# Patient Record
Sex: MALE | Race: ASIAN | NOT HISPANIC OR LATINO | Employment: OTHER | URBAN - METROPOLITAN AREA
[De-identification: names, ages, dates, MRNs, and addresses within clinical notes are randomized per-mention and may not be internally consistent; named-entity substitution may affect disease eponyms.]

---

## 2017-01-31 ENCOUNTER — CARE COORDINATION (OUTPATIENT)
Dept: GERIATRIC MEDICINE | Facility: CLINIC | Age: 67
End: 2017-01-31

## 2017-02-27 ENCOUNTER — CARE COORDINATION (OUTPATIENT)
Dept: GERIATRIC MEDICINE | Facility: CLINIC | Age: 67
End: 2017-02-27

## 2017-02-27 NOTE — PROGRESS NOTES
2/15/17: Cm placed call to member for 6 mo call. No answer - unable to leave .      Viki Avalos RN, PHN  [facility]

## 2017-02-27 NOTE — PROGRESS NOTES
1/31/17: TAN called member with Italian  for 6 mo call.  No answer.     Viki Avalos RN, N  [facility]

## 2017-02-27 NOTE — PROGRESS NOTES
"Per CM, mailed client an \"Unable to Contact\" letter.    Andie Meyer  Case Management Specialist   [facility]   [phone]    "

## 2017-02-27 NOTE — PROGRESS NOTES
2/27/17: TAN placed call for 6 mo call with Alber Shea  with [employer] line.  No answer - no voice mail set up. Will send [facility] letter.     Viki Avalos RN, N  [facility]

## 2017-03-07 ENCOUNTER — CARE COORDINATION (OUTPATIENT)
Dept: GERIATRIC MEDICINE | Facility: CLINIC | Age: 67
End: 2017-03-07

## 2017-03-07 NOTE — PROGRESS NOTES
3/7/17: Received call back from member and son, Noe, in response to 6 mo [facility] letter.   Member is doing well and has no needs - he is independent per son and can care for himself.    [facility] Six-Month Telephone Assessment    6 month telephone assessment completed on 3/7/17.    ER visits: No  Hospitalizations: No  TCU stays: No  Significant health status changes: None  Falls/Injuries: No  ADL/IADL changes: No  Changes in services: No    Caregiver Assessment follow up:  N/A    Goals: See POC in chart for goal progress documentation.      Will see client in 6 months for an annual health risk assessment.   Encouraged client to call CM with any questions or concerns in the meantime.     Viki Avalos RN, PHN  [facility]

## 2017-04-17 DIAGNOSIS — E11.9 TYPE 2 DIABETES MELLITUS WITH HEMOGLOBIN A1C GOAL OF LESS THAN 8.0% (H): ICD-10-CM

## 2017-04-17 NOTE — TELEPHONE ENCOUNTER
Routing refill request to provider for review/approval because:  Labs out of range:  Creatinine and GFR  Xin Marie RN

## 2017-04-17 NOTE — TELEPHONE ENCOUNTER
metFORMIN (GLUCOPHAGE) 500 MG tablet         Last Written Prescription Date: 03/02/16  Last Fill Quantity: 180, # refills: 3  Last Office Visit with [facility], [facility] or  Health prescribing provider:  11/16/16   Next 5 appointments (look out 90 days)     May 16, 2017  3:20 PM CDT   [facility] Visit with Kimberly Bell MD   [facility]    [address] [place] [id number] [phone]                   BP Readings from Last 3 Encounters:   11/16/16 112/65   03/02/16 120/78   02/25/16 139/77     Lab Results   Component Value Date    MICROL 41 11/16/2016     Lab Results   Component Value Date    UMALCR 9.74 11/16/2016     Creatinine   Date Value Ref Range Status   11/16/2016 1.40 (H) 0.66 - 1.25 mg/dL Final   ]  GFR Estimate   Date Value Ref Range Status   11/16/2016 50 (L) >60 mL/min/1.7m2 Final     Comment:     Non  GFR Calc   11/05/2015 70 >60 mL/min/1.7m2 Final     Comment:     Non  GFR Calc   06/15/2015 67 >60 mL/min/1.7m2 Final     Comment:     Non  GFR Calc     GFR Estimate If Black   Date Value Ref Range Status   11/16/2016 61 >60 mL/min/1.7m2 Final     Comment:      GFR Calc   11/05/2015 84 >60 mL/min/1.7m2 Final     Comment:      GFR Calc   06/15/2015 82 >60 mL/min/1.7m2 Final     Comment:      GFR Calc     Lab Results   Component Value Date    CHOL 121 11/05/2015     Lab Results   Component Value Date    HDL 41 11/05/2015     Lab Results   Component Value Date    LDL 91 11/16/2016    LDL 67 11/05/2015     Lab Results   Component Value Date    TRIG 63 11/05/2015     Lab Results   Component Value Date    CHOLHDLRATIO 3.0 11/05/2015     Lab Results   Component Value Date    AST 35 01/14/2014     Lab Results   Component Value Date    ALT 52 01/14/2014     Lab Results   Component Value Date    A1C 7.4 11/16/2016    A1C 6.3 02/23/2016    A1C 6.5 11/05/2015     A1C 6.9 06/15/2015    A1C 6.2 02/09/2015     Potassium   Date Value Ref Range Status   11/16/2016 4.0 3.4 - 5.3 mmol/L Final         [facility]

## 2017-04-21 ENCOUNTER — TRANSFERRED RECORDS (OUTPATIENT)
Dept: HEALTH INFORMATION MANAGEMENT | Facility: CLINIC | Age: 67
End: 2017-04-21

## 2017-05-09 ENCOUNTER — RADIANT APPOINTMENT (OUTPATIENT)
Dept: GENERAL RADIOLOGY | Facility: CLINIC | Age: 67
End: 2017-05-09
Payer: COMMERCIAL

## 2017-05-09 ENCOUNTER — OFFICE VISIT (OUTPATIENT)
Dept: FAMILY MEDICINE | Facility: CLINIC | Age: 67
End: 2017-05-09
Payer: COMMERCIAL

## 2017-05-09 VITALS
HEART RATE: 62 BPM | OXYGEN SATURATION: 97 % | BODY MASS INDEX: 28.17 KG/M2 | WEIGHT: 165 LBS | TEMPERATURE: 97.1 F | DIASTOLIC BLOOD PRESSURE: 79 MMHG | HEIGHT: 64 IN | SYSTOLIC BLOOD PRESSURE: 137 MMHG

## 2017-05-09 DIAGNOSIS — M62.838 TRAPEZIUS MUSCLE SPASM: ICD-10-CM
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
## 2017-05-09 DIAGNOSIS — I10 HTN, GOAL BELOW 150/90: ICD-10-CM

## 2017-05-09 DIAGNOSIS — Z13.6 SCREENING FOR AAA (ABDOMINAL AORTIC ANEURYSM): ICD-10-CM

## 2017-05-09 DIAGNOSIS — E11.49 TYPE 2 DIABETES MELLITUS WITH NEUROLOGICAL MANIFESTATIONS, CONTROLLED (H): ICD-10-CM
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
## 2017-05-09 DIAGNOSIS — M54.2 CERVICALGIA: ICD-10-CM

## 2017-05-09 DIAGNOSIS — M54.50 ACUTE BILATERAL LOW BACK PAIN WITHOUT SCIATICA: ICD-10-CM

## 2017-05-09 LAB
ANION GAP SERPL CALCULATED.3IONS-SCNC: 5 MMOL/L (ref 3–14)
BUN SERPL-MCNC: 20 MG/DL (ref 7–30)
CALCIUM SERPL-MCNC: 9.5 MG/DL (ref 8.5–10.1)
CHLORIDE SERPL-SCNC: 105 MMOL/L (ref 94–109)
CO2 SERPL-SCNC: 30 MMOL/L (ref 20–32)
CREAT SERPL-MCNC: 1.19 MG/DL (ref 0.66–1.25)
GFR SERPL CREATININE-BSD FRML MDRD: 61 ML/MIN/1.7M2
GLUCOSE SERPL-MCNC: 89 MG/DL (ref 70–99)
HBA1C MFR BLD: 8.1 % (ref 4.3–6)
POTASSIUM SERPL-SCNC: 4.1 MMOL/L (ref 3.4–5.3)
SODIUM SERPL-SCNC: 140 MMOL/L (ref 133–144)

## 2017-05-09 PROCEDURE — 99214 OFFICE O/P EST MOD 30 MIN: CPT | Performed by: PREVENTIVE MEDICINE

## 2017-05-09 PROCEDURE — 36415 COLL VENOUS BLD VENIPUNCTURE: CPT | Performed by: PREVENTIVE MEDICINE

## 2017-05-09 PROCEDURE — 99213 OFFICE O/P EST LOW 20 MIN: CPT | Performed by: PREVENTIVE MEDICINE

## 2017-05-09 PROCEDURE — 80048 BASIC METABOLIC PNL TOTAL CA: CPT | Performed by: PREVENTIVE MEDICINE

## 2017-05-09 PROCEDURE — 72100 X-RAY EXAM L-S SPINE 2/3 VWS: CPT

## 2017-05-09 PROCEDURE — 83036 HEMOGLOBIN GLYCOSYLATED A1C: CPT | Performed by: PREVENTIVE MEDICINE

## 2017-05-09 PROCEDURE — 72040 X-RAY EXAM NECK SPINE 2-3 VW: CPT

## 2017-05-09 RX ORDER — METHOCARBAMOL 500 MG/1
1000 TABLET, FILM COATED ORAL 3 TIMES DAILY PRN
Qty: 30 TABLET | Refills: 0 | Status: SHIPPED | OUTPATIENT
Start: 2017-05-09 | End: 2018-01-17

## 2017-05-09 ASSESSMENT — PAIN SCALES - GENERAL: PAINLEVEL: MODERATE PAIN (5)
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
## 2017-05-09 NOTE — PROGRESS NOTES
Please send a letter:    Dear Wolf Sen,    X rays of the low back did not show any acute changes. Mild arthritis was seen. Plan of care and follow up as discussed in clinic.     Regards,    Kimberly Bell MD MPH

## 2017-05-09 NOTE — PATIENT INSTRUCTIONS
At [facility], we strive to deliver an exceptional experience to you, every time we see you.    If you receive a survey in the mail, please send us back your thoughts. We really do value your feedback.    Thank you for visiting [facility]    Normal or non-critical lab and imaging results will be communicated to you by MyChart, letter or phone within 7 days.  If you do not hear from us within 10 days, please call the clinic. If you have a critical or abnormal lab result, we will notify you by phone as soon as possible.     If you have any questions regarding your visit please contact:     Team [employer]  Clinic Hours Telephone Number   Dr. Wilbur Nguyen   7am-7pm  Monday through Thursday  7am-5pm Friday [phone]  Uma HERRING RN   Pharmacy 8:30am-9pm Monday-Friday    9am-5pm Saturday-Sunday [phone]   Urgent Care 11am-9pm Monday-Friday        9am-5pm Saturday-Sunday [phone]     After hours, weekend or if you need to make an appointment with your primary provider please call [phone].   After Hours nurse advise: call [facility]: [phone]    Use [employer] (secure email communication and access to your chart) to send your primary care provider a message or make an appointment. Ask someone on your Team how to sign up for [employer]. To log on to [employer] or for more information in [place] please visit the website at [email or web address].   As of October 8, 2013, all password changes, disabled accounts, or ID changes in [employer]/MyHealth will be done by our Access Services Department.   If you need help with your account or password, call: [phone]. Clinic staff no longer has the ability to change passwords.

## 2017-05-09 NOTE — MR AVS SNAPSHOT
After Visit Summary   5/9/2017    Wolf Sen    MRN: [id number]           Patient Information     Date Of Birth          1950        Visit Information        Provider Department      5/9/2017 11:30 AM Kimberly Bell MD; [employer] [facility]        Today's Diagnoses     Type 2 diabetes mellitus with neurological manifestations, controlled (H)    -  1    HTN, goal below 150/90        Screening for AAA (abdominal aortic aneurysm)        Cervicalgia        Acute bilateral low back pain without sciatica          Care Instructions    At [facility], we strive to deliver an exceptional experience to you, every time we see you.    If you receive a survey in the mail, please send us back your thoughts. We really do value your feedback.    Thank you for visiting [facility]    Normal or non-critical lab and imaging results will be communicated to you by MyChart, letter or phone within 7 days.  If you do not hear from us within 10 days, please call the clinic. If you have a critical or abnormal lab result, we will notify you by phone as soon as possible.     If you have any questions regarding your visit please contact:     Team Sue/Spirit  Clinic Hours Telephone Number   Dr. Wilbur Nguyen   7am-7pm  Monday through Thursday  7am-5pm Friday [phone]  Uma HERRING RN   Pharmacy 8:30am-9pm Monday-Friday    9am-5pm Saturday-Sunday [phone]   Urgent Care 11am-9pm Monday-Friday        9am-5pm Saturday-Sunday [phone]     After hours, weekend or if you need to make an appointment with your primary provider please call [phone].   After Hours nurse advise: call [facility]: [phone]    Use Pingpigeon (secure email communication and access to your chart) to send your primary care provider a message  or make an appointment. Ask someone on your Team how to sign up for [employer]. To log on to SnappCloud or for more information in Dinomarket please visit the website at [email or web address].   As of October 8, 2013, all password changes, disabled accounts, or ID changes in [employer]/MyHealth will be done by our Access Services Department.   If you need help with your account or password, call: [phone]. Clinic staff no longer has the ability to change passwords.           Follow-ups after your visit        Your next 10 appointments already scheduled     May 16, 2017  3:15 PM CDT   [facility] Kimberly Bell MD   [facility]    [address] [place] [id number] [phone]           Bring a current list of meds and any records pertaining to this visit.  For Physicals, please bring immunization records and any forms needing to be filled out.  Please arrive 10 minutes early to complete paperwork.              Who to contact     If you have questions or need follow up information about today's clinic visit or your schedule please contact [facility] directly at [phone].  Normal or non-critical lab and imaging results will be communicated to you by NovoEDhart, letter or phone within 4 business days after the clinic has received the results. If you do not hear from us within 7 days, please contact the clinic through [employer]ne[employer] System[employer] or phone. If you have a critical or abnormal lab result, we will notify you by phone as soon as possible.  Submit refill requests through [employer] or call your pharmacy and they will forward the refill request to us. Please allow 3 business days for your refill to be completed.          Additional Information About Your Visit        [employer] Information     [employer] lets you send messages to your doctor, view your test results, renew your prescriptions, schedule appointments and more. To sign up, go to  [email or web address] . Click on \"Log in\" on the left side of the screen, which will take you to the Welcome page. Then click on \"Sign up Now\" on the right side of the page.     You will be asked to enter the access code listed below, as well as some personal information. Please follow the directions to create your username and password.     Your access code is: [email or web address]  Expires: 2017 12:12 PM     Your access code will  in 90 days. If you need help or a new code, please call your [facility] clinic or [phone].        Care EveryWhere ID     This is your Care EveryWhere ID. This could be used by other organizations to access your [facility] medical records  [id number]        Your Vitals Were     Pulse Temperature Height Pulse Oximetry BMI (Body Mass Index)       62 97.1  F (36.2  C) (Oral) 5' 4\" (1.626 m) 97% 28.32 kg/m2        Blood Pressure from Last 3 Encounters:   17 137/79   16 112/65   16 120/78    Weight from Last 3 Encounters:   17 165 lb (74.8 kg)   16 166 lb (75.3 kg)   16 160 lb (72.6 kg)              We Performed the Following     Basic metabolic panel     HEMOGLOBIN A1C        Primary Care Provider Office Phone # Fax #    Kimberly Bell [phone]       [facility] [address]  [facility] [address]        Thank you!     Thank you for choosing [facility]  for your care. Our goal is always to provide you with excellent care. Hearing back from our patients is one way we can continue to improve our services. Please take a few minutes to complete the written survey that you may receive in the mail after your visit with us. Thank you!             Your Updated Medication List - Protect others around you: Learn how to safely use, store and throw away your medicines at www.disposemymeds.org.          This list is accurate as of: 17 12:12 PM.  Always use your most recent med list.                   " Brand Name Dispense Instructions for use    amLODIPine 10 MG tablet    NORVASC    90 tablet    Take 1 tablet (10 mg) by mouth daily       ASPIRIN PO      Take 81 mg by mouth.       atorvastatin 40 MG tablet    LIPITOR    90 tablet    TAKE 1 TABLET BY MOUTH EVERY DAY       blood glucose monitoring test strip    EMIL CONTOUR NEXT    100 each    USE TO TEST BLOOD SUGARS 3 TIMES DAILY OR AS DIRECTED.       insulin aspart 100 UNIT/ML injection    NovoLOG FLEXPEN    15 mL    INJECT 5 UNITS UNDER THE SKIN THREE TIMES DAILY WITH MEALS       insulin glargine 100 UNIT/ML injection    LANTUS SOLOSTAR    3 Month    10 units in the evening       insulin pen needle 31G X 8 MM    B-D U/F    100 each    USE AS DIRECTED THREE TIMES DAILY       ketotifen 0.025 % Soln ophthalmic solution    ZADITOR    1 Bottle    Place 1 drop into both eyes 2 times daily as needed for itching       lisinopril 40 MG tablet    PRINIVIL/ZESTRIL    90 tablet    TAKE 1 TABLET BY MOUTH EVERY DAY       metFORMIN 500 MG tablet    GLUCOPHAGE    180 tablet    TAKE 1 TABLET BY MOUTH TWICE DAILY WITH MEALS       metoprolol 100 MG tablet    LOPRESSOR    180 tablet    TAKE 1 TABLET BY MOUTH TWICE DAILY       moxifloxacin 0.5 % ophthalmic solution    VIGAMOX    1 Bottle    Place 1 drop into the right eye 3 times daily       multivitamin, therapeutic with minerals Tabs tablet     100 tablet    Take 1 tablet by mouth daily       order for DME     1 Units    Equipment being ordered: Rolling walker with seat       order for DME     3 Month    Equipment being ordered: Lancets for use with Lantus pen (once at night) and Novolog Flex Pen (three times a day).  Dispense 3 month supply with one refill       SEROQUEL  MG 24 hr tablet   Generic drug:  QUEtiapine

## 2017-05-16 ENCOUNTER — OFFICE VISIT (OUTPATIENT)
Dept: FAMILY MEDICINE | Facility: CLINIC | Age: 67
End: 2017-05-16
Payer: COMMERCIAL

## 2017-05-16 VITALS
TEMPERATURE: 97.6 F | WEIGHT: 165 LBS | SYSTOLIC BLOOD PRESSURE: 125 MMHG | HEART RATE: 60 BPM | DIASTOLIC BLOOD PRESSURE: 73 MMHG | BODY MASS INDEX: 28.17 KG/M2 | HEIGHT: 64 IN | OXYGEN SATURATION: 96 %

## 2017-05-16 DIAGNOSIS — E11.65 TYPE 2 DIABETES MELLITUS WITH HYPERGLYCEMIA, WITH LONG-TERM CURRENT USE OF INSULIN (H): ICD-10-CM

## 2017-05-16 DIAGNOSIS — E11.9 TYPE 2 DIABETES MELLITUS WITH HEMOGLOBIN A1C GOAL OF LESS THAN 8.0% (H): ICD-10-CM

## 2017-05-16 DIAGNOSIS — E78.5 HYPERLIPIDEMIA LDL GOAL <70: ICD-10-CM

## 2017-05-16 DIAGNOSIS — I10 HYPERTENSION GOAL BP (BLOOD PRESSURE) < 150/90: ICD-10-CM

## 2017-05-16 DIAGNOSIS — Z79.4 TYPE 2 DIABETES MELLITUS WITH HYPERGLYCEMIA, WITH LONG-TERM CURRENT USE OF INSULIN (H): ICD-10-CM

## 2017-05-16 DIAGNOSIS — Z13.89 SCREENING FOR DIABETIC PERIPHERAL NEUROPATHY: ICD-10-CM

## 2017-05-16 DIAGNOSIS — E11.49 TYPE 2 DIABETES MELLITUS WITH NEUROLOGICAL MANIFESTATIONS, CONTROLLED (H): Primary | ICD-10-CM

## 2017-05-16 PROCEDURE — 99214 OFFICE O/P EST MOD 30 MIN: CPT | Performed by: PREVENTIVE MEDICINE

## 2017-05-16 PROCEDURE — 99207 C FOOT EXAM  NO CHARGE: CPT | Performed by: PREVENTIVE MEDICINE

## 2017-05-16 RX ORDER — METOPROLOL TARTRATE 100 MG
100 TABLET ORAL 2 TIMES DAILY
Qty: 180 TABLET | Refills: 1 | Status: SHIPPED | OUTPATIENT
Start: 2017-05-16 | End: 2017-09-26

## 2017-05-16 ASSESSMENT — PAIN SCALES - GENERAL: PAINLEVEL: NO PAIN (0)

## 2017-05-16 NOTE — PATIENT INSTRUCTIONS
At [facility], we strive to deliver an exceptional experience to you, every time we see you.    If you receive a survey in the mail, please send us back your thoughts. We really do value your feedback.    Thank you for visiting [facility]    Normal or non-critical lab and imaging results will be communicated to you by MyChart, letter or phone within 7 days.  If you do not hear from us within 10 days, please call the clinic. If you have a critical or abnormal lab result, we will notify you by phone as soon as possible.     If you have any questions regarding your visit please contact:     Team Sue/Spirit  Clinic Hours Telephone Number   Dr. Wilbur Nguyen   7am-7pm  Monday through Thursday  7am-5pm Friday [phone]  Uma HERRING RN   Pharmacy 8:30am-9pm Monday-Friday    9am-5pm Saturday-Sunday [phone]   Urgent Care 11am-9pm Monday-Friday        9am-5pm Saturday-Sunday [phone]     After hours, weekend or if you need to make an appointment with your primary provider please call [phone].   After Hours nurse advise: call [place] [facility]: [phone]    Use [employer] Management[employer] (secure email communication and access to your chart) to send your primary care provider a message or make an appointment. Ask someone on your Team how to sign up for [employer]ry[employer]. To log on to VideoBurst or for more information in wildcraft please visit the website at [email or web address].   As of October 8, 2013, all password changes, disabled accounts, or ID changes in MerryMarry/MyHealth will be done by our Access Services Department.   If you need help with your account or password, call: [phone]. Clinic staff no longer has the ability to change passwords.
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
## 2017-05-16 NOTE — MR AVS SNAPSHOT
After Visit Summary   5/16/2017    Wolf Sen    MRN: [id number]           Patient Information     Date Of Birth          1950        Visit Information        Provider Department      5/16/2017 3:15 PM Kimberly Bell MD; [employer] [facility]        Today's Diagnoses     Screening for diabetic peripheral neuropathy        Need for prophylactic vaccination against Streptococcus pneumoniae (pneumococcus)        Hypertension goal BP (blood pressure) < 150/90        Type 2 diabetes mellitus with hemoglobin A1c goal of less than 8.0% (H)          Care Instructions    At [facility], we strive to deliver an exceptional experience to you, every time we see you.    If you receive a survey in the mail, please send us back your thoughts. We really do value your feedback.    Thank you for visiting [facility]    Normal or non-critical lab and imaging results will be communicated to you by MyChart, letter or phone within 7 days.  If you do not hear from us within 10 days, please call the clinic. If you have a critical or abnormal lab result, we will notify you by phone as soon as possible.     If you have any questions regarding your visit please contact:     Team Sue[employer]Spirit  Clinic Hours Telephone Number   Dr. Wilbur Nguyen   7am-7pm  Monday through Thursday  7am-5pm Friday [phone]  Uma HERRING RN   Pharmacy 8:30am-9pm Monday-Friday    9am-5pm Saturday-Sunday [phone]   Urgent Care 11am-9pm Monday-Friday        9am-5pm Saturday-Sunday [phone]     After hours, weekend or if you need to make an appointment with your primary provider please call [phone].   After Hours nurse advise: call [facility]: [phone]    Use e[employer]ise (secure email communication and access to your chart) to send your  "primary care provider a message or make an appointment. Ask someone on your Team how to sign up for [employer]. To log on to [employer] or for more information in [employer] please visit the website at [email or web address].   As of 2013, all password changes, disabled accounts, or ID changes in [employer]/MyHealth will be done by our Access Services Department.   If you need help with your account or password, call: [phone]. Clinic staff no longer has the ability to change passwords.           Follow-ups after your visit        Who to contact     If you have questions or need follow up information about today's clinic visit or your schedule please contact [facility] directly at [phone].  Normal or non-critical lab and imaging results will be communicated to you by [employer], letter or phone within 4 business days after the clinic has received the results. If you do not hear from us within 7 days, please contact the clinic through [employer] or phone. If you have a critical or abnormal lab result, we will notify you by phone as soon as possible.  Submit refill requests through [employer] or call your pharmacy and they will forward the refill request to us. Please allow 3 business days for your refill to be completed.          Additional Information About Your Visit        [employer] Information     [employer] lets you send messages to your doctor, view your test results, renew your prescriptions, schedule appointments and more. To sign up, go to [email or web address] . Click on \"Log in\" on the left side of the screen, which will take you to the Welcome page. Then click on \"Sign up Now\" on the right side of the page.     You will be asked to enter the access code listed below, as well as some personal information. Please follow the directions to create your username and password.     Your access code is: [email or web address]  Expires: 2017 12:12 PM     Your access code will  in 90 days. If you " "need help or a new code, please call your [place] clinic or [phone].        Care EveryWhere ID     This is your Care EveryWhere ID. This could be used by other organizations to access your [place] medical records  [id number]        Your Vitals Were     Pulse Temperature Height Pulse Oximetry BMI (Body Mass Index)       60 97.6  F (36.4  C) 5' 4\" (1.626 m) 96% 28.32 kg/m2        Blood Pressure from Last 3 Encounters:   05/16/17 125/73   05/09/17 137/79   11/16/16 112/65    Weight from Last 3 Encounters:   05/16/17 165 lb (74.8 kg)   05/09/17 165 lb (74.8 kg)   11/16/16 166 lb (75.3 kg)              We Performed the Following     FOOT EXAM  NO CHARGE [61712.114]          Today's Medication Changes          These changes are accurate as of: 5/16/17  3:43 PM.  If you have any questions, ask your nurse or doctor.               These medicines have changed or have updated prescriptions.        Dose/Directions    metFORMIN 500 MG tablet   Commonly known as:  GLUCOPHAGE   This may have changed:  See the new instructions.   Used for:  Type 2 diabetes mellitus with hemoglobin A1c goal of less than 8.0% (H)   Changed by:  Kimberly Bell MD        Dose:  1000 mg   Take 2 tablets (1,000 mg) by mouth 2 times daily (with meals)   Quantity:  180 tablet   Refills:  0       metoprolol 100 MG tablet   Commonly known as:  LOPRESSOR   This may have changed:  See the new instructions.   Used for:  Hypertension goal BP (blood pressure) < 150/90   Changed by:  Kimberly Bell MD        Dose:  100 mg   Take 1 tablet (100 mg) by mouth 2 times daily   Quantity:  180 tablet   Refills:  1            Where to get your medicines      These medications were sent to [facility] - [place], [place] - [address] AT [facility] [address], [facility] [address]     Phone:  [phone]     metoprolol 100 MG tablet                Primary Care Provider Office Phone # Fax #    Kimberly Bell MD " [phone]       [facility] [address]  [facility] [address]        Thank you!     Thank you for choosing [facility]  for your care. Our goal is always to provide you with excellent care. Hearing back from our patients is one way we can continue to improve our services. Please take a few minutes to complete the written survey that you may receive in the mail after your visit with us. Thank you!             Your Updated Medication List - Protect others around you: Learn how to safely use, store and throw away your medicines at www.disposemymeds.org.          This list is accurate as of: 5/16/17  3:43 PM.  Always use your most recent med list.                   Brand Name Dispense Instructions for use    amLODIPine 10 MG tablet    NORVASC    90 tablet    Take 1 tablet (10 mg) by mouth daily       ASPIRIN PO      Take 81 mg by mouth.       atorvastatin 40 MG tablet    LIPITOR    90 tablet    TAKE 1 TABLET BY MOUTH EVERY DAY       blood glucose monitoring test strip    EMIL CONTOUR NEXT    100 each    USE TO TEST BLOOD SUGARS 3 TIMES DAILY OR AS DIRECTED.       insulin aspart 100 UNIT/ML injection    NovoLOG FLEXPEN    15 mL    INJECT 5 UNITS UNDER THE SKIN THREE TIMES DAILY WITH MEALS       insulin glargine 100 UNIT/ML injection    LANTUS SOLOSTAR    3 Month    10 units in the evening       insulin pen needle 31G X 8 MM    B-D U/F    100 each    USE AS DIRECTED THREE TIMES DAILY       ketotifen 0.025 % Soln ophthalmic solution    ZADITOR    1 Bottle    Place 1 drop into both eyes 2 times daily as needed for itching       lisinopril 40 MG tablet    PRINIVIL/ZESTRIL    90 tablet    TAKE 1 TABLET BY MOUTH EVERY DAY       metFORMIN 500 MG tablet    GLUCOPHAGE    180 tablet    Take 2 tablets (1,000 mg) by mouth 2 times daily (with meals)       methocarbamol 500 MG tablet    ROBAXIN    30 tablet    Take 2 tablets (1,000 mg) by mouth 3 times daily as needed for muscle  spasms       metoprolol 100 MG tablet    LOPRESSOR    180 tablet    Take 1 tablet (100 mg) by mouth 2 times daily       moxifloxacin 0.5 % ophthalmic solution    VIGAMOX    1 Bottle    Place 1 drop into the right eye 3 times daily       multivitamin, therapeutic with minerals Tabs tablet     100 tablet    Take 1 tablet by mouth daily       order for DME     1 Units    Equipment being ordered: Rolling walker with seat       order for DME     3 Month    Equipment being ordered: Lancets for use with Lantus pen (once at night) and Novolog Flex Pen (three times a day).  Dispense 3 month supply with one refill       SEROQUEL  MG 24 hr tablet   Generic drug:  QUEtiapine

## 2017-05-19 ENCOUNTER — RADIANT APPOINTMENT (OUTPATIENT)
Dept: ULTRASOUND IMAGING | Facility: CLINIC | Age: 67
End: 2017-05-19
Payer: COMMERCIAL

## 2017-05-19 DIAGNOSIS — Z13.6 SCREENING FOR AAA (ABDOMINAL AORTIC ANEURYSM): ICD-10-CM

## 2017-05-19 PROCEDURE — 76775 US EXAM ABDO BACK WALL LIM: CPT

## 2017-05-19 NOTE — LETTER
[facility]   [address]  [place] [address]      May 22, 2017      Wolf Sen  [address]  [place] [address]            Dear Wolf Sen,    Ultrasound of the abdomen did not show any abnormalities of the aorta. Plan of care and follow up as discussed in clinic. Please let me know if you have any questions and thank you for choosing [facility].    Regards,    Kimberly Bell MD MPH/ak        Results for orders placed or performed in visit on 05/19/17   US abdominal aorta limited    Narrative    US ABDOMINAL AORTA LIMITED 5/19/2017 11:10 AM     HISTORY: Encounter for screening for cardiovascular disorders.     FINDINGS: The maximal transverse diameter within the visualized  portions of the proximal, mid, and distal portions of the abdominal  aorta are 1.8 cm, 1.6 cm, and 1.7 cm respectively.      Impression    IMPRESSION: Negative for AAA.    MARK PIERCE MD

## 2017-05-22 NOTE — PROGRESS NOTES
Please send a letter:    Dear Wolf Sen,    Ultrasound of the abdomen did not show any abnormalities of the aorta. Plan of care and follow up as discussed in clinic. Please let me know if you have any questions and thank you for choosing [facility].    Regards,    Kimberly Bell MD MPH

## 2017-06-18 DIAGNOSIS — I10 HTN, GOAL BELOW 150/90: ICD-10-CM

## 2017-06-18 NOTE — TELEPHONE ENCOUNTER
lisinopril (PRINIVIL/ZESTRIL) 40 MG tablet      Last Written Prescription Date: 3/21/17  Last Fill Quantity: 90, # refills: 0  Last Office Visit with G, P or [facility] prescribing provider: 5/16/17       Potassium   Date Value Ref Range Status   05/09/2017 4.1 3.4 - 5.3 mmol/L Final     Creatinine   Date Value Ref Range Status   05/09/2017 1.19 0.66 - 1.25 mg/dL Final     BP Readings from Last 3 Encounters:   05/16/17 125/73   05/09/17 137/79   11/16/16 112/65         Melodie Garcia  [facility]

## 2017-06-20 RX ORDER — LISINOPRIL 40 MG/1
TABLET ORAL
Qty: 90 TABLET | Refills: 1 | Status: SHIPPED | OUTPATIENT
Start: 2017-06-20 | End: 2017-09-26

## 2017-06-20 NOTE — TELEPHONE ENCOUNTER
Prescription approved per [facility] Refill Protocol.  Xin Marie RN

## 2017-06-30 ENCOUNTER — CARE COORDINATION (OUTPATIENT)
Dept: GERIATRIC MEDICINE | Facility: CLINIC | Age: 67
End: 2017-06-30

## 2017-06-30 DIAGNOSIS — Z76.89 HEALTH CARE HOME: ICD-10-CM

## 2017-06-30 DIAGNOSIS — Z71.89 ADVANCE CARE PLANNING: Chronic | ICD-10-CM

## 2017-06-30 NOTE — PROGRESS NOTES
"Per CC, mailed client a \"Refusal of Home Visit\" letter.  MMIS entry.      Leora Dasilva  Case Management Specialist  [facility]   [phone]      "

## 2017-06-30 NOTE — PROGRESS NOTES
6/30/17:  CM placed call to member with Spanish  via [employer] to discuss annual HRA visit. Member asked CM to call back as at appt.       Member called CM back - made conference call with Spanish  via [employer].    Member states he is doing well and declined need for CM assistance/visit at this time.  Member stats he lives with family.  He states he is able to cook and clean for self and is independent.  He also drives short distances.  He is able to manage his medications, checks BS and manages all dr appts on his own.   He exercises daily.   He states he has CM contact information and will contact CM for any needs.  CMS instructed to enter refusal of visit into MMIS and mail refusal of visit letter to client.     Follow-Up Plan:  CM to f/u with client with a 6 month telephone assessment.  Contact information shared with client and family, encouraged client to call with any questions or concerns.    Viki Avalos RN, PHN  [employer]

## 2017-07-14 DIAGNOSIS — E11.9 TYPE 2 DIABETES MELLITUS WITH HEMOGLOBIN A1C GOAL OF LESS THAN 8.0% (H): ICD-10-CM

## 2017-07-14 DIAGNOSIS — I10 HYPERTENSION, GOAL BELOW 150/90: ICD-10-CM

## 2017-07-15 NOTE — TELEPHONE ENCOUNTER
metFORMIN (GLUCOPHAGE) 500 MG tablet         Last Written Prescription Date: 5/16/17  Last Fill Quantity: 180, # refills: 0  Last Office Visit with [facility], [facility] or [facility] prescribing provider:  5/16/17        BP Readings from Last 3 Encounters:   05/16/17 125/73   05/09/17 137/79   11/16/16 112/65     Lab Results   Component Value Date    MICROL 41 11/16/2016     Lab Results   Component Value Date    UMALCR 9.74 11/16/2016     Creatinine   Date Value Ref Range Status   05/09/2017 1.19 0.66 - 1.25 mg/dL Final   ]  GFR Estimate   Date Value Ref Range Status   05/09/2017 61 >60 mL/min/1.7m2 Final     Comment:     Non  GFR Calc   11/16/2016 50 (L) >60 mL/min/1.7m2 Final     Comment:     Non  GFR Calc   11/05/2015 70 >60 mL/min/1.7m2 Final     Comment:     Non  GFR Calc     GFR Estimate If Black   Date Value Ref Range Status   05/09/2017 73 >60 mL/min/1.7m2 Final     Comment:      GFR Calc   11/16/2016 61 >60 mL/min/1.7m2 Final     Comment:      GFR Calc   11/05/2015 84 >60 mL/min/1.7m2 Final     Comment:      GFR Calc     Lab Results   Component Value Date    CHOL 121 11/05/2015     Lab Results   Component Value Date    HDL 41 11/05/2015     Lab Results   Component Value Date    LDL 91 11/16/2016    LDL 67 11/05/2015     Lab Results   Component Value Date    TRIG 63 11/05/2015     Lab Results   Component Value Date    CHOLHDLRATIO 3.0 11/05/2015     Lab Results   Component Value Date    AST 35 01/14/2014     Lab Results   Component Value Date    ALT 52 01/14/2014     Lab Results   Component Value Date    A1C 8.1 05/09/2017    A1C 7.4 11/16/2016    A1C 6.3 02/23/2016    A1C 6.5 11/05/2015    A1C 6.9 06/15/2015     Potassium   Date Value Ref Range Status   05/09/2017 4.1 3.4 - 5.3 mmol/L Final       amLODIPine (NORVASC) 10 MG tablet      Last Written Prescription Date: 11/16/16  Last Fill Quantity: 90, # refills: 1    Last Office  Visit with [facility], [facility] or [facility] prescribing provider:  5/16/17   Future Office Visit:        BP Readings from Last 3 Encounters:   05/16/17 125/73   05/09/17 137/79   11/16/16 112/65           Timi Faarax  [facility]

## 2017-07-17 RX ORDER — AMLODIPINE BESYLATE 10 MG/1
TABLET ORAL
Qty: 90 TABLET | Refills: 0 | Status: SHIPPED | OUTPATIENT
Start: 2017-07-17 | End: 2017-09-26

## 2017-07-17 NOTE — TELEPHONE ENCOUNTER
Norvasc prescription approved per [facility] Refill [facility].    Metformin medication is being filled for 1 time refill only due to:  Patient needs labs A1C.    Xin Marie RN

## 2017-08-21 ENCOUNTER — OFFICE VISIT (OUTPATIENT)
Dept: OPHTHALMOLOGY | Facility: CLINIC | Age: 67
End: 2017-08-21
Payer: COMMERCIAL

## 2017-08-21 DIAGNOSIS — H26.493 PCO (POSTERIOR CAPSULAR OPACIFICATION), BILATERAL: ICD-10-CM

## 2017-08-21 DIAGNOSIS — H02.836 DERMATOCHALASIS OF EYELIDS OF BOTH EYES: ICD-10-CM

## 2017-08-21 DIAGNOSIS — Z98.890 S/P YAG CAPSULOTOMY, LEFT: ICD-10-CM

## 2017-08-21 DIAGNOSIS — Z96.1 PSEUDOPHAKIA: Primary | ICD-10-CM

## 2017-08-21 DIAGNOSIS — H02.833 DERMATOCHALASIS OF EYELIDS OF BOTH EYES: ICD-10-CM

## 2017-08-21 PROCEDURE — 92015 DETERMINE REFRACTIVE STATE: CPT | Performed by: OPHTHALMOLOGY

## 2017-08-21 PROCEDURE — 92014 COMPRE OPH EXAM EST PT 1/>: CPT | Performed by: OPHTHALMOLOGY

## 2017-08-21 RX ORDER — PREDNISOLONE ACETATE 10 MG/ML
1 SUSPENSION/ DROPS OPHTHALMIC 4 TIMES DAILY
Qty: 2 ML | Refills: 0 | Status: SHIPPED | OUTPATIENT
Start: 2017-08-21 | End: 2017-08-28

## 2017-08-21 ASSESSMENT — TONOMETRY
OD_IOP_MMHG: 17
OS_IOP_MMHG: 14
IOP_METHOD: TONOPEN

## 2017-08-21 ASSESSMENT — CUP TO DISC RATIO
OD_RATIO: 0.5
OS_RATIO: 0.45

## 2017-08-21 ASSESSMENT — SLIT LAMP EXAM - LIDS
COMMENTS: UPPER LID DERMATOCHALASIS
COMMENTS: UPPER LID DERMATOCHALASIS

## 2017-08-21 ASSESSMENT — REFRACTION_MANIFEST
OS_AXIS: 015
OD_SPHERE: -0.25
OS_ADD: +2.50
OD_ADD: +2.50
OS_SPHERE: -0.75
OS_CYLINDER: +0.25
OD_CYLINDER: SPHERE

## 2017-08-21 ASSESSMENT — VISUAL ACUITY
OD_SC: 20/25
OS_SC: 20/40
METHOD: SNELLEN - LINEAR

## 2017-08-21 ASSESSMENT — EXTERNAL EXAM - RIGHT EYE: OD_EXAM: NORMAL

## 2017-08-21 ASSESSMENT — CONF VISUAL FIELD
OD_NORMAL: 1
METHOD: COUNTING FINGERS
OS_NORMAL: 1

## 2017-08-21 ASSESSMENT — EXTERNAL EXAM - LEFT EYE: OS_EXAM: NORMAL

## 2017-08-21 NOTE — PATIENT INSTRUCTIONS
YAG Capsulotomy/Iridotomy Laser Surgery    Postoperative Instructions    Postoperative Medications: After surgery, you will use one eye drop for seven days. Which you will start these eye drops on the day of surgery. Your first dose will be given in the clinic prior to you leaving.  Prednisolone - is a steroid eye drop, used to minimize inflammation and modulate healing. It should be used 4 times daily for 1 week. It is a suspension so you will need to shake it before use.  (Name brands for Prednisilone include: Pred Forte, Omnipred,  Econopred, Durezol)  A convenient way of remembering the drops is to use them at Breakfast, Lunch, Dinner,and Bedtime.  The drops might sting a little when they are instilled, and that is normal.  Please continue any glaucoma, dry eye, or other medications you were using prior to the surgery. Wait 1-2 minutes between eye drops.  Please allow 24 to 48 hours when requesting refills, and call BEFORE you run out of  drops.  Restriction on Activities:  - You may develop a slight headache following the treatment. If desired, a pain reliever such as Ibuprofen, Advil, or Motrin may be used.  - It is fine to bathe, read, watch TV, and use the computer.  Symptoms requiring medical attention:  - Sudden onset of increased flashes and/or floaters beyond what you had prior to  leaving the surgery center.  - Persistent or increasing pain in the eye  - Sudden decrease in vision  - Nausea or vomiting  If you have any questions or concerns before or after your surgery, please contact  Dr. Don s office at [phone]
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
## 2017-08-21 NOTE — PROGRESS NOTES
Assessment & Plan   Wolf Sen is a 67 year old male who presents with:   Review of systems for the eyes was negative other than the pertinent positives and negatives noted in the HPI.    Pseudophakia  - REFRACTION  - EYE EXAM, EST PATIENT,COMPREHESV    PCO (posterior capsular opacification), bilateral  - EYE EXAM, EST PATIENT,COMPREHESV    S/P YAG capsulotomy, left  - prednisoLONE acetate (PRED FORTE) 1 % ophthalmic susp; Place 1 drop Into the left eye 4 times daily for 7 days  - EYE EXAM, EST PATIENT,COMPREHESV    Dermatochalasis of eyelids of both eyes  - OPHTHALMOLOGY ADULT REFERRAL  - EYE EXAM, EST PATIENT,COMPREHESV      Return in 1 week for f/u YAG PC OS. Repeat OCT RNFL OU (bad test today)    Documentation for today's encounter was performed by Lacey Baugh COA. OSC. Acting as a scribe in my presence. I have reviewed and verified that it is an accurate recording of today's encounter.    Attending Physician Attestation:  Complete documentation of historical and exam elements from today's encounter can be found in the full encounter summary report (not reduplicated in this progress note).  I personally obtained the chief complaint(s) and history of present illness.  I confirmed and edited as necessary the review of systems, past medical/surgical history, family history, social history, and examination findings as documented by others; and I examined the patient myself.  I personally reviewed the relevant tests, images, and reports as documented above.  I formulated and edited as necessary the assessment and plan and discussed the findings and management plan with the patient and family. - Carlton Don MD
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
## 2017-08-28 ENCOUNTER — OFFICE VISIT (OUTPATIENT)
Dept: OPTOMETRY | Facility: CLINIC | Age: 67
End: 2017-08-28
Payer: COMMERCIAL

## 2017-08-28 ENCOUNTER — OFFICE VISIT (OUTPATIENT)
Dept: OPHTHALMOLOGY | Facility: CLINIC | Age: 67
End: 2017-08-28
Payer: COMMERCIAL

## 2017-08-28 DIAGNOSIS — Z98.890 S/P YAG CAPSULOTOMY, LEFT: Primary | ICD-10-CM

## 2017-08-28 PROCEDURE — 99024 POSTOP FOLLOW-UP VISIT: CPT | Performed by: OPHTHALMOLOGY

## 2017-08-28 PROCEDURE — 92341 FIT SPECTACLES BIFOCAL: CPT | Performed by: OPTOMETRIST

## 2017-08-28 ASSESSMENT — SLIT LAMP EXAM - LIDS
COMMENTS: UPPER LID DERMATOCHALASIS
COMMENTS: UPPER LID DERMATOCHALASIS

## 2017-08-28 ASSESSMENT — VISUAL ACUITY
OS_SC: 20/30
OS_SC+: -2
METHOD: SNELLEN - LINEAR
OD_SC: 20/25

## 2017-08-28 ASSESSMENT — REFRACTION_MANIFEST
OS_SPHERE: -0.50
OS_ADD: +2.50
OS_AXIS: 035
OD_CYLINDER: +0.50
OD_AXIS: 035
OD_ADD: +2.50
OD_SPHERE: -0.50
OS_CYLINDER: +0.25

## 2017-08-28 ASSESSMENT — TONOMETRY
OS_IOP_MMHG: 14
OD_IOP_MMHG: 14
IOP_METHOD: TONOPEN

## 2017-08-28 ASSESSMENT — EXTERNAL EXAM - RIGHT EYE: OD_EXAM: NORMAL

## 2017-08-28 ASSESSMENT — EXTERNAL EXAM - LEFT EYE: OS_EXAM: NORMAL

## 2017-08-28 NOTE — PROGRESS NOTES
Assessment & Plan   Wolf Sen is a 67 year old male who presents with:   Review of systems for the eyes was negative other than the pertinent positives and negatives noted in the HPI.  History is obtained from the patient  with an  translating throughout the encounter.    S/P YAG capsulotomy, left  - Good post op results, Rx per MR for glasses (optional). Stop PF       Return in 1 year for annual exam    Documentation for today's encounter was performed by Lacey GARRISON. OSC. Acting as a scribe in my presence. I have reviewed and verified that it is an accurate recording of today's encounter.    Attending Physician Attestation:  Complete documentation of historical and exam elements from today's encounter can be found in the full encounter summary report (not reduplicated in this progress note).  I personally obtained the chief complaint(s) and history of present illness.  I confirmed and edited as necessary the review of systems, past medical/surgical history, family history, social history, and examination findings as documented by others; and I examined the patient myself.  I personally reviewed the relevant tests, images, and reports as documented above.  I formulated and edited as necessary the assessment and plan and discussed the findings and management plan with the patient and family. - Carlton Don MD

## 2017-08-28 NOTE — NURSING NOTE
Patient presents with:  Post-op Yag Capsulotomy: left eye 7 days ago      Referring Provider:  No referring provider defined for this encounter.    HPI    Symptoms:           Do you have eye pain now?:  No      Comments:     Post-op Yag Capsulotomy: left eye 7 days ago               Lacey GRARISON. OSC
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
## 2017-08-29 ENCOUNTER — PRE VISIT (OUTPATIENT)
Dept: OPHTHALMOLOGY | Facility: CLINIC | Age: 67
End: 2017-08-29

## 2017-08-29 NOTE — TELEPHONE ENCOUNTER
August 29, 2017                 Pre-Visit Documentation: Wolf Sen is a 67 year old male    Chief Complaint   Patient presents with     Previsit     appt w/ Dr Melara     Dermatochalasis Evaluation     refered by Dr Don           Current Outpatient Prescriptions   Medication Sig Dispense Refill     metFORMIN (GLUCOPHAGE) 500 MG tablet TAKE 1 TABLET BY MOUTH TWICE DAILY WITH MEALS 60 tablet 0     amLODIPine (NORVASC) 10 MG tablet TAKE 1 TABLET(10 MG) BY MOUTH DAILY 90 tablet 0     lisinopril (PRINIVIL/ZESTRIL) 40 MG tablet TAKE 1 TABLET BY MOUTH EVERY DAY 90 tablet 1     metoprolol (LOPRESSOR) 100 MG tablet Take 1 tablet (100 mg) by mouth 2 times daily 180 tablet 1     metFORMIN (GLUCOPHAGE) 500 MG tablet Take 2 tablets (1,000 mg) by mouth 2 times daily (with meals) 180 tablet 0     methocarbamol (ROBAXIN) 500 MG tablet Take 2 tablets (1,000 mg) by mouth 3 times daily as needed for muscle spasms 30 tablet 0     atorvastatin (LIPITOR) 40 MG tablet TAKE 1 TABLET BY MOUTH EVERY DAY 90 tablet 3     insulin pen needle (B-D U/F) 31G X 8 MM USE AS DIRECTED THREE TIMES DAILY 100 each 3     blood glucose monitoring (EMIL CONTOUR NEXT) test strip USE TO TEST BLOOD SUGARS 3 TIMES DAILY OR AS DIRECTED. 100 each 11     insulin glargine (LANTUS SOLOSTAR) 100 UNIT/ML PEN 10 units in the evening 3 Month 3     insulin aspart (NOVOLOG FLEXPEN) 100 UNIT/ML soln INJECT 5 UNITS UNDER THE SKIN THREE TIMES DAILY WITH MEALS 15 mL 3     multivitamin, therapeutic with minerals (MULTI-VITAMIN) TABS Take 1 tablet by mouth daily 100 tablet 3     moxifloxacin (VIGAMOX) 0.5 % ophthalmic solution Place 1 drop into the right eye 3 times daily 1 Bottle 0     ketotifen (ZADITOR) 0.025 % SOLN Place 1 drop into both eyes 2 times daily as needed for itching 1 Bottle 11     ORDER FOR DME Equipment being ordered: Lancets for use with Lantus pen (once at night) and Novolog Flex Pen (three times a day).  Dispense 3 month supply with one  refill 3 Month 1     SEROQUEL  MG 24 hr tablet   2     ORDER FOR DME Equipment being ordered: Rolling walker with seat 1 Units 0     ASPIRIN PO Take 81 mg by mouth.           Appt scheduled 8/30/17  Lacey SAGASTUME OSC

## 2017-08-30 ENCOUNTER — OFFICE VISIT (OUTPATIENT)
Dept: OPHTHALMOLOGY | Facility: CLINIC | Age: 67
End: 2017-08-30
Payer: COMMERCIAL

## 2017-08-30 ENCOUNTER — TELEPHONE (OUTPATIENT)
Dept: OPHTHALMOLOGY | Facility: CLINIC | Age: 67
End: 2017-08-30

## 2017-08-30 DIAGNOSIS — H02.833 DERMATOCHALASIS OF EYELIDS OF BOTH EYES: Primary | ICD-10-CM

## 2017-08-30 DIAGNOSIS — H02.403 ACQUIRED INVOLUTIONAL PTOSIS OF BOTH EYELIDS: ICD-10-CM

## 2017-08-30 DIAGNOSIS — H02.836 DERMATOCHALASIS OF EYELIDS OF BOTH EYES: Primary | ICD-10-CM

## 2017-08-30 PROCEDURE — 92285 EXTERNAL OCULAR PHOTOGRAPHY: CPT | Performed by: OPHTHALMOLOGY

## 2017-08-30 PROCEDURE — 99213 OFFICE O/P EST LOW 20 MIN: CPT | Performed by: OPHTHALMOLOGY

## 2017-08-30 PROCEDURE — 92081 LIMITED VISUAL FIELD XM: CPT | Performed by: OPHTHALMOLOGY

## 2017-08-30 ASSESSMENT — VISUAL ACUITY
METHOD: SNELLEN - LINEAR
OS_SC: 20/30
OD_SC: 20/25

## 2017-08-30 ASSESSMENT — SLIT LAMP EXAM - LIDS
COMMENTS: HEAVY DERMATOCHALASIS RESTING ON LASHES, TRUE PTOSIS
COMMENTS: HEAVY DERMATOCHALASIS RESTING ON LASHES, TRUE PTOSIS

## 2017-08-30 ASSESSMENT — LEVATOR FUNCTION
OS_LEVATOR: 14
OD_LEVATOR: 14

## 2017-08-30 ASSESSMENT — CONF VISUAL FIELD
OS_SUPERIOR_TEMPORAL_RESTRICTION: 3
OD_SUPERIOR_NASAL_RESTRICTION: 3
OD_SUPERIOR_TEMPORAL_RESTRICTION: 3
OS_SUPERIOR_NASAL_RESTRICTION: 3

## 2017-08-30 ASSESSMENT — LAGOPHTHALMOS
OS_LAGOPHTHALMOS: 0
OD_LAGOPHTHALMOS: 0

## 2017-08-30 ASSESSMENT — EXTERNAL EXAM - RIGHT EYE: OD_EXAM: RIGHT BROW PTOSIS

## 2017-08-30 ASSESSMENT — MARGIN REFLEX DISTANCE
OD_MRD1: 1
OS_MRD1: 0

## 2017-08-30 NOTE — PROGRESS NOTES
Oculoplastic Clinic New Patient    Patient: Wolf Sen MRN# [id number]   YOB: 1950 Age: 67 year old   Date of Visit: Aug 30, 2017    CC: Droopy eyelids obstructing vision.  Chief Complaints and History of Present Illnesses   Patient presents with     Dermatochalasis Evaluation     refered by Dr Arian [facility]                 HPI:     Wolf Sen is a 67 year old male who has noted gradual onset of droopy eyelids over the past years. The droopy eyelid is interfering with activities of daily living including driving, and reading. The patient denies double vision, variability of the eyelid position, or dry eye symptoms.     EXAM:     MRD1: 1 right eye 0 left eye   Dermatochalasis with excess skin touching eyelashes   Brow ptosis with brow resting below superior orbital rim right     VISUAL FIELD:  Right eye untaped:11 degrees Right eye taped:40 degrees  Left eye untaped:8 degrees Left eye taped:36 degrees    Assessment & Plan     Wolf Sen is a 67 year old male with the following diagnoses:   1. Dermatochalasis of eyelids of both eyes    2. Acquired involutional ptosis of both eyelids           Both upper eyelid blepharoplasty  and Bilateral ptosis repair MMCR 9 right eye and 10 OS      ANTICOAGULATION:  Aspirin 81mg   Can hold prior to surgery         PHOTOS DEMONSTRATE:    Significant dermatochalasis with lids resting on eyelashes and obstructing visual axis  Myogenic blepharoptosis  Brow ptosis with thicker brow skin and hairs below the lateral superior orbital rim on the right       Complete documentation of historical and exam elements from today's encounter can be found in the full encounter summary report (not reduplicated in this progress note). I personally obtained the chief complaint(s) and history of present illness.  I confirmed and edited as necessary the review of systems, past medical/surgical history, family history, social history, and examination findings as  documented by others; and I examined the patient myself. I personally reviewed the relevant tests, images, and reports as documented above. I formulated and edited as necessary the assessment and plan and discussed the findings and management plan with the patient and family. - Bibiana Melara MD      Today with Wolf Sen  and his  Harvey, I reviewed the indications, risks, benefits, and alternatives of the proposed surgical procedure including, but not limited to, failure obtain the desired result  and need for additional surgery, bleeding, infection, loss of vision, loss of the eye, and the remote possibility of permanent damage to any organ system or death with the use of anesthesia.  I provided multiple opportunities for the questions, answered all questions to the best of my ability, and confirmed that my answers and my discussion were understood.

## 2017-08-30 NOTE — NURSING NOTE
Patient presents with:  Dermatochalasis Evaluation: refered by Dr Arian MCGUIRE [facility]      Referring Provider:  Carlton Don MD  [facility]  [place], [phone]    HPI    Affected eye(s):  Both   Location:  Upper   Symptoms:           Do you have eye pain now?:  No      Comments:     Dermatochalasis Evaluation: refered by Dr Arian MCGUIRE [facility]               Lacey GARRISON. OSC

## 2017-08-30 NOTE — TELEPHONE ENCOUNTER
"Procedure:  both upper eyelid blepharoplasty and ptosis repair   Facility: [facility], [facility] or Other [facility]  Length: .40 minute(s)  Surgeon:Bibiana Melara MD  Anesthesia: Local with MAC  Diagnosis: droopy eyelids with superior field defect  Out Patient or AM admit:  (Same day)  BMI:Estimated body mass index is 28.32 kg/(m^2) as calculated from the following:    Height as of 5/16/17: 1.626 m (5' 4\").    Weight as of 5/16/17: 74.8 kg (165 lb). (If over 43 for general or 45 for MAC cannot be scheduled at [facility])   Pre-op Appointments needed: History & Physical within 30 days of surgery  Post-op appointments needed: droopy eyelids with superior field defect1 week 3 weeks   needed:Yes:  Del   Surgery packet/instructions given to patient?:  No  Pre op teaching done:  No  Lens Orders Needed: No   Referring provider: Dr Don  Special Considerations: will need a PA also  Lacey GARRISON. [facility]            "
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
## 2017-09-26 ENCOUNTER — OFFICE VISIT (OUTPATIENT)
Dept: FAMILY MEDICINE | Facility: CLINIC | Age: 67
End: 2017-09-26
Payer: COMMERCIAL

## 2017-09-26 VITALS
TEMPERATURE: 97.8 F | SYSTOLIC BLOOD PRESSURE: 107 MMHG | DIASTOLIC BLOOD PRESSURE: 60 MMHG | WEIGHT: 161.4 LBS | HEART RATE: 57 BPM | OXYGEN SATURATION: 97 % | HEIGHT: 64 IN | BODY MASS INDEX: 27.55 KG/M2

## 2017-09-26 DIAGNOSIS — E11.9 TYPE 2 DIABETES MELLITUS WITH HEMOGLOBIN A1C GOAL OF LESS THAN 8.0% (H): ICD-10-CM

## 2017-09-26 DIAGNOSIS — Z01.818 PREOP GENERAL PHYSICAL EXAM: Primary | ICD-10-CM

## 2017-09-26 DIAGNOSIS — E78.5 HYPERLIPIDEMIA, UNSPECIFIED HYPERLIPIDEMIA TYPE: ICD-10-CM

## 2017-09-26 DIAGNOSIS — I63.9 CEREBRAL INFARCTION, UNSPECIFIED MECHANISM (H): ICD-10-CM

## 2017-09-26 DIAGNOSIS — I10 HYPERTENSION, GOAL BELOW 150/90: ICD-10-CM

## 2017-09-26 LAB
ANION GAP SERPL CALCULATED.3IONS-SCNC: 8 MMOL/L (ref 3–14)
BUN SERPL-MCNC: 20 MG/DL (ref 7–30)
CALCIUM SERPL-MCNC: 9.1 MG/DL (ref 8.5–10.1)
CHLORIDE SERPL-SCNC: 105 MMOL/L (ref 94–109)
CO2 SERPL-SCNC: 26 MMOL/L (ref 20–32)
CREAT SERPL-MCNC: 1.16 MG/DL (ref 0.66–1.25)
GFR SERPL CREATININE-BSD FRML MDRD: 62 ML/MIN/1.7M2
GLUCOSE SERPL-MCNC: 94 MG/DL (ref 70–99)
HBA1C MFR BLD: 7.9 % (ref 4.3–6)
POTASSIUM SERPL-SCNC: 4.3 MMOL/L (ref 3.4–5.3)
SODIUM SERPL-SCNC: 139 MMOL/L (ref 133–144)

## 2017-09-26 PROCEDURE — 99214 OFFICE O/P EST MOD 30 MIN: CPT | Performed by: PHYSICIAN ASSISTANT

## 2017-09-26 PROCEDURE — 80048 BASIC METABOLIC PNL TOTAL CA: CPT | Performed by: PHYSICIAN ASSISTANT

## 2017-09-26 PROCEDURE — 83036 HEMOGLOBIN GLYCOSYLATED A1C: CPT | Performed by: PHYSICIAN ASSISTANT

## 2017-09-26 PROCEDURE — 99207 C PAF COMPLETED  NO CHARGE: CPT | Performed by: PHYSICIAN ASSISTANT

## 2017-09-26 PROCEDURE — 93000 ELECTROCARDIOGRAM COMPLETE: CPT | Performed by: PHYSICIAN ASSISTANT

## 2017-09-26 PROCEDURE — 36415 COLL VENOUS BLD VENIPUNCTURE: CPT | Performed by: PHYSICIAN ASSISTANT

## 2017-09-26 RX ORDER — LISINOPRIL 40 MG/1
40 TABLET ORAL DAILY
Qty: 90 TABLET | Refills: 1 | Status: SHIPPED | OUTPATIENT
Start: 2017-09-26 | End: 2018-01-17

## 2017-09-26 RX ORDER — METOPROLOL TARTRATE 100 MG
100 TABLET ORAL 2 TIMES DAILY
Qty: 180 TABLET | Refills: 1 | Status: SHIPPED | OUTPATIENT
Start: 2017-09-26 | End: 2018-01-17

## 2017-09-26 RX ORDER — AMLODIPINE BESYLATE 10 MG/1
10 TABLET ORAL DAILY
Qty: 90 TABLET | Refills: 1 | Status: SHIPPED | OUTPATIENT
Start: 2017-09-26 | End: 2018-01-17

## 2017-09-26 RX ORDER — ATORVASTATIN CALCIUM 40 MG/1
40 TABLET, FILM COATED ORAL DAILY
Qty: 90 TABLET | Refills: 3 | Status: SHIPPED | OUTPATIENT
Start: 2017-09-26 | End: 2018-04-04

## 2017-09-26 NOTE — TELEPHONE ENCOUNTER
Date Scheduled: 10-9-17  Facility: [facility]  Surgeon: Dr. Melara   Post-op appointment scheduled:   Next 5 appointments (look out 90 days)     Oct 25, 2017  3:00 PM CDT   [facility] with Bib[facility]ara MD   [facility]    [address] [place] [id number] [phone]                   scheduled?: No  Surgery packet/instructions confirmed received?  Yes  Special Considerations:   Mana Win, Surgery Scheduling Coordinator

## 2017-09-26 NOTE — LETTER
October 2, 2017      Wolf Sen  [address] [address] [place] [address]            Dear Wolf Sen        Enclosed is a copy of the results.  Labs are within normal limits.    If you have any questions or concerns, please contact our team at [phone].      Sincerely,    Mali Johns PA-C/ak          Results for orders placed or performed in visit on 09/26/17   Hemoglobin A1c   Result Value Ref Range    Hemoglobin A1C 7.9 (H) 4.3 - 6.0 %   Basic metabolic panel  (Ca, Cl, CO2, Creat, Gluc, K, Na, BUN)   Result Value Ref Range    Sodium 139 133 - 144 mmol/L    Potassium 4.3 3.4 - 5.3 mmol/L    Chloride 105 94 - 109 mmol/L    Carbon Dioxide 26 20 - 32 mmol/L    Anion Gap 8 3 - 14 mmol/L    Glucose 94 70 - 99 mg/dL    Urea Nitrogen 20 7 - 30 mg/dL    Creatinine 1.16 0.66 - 1.25 mg/dL    GFR Estimate 62 >60 mL/min/1.7m2    GFR Estimate If Black 75 >60 mL/min/1.7m2    Calcium 9.1 8.5 - 10.1 mg/dL

## 2017-09-26 NOTE — PROGRESS NOTES
[address] [address]  [phone]  Dept: [phone]    PRE-OP EVALUATION:  Today's date: 2017    Wolf Sen (: 1950) presents for pre-operative evaluation assessment as requested by Dr. Melara.  He requires evaluation and anesthesia risk assessment prior to undergoing surgery/procedure for treatment of repair ptosis. Proposed procedure: blepharoplasty    Date of Surgery/ Procedure: 10/09/2017  Time of Surgery/ Procedure: 11 AM  Hospital/Surgical Facility: [facility]   Fax number for surgical facility:   Primary Physician: Kimberly Bell  Type of Anesthesia Anticipated: Local with MAC    Patient has a Health Care Directive or Living Will:  NO    1. YES - Do you have a history of heart attack, stroke, stent, bypass or surgery on an artery in the head, neck, heart or legs? Stroke  2. NO - Do you ever have any pain or discomfort in your chest?  3. NO - Do you have a history of  Heart Failure?  4. NO - Are you troubled by shortness of breath when: walking on the level, up a slight hill or at night?  5. NO - Do you currently have a cold, bronchitis or other respiratory infection?  6. NO - Do you have a cough, shortness of breath or wheezing?  7. NO - Do you sometimes get pains in the calves of your legs when you walk?  8. NO - Do you or anyone in your family have previous history of blood clots?  9. NO - Do you or does anyone in your family have a serious bleeding problem such as prolonged bleeding following surgeries or cuts?  10. NO - Have you ever had problems with anemia or been told to take iron pills?  11. NO - Have you had any abnormal blood loss such as black, tarry or bloody stools, or abnormal vaginal bleeding?  12. NO - Have you ever had a blood transfusion?  13. NO - Have you or any of your relatives ever had problems with anesthesia?  14. YES - Do you have sleep apnea, excessive snoring or daytime drowsiness?  15. NO  - Do you have any prosthetic heart valves?  16. NO - Do you have prosthetic joints?  17. NO - Is there any chance that you may be pregnant?        HPI:                                                      Brief HPI related to upcoming procedure: ptosis of bilateral eyelids causing disruptive vision. Procedure- blepharoplasty      DIABETES - Patient has a longstanding history of DiabetesType Type II . Patient is being treated with oral agents and insulin injections and denies significant side effects. Control has been good. Complicating factors include but are not limited to: cerebrovascular -history of stroke.                                                                                                             .  HYPERTENSION - Patient has longstanding history of mod-severe HTN , currently denies any symptoms referable to elevated blood pressure. Specifically denies chest pain, palpitations, dyspnea, orthopnea, PND or peripheral edema. Blood pressure readings have been in normal range. Current medication regimen is as listed below. Patient denies any side effects of medication.                                                                                                                                                                                          .  HYPERLIPIDEMIA - Patient has a long history of significant Hyperlipidemia requiring medication for treatment with recent good control. Patient reports no problems or side effects with the medication.                                                                                                                                                       .  SLEEP PROBLEM - Patient has a longstanding history of snoring of moderate severity. Patient has tried OTC medications with limited success.                                                                                                                                         .    MEDICAL HISTORY:                                                     Patient Active Problem List    Diagnosis Date Noted     Pseudophakia of left eye 2016     Priority: Medium     Type 2 diabetes mellitus with neurological manifestations, controlled (H) 10/15/2015     Priority: Medium     Advance care planning 06/15/2015     Priority: Medium     Advance Care Planning 2017: ACP Review of Chart / Resources Provided:  Reviewed chart for advance care plan.  Wolf Sen has an up to date advance care plan on file.  Added by Viki Avalos  16: Advance Care Planning 16: ACP Review of Chart / Resources Provided:  Reviewed chart for advance care plan.  Wolf Sen has an up to date advance care plan on file.  Added by Viki Avalos  Advance Care Planning 2016: Receipt of ACP document:  Received: Health Care Directive which was witnessed or notarized on 7/28/15.  Document not previously scanned.  Validation form completed and sent with document to be scanned.  Code Status reflect choices in most recent ACP document.   Confirmed/documented designated decision maker(s).  Added by Bobbi Licona  Advance Care Planning 2015: ACP Review and Resources Provided:  Reviewed chart for advance care plan.  Wolf Sen has no plan or code status on file. Discussed available resources and provided with information on 06/15/2015. Confirmed code status reflects current choices pending further ACP discussions.  Confirmed/documented legally designated decision maker(s). Added by Carmina Arboleda  Advance Care Planning 6-15-15 Gave patient advance care planning info.  Ama De La Tor[facility]       Health Care Home 07/10/2014     Priority: Medium     [facility]   Viki Avalos RN  [phone]    [facility] CARE PLAN SUMMARY    Client Name:  Wolf Sen  Address:  [address] [address] [place] [address] Phone: [phone] (home)    :  1950 Date of Assessment: Refusal 17   Health Plan:  [facility]  "MSHO  Health Plan Number: [id number] Medical Assistance Number: [id number]  Financial Worker:  [facility]  Case #:     FVP :  Viki Avalos RN CM Phone:  [phone]  CM Fax:  [phone]   [facility] Enrollment Date: 7/1/2014 Case Mix:    Rate Cell:    Waiver Type:  None   Emergency Contact:  Extended Emergency Contact Information  Primary Emergency Contact: Karine Sen  Address: [address] [place] [address]  Home Phone: [phone]  Relation: Son  Secondary Emergency Contact: RickyjoseyJamison  Relation: Daughter Language:  Guamanian  :  Yes    Health Care Agent/POA:  Family Advanced Directives/Living Will:  No   Primary Care Clinic/Phone/Fax:  [facility]/(p) [phone], (f) [phone] Primary Dx:   Secondary Dx:     Primary Physician:  Adelina Dash   Height:  5' 5\"  Weight:  163 lbs   Specialty Physician:    Audiologist:     Eye Care Provider:   Dental Care Provider:    Chel: [facility] [phone]/See a Dentist [phone]   Other:                                             Cataracts, both eyes 05/20/2014     Priority: Medium     DM type 2, goal A1C below 8.0 05/05/2014     Priority: Medium     HTN, goal below 150/90 05/05/2014     Priority: Medium     Hyperlipidemia LDL goal <70 05/05/2014     Priority: Medium     Overweight (BMI 25.0-29.9) 05/05/2014     Priority: Medium     Problem list name updated by automated process. Provider to review       History of carotid endarterectomy 01/25/2013     Priority: Medium     Aortic valve insufficiency 01/25/2013     Priority: Medium     Cerebral infarction (H) 10/01/2012     Priority: Medium     Overview:   Right sided numbness and weakness. 1/2011        Past Medical History:   Diagnosis Date     Cataracts, both eyes 5/20/2014     DM type 2, goal A1c below 7 5/5/2014     Past Surgical History:   Procedure Laterality Date     CATARACT IOL, RT/LT       PHACOEMULSIFICATION WITH STANDARD " INTRAOCULAR LENS IMPLANT Right 2/25/2016    Procedure: PHACOEMULSIFICATION WITH STANDARD INTRAOCULAR LENS IMPLANT;  Surgeon: Carlton Don MD;  Location: [facility] OR     PHACOEMULSIFICATION WITH STANDARD INTRAOCULAR LENS IMPLANT Left 2/11/2016    Procedure: PHACOEMULSIFICATION WITH STANDARD INTRAOCULAR LENS IMPLANT;  Surgeon: Carlton Don MD;  Location: [facility] OR     Current Outpatient Prescriptions   Medication Sig Dispense Refill     amLODIPine (NORVASC) 10 MG tablet Take 1 tablet (10 mg) by mouth daily 90 tablet 1     metoprolol (LOPRESSOR) 100 MG tablet Take 1 tablet (100 mg) by mouth 2 times daily 180 tablet 1     atorvastatin (LIPITOR) 40 MG tablet Take 1 tablet (40 mg) by mouth daily 90 tablet 3     lisinopril (PRINIVIL/ZESTRIL) 40 MG tablet Take 1 tablet (40 mg) by mouth daily 90 tablet 1     metFORMIN (GLUCOPHAGE) 500 MG tablet Take 2 tablets (1,000 mg) by mouth 2 times daily (with meals) 180 tablet 1     [DISCONTINUED] metFORMIN (GLUCOPHAGE) 500 MG tablet TAKE 1 TABLET BY MOUTH TWICE DAILY WITH MEALS 60 tablet 0     [DISCONTINUED] amLODIPine (NORVASC) 10 MG tablet TAKE 1 TABLET(10 MG) BY MOUTH DAILY 90 tablet 0     [DISCONTINUED] lisinopril (PRINIVIL/ZESTRIL) 40 MG tablet TAKE 1 TABLET BY MOUTH EVERY DAY 90 tablet 1     [DISCONTINUED] metoprolol (LOPRESSOR) 100 MG tablet Take 1 tablet (100 mg) by mouth 2 times daily 180 tablet 1     [DISCONTINUED] metFORMIN (GLUCOPHAGE) 500 MG tablet Take 2 tablets (1,000 mg) by mouth 2 times daily (with meals) 180 tablet 0     methocarbamol (ROBAXIN) 500 MG tablet Take 2 tablets (1,000 mg) by mouth 3 times daily as needed for muscle spasms 30 tablet 0     [DISCONTINUED] atorvastatin (LIPITOR) 40 MG tablet TAKE 1 TABLET BY MOUTH EVERY DAY 90 tablet 3     insulin pen needle (B-D U/F) 31G X 8 MM USE AS DIRECTED THREE TIMES DAILY 100 each 3     blood glucose monitoring (EMIL CONTOUR NEXT) test strip USE TO TEST BLOOD SUGARS 3 TIMES DAILY OR AS DIRECTED. 100 each  "11     insulin glargine (LANTUS SOLOSTAR) 100 UNIT/ML PEN 10 units in the evening 3 Month 3     insulin aspart (NOVOLOG FLEXPEN) 100 UNIT/ML soln INJECT 5 UNITS UNDER THE SKIN THREE TIMES DAILY WITH MEALS 15 mL 3     multivitamin, therapeutic with minerals (MULTI-VITAMIN) TABS Take 1 tablet by mouth daily 100 tablet 3     ORDER FOR DME Equipment being ordered: Lancets for use with Lantus pen (once at night) and Novolog Flex Pen (three times a day).  Dispense 3 month supply with one refill 3 Month 1     SEROQUEL  MG 24 hr tablet   2     ORDER FOR DME Equipment being ordered: Rolling walker with seat 1 Units 0     ASPIRIN PO Take 81 mg by mouth.         OTC products: None, except as noted above    No Known Allergies   Latex Allergy: NO    Social History   Substance Use Topics     Smoking status: Never Smoker     Smokeless tobacco: Never Used     Alcohol use No     History   Drug Use No       REVIEW OF SYSTEMS:                                                    Constitutional, neuro, ENT, endocrine, pulmonary, cardiac, gastrointestinal, genitourinary, musculoskeletal, integument and psychiatric systems are negative, except as otherwise noted.      EXAM:                                                    /60 (BP Location: Left arm, Patient Position: Chair, Cuff Size: Adult Regular)  Pulse 57  Temp 97.8  F (36.6  C) (Oral)  Ht 5' 4\" (1.626 m)  Wt 161 lb 6.4 oz (73.2 kg)  SpO2 97%  BMI 27.7 kg/m2    GENERAL APPEARANCE: healthy, alert and no distress     EYES: EOMI, PERRL     HENT: ear canals and TM's normal and nose and mouth without ulcers or lesions     NECK: no adenopathy, no asymmetry, masses, or scars and thyroid normal to palpation     RESP: lungs clear to auscultation - no rales, rhonchi or wheezes     CV: regular rates and rhythm, normal S1 S2, no S3 or S4 and no murmur, click or rub     ABDOMEN:  soft, nontender, no HSM or masses and bowel sounds normal     MS: extremities normal- no gross " deformities noted, no evidence of inflammation in joints, FROM in all extremities.     SKIN: no suspicious lesions or rashes     NEURO: Normal strength and tone, sensory exam grossly normal, mentation intact and speech normal     PSYCH: mentation appears normal. and affect normal/bright     LYMPHATICS: No axillary, cervical, or supraclavicular nodes    DIAGNOSTICS:                                                    EKG: appears normal, NSR, normal axis, normal intervals, no acute ST/T changes c/w ischemia, no LVH by voltage criteria, unchanged from previous tracings  Serum Potassium  Serum Creatinine  Hemoglobin A1C    Recent Labs   Lab Test  05/09/17   1216  11/16/16   1537   08/08/14   1003   01/14/14   1151   HGB   --    --    --   13.4   --   14.5   PLT   --    --    --   259   --   246   NA  140  138   < >  139   < >  131*   POTASSIUM  4.1  4.0   < >  4.4   < >  5.0   CR  1.19  1.40*   < >  1.25   < >  1.73*   A1C  8.1*  7.4*   < >  6.4*   < >   --     < > = values in this interval not displayed.    Diagnostic Test Results:  Results for orders placed or performed in visit on 09/26/17 (from the past 24 hour(s))   Hemoglobin A1c   Result Value Ref Range    Hemoglobin A1C 7.9 (H) 4.3 - 6.0 %         IMPRESSION:                                                    Reason for surgery/procedure: bilateral eyelid ptosis requiring blepharoplasty    The proposed surgical procedure is considered LOW risk.    REVISED CARDIAC RISK INDEX  The patient has the following serious cardiovascular risks for perioperative complications such as (MI, PE, VFib and 3  AV Block):  No serious cardiac risks  INTERPRETATION: 1 risks: Class II (low risk - 0.9% complication rate)    The patient has the following additional risks for perioperative complications:  No identified additional risks      ICD-10-CM    1. Preop general physical exam Z01.818 EKG 12-lead complete w/read - Clinics     Basic metabolic panel  (Ca, Cl, CO2, Creat, Gluc, K,  Na, BUN)   2. Type 2 diabetes mellitus with hemoglobin A1c goal of less than 8.0% (H) E11.9 Hemoglobin A1c     metFORMIN (GLUCOPHAGE) 500 MG tablet     Basic metabolic panel  (Ca, Cl, CO2, Creat, Gluc, K, Na, BUN)   3. Hypertension, goal below 150/90 I10 amLODIPine (NORVASC) 10 MG tablet   4. Hyperlipidemia, unspecified hyperlipidemia type E78.5 atorvastatin (LIPITOR) 40 MG tablet   5. Cerebral infarction, unspecified mechanism (H) I63.9        RECOMMENDATIONS:                                                          --NPO on day of surgery    Diabetes Medication Use  Stable A1C 7.9  -----Hold usual  oral diabetic meds (e.g. Metformin, Actos, Glipizide) while NPO.   -----Take 80% of long acting insulin (e.g. Lantus, NPH) while NPO (fasting) 8 units   -----Hold short acting insulin (e.g. Novolog, Humalog) while NPO (fasting)      ACE Inhibitor or Angiotensin Receptor Blocker (ARB) Use  Ace inhibitor or Angiotensin Receptor Blocker (ARB) and will continue this medication due to the higher risk of uncontrolled perioerative hypertension (e.g. neurosurgical procedure)      Patient is cleared for surgery as low risk for anesthesia. A copy of the pre-op was provided to the patient. Full Code.       APPROVAL GIVEN to proceed with proposed procedure, without further diagnostic evaluation       Signed Electronically by: Mali Johns PA-C  I reviewed and agree with assessment and plan above. Carlos Tomlinson MD    Copy of this evaluation report is provided to requesting physician.    [facility] Preop Guidelines

## 2017-09-26 NOTE — NURSING NOTE
"Chief Complaint   Patient presents with     Pre-Op Exam     Eye lids remove       Initial /60 (BP Location: Left arm, Patient Position: Chair, Cuff Size: Adult Regular)  Pulse 57  Temp 97.8  F (36.6  C) (Oral)  Ht 5' 4\" (1.626 m)  Wt 161 lb 6.4 oz (73.2 kg)  SpO2 97%  BMI 27.7 kg/m2 Estimated body mass index is 27.7 kg/(m^2) as calculated from the following:    Height as of this encounter: 5' 4\" (1.626 m).    Weight as of this encounter: 161 lb 6.4 oz (73.2 kg).  Medication Reconciliation: complete   Maribell Siddiqi MA        "
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
## 2017-09-27 DIAGNOSIS — E11.49 TYPE 2 DIABETES MELLITUS WITH NEUROLOGICAL MANIFESTATIONS, CONTROLLED (H): ICD-10-CM

## 2017-09-27 NOTE — TELEPHONE ENCOUNTER
insulin glargine (LANTUS SOLOSTAR) 100 UNIT/ML PEN         Last Written Prescription Date: 11/16/16  Last Fill Quantity: 3, # refills: 3  Last Office Visit with JAZMYN, KENDY or [facility] prescribing provider:  09/27/17   Next 5 appointments (look out 90 days)     Oct 25, 2017  3:00 PM CDT   Return Visit with Bibiana Melara MD   [facility] ([facility]    [address] [place] [address] [phone]                   BP Readings from Last 3 Encounters:   09/26/17 107/60   05/16/17 125/73   05/09/17 137/79     Lab Results   Component Value Date    MICROL 41 11/16/2016     Lab Results   Component Value Date    UMALCR 9.74 11/16/2016     Creatinine   Date Value Ref Range Status   09/26/2017 1.16 0.66 - 1.25 mg/dL Final   ]  GFR Estimate   Date Value Ref Range Status   09/26/2017 62 >60 mL/min/1.7m2 Final     Comment:     Non  GFR Calc   05/09/2017 61 >60 mL/min/1.7m2 Final     Comment:     Non  GFR Calc   11/16/2016 50 (L) >60 mL/min/1.7m2 Final     Comment:     Non  GFR Calc     GFR Estimate If Black   Date Value Ref Range Status   09/26/2017 75 >60 mL/min/1.7m2 Final     Comment:      GFR Calc   05/09/2017 73 >60 mL/min/1.7m2 Final     Comment:      GFR Calc   11/16/2016 61 >60 mL/min/1.7m2 Final     Comment:      GFR Calc     Lab Results   Component Value Date    CHOL 121 11/05/2015     Lab Results   Component Value Date    HDL 41 11/05/2015     Lab Results   Component Value Date    LDL 91 11/16/2016    LDL 67 11/05/2015     Lab Results   Component Value Date    TRIG 63 11/05/2015     Lab Results   Component Value Date    CHOLHDLRATIO 3.0 11/05/2015     Lab Results   Component Value Date    AST 35 01/14/2014     Lab Results   Component Value Date    ALT 52 01/14/2014     Lab Results   Component Value Date    A1C 7.9 09/26/2017    A1C 8.1 05/09/2017    A1C 7.4 11/16/2016     A1C 6.3 02/23/2016    A1C 6.5 11/05/2015     Potassium   Date Value Ref Range Status   09/26/2017 4.3 3.4 - 5.3 mmol/L Final         [facility]

## 2017-10-06 ENCOUNTER — ANESTHESIA EVENT (OUTPATIENT)
Dept: SURGERY | Facility: AMBULATORY SURGERY CENTER | Age: 67
End: 2017-10-06

## 2017-10-09 ENCOUNTER — SURGERY (OUTPATIENT)
Age: 67
End: 2017-10-09
Payer: COMMERCIAL

## 2017-10-09 ENCOUNTER — HOSPITAL ENCOUNTER (OUTPATIENT)
Facility: AMBULATORY SURGERY CENTER | Age: 67
Discharge: HOME | End: 2017-10-09
Attending: OPHTHALMOLOGY | Admitting: OPHTHALMOLOGY
Payer: COMMERCIAL

## 2017-10-09 ENCOUNTER — ANESTHESIA (OUTPATIENT)
Dept: SURGERY | Facility: AMBULATORY SURGERY CENTER | Age: 67
End: 2017-10-09

## 2017-10-09 VITALS
TEMPERATURE: 98.1 F | RESPIRATION RATE: 14 BRPM | DIASTOLIC BLOOD PRESSURE: 72 MMHG | OXYGEN SATURATION: 96 % | SYSTOLIC BLOOD PRESSURE: 138 MMHG

## 2017-10-09 DIAGNOSIS — Z98.890 POSTOPERATIVE EYE STATE: Primary | ICD-10-CM

## 2017-10-09 LAB — GLUCOSE BLDC GLUCOMTR-MCNC: 123 MG/DL (ref 70–99)

## 2017-10-09 PROCEDURE — G8907 PT DOC NO EVENTS ON DISCHARG: HCPCS

## 2017-10-09 PROCEDURE — 15823 BLEPHARP UPR EYELID XCSV SKN: CPT | Mod: 50 | Performed by: OPHTHALMOLOGY

## 2017-10-09 PROCEDURE — 15823 BLEPHARP UPR EYELID XCSV SKN: CPT | Mod: 50

## 2017-10-09 PROCEDURE — G8918 PT W/O PREOP ORDER IV AB PRO: HCPCS

## 2017-10-09 RX ORDER — TETRACAINE HYDROCHLORIDE 5 MG/ML
SOLUTION OPHTHALMIC PRN
Status: DISCONTINUED | OUTPATIENT
Start: 2017-10-09 | End: 2017-10-09 | Stop reason: HOSPADM

## 2017-10-09 RX ORDER — ERYTHROMYCIN 5 MG/G
OINTMENT OPHTHALMIC PRN
Status: DISCONTINUED | OUTPATIENT
Start: 2017-10-09 | End: 2017-10-09 | Stop reason: HOSPADM

## 2017-10-09 RX ORDER — TOBRAMYCIN AND DEXAMETHASONE 3; 1 MG/ML; MG/ML
1 SUSPENSION/ DROPS OPHTHALMIC 3 TIMES DAILY
Qty: 5 ML | Refills: 0 | Status: SHIPPED | OUTPATIENT
Start: 2017-10-09 | End: 2017-10-19

## 2017-10-09 RX ORDER — SODIUM CHLORIDE, SODIUM LACTATE, POTASSIUM CHLORIDE, CALCIUM CHLORIDE 600; 310; 30; 20 MG/100ML; MG/100ML; MG/100ML; MG/100ML
500 INJECTION, SOLUTION INTRAVENOUS CONTINUOUS
Status: DISCONTINUED | OUTPATIENT
Start: 2017-10-09 | End: 2017-10-10 | Stop reason: HOSPADM

## 2017-10-09 RX ORDER — NALOXONE HYDROCHLORIDE 0.4 MG/ML
.1-.4 INJECTION, SOLUTION INTRAMUSCULAR; INTRAVENOUS; SUBCUTANEOUS
Status: DISCONTINUED | OUTPATIENT
Start: 2017-10-09 | End: 2017-10-10 | Stop reason: HOSPADM

## 2017-10-09 RX ORDER — ERYTHROMYCIN 5 MG/G
OINTMENT OPHTHALMIC
Qty: 3.5 G | Refills: 0 | Status: SHIPPED | OUTPATIENT
Start: 2017-10-09 | End: 2017-11-06

## 2017-10-09 RX ORDER — HYDROCODONE BITARTRATE AND ACETAMINOPHEN 5; 325 MG/1; MG/1
1 TABLET ORAL EVERY 4 HOURS PRN
Qty: 15 TABLET | Refills: 0 | Status: SHIPPED | OUTPATIENT
Start: 2017-10-09 | End: 2017-11-06

## 2017-10-09 RX ORDER — PROPOFOL 10 MG/ML
INJECTION, EMULSION INTRAVENOUS CONTINUOUS PRN
Status: DISCONTINUED | OUTPATIENT
Start: 2017-10-09 | End: 2017-10-09

## 2017-10-09 RX ORDER — FENTANYL CITRATE 50 UG/ML
INJECTION, SOLUTION INTRAMUSCULAR; INTRAVENOUS PRN
Status: DISCONTINUED | OUTPATIENT
Start: 2017-10-09 | End: 2017-10-09

## 2017-10-09 RX ORDER — SODIUM CHLORIDE, SODIUM LACTATE, POTASSIUM CHLORIDE, CALCIUM CHLORIDE 600; 310; 30; 20 MG/100ML; MG/100ML; MG/100ML; MG/100ML
INJECTION, SOLUTION INTRAVENOUS CONTINUOUS
Status: DISCONTINUED | OUTPATIENT
Start: 2017-10-09 | End: 2017-10-10 | Stop reason: HOSPADM

## 2017-10-09 RX ORDER — MEPERIDINE HYDROCHLORIDE 25 MG/ML
12.5 INJECTION INTRAMUSCULAR; INTRAVENOUS; SUBCUTANEOUS
Status: DISCONTINUED | OUTPATIENT
Start: 2017-10-09 | End: 2017-10-10 | Stop reason: HOSPADM

## 2017-10-09 RX ORDER — ACETAMINOPHEN 325 MG/1
975 TABLET ORAL ONCE
Status: COMPLETED | OUTPATIENT
Start: 2017-10-09 | End: 2017-10-09

## 2017-10-09 RX ORDER — HYDROMORPHONE HYDROCHLORIDE 1 MG/ML
.3-.5 INJECTION, SOLUTION INTRAMUSCULAR; INTRAVENOUS; SUBCUTANEOUS EVERY 10 MIN PRN
Status: DISCONTINUED | OUTPATIENT
Start: 2017-10-09 | End: 2017-10-10 | Stop reason: HOSPADM

## 2017-10-09 RX ORDER — LIDOCAINE 40 MG/G
CREAM TOPICAL
Status: DISCONTINUED | OUTPATIENT
Start: 2017-10-09 | End: 2017-10-10 | Stop reason: HOSPADM

## 2017-10-09 RX ORDER — DEXAMETHASONE SODIUM PHOSPHATE 4 MG/ML
4 INJECTION, SOLUTION INTRA-ARTICULAR; INTRALESIONAL; INTRAMUSCULAR; INTRAVENOUS; SOFT TISSUE EVERY 10 MIN PRN
Status: DISCONTINUED | OUTPATIENT
Start: 2017-10-09 | End: 2017-10-10 | Stop reason: HOSPADM

## 2017-10-09 RX ORDER — ALBUTEROL SULFATE 0.83 MG/ML
2.5 SOLUTION RESPIRATORY (INHALATION) EVERY 4 HOURS PRN
Status: DISCONTINUED | OUTPATIENT
Start: 2017-10-09 | End: 2017-10-10 | Stop reason: HOSPADM

## 2017-10-09 RX ORDER — FENTANYL CITRATE 50 UG/ML
25-50 INJECTION, SOLUTION INTRAMUSCULAR; INTRAVENOUS
Status: DISCONTINUED | OUTPATIENT
Start: 2017-10-09 | End: 2017-10-10 | Stop reason: HOSPADM

## 2017-10-09 RX ORDER — ONDANSETRON 4 MG/1
4 TABLET, ORALLY DISINTEGRATING ORAL EVERY 30 MIN PRN
Status: DISCONTINUED | OUTPATIENT
Start: 2017-10-09 | End: 2017-10-10 | Stop reason: HOSPADM

## 2017-10-09 RX ORDER — ONDANSETRON 2 MG/ML
4 INJECTION INTRAMUSCULAR; INTRAVENOUS EVERY 30 MIN PRN
Status: DISCONTINUED | OUTPATIENT
Start: 2017-10-09 | End: 2017-10-10 | Stop reason: HOSPADM

## 2017-10-09 RX ADMIN — SODIUM CHLORIDE, SODIUM LACTATE, POTASSIUM CHLORIDE, CALCIUM CHLORIDE 500 ML: 600; 310; 30; 20 INJECTION, SOLUTION INTRAVENOUS at 11:01

## 2017-10-09 RX ADMIN — ERYTHROMYCIN 1 INCH: 5 OINTMENT OPHTHALMIC at 11:30

## 2017-10-09 RX ADMIN — TETRACAINE HYDROCHLORIDE 1 DROP: 5 SOLUTION OPHTHALMIC at 11:05

## 2017-10-09 RX ADMIN — FENTANYL CITRATE 25 MCG: 50 INJECTION, SOLUTION INTRAMUSCULAR; INTRAVENOUS at 11:12

## 2017-10-09 RX ADMIN — PROPOFOL 100 MCG/KG/MIN: 10 INJECTION, EMULSION INTRAVENOUS at 11:09

## 2017-10-09 RX ADMIN — Medication 6 ML: at 11:20

## 2017-10-09 RX ADMIN — ACETAMINOPHEN 975 MG: 325 TABLET ORAL at 10:55

## 2017-10-09 RX ADMIN — ERYTHROMYCIN 1 INCH: 5 OINTMENT OPHTHALMIC at 11:31

## 2017-10-09 RX ADMIN — FENTANYL CITRATE 25 MCG: 50 INJECTION, SOLUTION INTRAMUSCULAR; INTRAVENOUS at 11:11

## 2017-10-09 NOTE — ANESTHESIA CARE TRANSFER NOTE
Patient: Wolf Sen    Procedure(s):  Bilateral upper eyelid blepharoplasty and ptosis repair - Wound Class: I-Clean    Diagnosis: droopy eyelids with superior field defect  Diagnosis Additional Information: No value filed.    Anesthesia Type:   No value filed.     Note:  Airway :Room Air  Patient transferred to:Phase II        Vitals: (Last set prior to Anesthesia Care Transfer)    CRNA VITALS  10/9/2017 1124 - 10/9/2017 1202      10/9/2017             Pulse: 60    SpO2: 95 %                Electronically Signed By: BERE Trent CRNA  October 9, 2017  12:02 PM
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
## 2017-10-09 NOTE — ANESTHESIA POSTPROCEDURE EVALUATION
Patient: Wolf Sen    Procedure(s):  Bilateral upper eyelid blepharoplasty and ptosis repair - Wound Class: I-Clean    Diagnosis:droopy eyelids with superior field defect  Diagnosis Additional Information: No value filed.    Anesthesia Type:  No value filed.    Note:  Anesthesia Post Evaluation    Patient location during evaluation: PACU  Patient participation: Able to fully participate in evaluation  Level of consciousness: awake  Pain management: adequate  Airway patency: patent  Cardiovascular status: acceptable  Respiratory status: acceptable  Hydration status: balanced  PONV: none     Anesthetic complications: None          Last vitals:  Vitals:    10/09/17 1157 10/09/17 1212 10/09/17 1227   BP: 108/62 123/65 138/72   Resp: 14 14 14   Temp: 36.7  C (98.1  F)     SpO2: 96% 97% 96%         Electronically Signed By: Gato Mcrae MD  October 9, 2017  3:02 PM

## 2017-10-09 NOTE — OP NOTE
PREOPERATIVE DIAGNOSIS:   1. Bilateral upper eyelid dermatochalasis.   2. Bilateral upper eyelid ptosis.   POSTOPERATIVE DIAGNOSES:   1. Bilateral upper eyelid dermatochalasis.   2. Bilateral upper eyelid ptosis.   PROCEDURE PERFORMED:   1. Bilateral upper blepharoplasty.   2. Bilateral upper eyelid ptosis repair by Knight's muscle conjunctival resection. 9mm right 10 mm left.   SURGEON: Bibiana Melara MD   ASSISTANT: Pelon Boyd MD  ANESTHESIA: Monitored with local infiltration of a 50/50 mixture of 2% lidocaine with epinephrine and 0.5% Marcaine.   COMPLICATIONS: None.   ESTIMATED BLOOD LOSS: Less than 5 mL.   HISTORY: Wolf Sen  presented with upper lid drooping interfering with his superior visual field and activities of daily living. After the risks, benefits and alternatives to the proposed procedure were explained, informed consent was obtained.   DESCRIPTION OF PROCEDURE: Wolf Sen  was brought to the operating room and placed supine on the operating table. IV sedation was given. The upper eyelid crease and excess upper eyelid skin was marked with a marking pen and infiltrated with local anesthetic.  The area was prepped and draped in the typical sterile ophthalmic fashion. Attention was directed to the left side. Skin was incised following marked lines. Skin and orbicularis muscle flap were excised with high temperature cautery. A 4-0 silk suture was placed to the eyelid margin and the eyelid everted over a Desmarres retractor. A 6-0 silk suture was threaded 5 mm from the superior tarsal border and used to elevate the conjunctiva and Knight's muscle which was clamped with a Putterman clamp. A 6-0 plain gut suture was run in a horizontal mattress fashion 1 mm below the clamp. This was run from lateral to medial and then medial to lateral and the clamped tissues were excised with a 15 blade. The suture was externalized through the blepharoplasty incision and tied in a permanent fashion. A small  amount of nasal and central fat was debulked. Crease formation sutures were placd from skin to levator to skin using 7-0 vicrl. The blepharoplasty incision was closed with running 6-0 plain gut suture. The same procedure was performed on the right side where a total of 9 mm of conjunctiva and bass's muscle was excised. The patient tolerated the procedure well. Antibiotic ointment was applied. Wolf Sen left the operating room in stable condition.   Bibiana Melara MD
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
## 2017-10-09 NOTE — BRIEF OP NOTE
[facility]    Brief Operative Note    Pre-operative diagnosis: Droopy eyelids with superior field defect  Post-operative diagnosis Same  Procedure: Procedure(s):  Bilateral upper eyelid blepharoplasty and ptosis repair - Wound Class:   Surgeon: Surgeon(s) and Role:     * Bibiana Melara MD - Primary   Pelon Boyd MD - Assisting  Anesthesia: Combined MAC with Local   Estimated blood loss: <10ml  Drains: None  Specimens: * No specimens in log *  Findings:   Pls see dictation.  Complications: None.  Implants: None.

## 2017-10-09 NOTE — IP AVS SNAPSHOT
[facility]    [address] [place] [address]    Phone:  [phone]                                       After Visit Summary   10/9/2017    Wolf Sen    MRN: [id number]           After Visit Summary Signature Page     I have received my discharge instructions, and my questions have been answered. I have discussed any challenges I see with this plan with the nurse or doctor.    ..........................................................................................................................................  Patient/Patient Representative Signature      ..........................................................................................................................................  Patient Representative Print Name and Relationship to Patient    ..................................................               ................................................  Date                                            Time    ..........................................................................................................................................  Reviewed by Signature/Title    ...................................................              ..............................................  Date                                                            Time

## 2017-10-09 NOTE — DISCHARGE INSTRUCTIONS
[facility]  Same-Day Surgery   Adult Discharge Orders & Instructions   For 24 hours after surgery  1. Get plenty of rest.  A responsible adult must stay with you for at least 24 hours after you leave the hospital.   2. Do not drive or use heavy equipment.  If you have weakness or tingling, don't drive or use heavy equipment until this feeling goes away.  3. Do not drink alcohol.  4. Avoid strenuous or risky activities.  Ask for help when climbing stairs.   5. You may feel lightheaded.  IF so, sit for a few minutes before standing.  Have someone help you get up.   6. If you have nausea (feel sick to your stomach): Drink only clear liquids such as apple juice, ginger ale, broth or 7-Up.  Rest may also help.  Be sure to drink enough fluids.  Move to a regular diet as you feel able.  7. You may have a slight fever. Call the doctor if your fever is over 100 F (37.7 C) (taken under the tongue) or lasts longer than 24 hours.  8. You may have a dry mouth, a sore throat, muscle aches or trouble sleeping.  These should go away after 24 hours.  9. Do not make important or legal decisions.   Call your doctor for any of the followin.  Signs of infection (fever, growing tenderness at the surgery site, a large amount of drainage or bleeding, severe pain, foul-smelling drainage, redness, swelling).    2. It has been over 8 to 10 hours since surgery and you are still not able to urinate (pass water).    3.  Headache for over 24 hours.      Post-operative Instructions  Ophthalmic Plastic and Reconstructive Surgery    Bibiana Melara M.D.     All instructions apply to the operated eye(s) or eyelid(s).    Wound care and personal care  ? If a patch or bandage has been placed, please leave this in place until seen by your physician. Ensure that the bandage does not get wet when you take a shower.  ? Apply ice compresses 15 minutes on 15 minutes off while awake for 2 days, then switch to warm water  compresses 4 times a day until seen by your physician. For warm packs you can place a cup of dry uncooked rice in a clean cotton sock. Then place sock in microwave 30 seconds to one minute. Next place the warm sock into a plastic bag and wrap the bag with clean warm wet washcloth and place over operated eye.    ? You may shower or wash your hair the day after surgery. Do not bathe or go swimming for 1 week to prevent contamination of your wounds.  ? Do not apply make-up to the eyes or eyelids for 2 weeks after surgery.  ? Expect some swelling, bruising, black eye (even into the lower eyelids and cheeks). Also expect serum caking, crusting and discharge from the eye and/or incisions. A small amount of surface bleeding is normal for the first 48 hours.  ? Your eye(s) and eyelid(s) may be painful and tender. This is normal after surgery.  Use the pain medication as prescribed. If your pain does not improve despite the  medication, contact the office.    Contact information and follow-up  ? Return to the Eye Clinic for a follow-up appointment with your physician as  scheduled. If no appointment has been scheduled:   - [facility] eye clinic: [phone] for an appointment with Dr. Melara within 1 to 2 weeks from your date of surgery.   -  [facility] eye clinic: [phone] for an appointment with Dr. Melara within 1 to 2 weeks from your date of surgery.     ? For severe pain, bleeding, or loss of vision, call the [facility] at [phone] or [facility] at [phone].     After hours or on weekends and holidays, call [phone] and ask to speak with the ophthalmologist on call.    An on call person can be reached after hours for concerns. The on call doctor should not call in medication refill requests after hours or on weekends, so please plan accordingly. An effort has been made to provide adequate pain medications following every surgery,  and refills will not be provided in most instances. Narcotic pain medications cannot be called in.     Activity restrictions and driving  ? Avoid heavy lifting, bending, exercise or strenuous activity for 1 week after surgery.  You may resume other activities and return to work as tolerated.  ? You may not resume driving until have you stopped using narcotic pain medications (such as Norco, Percocet, Tylenol #3).    Medications  ? Restart all your regular home medications and eye drops. If you take Plavix or  Aspirin on a regular basis, wait for 72 hours after your surgery before restarting these in order to decrease the risk of bleeding complications.  ? Avoid aspirin and aspirin-like medications (Motrin, Aleve, Ibuprofen, Randi-  [place] etc) for 72 hours to reduce the risk of bleeding. You may take Tylenol  (acetaminophen) for pain.  ? In addition to your home medications, take the following post-operative medications as prescribed by your physician.    ? Apply antibiotic ointment to all sutures three times a day, and into the operated eye(s) at night.  ? Instill eye drops 3 times a day for 10 days.   ? Take pain pills at prescribed frequency as needed for pain.    ? WARNING: All the prescription pain medications listed above contain Tylenol  (acetaminophen). You must not take more than 4,000 mg of acetaminophen per  24-hour period. This is equivalent to 6 tablets of Darvocet, 12 tablets of Norco, Percocet or Tylenol #3. If you take other over-the-counter medications containing acetaminophen, you must take the amount of acetaminophen into account and reduce the number of prescribed pain pills accordingly.  ? The prescribed medications may make you drowsy. You must not drive a car,  operate heavy machinery or drink alcohol while taking them.  ? The prescribed pain medications may cause constipation and nausea. Take them with some food to prevent a stomach upset. If you continue to experience nausea, call your  physician.

## 2017-11-01 ENCOUNTER — OFFICE VISIT (OUTPATIENT)
Dept: OPHTHALMOLOGY | Facility: CLINIC | Age: 67
End: 2017-11-01
Payer: COMMERCIAL

## 2017-11-01 DIAGNOSIS — H02.836 DERMATOCHALASIS OF EYELIDS OF BOTH EYES: Primary | ICD-10-CM

## 2017-11-01 DIAGNOSIS — H02.833 DERMATOCHALASIS OF EYELIDS OF BOTH EYES: Primary | ICD-10-CM

## 2017-11-01 PROCEDURE — 99024 POSTOP FOLLOW-UP VISIT: CPT | Performed by: OPHTHALMOLOGY

## 2017-11-01 ASSESSMENT — VISUAL ACUITY
OS_SC: 20/40
OS_SC+: -2
METHOD: SNELLEN - LINEAR
OD_SC: 20/25
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
## 2017-11-01 NOTE — NURSING NOTE
Patient presents with:  Post Op (Ophthalmology) Both Eyes: both upper eyelid blepharoplasty and ptosis repair 10/9/17      Referring Provider:  No referring provider defined for this encounter.    HPI    Symptoms:              Comments:  OU comfortable. Pt currently using gtt TID OU.

## 2017-11-01 NOTE — PROGRESS NOTES
Wolf Sen is status post both upper eyelid blepharoplasty  and bilateral mmcr ptosis repair.  Incision(s) healing well.  The lid(s)  are  in excellent position.    I have recommended:  * Continue antibiotic ointment or bland lubricating ointment (eg vaseline or aquaphor) to the incision site BID.  * Massage along the incision 2-3 x daily.   * Warm soaks 3-4x daily until all edema and ecchymoses resolve  * Return to clinic in 2 months     Attending Physician Attestation:  Complete documentation of historical and exam elements from today's encounter can be found in the full encounter summary report (not reduplicated in this progress note).  I personally obtained the chief complaint(s) and history of present illness.  I confirmed and edited as necessary the review of systems, past medical/surgical history, family history, social history, and examination findings as documented by others; and I examined the patient myself.  I personally reviewed the relevant tests, images, and reports as documented above.  I formulated and edited as necessary the assessment and plan and discussed the findings and management plan with the patient and family. - Bibiana Melara MD

## 2017-11-06 ENCOUNTER — OFFICE VISIT (OUTPATIENT)
Dept: OPHTHALMOLOGY | Facility: CLINIC | Age: 67
End: 2017-11-06
Payer: COMMERCIAL

## 2017-11-06 DIAGNOSIS — H10.12 ALLERGIC CONJUNCTIVITIS OF LEFT EYE: Primary | ICD-10-CM

## 2017-11-06 PROCEDURE — 92012 INTRM OPH EXAM EST PATIENT: CPT | Performed by: OPHTHALMOLOGY

## 2017-11-06 ASSESSMENT — VISUAL ACUITY
OD_SC+: -1
OS_SC+: +2
OD_SC: 20/25
OS_SC: 20/50
METHOD: SNELLEN - LINEAR
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
## 2017-11-06 NOTE — NURSING NOTE
Patient presents with:  Red Eye Both Eyes: L>R, started Friday last week, used the Tdex he was given after ptosis surgery and the redness went away. c/o itching on the skin for which he uses ointment still per Dr Melara's instructions      Referring Provider:  No referring provider defined for this encounter.    HPI    Affected eye(s):  Left, Both   Symptoms:     Redness   Itching         Do you have eye pain now?:  No      Comments:     Red Eye Both Eyes: L>R, started Friday last week, used the Tdex he was given after ptosis surgery and the redness went away. c/o itching on the skin for which he uses ointment still per Dr Melara's instructions               Lacey GARRISON. OSC

## 2017-11-06 NOTE — PROGRESS NOTES
Assessment & Plan   Wolf Sen is a 67 year old male who presents with:   Review of systems for the eyes was negative other than the pertinent positives and negatives noted in the HPI.  History is obtained from the patient with an  translating throughout the encounter.    Allergic conjunctivitis of left eye  - No redness present today, incision lines healing well. Use ATs for dryness    Return PRN    Documentation for today's encounter was performed by Lacey GARRISON. GILBERTO. Acting as a scribe in my presence. I have reviewed and verified that it is an accurate recording of today's encounter.    Attending Physician Attestation:  Complete documentation of historical and exam elements from today's encounter can be found in the full encounter summary report (not reduplicated in this progress note).  I personally obtained the chief complaint(s) and history of present illness.  I confirmed and edited as necessary the review of systems, past medical/surgical history, family history, social history, and examination findings as documented by others; and I examined the patient myself.  I personally reviewed the relevant tests, images, and reports as documented above.  I formulated and edited as necessary the assessment and plan and discussed the findings and management plan with the patient and family. - Carlton Don MD

## 2017-11-13 ASSESSMENT — EXTERNAL EXAM - LEFT EYE: OS_EXAM: NORMAL

## 2017-11-13 ASSESSMENT — SLIT LAMP EXAM - LIDS
COMMENTS: NORMAL
COMMENTS: NORMAL

## 2017-11-13 ASSESSMENT — EXTERNAL EXAM - RIGHT EYE: OD_EXAM: NORMAL

## 2017-11-20 DIAGNOSIS — E11.9 TYPE 2 DIABETES MELLITUS WITH HEMOGLOBIN A1C GOAL OF LESS THAN 8.0% (H): ICD-10-CM

## 2017-11-22 NOTE — TELEPHONE ENCOUNTER
Medication is being filled for 1 time refill only due to:  patient is overdue for labs and diabetic recheck. 30 day supply sent.       - please send reminder.     Carrol Lake RN

## 2017-11-22 NOTE — TELEPHONE ENCOUNTER
This writer attempted to contact patient  on 11/22/17      Reason for call refill and left detailed message refill x 1 needs labs and office visit.        Amena Castillo MA

## 2018-01-03 ENCOUNTER — CARE COORDINATION (OUTPATIENT)
Dept: GERIATRIC MEDICINE | Facility: CLINIC | Age: 68
End: 2018-01-03

## 2018-01-03 NOTE — PROGRESS NOTES
[facility] Six-Month Telephone Assessment    6 month telephone assessment completed on 1/3/18 with Beka Vazquez.    ER visits: No  Hospitalizations: No  TCU stays: No  Significant health status changes: N/A  Falls/Injuries: No  ADL/IADL changes: No  Changes in services: No    Caregiver Assessment follow up:  N/A    Goals: See POC in chart for goal progress documentation.  Member states he is healthy and has no needs.     Will see client in 6 months for an annual health risk assessment.   Encouraged client to call CM with any questions or concerns in the meantime.     Viki Avalos RN, PHN  [facility]

## 2018-01-10 ENCOUNTER — OFFICE VISIT (OUTPATIENT)
Dept: OPHTHALMOLOGY | Facility: CLINIC | Age: 68
End: 2018-01-10
Payer: COMMERCIAL

## 2018-01-10 DIAGNOSIS — H02.833 DERMATOCHALASIS OF EYELIDS OF BOTH EYES: Primary | ICD-10-CM

## 2018-01-10 DIAGNOSIS — H02.836 DERMATOCHALASIS OF EYELIDS OF BOTH EYES: Primary | ICD-10-CM

## 2018-01-10 PROCEDURE — 99024 POSTOP FOLLOW-UP VISIT: CPT | Performed by: OPHTHALMOLOGY

## 2018-01-10 ASSESSMENT — VISUAL ACUITY
OS_SC: 20/25
OS_SC+: -1
METHOD: SNELLEN - LINEAR
OD_SC: 20/20
OD_SC+: -1
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
## 2018-01-10 NOTE — PROGRESS NOTES
Wolf Sen is about 2 months status post bilateral upper eyelid blepharoplasty and ptosis repair  He is doing well. He is pleased with the aesthetic result, and improvement in peripheral visual field.      He will follow up with me on an as needed basis.    Complete documentation of historical and exam elements from today's encounter can be found in the full encounter summary report (not reduplicated in this progress note). I personally obtained the chief complaint(s) and history of present illness.  I confirmed and edited as necessary the review of systems, past medical/surgical history, family history, social history, and examination findings as documented by others; and I examined the patient myself. I personally reviewed the relevant tests, images, and reports as documented above. I formulated and edited as necessary the assessment and plan and discussed the findings and management plan with the patient and family. - Bibiana Melara MD

## 2018-01-10 NOTE — NURSING NOTE
Patient presents with:  Post Op (Ophthalmology) Both Eyes:  both upper eyelid blepharoplasty and ptosis repair 10/9/17      Referring Provider:  No referring provider defined for this encounter.    HPI    Symptoms:           Do you have eye pain now?:  No      Comments:  Upper lids have been fine but has been having dry lower lids OU.

## 2018-01-17 ENCOUNTER — OFFICE VISIT (OUTPATIENT)
Dept: FAMILY MEDICINE | Facility: CLINIC | Age: 68
End: 2018-01-17
Payer: COMMERCIAL

## 2018-01-17 VITALS
OXYGEN SATURATION: 98 % | HEART RATE: 64 BPM | SYSTOLIC BLOOD PRESSURE: 127 MMHG | DIASTOLIC BLOOD PRESSURE: 74 MMHG | HEIGHT: 64 IN | WEIGHT: 161 LBS | BODY MASS INDEX: 27.49 KG/M2 | TEMPERATURE: 97.9 F

## 2018-01-17 DIAGNOSIS — I10 HYPERTENSION, GOAL BELOW 150/90: ICD-10-CM

## 2018-01-17 DIAGNOSIS — Z23 NEED FOR PROPHYLACTIC VACCINATION AND INOCULATION AGAINST INFLUENZA: ICD-10-CM

## 2018-01-17 DIAGNOSIS — E11.49 TYPE 2 DIABETES MELLITUS WITH NEUROLOGICAL MANIFESTATIONS, CONTROLLED (H): Primary | ICD-10-CM

## 2018-01-17 DIAGNOSIS — E78.5 HYPERLIPIDEMIA, UNSPECIFIED HYPERLIPIDEMIA TYPE: ICD-10-CM

## 2018-01-17 DIAGNOSIS — I63.9 CEREBRAL INFARCTION, UNSPECIFIED MECHANISM (H): ICD-10-CM

## 2018-01-17 DIAGNOSIS — M15.9 GENERALIZED OSTEOARTHRITIS: ICD-10-CM

## 2018-01-17 LAB
ALBUMIN SERPL-MCNC: 3.7 G/DL (ref 3.4–5)
ALP SERPL-CCNC: 120 U/L (ref 40–150)
ALT SERPL W P-5'-P-CCNC: 54 U/L (ref 0–70)
ANION GAP SERPL CALCULATED.3IONS-SCNC: 5 MMOL/L (ref 3–14)
AST SERPL W P-5'-P-CCNC: 30 U/L (ref 0–45)
BILIRUB SERPL-MCNC: 0.5 MG/DL (ref 0.2–1.3)
BUN SERPL-MCNC: 20 MG/DL (ref 7–30)
CALCIUM SERPL-MCNC: 8.8 MG/DL (ref 8.5–10.1)
CHLORIDE SERPL-SCNC: 103 MMOL/L (ref 94–109)
CO2 SERPL-SCNC: 32 MMOL/L (ref 20–32)
CREAT SERPL-MCNC: 1.22 MG/DL (ref 0.66–1.25)
CREAT UR-MCNC: 235 MG/DL
GFR SERPL CREATININE-BSD FRML MDRD: 59 ML/MIN/1.7M2
GLUCOSE SERPL-MCNC: 92 MG/DL (ref 70–99)
HBA1C MFR BLD: 9 % (ref 4.3–6)
LDLC SERPL DIRECT ASSAY-MCNC: 160 MG/DL
MICROALBUMIN UR-MCNC: 74 MG/L
MICROALBUMIN/CREAT UR: 31.28 MG/G CR (ref 0–17)
POTASSIUM SERPL-SCNC: 4.1 MMOL/L (ref 3.4–5.3)
PROT SERPL-MCNC: 8.1 G/DL (ref 6.8–8.8)
SODIUM SERPL-SCNC: 140 MMOL/L (ref 133–144)
TSH SERPL DL<=0.005 MIU/L-ACNC: 1.35 MU/L (ref 0.4–4)

## 2018-01-17 PROCEDURE — G0008 ADMIN INFLUENZA VIRUS VAC: HCPCS | Performed by: PREVENTIVE MEDICINE

## 2018-01-17 PROCEDURE — 99214 OFFICE O/P EST MOD 30 MIN: CPT | Mod: 25 | Performed by: PREVENTIVE MEDICINE

## 2018-01-17 PROCEDURE — 90662 IIV NO PRSV INCREASED AG IM: CPT | Performed by: PREVENTIVE MEDICINE

## 2018-01-17 PROCEDURE — 84443 ASSAY THYROID STIM HORMONE: CPT | Performed by: PREVENTIVE MEDICINE

## 2018-01-17 PROCEDURE — 80053 COMPREHEN METABOLIC PANEL: CPT | Performed by: PREVENTIVE MEDICINE

## 2018-01-17 PROCEDURE — 82306 VITAMIN D 25 HYDROXY: CPT | Performed by: PREVENTIVE MEDICINE

## 2018-01-17 PROCEDURE — 36415 COLL VENOUS BLD VENIPUNCTURE: CPT | Performed by: PREVENTIVE MEDICINE

## 2018-01-17 PROCEDURE — 83721 ASSAY OF BLOOD LIPOPROTEIN: CPT | Performed by: PREVENTIVE MEDICINE

## 2018-01-17 PROCEDURE — 82043 UR ALBUMIN QUANTITATIVE: CPT | Performed by: PREVENTIVE MEDICINE

## 2018-01-17 PROCEDURE — 83036 HEMOGLOBIN GLYCOSYLATED A1C: CPT | Performed by: PREVENTIVE MEDICINE

## 2018-01-17 RX ORDER — METOPROLOL TARTRATE 100 MG
100 TABLET ORAL 2 TIMES DAILY
Qty: 180 TABLET | Refills: 1 | Status: SHIPPED | OUTPATIENT
Start: 2018-01-17 | End: 2018-04-04

## 2018-01-17 RX ORDER — AMLODIPINE BESYLATE 10 MG/1
10 TABLET ORAL DAILY
Qty: 90 TABLET | Refills: 1 | Status: SHIPPED | OUTPATIENT
Start: 2018-01-17 | End: 2018-04-04

## 2018-01-17 RX ORDER — MULTIPLE VITAMINS W/ MINERALS TAB 9MG-400MCG
1 TAB ORAL DAILY
Qty: 100 TABLET | Refills: 3 | Status: SHIPPED | OUTPATIENT
Start: 2018-01-17 | End: 2018-04-04

## 2018-01-17 RX ORDER — LISINOPRIL 40 MG/1
40 TABLET ORAL DAILY
Qty: 90 TABLET | Refills: 1 | Status: SHIPPED | OUTPATIENT
Start: 2018-01-17 | End: 2018-04-04

## 2018-01-17 RX ORDER — ACETAMINOPHEN 500 MG
1000 TABLET ORAL 3 TIMES DAILY PRN
Qty: 100 TABLET | Refills: 0 | Status: SHIPPED | OUTPATIENT
Start: 2018-01-17 | End: 2018-04-04

## 2018-01-17 ASSESSMENT — PAIN SCALES - GENERAL: PAINLEVEL: NO PAIN (0)

## 2018-01-17 NOTE — PATIENT INSTRUCTIONS
At [facility], we strive to deliver an exceptional experience to you, every time we see you.  If you receive a survey in the mail, please send us back your thoughts. We really do value your feedback.    Based on your medical history, these are the current health maintenance/preventive care services that you are due for (some may have been done at this visit.)  Health Maintenance Due   Topic Date Due     PNEUMOCOCCAL (2 of 2 - PCV13) 05/05/2015     INFLUENZA VACCINE (SYSTEM ASSIGNED)  09/01/2017     LIPID MONITORING Q1 YEAR  11/16/2017     MICROALBUMIN Q1 YEAR  11/16/2017         Suggested websites for health information:  Www.[facility].org : Up to date and easily searchable information on multiple topics.  Www.medlineplus.gov : medication info, interactive tutorials, watch real surgeries online  Www.familydoctor.org : good info from the Academy of Family Physicians  Www.cdc.gov : public health info, travel advisories, epidemics (H1N1)  Www.aap.org : children's health info, normal development, vaccinations  Www.health.[facility].[place].us : [place] dept of health, public health issues in [place], N1N1    Your care team:                            Family Medicine Internal Medicine   MD Isidro Ventura MD Shantel Branch-Fleming, MD Katya Georgiev PA-C Nam Ho, MD Pediatrics   CHRISTINE Quarles, MD Apoorva Ball CNP, MD Deborah Mielke, MD Kim Thein, APRN CNP      Clinic hours: Monday - Thursday 7 am-7 pm; Fridays 7 am-5 pm.   Urgent care: Monday - Friday 11 am-9 pm; Saturday and Sunday 9 am-5 pm.  Pharmacy : Monday -Thursday 8 am-8 pm; Friday 8 am-6 pm; Saturday and Sunday 9 am-5 pm.     Clinic: [phone]   Pharmacy: [phone]

## 2018-01-17 NOTE — PROGRESS NOTES
SUBJECTIVE:   Wolf Sen is a 67 year old male who presents to clinic today for the following health issues:      Diabetes Follow-up    Patient is checking blood sugars: once daily.  Results are as follows:       am - 140    Diabetic concerns: None     Symptoms of hypoglycemia (low blood sugar): none     Paresthesias (numbness or burning in feet) or sores: No     Date of last diabetic eye exam: UTD    Exercise daily+ 10 minutes daily     Hyperlipidemia Follow-Up      Rate your low fat/cholesterol diet?: good    Taking statin?  Yes, no muscle aches from statin    Other lipid medications/supplements?:  none    Hypertension Follow-up      Outpatient blood pressures are being checked at home.  Results are 147/77.    Low Salt Diet: low salt  BP Readings from Last 2 Encounters:   10/09/17 138/72   09/26/17 107/60     Hemoglobin A1C (%)   Date Value   09/26/2017 7.9 (H)   05/09/2017 8.1 (H)     LDL Cholesterol Calculated (mg/dL)   Date Value   11/05/2015 67   06/15/2015 50     LDL Cholesterol Direct (mg/dL)   Date Value   11/16/2016 91         Amount of exercise or physical activity: 6-7 days/week for an average of less than 15 minutes    Problems taking medications regularly: No    Medication side effects: none    Diet: low salt, low fat/cholesterol and diabetic      Generalized joint pains:  -As needed use of Advil  -Advil twice a day  -No stiffness  -Some tightness  -No erythema  -No rash   -Has had for some time    Handicapped parking decal:  -Would like a parking tag      Problem list and histories reviewed & adjusted, as indicated.  Additional history: as documented    Patient Active Problem List   Diagnosis     DM type 2, goal A1C below 8.0     HTN, goal below 150/90     Hyperlipidemia LDL goal <70     Overweight (BMI 25.0-29.9)     Cataracts, both eyes     Health Care Home     Advance care planning     Type 2 diabetes mellitus with neurological manifestations, controlled (H)     Pseudophakia of left eye      History of carotid endarterectomy     Cerebral infarction (H)     Aortic valve insufficiency     Past Surgical History:   Procedure Laterality Date     CATARACT IOL, RT/LT       COMBINED REPAIR PTOSIS WITH BLEPHAROPLASTY Bilateral 10/9/2017    Procedure: COMBINED REPAIR PTOSIS WITH BLEPHAROPLASTY;  Bilateral upper eyelid blepharoplasty and ptosis repair;  Surgeon: Bibiana Melara MD;  Location: [facility] OR     PHACOEMULSIFICATION WITH STANDARD INTRAOCULAR LENS IMPLANT Right 2/25/2016    Procedure: PHACOEMULSIFICATION WITH STANDARD INTRAOCULAR LENS IMPLANT;  Surgeon: Carlton Don MD;  Location: [facility] OR     PHACOEMULSIFICATION WITH STANDARD INTRAOCULAR LENS IMPLANT Left 2/11/2016    Procedure: PHACOEMULSIFICATION WITH STANDARD INTRAOCULAR LENS IMPLANT;  Surgeon: Carlton Don MD;  Location: [facility] OR     REPAIR PTOSIS Bilateral     10/17       Social History   Substance Use Topics     Smoking status: Never Smoker     Smokeless tobacco: Never Used     Alcohol use No     Family History   Problem Relation Age of Onset     Hypertension Father      CANCER No family hx of      DIABETES No family hx of      CEREBROVASCULAR DISEASE No family hx of      Thyroid Disease No family hx of      Glaucoma No family hx of      Macular Degeneration No family hx of          Current Outpatient Prescriptions   Medication Sig Dispense Refill     acetaminophen (TYLENOL) 500 MG tablet Take 2 tablets (1,000 mg) by mouth 3 times daily as needed for pain 100 tablet 0     multivitamin, therapeutic with minerals (MULTI-VITAMIN) TABS tablet Take 1 tablet by mouth daily 100 tablet 3     amLODIPine (NORVASC) 10 MG tablet Take 1 tablet (10 mg) by mouth daily 90 tablet 1     metoprolol tartrate (LOPRESSOR) 100 MG tablet Take 1 tablet (100 mg) by mouth 2 times daily 180 tablet 1     lisinopril (PRINIVIL/ZESTRIL) 40 MG tablet Take 1 tablet (40 mg) by mouth daily 90 tablet 1     metFORMIN (GLUCOPHAGE) 1000 MG tablet Take 1 tablet (1,000 mg)  by mouth 2 times daily (with meals) 180 tablet 1     insulin glargine (LANTUS SOLOSTAR) 100 UNIT/ML injection Inject 10 Units Subcutaneous At Bedtime 9 mL 1     insulin aspart (NOVOLOG FLEXPEN) 100 UNIT/ML injection INJECT 5 UNITS UNDER THE SKIN THREE TIMES DAILY WITH MEALS 15 mL 3     atorvastatin (LIPITOR) 40 MG tablet Take 1 tablet (40 mg) by mouth daily 90 tablet 3     insulin pen needle (B-D U/F) 31G X 8 MM USE AS DIRECTED THREE TIMES DAILY 100 each 3     blood glucose monitoring (EMIL CONTOUR NEXT) test strip USE TO TEST BLOOD SUGARS 3 TIMES DAILY OR AS DIRECTED. 100 each 11     multivitamin, therapeutic with minerals (MULTI-VITAMIN) TABS Take 1 tablet by mouth daily 100 tablet 3     ORDER FOR DME Equipment being ordered: Lancets for use with Lantus pen (once at night) and Novolog Flex Pen (three times a day).  Dispense 3 month supply with one refill 3 Month 1     SEROQUEL  MG 24 hr tablet   2     ORDER FOR DME Equipment being ordered: Rolling walker with seat 1 Units 0     ASPIRIN PO Take 81 mg by mouth.         [DISCONTINUED] metFORMIN (GLUCOPHAGE) 500 MG tablet TAKE 1 TABLET BY MOUTH TWICE DAILY WITH MEALS 60 tablet 0     [DISCONTINUED] insulin glargine (LANTUS SOLOSTAR) 100 UNIT/ML injection Inject 10 Units Subcutaneous At Bedtime 9 mL 1     [DISCONTINUED] amLODIPine (NORVASC) 10 MG tablet Take 1 tablet (10 mg) by mouth daily 90 tablet 1     [DISCONTINUED] metoprolol (LOPRESSOR) 100 MG tablet Take 1 tablet (100 mg) by mouth 2 times daily 180 tablet 1     [DISCONTINUED] lisinopril (PRINIVIL/ZESTRIL) 40 MG tablet Take 1 tablet (40 mg) by mouth daily 90 tablet 1     [DISCONTINUED] metFORMIN (GLUCOPHAGE) 500 MG tablet Take 2 tablets (1,000 mg) by mouth 2 times daily (with meals) 180 tablet 1     [DISCONTINUED] insulin aspart (NOVOLOG FLEXPEN) 100 UNIT/ML soln INJECT 5 UNITS UNDER THE SKIN THREE TIMES DAILY WITH MEALS 15 mL 3     No Known Allergies  Recent Labs   Lab Test  09/26/17   1449  09/26/17    "1413  05/09/17   1216  11/16/16   1537  02/23/16   1042  11/05/15   0951  06/15/15   1103  02/09/15   1034   01/14/14   1151   A1C   --   7.9*  8.1*  7.4*  6.3*  6.5*  6.9*  6.2*   < >   --    LDL   --    --    --   91   --   67  50  123   < >   --    HDL   --    --    --    --    --   41  47  55   < >   --    TRIG   --    --    --    --    --   63  113  87   < >   --    ALT   --    --    --    --    --    --    --    --    --   52   CR  1.16   --   1.19  1.40*   --   1.06  1.09  0.94   < >  1.73*   GFRESTIMATED  62   --   61  50*   --   70  67  80   < >  40*   GFRESTBLACK  75   --   73  61   --   84  82  >90   GFR Calc     < >  48*   POTASSIUM  4.3   --   4.1  4.0   --   4.7  4.5  4.0   < >  5.0   TSH   --    --    --    --   1.32   --    --    --    --    --     < > = values in this interval not displayed.      BP Readings from Last 3 Encounters:   01/17/18 127/74   10/09/17 138/72   09/26/17 107/60    Wt Readings from Last 3 Encounters:   01/17/18 161 lb (73 kg)   09/26/17 161 lb 6.4 oz (73.2 kg)   05/16/17 165 lb (74.8 kg)                  Labs reviewed in EPIC          Reviewed and updated as needed this visit by clinical staffAllergies       Reviewed and updated as needed this visit by Provider         ROS:  Constitutional, neuro, ENT, endocrine, pulmonary, cardiac, gastrointestinal, genitourinary, musculoskeletal, integument and psychiatric systems are negative, except as otherwise noted.      OBJECTIVE:                                                    /74  Pulse 64  Temp 97.9  F (36.6  C) (Oral)  Ht 5' 4\" (1.626 m)  Wt 161 lb (73 kg)  SpO2 98%  BMI 27.64 kg/m2  Body mass index is 27.64 kg/(m^2).  GENERAL APPEARANCE: healthy, alert and no distress  EYES: Eyes grossly normal to inspection  NECK: no adenopathy  RESP: lungs clear to auscultation - no rales, rhonchi or wheezes  CV: regular rates and rhythm, normal S1 S2, no S3 or S4, no murmur, click or rub, no irregular beats and " peripheral pulses strong  ABDOMEN: soft, non-tender  MS: extremities normal- no gross deformities noted and peripheral pulses normal  SKIN: no suspicious lesions or rashes  NEURO: Normal strength and tone, mentation intact and speech normal  PSYCH: mentation appears normal and affect normal/bright    Diagnostic test results:  Diagnostic Test Results:  No results found for this or any previous visit (from the past 24 hour(s)).       ASSESSMENT/PLAN:                                                    1. Type 2 diabetes mellitus with neurological manifestations, controlled (H)  - Albumin Random Urine Quantitative with Creat Ratio  - Hemoglobin A1c  - Comprehensive metabolic panel  - LDL cholesterol direct  - TSH with free T4 reflex  - Vitamin D Deficiency  - lisinopril (PRINIVIL/ZESTRIL) 40 MG tablet; Take 1 tablet (40 mg) by mouth daily  Dispense: 90 tablet; Refill: 1  - metFORMIN (GLUCOPHAGE) 1000 MG tablet; Take 1 tablet (1,000 mg) by mouth 2 times daily (with meals)  Dispense: 180 tablet; Refill: 1  - insulin glargine (LANTUS SOLOSTAR) 100 UNIT/ML injection; Inject 10 Units Subcutaneous At Bedtime  Dispense: 9 mL; Refill: 1  - insulin aspart (NOVOLOG FLEXPEN) 100 UNIT/ML injection; INJECT 5 UNITS UNDER THE SKIN THREE TIMES DAILY WITH MEALS  Dispense: 15 mL; Refill: 3    2. Cerebral infarction, unspecified mechanism (H)  -On Aspirin     3. Generalized osteoarthritis  -Handicapped parking form completed, copy made for medical records   - acetaminophen (TYLENOL) 500 MG tablet; Take 2 tablets (1,000 mg) by mouth 3 times daily as needed for pain  Dispense: 100 tablet; Refill: 0  - multivitamin, therapeutic with minerals (MULTI-VITAMIN) TABS tablet; Take 1 tablet by mouth daily  Dispense: 100 tablet; Refill: 3  - Vitamin D Deficiency    4. Hypertension, goal below 150/90  - amLODIPine (NORVASC) 10 MG tablet; Take 1 tablet (10 mg) by mouth daily  Dispense: 90 tablet; Refill: 1  - metoprolol tartrate (LOPRESSOR) 100 MG  tablet; Take 1 tablet (100 mg) by mouth 2 times daily  Dispense: 180 tablet; Refill: 1  - lisinopril (PRINIVIL/ZESTRIL) 40 MG tablet; Take 1 tablet (40 mg) by mouth daily  Dispense: 90 tablet; Refill: 1    5. Hyperlipidemia, unspecified hyperlipidemia type  -Continue statin   -Last LDL was 91     6. Need for prophylactic vaccination and inoculation against influenza  - FLU VACCINE, INCREASED ANTIGEN, PRESV FREE, AGE 65+ [99422]  - Vaccine Administration, Initial [85577]      Follow up with Provider - 6 months, sooner if any changes or concerns      Kimberly Bell MD MPH    [facility]    Injectable Influenza Immunization Documentation    1.  Is the person to be vaccinated sick today?   No    2. Does the person to be vaccinated have an allergy to a component   of the vaccine?   No  Egg Allergy Algorithm Link    3. Has the person to be vaccinated ever had a serious reaction   to influenza vaccine in the past?   No    4. Has the person to be vaccinated ever had Guillain-Barré syndrome?   No    Form completed by Maribell PURDY

## 2018-01-17 NOTE — MR AVS SNAPSHOT
After Visit Summary   1/17/2018    Wolf Sen    MRN: [id number]           Patient Information     Date Of Birth          1950        Visit Information        Provider Department      1/17/2018 1:30 PM Kimberly Bell MD; [employer] [facility]        Today's Diagnoses     Type 2 diabetes mellitus with neurological manifestations, controlled (H)    -  1    Need for prophylactic vaccination and inoculation against influenza        Generalized osteoarthritis        Hypertension, goal below 150/90        Hyperlipidemia, unspecified hyperlipidemia type          Care Instructions    At [facility], we strive to deliver an exceptional experience to you, every time we see you.  If you receive a survey in the mail, please send us back your thoughts. We really do value your feedback.    Based on your medical history, these are the current health maintenance/preventive care services that you are due for (some may have been done at this visit.)  Health Maintenance Due   Topic Date Due     PNEUMOCOCCAL (2 of 2 - PCV13) 05/05/2015     INFLUENZA VACCINE (SYSTEM ASSIGNED)  09/01/2017     LIPID MONITORING Q1 YEAR  11/16/2017     MICROALBUMIN Q1 YEAR  11/16/2017         Suggested websites for health information:  [email or web address] : Up to date and easily searchable information on multiple topics.  Www.medlineplus.gov : medication info, interactive tutorials, watch real surgeries online  Www.familydoctor.org : good info from the Academy of Family Physicians  Www.cdc.gov : public health info, travel advisories, epidemics (H1N1)  Www.aap.org : children's health info, normal development, vaccinations  Www.health.[facility].[place].us : [place] dept of health, public health issues in [place], N1N1    Your care team:                            Family Medicine Internal Medicine   MD Isidro Ventura MD Shantel Branch-Fleming, MD Katya Georgiev PA-C Nam Ho, MD  "Pediatrics   CHRISTINE Quarles, VERONICA Nguyen APRN MD Apoorva Gong MD Deborah Mielke, MD Kim Thein, BERE [facility]      Clinic hours: Monday - Thursday 7 am-7 pm;  7 am-5 pm.   Urgent care: Monday - Friday 11 am-9 pm; Saturday and  9 am-5 pm.  Pharmacy : Monday -Thursday 8 am-8 pm; Friday 8 am-6 pm; Saturday and  9 am-5 pm.     Clinic: [phone]   Pharmacy: [phone]              Follow-ups after your visit        Who to contact     If you have questions or need follow up information about today's clinic visit or your schedule please contact [facility] directly at [phone].  Normal or non-critical lab and imaging results will be communicated to you by MyChart, letter or phone within 4 business days after the clinic has received the results. If you do not hear from us within 7 days, please contact the clinic through [employer] or phone. If you have a critical or abnormal lab result, we will notify you by phone as soon as possible.  Submit refill requests through [employer] or call your pharmacy and they will forward the refill request to us. Please allow 3 business days for your refill to be completed.          Additional Information About Your Visit        [employer] Information     [employer] lets you send messages to your doctor, view your test results, renew your prescriptions, schedule appointments and more. To sign up, go to [email or web address] . Click on \"Log in\" on the left side of the screen, which will take you to the Welcome page. Then click on \"Sign up Now\" on the right side of the page.     You will be asked to enter the access code listed below, as well as some personal information. Please follow the directions to create your username and password.     Your access code is: [email or web address]  Expires: 4/10/2018  2:54 PM     Your access code will  in 90 days. If you need help or a new code, please call your " "[facility] or [phone].        Care EveryWhere ID     This is your Care EveryWhere ID. This could be used by other organizations to access your [facility] medical records  [id number]        Your Vitals Were     Pulse Temperature Height Pulse Oximetry BMI (Body Mass Index)       64 97.9  F (36.6  C) (Oral) 5' 4\" (1.626 m) 98% 27.64 kg/m2        Blood Pressure from Last 3 Encounters:   01/17/18 127/74   10/09/17 138/72   09/26/17 107/60    Weight from Last 3 Encounters:   01/17/18 161 lb (73 kg)   09/26/17 161 lb 6.4 oz (73.2 kg)   05/16/17 165 lb (74.8 kg)              We Performed the Following     Albumin Random Urine Quantitative with Creat Ratio     Comprehensive metabolic panel     FLU VACCINE, INCREASED ANTIGEN, PRESV FREE, AGE 65+ [74959]     Hemoglobin A1c     LDL cholesterol direct     TSH with free T4 reflex     Vaccine Administration, Initial [00594]     Vitamin D Deficiency          Today's Medication Changes          These changes are accurate as of: 1/17/18  2:09 PM.  If you have any questions, ask your nurse or doctor.               Start taking these medicines.        Dose/Directions    acetaminophen 500 MG tablet   Commonly known as:  TYLENOL   Used for:  Generalized osteoarthritis   Started by:  Kimberly Bell MD        Dose:  1000 mg   Take 2 tablets (1,000 mg) by mouth 3 times daily as needed for pain   Quantity:  100 tablet   Refills:  0         These medicines have changed or have updated prescriptions.        Dose/Directions    metFORMIN 1000 MG tablet   Commonly known as:  GLUCOPHAGE   This may have changed:  medication strength   Used for:  Type 2 diabetes mellitus with neurological manifestations, controlled (H)   Changed by:  Kimberly Bell MD        Dose:  1000 mg   Take 1 tablet (1,000 mg) by mouth 2 times daily (with meals)   Quantity:  180 tablet   Refills:  1       * multivitamin, therapeutic with minerals Tabs tablet   This may have changed:  Another medication with the " same name was added. Make sure you understand how and when to take each.   Used for:  Type 2 diabetes mellitus with neurological manifestations, controlled (H)   Changed by:  Kimberly Bell MD        Dose:  1 tablet   Take 1 tablet by mouth daily   Quantity:  100 tablet   Refills:  3       * multivitamin, therapeutic with minerals Tabs tablet   This may have changed:  You were already taking a medication with the same name, and this prescription was added. Make sure you understand how and when to take each.   Used for:  Generalized osteoarthritis   Changed by:  Kimberly Bell MD        Dose:  1 tablet   Take 1 tablet by mouth daily   Quantity:  100 tablet   Refills:  3       * Notice:  This list has 2 medication(s) that are the same as other medications prescribed for you. Read the directions carefully, and ask your doctor or other care provider to review them with you.         Where to get your medicines      These medications were sent to [facility] - [place], [place] - [address] AT [facility] [address], [facility] [address]     Phone:  [phone]     acetaminophen 500 MG tablet    amLODIPine 10 MG tablet    insulin aspart 100 UNIT/ML injection    insulin glargine 100 UNIT/ML injection    lisinopril 40 MG tablet    metFORMIN 1000 MG tablet    metoprolol tartrate 100 MG tablet    multivitamin, therapeutic with minerals Tabs tablet                Primary Care Provider Office Phone # Fax #    Kimberly Bell [phone]       [address]  [facility] [address]        Equal Access to Services     ANDERSON BALDWIN AH: Hadii kasia ku hadasho Soomaali, waaxda luqadaha, qaybta kaalmada adeegyada, risa titus haykira poole. So [facility] [phone].    ATENCIÓN: Si habla español, tiene a kim disposición servicios gratuitos de asistencia lingüística. Llame al [phone].    We comply with applicable federal civil rights laws and [place] laws.  We do not discriminate on the basis of race, color, national origin, age, disability, sex, sexual orientation, or gender identity.            Thank you!     Thank you for choosing [facility]  for your care. Our goal is always to provide you with excellent care. Hearing back from our patients is one way we can continue to improve our services. Please take a few minutes to complete the written survey that you may receive in the mail after your visit with us. Thank you!             Your Updated Medication List - Protect others around you: Learn how to safely use, store and throw away your medicines at www.disposemymeds.org.          This list is accurate as of: 1/17/18  2:09 PM.  Always use your most recent med list.                   Brand Name Dispense Instructions for use Diagnosis    acetaminophen 500 MG tablet    TYLENOL    100 tablet    Take 2 tablets (1,000 mg) by mouth 3 times daily as needed for pain    Generalized osteoarthritis       amLODIPine 10 MG tablet    NORVASC    90 tablet    Take 1 tablet (10 mg) by mouth daily    Hypertension, goal below 150/90       ASPIRIN PO      Take 81 mg by mouth.        atorvastatin 40 MG tablet    LIPITOR    90 tablet    Take 1 tablet (40 mg) by mouth daily    Hyperlipidemia, unspecified hyperlipidemia type       blood glucose monitoring test strip    EMIL CONTOUR NEXT    100 each    USE TO TEST BLOOD SUGARS 3 TIMES DAILY OR AS DIRECTED.    Type 2 diabetes mellitus with neurological manifestations, controlled (H)       insulin aspart 100 UNIT/ML injection    NovoLOG FLEXPEN    15 mL    INJECT 5 UNITS UNDER THE SKIN THREE TIMES DAILY WITH MEALS    Type 2 diabetes mellitus with neurological manifestations, controlled (H)       insulin glargine 100 UNIT/ML injection    LANTUS SOLOSTAR    9 mL    Inject 10 Units Subcutaneous At Bedtime    Type 2 diabetes mellitus with neurological manifestations, controlled (H)       insulin pen needle 31G X 8 MM    B-D U/F     100 each    USE AS DIRECTED THREE TIMES DAILY    Type 2 diabetes mellitus with neurological manifestations, controlled (H)       lisinopril 40 MG tablet    PRINIVIL/ZESTRIL    90 tablet    Take 1 tablet (40 mg) by mouth daily    Hypertension, goal below 150/90, Type 2 diabetes mellitus with neurological manifestations, controlled (H)       metFORMIN 1000 MG tablet    GLUCOPHAGE    180 tablet    Take 1 tablet (1,000 mg) by mouth 2 times daily (with meals)    Type 2 diabetes mellitus with neurological manifestations, controlled (H)       metoprolol tartrate 100 MG tablet    LOPRESSOR    180 tablet    Take 1 tablet (100 mg) by mouth 2 times daily    Hypertension, goal below 150/90       * multivitamin, therapeutic with minerals Tabs tablet     100 tablet    Take 1 tablet by mouth daily    Type 2 diabetes mellitus with neurological manifestations, controlled (H)       * multivitamin, therapeutic with minerals Tabs tablet     100 tablet    Take 1 tablet by mouth daily    Generalized osteoarthritis       order for DME     1 Units    Equipment being ordered: Rolling walker with seat    History of stroke, DM type 2, goal A1C below 8.0       order for DME     3 Month    Equipment being ordered: Lancets for use with Lantus pen (once at night) and Novolog Flex Pen (three times a day).  Dispense 3 month supply with one refill    DM type 2, goal A1c below 7       SEROQUEL  MG 24 hr tablet   Generic drug:  QUEtiapine           * Notice:  This list has 2 medication(s) that are the same as other medications prescribed for you. Read the directions carefully, and ask your doctor or other care provider to review them with you.

## 2018-01-18 LAB — DEPRECATED CALCIDIOL+CALCIFEROL SERPL-MC: 15 UG/L (ref 20–75)

## 2018-01-21 DIAGNOSIS — E11.49 TYPE 2 DIABETES MELLITUS WITH NEUROLOGICAL MANIFESTATIONS, CONTROLLED (H): Primary | ICD-10-CM

## 2018-01-21 DIAGNOSIS — E55.9 VITAMIN D DEFICIENCY: ICD-10-CM

## 2018-01-21 DIAGNOSIS — E78.5 HYPERLIPIDEMIA LDL GOAL <70: ICD-10-CM

## 2018-01-22 ENCOUNTER — TELEPHONE (OUTPATIENT)
Dept: FAMILY MEDICINE | Facility: CLINIC | Age: 68
End: 2018-01-22

## 2018-01-22 NOTE — LETTER
January 22, 2018      Wolf Sen  [address]  [place] [address]          Dear Wolf Sen,    Three month glucose value has gone up. Increase Lantus from 10 units to 14 units daily.   Vitamin D levels are low at 15, low levels can cause fatigue and joint pains. I have sent in a medication for this to be taken once a week.  LDL cholesterol has increased from 91 to 160, goal is less than 100. Continue with the cholesterol medication in addition to increasing exercise to 30 minutes most days of the week.  Electrolytes, thyroid function and kidney function are normal.   Please follow up in clinic in 3 months and labs prior to appointment.       Regards,    Kimberly Bell MD MPH      Results for orders placed or performed in visit on 01/17/18   Albumin Random Urine Quantitative with Creat Ratio   Result Value Ref Range    Creatinine Urine 235 mg/dL    Albumin Urine mg/L 74 mg/L    Albumin Urine mg/g Cr 31.28 (H) 0 - 17 mg/g Cr   Hemoglobin A1c   Result Value Ref Range    Hemoglobin A1C 9.0 (H) 4.3 - 6.0 %   Comprehensive metabolic panel   Result Value Ref Range    Sodium 140 133 - 144 mmol/L    Potassium 4.1 3.4 - 5.3 mmol/L    Chloride 103 94 - 109 mmol/L    Carbon Dioxide 32 20 - 32 mmol/L    Anion Gap 5 3 - 14 mmol/L    Glucose 92 70 - 99 mg/dL    Urea Nitrogen 20 7 - 30 mg/dL    Creatinine 1.22 0.66 - 1.25 mg/dL    GFR Estimate 59 (L) >60 mL/min/1.7m2    GFR Estimate If Black 71 >60 mL/min/1.7m2    Calcium 8.8 8.5 - 10.1 mg/dL    Bilirubin Total 0.5 0.2 - 1.3 mg/dL    Albumin 3.7 3.4 - 5.0 g/dL    Protein Total 8.1 6.8 - 8.8 g/dL    Alkaline Phosphatase 120 40 - 150 U/L    ALT 54 0 - 70 U/L    AST 30 0 - 45 U/L   LDL cholesterol direct   Result Value Ref Range    LDL Cholesterol Direct 160 (H) <100 mg/dL   TSH with free T4 reflex   Result Value Ref Range    TSH 1.35 0.40 - 4.00 mU/L   Vitamin D Deficiency   Result Value Ref Range    Vitamin D Deficiency screening 15 (L) 20 - 75 ug/L

## 2018-01-22 NOTE — TELEPHONE ENCOUNTER
This writer attempted to contact Wolf on 01/22/18      Reason for call results and unable to leave message.      If patient calls back:   Shante RN.    Notes Recorded by Kimberly Bell MD on 1/21/2018 at 10:57 PM  Please CALL patient:    Dear Wolf Sen,    Three month glucose value has gone up. Increase Lantus from 10 units to 14 units daily.   Vitamin D levels are low at 15, low levels can cause fatigue and joint pains. I have sent in a medication for this to be taken once a week.  LDL cholesterol has increased from 91 to 160, goal is less than 100. Continue with the cholesterol medication in addition to increasing exercise to 30 minutes most days of the week.  Electrolytes, thyroid function and kidney function are normal.   Please follow up in clinic in 3 months and labs prior to appointment.       Regards,    Kimberly Bell MD MPH    Daphney Bennett, RN   [facility] Triage

## 2018-01-22 NOTE — PROGRESS NOTES
Please CALL patient:    Dear Wolf Sen,    Three month glucose value has gone up. Increase Lantus from 10 units to 14 units daily.   Vitamin D levels are low at 15, low levels can cause fatigue and joint pains. I have sent in a medication for this to be taken once a week.  LDL cholesterol has increased from 91 to 160, goal is less than 100. Continue with the cholesterol medication in addition to increasing exercise to 30 minutes most days of the week.  Electrolytes, thyroid function and kidney function are normal.   Please follow up in clinic in 3 months and labs prior to appointment.       Regards,    Kimberly Bell MD MPH

## 2018-01-22 NOTE — TELEPHONE ENCOUNTER
Sending letter with results, this will  Go out in tomorrow's mail.  Caterina Leary MA/  For Teams Lobo and Sue

## 2018-01-22 NOTE — TELEPHONE ENCOUNTER
Spoke with daughter, Gwen. Reviewed results and verbalized understanding.    TC: please send a letter with the message from Dr. Bell.    Daphney Bennett RN   [facility] Triage

## 2018-03-28 ENCOUNTER — TRANSFERRED RECORDS (OUTPATIENT)
Dept: HEALTH INFORMATION MANAGEMENT | Facility: CLINIC | Age: 68
End: 2018-03-28

## 2018-04-04 ENCOUNTER — OFFICE VISIT (OUTPATIENT)
Dept: FAMILY MEDICINE | Facility: CLINIC | Age: 68
End: 2018-04-04
Payer: COMMERCIAL

## 2018-04-04 VITALS
DIASTOLIC BLOOD PRESSURE: 73 MMHG | BODY MASS INDEX: 28 KG/M2 | OXYGEN SATURATION: 99 % | TEMPERATURE: 97.8 F | HEIGHT: 64 IN | WEIGHT: 164 LBS | HEART RATE: 58 BPM | SYSTOLIC BLOOD PRESSURE: 132 MMHG

## 2018-04-04 DIAGNOSIS — E78.5 HYPERLIPIDEMIA LDL GOAL <70: ICD-10-CM

## 2018-04-04 DIAGNOSIS — E11.9 TYPE 2 DIABETES MELLITUS WITH HEMOGLOBIN A1C GOAL OF LESS THAN 8.0% (H): ICD-10-CM

## 2018-04-04 DIAGNOSIS — E78.5 HYPERLIPIDEMIA, UNSPECIFIED HYPERLIPIDEMIA TYPE: ICD-10-CM

## 2018-04-04 DIAGNOSIS — E11.49 TYPE 2 DIABETES MELLITUS WITH NEUROLOGICAL MANIFESTATIONS, CONTROLLED (H): ICD-10-CM

## 2018-04-04 DIAGNOSIS — E55.9 VITAMIN D DEFICIENCY: ICD-10-CM

## 2018-04-04 DIAGNOSIS — Z00.00 ROUTINE GENERAL MEDICAL EXAMINATION AT A HEALTH CARE FACILITY: Primary | ICD-10-CM

## 2018-04-04 DIAGNOSIS — I10 HYPERTENSION, GOAL BELOW 150/90: ICD-10-CM

## 2018-04-04 DIAGNOSIS — M15.9 GENERALIZED OSTEOARTHRITIS: ICD-10-CM

## 2018-04-04 LAB
HBA1C MFR BLD: 7.7 % (ref 0–6.4)
LDLC SERPL DIRECT ASSAY-MCNC: 96 MG/DL

## 2018-04-04 PROCEDURE — 36415 COLL VENOUS BLD VENIPUNCTURE: CPT | Performed by: PREVENTIVE MEDICINE

## 2018-04-04 PROCEDURE — 99397 PER PM REEVAL EST PAT 65+ YR: CPT | Performed by: PREVENTIVE MEDICINE

## 2018-04-04 PROCEDURE — 83721 ASSAY OF BLOOD LIPOPROTEIN: CPT | Performed by: PREVENTIVE MEDICINE

## 2018-04-04 PROCEDURE — 82306 VITAMIN D 25 HYDROXY: CPT | Performed by: PREVENTIVE MEDICINE

## 2018-04-04 PROCEDURE — 99213 OFFICE O/P EST LOW 20 MIN: CPT | Mod: 25 | Performed by: PREVENTIVE MEDICINE

## 2018-04-04 PROCEDURE — 83036 HEMOGLOBIN GLYCOSYLATED A1C: CPT | Performed by: PREVENTIVE MEDICINE

## 2018-04-04 RX ORDER — ATORVASTATIN CALCIUM 40 MG/1
40 TABLET, FILM COATED ORAL DAILY
Qty: 90 TABLET | Refills: 3 | Status: SHIPPED | OUTPATIENT
Start: 2018-04-04 | End: 2019-01-11

## 2018-04-04 RX ORDER — LISINOPRIL 40 MG/1
40 TABLET ORAL DAILY
Qty: 90 TABLET | Refills: 1 | Status: SHIPPED | OUTPATIENT
Start: 2018-04-04 | End: 2019-01-11

## 2018-04-04 RX ORDER — ACETAMINOPHEN 500 MG
1000 TABLET ORAL 3 TIMES DAILY PRN
Qty: 100 TABLET | Refills: 0 | Status: SHIPPED | OUTPATIENT
Start: 2018-04-04 | End: 2018-08-30

## 2018-04-04 RX ORDER — MULTIPLE VITAMINS W/ MINERALS TAB 9MG-400MCG
1 TAB ORAL DAILY
Qty: 100 TABLET | Refills: 3 | Status: SHIPPED | OUTPATIENT
Start: 2018-04-04 | End: 2018-09-11

## 2018-04-04 RX ORDER — AMLODIPINE BESYLATE 10 MG/1
10 TABLET ORAL DAILY
Qty: 90 TABLET | Refills: 1 | Status: SHIPPED | OUTPATIENT
Start: 2018-04-04 | End: 2019-01-11

## 2018-04-04 RX ORDER — METOPROLOL TARTRATE 100 MG
100 TABLET ORAL 2 TIMES DAILY
Qty: 180 TABLET | Refills: 1 | Status: SHIPPED | OUTPATIENT
Start: 2018-04-04 | End: 2019-01-11

## 2018-04-04 ASSESSMENT — PAIN SCALES - GENERAL: PAINLEVEL: NO PAIN (0)

## 2018-04-04 NOTE — PROGRESS NOTES
SUBJECTIVE:   Wolf Sen is a 68 year old male who presents for Preventive Visit.    Here with interpretor    Are you in the first 12 months of your Medicare Part B coverage?  No      Healthy Habits:    Do you get at least three servings of calcium containing foods daily (dairy, green leafy vegetables, etc.)? yes    Amount of exercise or daily activities, outside of work: 7 day(s) per week, 30 minutes    Problems taking medications regularly No    Medication side effects: No    Have you had an eye exam in the past two years? yes    Do you see a dentist twice per year? yes    Do you have sleep apnea, excessive snoring or daytime drowsiness?no      Ability to successfully perform activities of daily living: Yes, no assistance needed    Home safety:  none identified     Hearing impairment: No    Fall risk:           COGNITIVE SCREENING:  Not appropriate due not speaking english        Diabetes Follow-up    Patient is checking blood sugars: once daily.  Results are as follows:         am - 120-130 mg/dl    Diabetic concerns: None     Symptoms of hypoglycemia (low blood sugar): none     Paresthesias (numbness or burning in feet) or sores: No     Date of last diabetic eye exam: 1/18    Hyperlipidemia Follow-Up      Rate your low fat/cholesterol diet?: fair    Taking statin?  Yes, no muscle aches from statin    Other lipid medications/supplements?:  none    Hypertension Follow-up      Outpatient blood pressures are being checked at home.  Results are 120 systolic.    Low Salt Diet: low salt    BP Readings from Last 2 Encounters:   04/04/18 132/73   01/17/18 127/74     Hemoglobin A1C (%)   Date Value   01/17/2018 9.0 (H)   09/26/2017 7.9 (H)     LDL Cholesterol Calculated (mg/dL)   Date Value   11/05/2015 67   06/15/2015 50     LDL Cholesterol Direct (mg/dL)   Date Value   01/17/2018 160 (H)   11/16/2016 91       Reviewed and updated as needed this visit by clinical staff  Tobacco  Allergies  Meds         Reviewed  and updated as needed this visit by Provider        Social History   Substance Use Topics     Smoking status: Never Smoker     Smokeless tobacco: Never Used     Alcohol use No       If you drink alcohol do you typically have >3 drinks per day or >7 drinks per week? No                        Today's PHQ-2 Score:   PHQ-2 ( 1999 Pf[employer]) 4/4/2018 9/26/2017   Q1: Little interest or pleasure in doing things 0 0   Q2: Feeling down, depressed or hopeless 0 0   PHQ-2 Score 0 0       Do you feel safe in your environment - Yes    Do you have a Health Care Directive?: Yes: Advance Directive has been received and scanned.    Current providers sharing in care for this patient include:   Patient Care Team:  Kimberly Bell MD as PCP - General (Family Practice)  Viki Avalos RN as Lead Care Coordinator  Rianna Nettles as Other (see comments)  Jodi Snider as Other (see comments)    The following health maintenance items are reviewed in Epic and correct as of today:  Health Maintenance   Topic Date Due     PNEUMOCOCCAL (2 of 2 - PCV13) 05/05/2015     FOOT EXAM Q1 YEAR  05/16/2018     A1C Q6 MO  07/17/2018     EYE EXAM Q1 YEAR  08/21/2018     INFLUENZA VACCINE (SYSTEM ASSIGNED)  09/01/2018     FALL RISK ASSESSMENT  09/26/2018     CREATININE Q1 YEAR  01/17/2019     LIPID MONITORING Q1 YEAR  01/17/2019     MICROALBUMIN Q1 YEAR  01/17/2019     TSH W/ FREE T4 REFLEX Q2 YEAR  01/17/2020     COLON CANCER SCREEN (SYSTEM ASSIGNED)  06/01/2022     ADVANCE DIRECTIVE PLANNING Q5 YRS  06/30/2022     TETANUS IMMUNIZATION (SYSTEM ASSIGNED)  10/12/2022     AORTIC ANEURYSM SCREENING (SYSTEM ASSIGNED)  Completed     HEPATITIS C SCREENING  Completed     Labs reviewed in EPIC  BP Readings from Last 3 Encounters:   04/04/18 132/73   01/17/18 127/74   10/09/17 138/72    Wt Readings from Last 3 Encounters:   04/04/18 164 lb (74.4 kg)   01/17/18 161 lb (73 kg)   09/26/17 161 lb 6.4 oz (73.2 kg)                  Patient Active Problem List    Diagnosis     DM type 2, goal A1C below 8.0     HTN, goal below 150/90     Hyperlipidemia LDL goal <70     Overweight (BMI 25.0-29.9)     Cataracts, both eyes     Health Care Home     Advance care planning     Type 2 diabetes mellitus with neurological manifestations, controlled (H)     Pseudophakia of left eye     History of carotid endarterectomy     Cerebral infarction (H)     Aortic valve insufficiency     Vitamin D deficiency     Past Surgical History:   Procedure Laterality Date     CATARACT IOL, RT/LT       COMBINED REPAIR PTOSIS WITH BLEPHAROPLASTY Bilateral 10/9/2017    Procedure: COMBINED REPAIR PTOSIS WITH BLEPHAROPLASTY;  Bilateral upper eyelid blepharoplasty and ptosis repair;  Surgeon: Bibiana Melara MD;  Location: [facility]     PHACOEMULSIFICATION WITH STANDARD INTRAOCULAR LENS IMPLANT Right 2/25/2016    Procedure: PHACOEMULSIFICATION WITH STANDARD INTRAOCULAR LENS IMPLANT;  Surgeon: Carlton Don MD;  Location: [facility] OR     PHACOEMULSIFICATION WITH STANDARD INTRAOCULAR LENS IMPLANT Left 2/11/2016    Procedure: PHACOEMULSIFICATION WITH STANDARD INTRAOCULAR LENS IMPLANT;  Surgeon: Carlton Don MD;  Location: [facility] OR     REPAIR PTOSIS Bilateral     10/17       Social History   Substance Use Topics     Smoking status: Never Smoker     Smokeless tobacco: Never Used     Alcohol use No     Family History   Problem Relation Age of Onset     Hypertension Father      CANCER No family hx of      DIABETES No family hx of      CEREBROVASCULAR DISEASE No family hx of      Thyroid Disease No family hx of      Glaucoma No family hx of      Macular Degeneration No family hx of          Current Outpatient Prescriptions   Medication Sig Dispense Refill     insulin glargine (LANTUS SOLOSTAR) 100 UNIT/ML injection Inject 14 Units Subcutaneous At Bedtime 9 mL 1     acetaminophen (TYLENOL) 500 MG tablet Take 2 tablets (1,000 mg) by mouth 3 times daily as needed for pain 100 tablet 0     multivitamin,  therapeutic with minerals (MULTI-VITAMIN) TABS tablet Take 1 tablet by mouth daily 100 tablet 3     amLODIPine (NORVASC) 10 MG tablet Take 1 tablet (10 mg) by mouth daily 90 tablet 1     metoprolol tartrate (LOPRESSOR) 100 MG tablet Take 1 tablet (100 mg) by mouth 2 times daily 180 tablet 1     lisinopril (PRINIVIL/ZESTRIL) 40 MG tablet Take 1 tablet (40 mg) by mouth daily 90 tablet 1     metFORMIN (GLUCOPHAGE) 1000 MG tablet Take 1 tablet (1,000 mg) by mouth 2 times daily (with meals) 180 tablet 1     insulin aspart (NOVOLOG FLEXPEN) 100 UNIT/ML injection INJECT 5 UNITS UNDER THE SKIN THREE TIMES DAILY WITH MEALS 15 mL 3     atorvastatin (LIPITOR) 40 MG tablet Take 1 tablet (40 mg) by mouth daily 90 tablet 3     blood glucose (NO BRAND SPECIFIED) lancets standard Use to test blood sugar 1 times daily or as directed. 100 each 11     cholecalciferol (VITAMIN D3) 53494 UNITS capsule Take 1 capsule (50,000 Units) by mouth once a week 8 capsule 0     insulin pen needle (B-D U/F) 31G X 8 MM USE AS DIRECTED THREE TIMES DAILY 100 each 3     blood glucose monitoring (EMIL CONTOUR NEXT) test strip USE TO TEST BLOOD SUGARS 3 TIMES DAILY OR AS DIRECTED. 100 each 11     multivitamin, therapeutic with minerals (MULTI-VITAMIN) TABS Take 1 tablet by mouth daily 100 tablet 3     ORDER FOR DME Equipment being ordered: Lancets for use with Lantus pen (once at night) and Novolog Flex Pen (three times a day).  Dispense 3 month supply with one refill 3 Month 1     ORDER FOR DME Equipment being ordered: Rolling walker with seat 1 Units 0     ASPIRIN PO Take 81 mg by mouth.         [DISCONTINUED] insulin glargine (LANTUS SOLOSTAR) 100 UNIT/ML injection Inject 14 Units Subcutaneous At Bedtime 9 mL 1     [DISCONTINUED] amLODIPine (NORVASC) 10 MG tablet Take 1 tablet (10 mg) by mouth daily 90 tablet 1     [DISCONTINUED] metoprolol tartrate (LOPRESSOR) 100 MG tablet Take 1 tablet (100 mg) by mouth 2 times daily 180 tablet 1      "[DISCONTINUED] lisinopril (PRINIVIL/ZESTRIL) 40 MG tablet Take 1 tablet (40 mg) by mouth daily 90 tablet 1     [DISCONTINUED] metFORMIN (GLUCOPHAGE) 1000 MG tablet Take 1 tablet (1,000 mg) by mouth 2 times daily (with meals) 180 tablet 1     [DISCONTINUED] insulin aspart (NOVOLOG FLEXPEN) 100 UNIT/ML injection INJECT 5 UNITS UNDER THE SKIN THREE TIMES DAILY WITH MEALS 15 mL 3     [DISCONTINUED] atorvastatin (LIPITOR) 40 MG tablet Take 1 tablet (40 mg) by mouth daily 90 tablet 3     No Known Allergies  Recent Labs   Lab Test  01/17/18   1422  09/26/17   1449  09/26/17   1413  05/09/17   1216  11/16/16   1537  02/23/16   1042  11/05/15   0951  06/15/15   1103  02/09/15   1034   01/14/14   1151   A1C  9.0*   --   7.9*  8.1*  7.4*  6.3*  6.5*  6.9*  6.2*   < >   --    LDL  160*   --    --    --   91   --   67  50  123   < >   --    HDL   --    --    --    --    --    --   41  47  55   < >   --    TRIG   --    --    --    --    --    --   63  113  87   < >   --    ALT  54   --    --    --    --    --    --    --    --    --   52   CR  1.22  1.16   --   1.19  1.40*   --   1.06  1.09  0.94   < >  1.73*   GFRESTIMATED  59*  62   --   61  50*   --   70  67  80   < >  40*   GFRESTBLACK  71  75   --   73  61   --   84  82  >90   GFR Calc     < >  48*   POTASSIUM  4.1  4.3   --   4.1  4.0   --   4.7  4.5  4.0   < >  5.0   TSH  1.35   --    --    --    --   1.32   --    --    --    --    --     < > = values in this interval not displayed.        Pneumonia Vaccine:Adults age 65+ who received Pneumovax (PPSV23) at 65 years or older: Should be given PCV13 > 1 year after their most recent PPSV23    ROS:  Constitutional, HEENT, cardiovascular, pulmonary, gi and gu systems are negative, except as otherwise noted.    OBJECTIVE:   /73  Pulse 58  Temp 97.8  F (36.6  C) (Oral)  Ht 5' 4\" (1.626 m)  Wt 164 lb (74.4 kg)  SpO2 99%  BMI 28.15 kg/m2 Estimated body mass index is 28.15 kg/(m^2) as calculated from the " "following:    Height as of this encounter: 5' 4\" (1.626 m).    Weight as of this encounter: 164 lb (74.4 kg).  EXAM:   GENERAL: healthy, alert and no distress  EYES: Eyes grossly normal to inspection  HENT:  nose and mouth without ulcers or lesions  NECK: no adenopathy, no asymmetry  RESP: lungs clear to auscultation - no rales, rhonchi or wheezes  CV: regular rate and rhythm, normal S1 S2, no peripheral edema and peripheral pulses strong  ABDOMEN: soft, nontender, no rebound or guarding   MS: no gross musculoskeletal defects noted, no edema  SKIN: no suspicious lesions or rashes  NEURO: Normal strength and tone, mentation intact and speech normal  PSYCH: mentation appears normal, affect normal/bright  LYMPH: no cervical, supraclavicular adenopathy    ASSESSMENT / PLAN:   Wolf was seen today for physical.    Diagnoses and all orders for this visit:    Routine general medical examination at a health care facility  -Will be traveling to [facility] for 4 weeks and main reason for coming in today is for medication refills     Type 2 diabetes mellitus with neurological manifestations, controlled (H)  -     insulin glargine (LANTUS SOLOSTAR) 100 UNIT/ML injection; Inject 14 Units Subcutaneous At Bedtime  -     lisinopril (PRINIVIL/ZESTRIL) 40 MG tablet; Take 1 tablet (40 mg) by mouth daily  -     metFORMIN (GLUCOPHAGE) 1000 MG tablet; Take 1 tablet (1,000 mg) by mouth 2 times daily (with meals)  -     insulin aspart (NOVOLOG FLEXPEN) 100 UNIT/ML injection; INJECT 5 UNITS UNDER THE SKIN THREE TIMES DAILY WITH MEALS  -     blood glucose (NO BRAND SPECIFIED) lancets standard; Use to test blood sugar 1 times daily or as directed.  -     Hemoglobin A1c    Hypertension, goal below 150/90  -     amLODIPine (NORVASC) 10 MG tablet; Take 1 tablet (10 mg) by mouth daily  -     metoprolol tartrate (LOPRESSOR) 100 MG tablet; Take 1 tablet (100 mg) by mouth 2 times daily  -     lisinopril (PRINIVIL/ZESTRIL) 40 MG tablet; Take 1 tablet (40 mg) " "by mouth daily    Vitamin D deficiency  -     Vitamin D Deficiency  -Last levels were 15    Generalized osteoarthritis  -     acetaminophen (TYLENOL) 500 MG tablet; Take 2 tablets (1,000 mg) by mouth 3 times daily as needed for pain  -     multivitamin, therapeutic with minerals (MULTI-VITAMIN) TABS tablet; Take 1 tablet by mouth daily    Hyperlipidemia, unspecified hyperlipidemia type  -     atorvastatin (LIPITOR) 40 MG tablet; Take 1 tablet (40 mg) by mouth daily  -LDL had increased to 160  -Await labs from today     Type 2 diabetes mellitus with hemoglobin A1c goal of less than 8.0% (H)  -     Hemoglobin A1c    Hyperlipidemia LDL goal <70  -     LDL cholesterol direct          End of Life Planning:  Patient currently has an advanced directive: No.  I have verified the patient's ablity to prepare an advanced directive/make health care decisions.  Literature was provided to assist patient in preparing an advanced directive.    COUNSELING:  Reviewed preventive health counseling, as reflected in patient instructions       Consider AAA screening for ages 65-75 and smoking history       Regular exercise       Healthy diet/nutrition       Vision screening        Estimated body mass index is 28.15 kg/(m^2) as calculated from the following:    Height as of this encounter: 5' 4\" (1.626 m).    Weight as of this encounter: 164 lb (74.4 kg).  Weight management plan: Discussed healthy diet and exercise guidelines and patient will follow up in 12 months in clinic to re-evaluate.     reports that he has never smoked. He has never used smokeless tobacco.      Appropriate preventive services were discussed with this patient, including applicable screening as appropriate for cardiovascular disease, diabetes, osteopenia/osteoporosis, and glaucoma.  As appropriate for age/gender, discussed screening for colorectal cancer, prostate cancer, breast cancer, and cervical cancer. Checklist reviewing preventive services available has been " given to the patient.    Reviewed patients plan of care and provided an AVS. The Basic Care Plan (routine screening as documented in Health Maintenance) for Wolf meets the Care Plan requirement. This Care Plan has been established and reviewed with the Patient.    Counseling Resources:  ATP IV Guidelines  Pooled Cohorts Equation Calculator  Breast Cancer Risk Calculator  FRAX Risk Assessment  ICSI Preventive Guidelines  Dietary Guidelines for Americans, 2010  USDA's MyPlate  ASA Prophylaxis  Lung CA Screening    Kimberly Bell MD MPH    [facility]

## 2018-04-04 NOTE — PATIENT INSTRUCTIONS
At [facility], we strive to deliver an exceptional experience to you, every time we see you.  If you receive a survey in the mail, please send us back your thoughts. We really do value your feedback.    Based on your medical history, these are the current health maintenance/preventive care services that you are due for (some may have been done at this visit.)  Health Maintenance Due   Topic Date Due     PNEUMOCOCCAL (2 of 2 - PCV13) 05/05/2015         Suggested websites for health information:  Www.KeepTruckin.Wavii : Up to date and easily searchable information on multiple topics.  Www.SocialFlow.gov : medication info, interactive tutorials, watch real surgeries online  Www.familydoctor.org : good info from the Academy of Family Physicians  Www.cdc.gov : public health info, travel advisories, epidemics (H1N1)  Www.aap.org : children's health info, normal development, vaccinations  Www.health.[facility].[place].us : [place] dept of health, public health issues in [place], N1N1    Your care team:                            Family Medicine Internal Medicine   MD Isidro Ventura MD Shantel Branch-Fleming, MD Katya Georgiev PA-C Megan Hill, APRN CNP    Carlos Tomlinson MD Pediatrics   Jamison Aguilar, PALindsayC  Ginna Mackey, CNP Jennifer Nguyen APRN CNP   MD Apoorva Jack MD Deborah Mielke, MD Kim Thein, APRN CNP      Clinic hours: Monday - Thursday 7 am-7 pm; Fridays 7 am-5 pm.   Urgent care: Monday - Friday 11 am-9 pm; Saturday and Sunday 9 am-5 pm.  Pharmacy : Monday -Thursday 8 am-8 pm; Friday 8 am-6 pm; Saturday and Sunday 9 am-5 pm.     Clinic: [phone]   Pharmacy: [phone]        Preventive Health Recommendations:       Male Ages 65 and over    Yearly exam:             See your health care provider every year in order to  o   Review health changes.   o   Discuss preventive care.    o   Review your medicines if your doctor has prescribed any.    Talk with your health care  provider about whether you should have a test to screen for prostate cancer (PSA).    Every 3 years, have a diabetes test (fasting glucose). If you are at risk for diabetes, you should have this test more often.    Every 5 years, have a cholesterol test. Have this test more often if you are at risk for high cholesterol or heart disease.     Every 10 years, have a colonoscopy. Or, have a yearly FIT test (stool test). These exams will check for colon cancer.    Talk to with your health care provider about screening for Abdominal Aortic Aneurysm if you have a family history of AAA or have a history of smoking.  Shots:     Get a flu shot each year.     Get a tetanus shot every 10 years.     Talk to your doctor about your pneumonia vaccines. There are now two you should receive - Pneumovax (PPSV 23) and Prevnar (PCV 13).    Talk to your doctor about a shingles vaccine.     Talk to your doctor about the hepatitis B vaccine.  Nutrition:     Eat at least 5 servings of fruits and vegetables each day.     Eat whole-grain bread, whole-wheat pasta and brown rice instead of white grains and rice.     Talk to your doctor about Calcium and Vitamin D.   Lifestyle    Exercise for at least 150 minutes a week (30 minutes a day, 5 days a week). This will help you control your weight and prevent disease.     Limit alcohol to one drink per day.     No smoking.     Wear sunscreen to prevent skin cancer.     See your dentist every six months for an exam and cleaning.     See your eye doctor every 1 to 2 years to screen for conditions such as glaucoma, macular degeneration and cataracts.

## 2018-04-04 NOTE — MR AVS SNAPSHOT
After Visit Summary   4/4/2018    Wolf Sen    MRN: [id number]           Patient Information     Date Of Birth          1950        Visit Information        Provider Department      4/4/2018 2:45 PM Kimberly Bell MD; [employer] [facility]        Today's Diagnoses     Routine general medical examination at a health care facility    -  1    Type 2 diabetes mellitus with neurological manifestations, controlled (H)        Hypertension, goal below 150/90        Vitamin D deficiency        Generalized osteoarthritis        Hyperlipidemia, unspecified hyperlipidemia type        Type 2 diabetes mellitus with hemoglobin A1c goal of less than 8.0% (H)        Hyperlipidemia LDL goal <70          Care Instructions    At [facility], we strive to deliver an exceptional experience to you, every time we see you.  If you receive a survey in the mail, please send us back your thoughts. We really do value your feedback.    Based on your medical history, these are the current health maintenance/preventive care services that you are due for (some may have been done at this visit.)  Health Maintenance Due   Topic Date Due     PNEUMOCOCCAL (2 of 2 - PCV13) 05/05/2015         Suggested websites for health information:  WwwBubble Motion : Up to date and easily searchable information on multiple topics.  Www.medlineplus.gov : medication info, interactive tutorials, watch real surgeries online  Www.familydoctor.org : good info from the Academy of Family Physicians  Www.cdc.gov : public health info, travel advisories, epidemics (H1N1)  Www.aap.org : children's health info, normal development, vaccinations  Www.health.state.[place].us : [place] dept of health, public health issues in [place], N1N1    Your care team:                            Family Medicine Internal Medicine   MD Isidro Ventura MD Shantel Branch-Fleming, MD Katya Georgiev PA-C Megan Hill,  APRN CNP    Carlos Tomlinson MD Pediatrics   CHRISTINE Quarles, VERONICA Jennifer Jeffersonjosé miguel APRN CNP   MD Apoorva Jack MD Deborah Mielke, MD Kim Thein, APRN CNP      Clinic hours: Monday - Thursday 7 am-7 pm; Fridays 7 am-5 pm.   Urgent care: Monday - Friday 11 am-9 pm; Saturday and Sunday 9 am-5 pm.  Pharmacy : Monday -Thursday 8 am-8 pm; Friday 8 am-6 pm; Saturday and Sunday 9 am-5 pm.     Clinic: [phone]   Pharmacy: [phone]        Preventive Health Recommendations:       Male Ages 65 and over    Yearly exam:             See your health care provider every year in order to  o   Review health changes.   o   Discuss preventive care.    o   Review your medicines if your doctor has prescribed any.    Talk with your health care provider about whether you should have a test to screen for prostate cancer (PSA).    Every 3 years, have a diabetes test (fasting glucose). If you are at risk for diabetes, you should have this test more often.    Every 5 years, have a cholesterol test. Have this test more often if you are at risk for high cholesterol or heart disease.     Every 10 years, have a colonoscopy. Or, have a yearly FIT test (stool test). These exams will check for colon cancer.    Talk to with your health care provider about screening for Abdominal Aortic Aneurysm if you have a family history of AAA or have a history of smoking.  Shots:     Get a flu shot each year.     Get a tetanus shot every 10 years.     Talk to your doctor about your pneumonia vaccines. There are now two you should receive - Pneumovax (PPSV 23) and Prevnar (PCV 13).    Talk to your doctor about a shingles vaccine.     Talk to your doctor about the hepatitis B vaccine.  Nutrition:     Eat at least 5 servings of fruits and vegetables each day.     Eat whole-grain bread, whole-wheat pasta and brown rice instead of white grains and rice.     Talk to your doctor about Calcium and Vitamin D.  "  Lifestyle    Exercise for at least 150 minutes a week (30 minutes a day, 5 days a week). This will help you control your weight and prevent disease.     Limit alcohol to one drink per day.     No smoking.     Wear sunscreen to prevent skin cancer.     See your dentist every six months for an exam and cleaning.     See your eye doctor every 1 to 2 years to screen for conditions such as glaucoma, macular degeneration and cataracts.          Follow-ups after your visit        Follow-up notes from your care team     Return in about 6 months (around 10/4/2018) for Routine Visit, Lab Work, Physical Exam, BP Recheck.      Who to contact     If you have questions or need follow up information about today's clinic visit or your schedule please contact [facility] directly at [phone].  Normal or non-critical lab and imaging results will be communicated to you by MyChart, letter or phone within 4 business days after the clinic has received the results. If you do not hear from us within 7 days, please contact the clinic through Pageflakeshart or phone. If you have a critical or abnormal lab result, we will notify you by phone as soon as possible.  Submit refill requests through Zerista or call your pharmacy and they will forward the refill request to us. Please allow 3 business days for your refill to be completed.          Additional Information About Your Visit        Pageflakeshart Information     Zerista lets you send messages to your doctor, view your test results, renew your prescriptions, schedule appointments and more. To sign up, go to [email or web address] . Click on \"Log in\" on the left side of the screen, which will take you to the Welcome page. Then click on \"Sign up Now\" on the right side of the page.     You will be asked to enter the access code listed below, as well as some personal information. Please follow the directions to create your username and password.     Your access code is: " [email or web address]  Expires: 4/10/2018  3:54 PM     Your access code will  in 90 days. If you need help or a new code, please call your [facility] clinic or [phone].        Care EveryWhere ID     This is your Care EveryWhere ID. This could be used by other organizations to access your [facility] medical records  [id number]        Your Vitals Were     Pulse Temperature Height Pulse Oximetry BMI (Body Mass Index)       58 97.8  F (36.6  C) (Oral) 5' 4\" (1.626 m) 99% 28.15 kg/m2        Blood Pressure from Last 3 Encounters:   18 132/73   18 127/74   10/09/17 138/72    Weight from Last 3 Encounters:   18 164 lb (74.4 kg)   18 161 lb (73 kg)   17 161 lb 6.4 oz (73.2 kg)              We Performed the Following     Hemoglobin A1c     LDL cholesterol direct     Vitamin D Deficiency          Today's Medication Changes          These changes are accurate as of 18  3:40 PM.  If you have any questions, ask your nurse or doctor.               Start taking these medicines.        Dose/Directions    blood glucose lancets standard   Commonly known as:  no brand specified   Used for:  Type 2 diabetes mellitus with neurological manifestations, controlled (H)   Started by:  Kimberly Bell MD        Use to test blood sugar 1 times daily or as directed.   Quantity:  100 each   Refills:  11         Stop taking these medicines if you haven't already. Please contact your care team if you have questions.     SEROQUEL  MG 24 hr tablet   Generic drug:  QUEtiapine   Stopped by:  Kimberly Bell MD                Where to get your medicines      These medications were sent to [id number] - [place], [place] [address] AT [facility] & [address], [facility] [address]     Phone:  [phone]     acetaminophen 500 MG tablet    amLODIPine 10 MG tablet    atorvastatin 40 MG tablet    blood glucose lancets standard    insulin aspart 100 UNIT/ML injection    " insulin glargine 100 UNIT/ML injection    lisinopril 40 MG tablet    metFORMIN 1000 MG tablet    metoprolol tartrate 100 MG tablet    multivitamin, therapeutic with minerals Tabs tablet                Primary Care Provider Office Phone # Fax #    Kimberly Bell [phone] [address]  [facility] [address]        Equal Access to Services     [facility]: Hadii aad ku hadasho Soomaali, waaxda luqadaha, qaybta kaalmada adeegyada, waxay idiin hayaan adeeg kharash la'aan . So [facility] [phone].    ATENCIÓN: Si habla español, tiene a kim disposición servicios gratuitos de asistencia lingüística. Llame al [phone].    We comply with applicable federal civil rights laws and [place] laws. We do not discriminate on the basis of race, color, national origin, age, disability, sex, sexual orientation, or gender identity.            Thank you!     Thank you for choosing [facility]  for your care. Our goal is always to provide you with excellent care. Hearing back from our patients is one way we can continue to improve our services. Please take a few minutes to complete the written survey that you may receive in the mail after your visit with us. Thank you!             Your Updated Medication List - Protect others around you: Learn how to safely use, store and throw away your medicines at www.disposemymeds.org.          This list is accurate as of 4/4/18  3:40 PM.  Always use your most recent med list.                   Brand Name Dispense Instructions for use Diagnosis    acetaminophen 500 MG tablet    TYLENOL    100 tablet    Take 2 tablets (1,000 mg) by mouth 3 times daily as needed for pain    Generalized osteoarthritis       amLODIPine 10 MG tablet    NORVASC    90 tablet    Take 1 tablet (10 mg) by mouth daily    Hypertension, goal below 150/90       ASPIRIN PO      Take 81 mg by mouth.        atorvastatin 40 MG tablet    LIPITOR    90 tablet    Take 1 tablet (40 mg)  by mouth daily    Hyperlipidemia, unspecified hyperlipidemia type       blood glucose lancets standard    no brand specified    100 each    Use to test blood sugar 1 times daily or as directed.    Type 2 diabetes mellitus with neurological manifestations, controlled (H)       blood glucose monitoring test strip    EMIL CONTOUR NEXT    100 each    USE TO TEST BLOOD SUGARS 3 TIMES DAILY OR AS DIRECTED.    Type 2 diabetes mellitus with neurological manifestations, controlled (H)       cholecalciferol 35603 UNITS capsule    VITAMIN D3    8 capsule    Take 1 capsule (50,000 Units) by mouth once a week    Vitamin D deficiency       insulin aspart 100 UNIT/ML injection    NovoLOG FLEXPEN    15 mL    INJECT 5 UNITS UNDER THE SKIN THREE TIMES DAILY WITH MEALS    Type 2 diabetes mellitus with neurological manifestations, controlled (H)       insulin glargine 100 UNIT/ML injection    LANTUS SOLOSTAR    9 mL    Inject 14 Units Subcutaneous At Bedtime    Type 2 diabetes mellitus with neurological manifestations, controlled (H)       insulin pen needle 31G X 8 MM    B-D U/F    100 each    USE AS DIRECTED THREE TIMES DAILY    Type 2 diabetes mellitus with neurological manifestations, controlled (H)       lisinopril 40 MG tablet    PRINIVIL/ZESTRIL    90 tablet    Take 1 tablet (40 mg) by mouth daily    Hypertension, goal below 150/90, Type 2 diabetes mellitus with neurological manifestations, controlled (H)       metFORMIN 1000 MG tablet    GLUCOPHAGE    180 tablet    Take 1 tablet (1,000 mg) by mouth 2 times daily (with meals)    Type 2 diabetes mellitus with neurological manifestations, controlled (H)       metoprolol tartrate 100 MG tablet    LOPRESSOR    180 tablet    Take 1 tablet (100 mg) by mouth 2 times daily    Hypertension, goal below 150/90       * multivitamin, therapeutic with minerals Tabs tablet     100 tablet    Take 1 tablet by mouth daily    Type 2 diabetes mellitus with neurological manifestations, controlled  (H)       * multivitamin, therapeutic with minerals Tabs tablet     100 tablet    Take 1 tablet by mouth daily    Generalized osteoarthritis       order for DME     1 Units    Equipment being ordered: Rolling walker with seat    History of stroke, DM type 2, goal A1C below 8.0       order for DME     3 Month    Equipment being ordered: Lancets for use with Lantus pen (once at night) and Novolog Flex Pen (three times a day).  Dispense 3 month supply with one refill    DM type 2, goal A1c below 7       * Notice:  This list has 2 medication(s) that are the same as other medications prescribed for you. Read the directions carefully, and ask your doctor or other care provider to review them with you.

## 2018-04-05 LAB — DEPRECATED CALCIDIOL+CALCIFEROL SERPL-MC: 14 UG/L (ref 20–75)

## 2018-04-06 ENCOUNTER — TELEPHONE (OUTPATIENT)
Dept: FAMILY MEDICINE | Facility: CLINIC | Age: 68
End: 2018-04-06

## 2018-04-06 NOTE — PROGRESS NOTES
Please CALL patient:    Dear Wolf Sen,    Three month glucose values numbers are improved and are at goal at 7.7  LDL cholesterol is at goal too.  Vitamin D levels are still low at 14. I would like to continue with high dose Vitamin D and I have sent a script to your pharmacy for this.   Otherwise, plan of care and follow up as discussed in clinic.     Regards,    Kimberly Bell MD MPH

## 2018-04-06 NOTE — TELEPHONE ENCOUNTER
Notes Recorded by Kimberly Bell MD on 4/6/2018 at 9:29 AM  Please CALL patient:    Dear Wolf Sen,    Three month glucose values numbers are improved and are at goal at 7.7  LDL cholesterol is at goal too.  Vitamin D levels are still low at 14. I would like to continue with high dose Vitamin D and I have sent a script to your pharmacy for this.   Otherwise, plan of care and follow up as discussed in clinic.     Regards,    Kimberly Bell MD MPH      Gave patient results via [employer]Courage  #[id number]. He had no further questions or concerns at this time.     Maile Du RN, BSN

## 2018-05-04 DIAGNOSIS — E11.49 TYPE 2 DIABETES MELLITUS WITH NEUROLOGICAL MANIFESTATIONS, CONTROLLED (H): ICD-10-CM

## 2018-05-04 NOTE — TELEPHONE ENCOUNTER
"Requested Prescriptions   Pending Prescriptions Disp Refills     B-D U/F 31G X 8 MM insulin pen needle [Pharmacy Med Name: B-D PEN NDL SHRT [id number](5/16) ABDI]    Last Written Prescription Date:  11/16/16  Last Fill Quantity: 100,  # refills: 3   Last Office Visit with [facility] or [facility] prescribing provider:  4/4/18   Future Office Visit:      100 each 0     Sig: FOR USE AS DIRECTED THREE TIMES DAILY    Diabetic Supplies Protocol Passed    5/4/2018  9:47 AM       Passed - Patient is 18 years of age or older       Passed - Recent (6 mo) or future (30 days) visit within the authorizing provider's specialty    Patient had office visit in the last 6 months or has a visit in the next 30 days with authorizing provider.  See \"Patient Info\" tab in inbasket, or \"Choose Columns\" in Meds & Orders section of the refill encounter.                  Timi Faarax  [facility]  "

## 2018-05-08 RX ORDER — PEN NEEDLE, DIABETIC 31 GX5/16"
NEEDLE, DISPOSABLE MISCELLANEOUS
Qty: 100 EACH | Refills: 3 | Status: SHIPPED | OUTPATIENT
Start: 2018-05-08 | End: 2018-09-11

## 2018-05-08 NOTE — TELEPHONE ENCOUNTER
Prescription approved per [facility] Refill Protocol.    Maile Du RN, BSN

## 2018-06-29 ENCOUNTER — PATIENT OUTREACH (OUTPATIENT)
Dept: GERIATRIC MEDICINE | Facility: CLINIC | Age: 68
End: 2018-06-29

## 2018-06-29 DIAGNOSIS — Z76.89 HEALTH CARE HOME: ICD-10-CM

## 2018-06-29 ASSESSMENT — ACTIVITIES OF DAILY LIVING (ADL): DEPENDENT_IADLS:: INDEPENDENT

## 2018-06-29 NOTE — PROGRESS NOTES
[facility] Care Coordination Contact    [facility] Refusal Telephone Assessment    Member refused home visit HRA on 6/29/18 - contacted with [employer] (reason: member states he is independent and has no needs at this time).    ER visits: No  Hospitalizations: No  Health concerns: None  Falls/Injuries: No  ADL/IADL Dependencies:None  Dependent ADLs:: Independent  Dependent IADLs:: Independent  Member currently receiving the following home care services:   None  Member currently receiving the following community resources:  None  Informal support(s): Family    Advanced Care Planning discussion, complete code section.    [facility] sponsored benefits: Shared information re: Silver Sneakers/gym memberships, ASA, Calcium +D.    Follow-Up Plan: Member informed of future contact, plan to f/u with member with a 6 month telephone assessment and offer a home visit.  Contact information shared with member and family, encouraged member to call with any questions or concerns at any time.    Requested CMS to mail refusal letter and enter Refusal in MMIS.  are Refusal POC completed.     Viki Avalos RN, PHN  [facility]

## 2018-07-02 ENCOUNTER — PATIENT OUTREACH (OUTPATIENT)
Dept: GERIATRIC MEDICINE | Facility: CLINIC | Age: 68
End: 2018-07-02

## 2018-07-02 NOTE — PROGRESS NOTES
"[facility] Care Coordination Contact  Per CC, mailed client a \"Refusal of Home Visit\" letter.  MMIS entry.    Andie Digatomadi  Case Management Specialist   [facility]   [phone]      "

## 2018-08-30 DIAGNOSIS — M15.9 GENERALIZED OSTEOARTHRITIS: ICD-10-CM

## 2018-08-31 NOTE — TELEPHONE ENCOUNTER
Prescription approved per [facility] Refill Protocol.    Trinidad Ornelas RN  [facility]

## 2018-08-31 NOTE — TELEPHONE ENCOUNTER
"Requested Prescriptions   Pending Prescriptions Disp Refills     MAPAP 500 MG tablet [Pharmacy Med Name: MAPAP ACETAMINOPHEN 500MG TABLETS] 100 tablet 0    Last Written Prescription Date:  4/4/18  Last Fill Quantity: 100,  # refills: 0   Last Office Visit with [facility], [facility] or [facility] prescribing provider:  4/4/2018     Future Office Visit:    Next 5 appointments (look out 90 days)     Sep 10, 2018  8:30 AM CDT   Return Visit with Carlton Don MD, [facility] NURSE ONLY   [facility]    [id number] [place] [id number] [phone]                  Sig: TAKE 2 TABLETS(1000 MG) BY MOUTH THREE TIMES DAILY AS NEEDED FOR PAIN    Analgesics (Non-Narcotic Tylenol and ASA Only) Passed    8/30/2018  5:15 PM       Passed - Recent (12 mo) or future (30 days) visit within the authorizing provider's specialty    Patient had office visit in the last 12 months or has a visit in the next 30 days with authorizing provider or within the authorizing provider's specialty.  See \"Patient Info\" tab in inbasket, or \"Choose Columns\" in Meds & Orders section of the refill encounter.           Passed - Patient is 7 months old or older    If patient is a peds patient of the age 7 mos -12 years, ok to refill using weight-based dosing.     If >3g daily and/or sig is not \"prn\", check for liver enzymes. If normal in the last year, ok to refill.  If not, refer to the provider.            "

## 2018-09-10 ENCOUNTER — OFFICE VISIT (OUTPATIENT)
Dept: OPHTHALMOLOGY | Facility: CLINIC | Age: 68
End: 2018-09-10
Payer: COMMERCIAL

## 2018-09-10 DIAGNOSIS — H52.13 MYOPIA WITH PRESBYOPIA OF BOTH EYES: ICD-10-CM

## 2018-09-10 DIAGNOSIS — H40.003 GLAUCOMA SUSPECT OF BOTH EYES: ICD-10-CM

## 2018-09-10 DIAGNOSIS — H35.373 EPIRETINAL MEMBRANE (ERM) OF BOTH EYES: ICD-10-CM

## 2018-09-10 DIAGNOSIS — Z96.1 PSEUDOPHAKIA: ICD-10-CM

## 2018-09-10 DIAGNOSIS — H52.4 MYOPIA WITH PRESBYOPIA OF BOTH EYES: ICD-10-CM

## 2018-09-10 DIAGNOSIS — E11.49 TYPE 2 DIABETES MELLITUS WITH NEUROLOGICAL MANIFESTATIONS, CONTROLLED (H): Primary | ICD-10-CM

## 2018-09-10 PROCEDURE — 92133 CPTRZD OPH DX IMG PST SGM ON: CPT | Performed by: OPHTHALMOLOGY

## 2018-09-10 PROCEDURE — 92014 COMPRE OPH EXAM EST PT 1/>: CPT | Performed by: OPHTHALMOLOGY

## 2018-09-10 PROCEDURE — 76514 ECHO EXAM OF EYE THICKNESS: CPT | Performed by: OPHTHALMOLOGY

## 2018-09-10 PROCEDURE — 92015 DETERMINE REFRACTIVE STATE: CPT | Performed by: OPHTHALMOLOGY

## 2018-09-10 ASSESSMENT — TONOMETRY
OS_IOP_MMHG: 15
OD_IOP_MMHG: 15
IOP_METHOD: TONOPEN

## 2018-09-10 ASSESSMENT — PACHYMETRY
OS_CT(UM): 549
OD_CT(UM): 552

## 2018-09-10 ASSESSMENT — REFRACTION_MANIFEST
OD_SPHERE: PLANO
OS_AXIS: 040
OD_CYLINDER: +0.25
OD_ADD: +2.50
OS_CYLINDER: +0.25
OS_SPHERE: -0.50
OS_ADD: +2.50
OD_AXIS: 035

## 2018-09-10 ASSESSMENT — VISUAL ACUITY
OS_SC: 20/30
OD_SC: 20/15
METHOD: SNELLEN - LINEAR
OD_SC+: -2

## 2018-09-10 ASSESSMENT — CUP TO DISC RATIO
OD_RATIO: 0.6
OS_RATIO: 0.5

## 2018-09-10 ASSESSMENT — EXTERNAL EXAM - RIGHT EYE: OD_EXAM: NORMAL

## 2018-09-10 ASSESSMENT — CONF VISUAL FIELD
OD_NORMAL: 1
OS_NORMAL: 1

## 2018-09-10 ASSESSMENT — EXTERNAL EXAM - LEFT EYE: OS_EXAM: NORMAL

## 2018-09-10 NOTE — PATIENT INSTRUCTIONS
In order for us to perform a thorough eye examination, your eyes were dilated.  The affects of the dilating drops last for 4- 6 hours.  You will be more sensitive to light and vision will be blurry up close.  Mydriatic sunglasses were given if needed.      There are not any signs of the diabetes affecting the eyes today.  It is important that you get your eyes dilated once yearly and keep good control of your diabetes.     Diabetes weakens the blood vessels all over the body, including the eyes. Damage to the blood vessels in the eyes can cause swelling or bleeding into part of the eye (called the retina). This is called diabetic retinopathy (CALLIE-tin-AH-puh-thee). If not treated, this disease can cause vision loss or blindness.   Symptoms may include blurred or distorted vision, but many people have no symptoms. It's important to see your eye doctor regularly to check for problems.   Early treatment and good control can help protect your vision. Here are the things you can do to help prevent vision loss:      1. Keep your blood sugar levels under tight control.      2. Bring high blood pressure under control.      3. No smoking.      4. Have yearly dilated eye exams.

## 2018-09-10 NOTE — NURSING NOTE
Patient presents with:  Diabetic Eye Exam: Type II DM, -130 Am's , A1c 4/4/18 7.7  COMPREHENSIVE EYE EXAM: NO VA changes.       Referring Provider:  No referring provider defined for this encounter.    HPI    Last Eye Exam:  8/28/17   Informant(s):  EMR   Symptoms:           Do you have eye pain now?:  No      Comments:  Pt denies any gtts.              Eliza Moralez COT

## 2018-09-10 NOTE — PROGRESS NOTES
Assessment & Plan   Wolf Sen is a 68 year old male who presents with:   Review of systems for the eyes was negative other than the pertinent positives and negatives noted in the HPI.  History is obtained from the patient with an  translating throughout the encounter.    Chief Complaint   Patient presents with     Diabetic Eye Exam     Type II DM, -130 Am's , A1c 4/4/18 7.7     COMPREHENSIVE EYE EXAM     NO VA changes.        Lab Results   Component Value Date    A1C 7.7 04/04/2018    A1C 9.0 01/17/2018    A1C 7.9 09/26/2017    A1C 8.1 05/09/2017    A1C 7.4 11/16/2016         Type 2 diabetes mellitus with neurological manifestations, controlled (H)  - No Signs of retinopathy  - Recommend better control of BS    Pseudophakia    Myopia with presbyopia of both eyes  - Rx per MR for glasses (optional)  - REFRACTION    Epiretinal membrane (ERM) of both eyes  - SD-OCT Macula Optovue OU (both eyes)    Glaucoma suspect of both eyes  - Based on appearance of optic nerve. IOP wnl  - OCT Optic Nerve RNFL Optovue OU (both eyes)    Return in about 1 year (around 9/10/2019) for Diabetic eye exam, Glaucoma check, Visual field and OCT.    Documentation for today's encounter was performed by Lacey Baugh COA. OSC. Acting as a scribe in my presence. I have reviewed and verified that it is an accurate recording of today's encounter.    Attending Physician Attestation:  Complete documentation of historical and exam elements from today's encounter can be found in the full encounter summary report (not reduplicated in this progress note).  I personally obtained the chief complaint(s) and history of present illness.  I confirmed and edited as necessary the review of systems, past medical/surgical history, family history, social history, and examination findings as documented by others; and I examined the patient myself.  I personally reviewed the relevant tests, images, and reports as documented above.  I formulated  and edited as necessary the assessment and plan and discussed the findings and management plan with the patient and family. - Carlton Don MD

## 2018-09-10 NOTE — LETTER
9/10/2018       RE: Wolf Sen  [address] [address] [place] [id number]     Dear Colleague,    Thank you for referring your patient, Wolf Sen, to the [facility] at [facility]. Please see a copy of my visit note below.    Assessment & Plan   Wolf Sen is a 68 year old male who presents with:   Review of systems for the eyes was negative other than the pertinent positives and negatives noted in the HPI.  History is obtained from the patient with an  translating throughout the encounter.    Chief Complaint   Patient presents with     Diabetic Eye Exam     Type II DM, -130 Am's , A1c 4/4/18 7.7     COMPREHENSIVE EYE EXAM     NO VA changes.        Lab Results   Component Value Date    A1C 7.7 04/04/2018    A1C 9.0 01/17/2018    A1C 7.9 09/26/2017    A1C 8.1 05/09/2017    A1C 7.4 11/16/2016         Type 2 diabetes mellitus with neurological manifestations, controlled (H)  - No Signs of retinopathy  - Recommend better control of BS    Pseudophakia    Myopia with presbyopia of both eyes  - Rx per MR for glasses (optional)  - REFRACTION    Epiretinal membrane (ERM) of both eyes  - SD-OCT Macula Optovue OU (both eyes)    Glaucoma suspect of both eyes  - Based on appearance of optic nerve. IOP wnl  - OCT Optic Nerve RNFL Optovue OU (both eyes)    Return in about 1 year (around 9/10/2019) for Diabetic eye exam, Glaucoma check, Visual field and OCT.    Documentation for today's encounter was performed by Lacey Baugh [employer]. OSC. Acting as a scribe in my presence. I have reviewed and verified that it is an accurate recording of today's encounter.    Attending Physician Attestation:  Complete documentation of historical and exam elements from today's encounter can be found in the full encounter summary report (not reduplicated in this progress note).  I personally obtained the chief complaint(s) and history of present illness.  I confirmed and edited  as necessary the review of systems, past medical/surgical history, family history, social history, and examination findings as documented by others; and I examined the patient myself.  I personally reviewed the relevant tests, images, and reports as documented above.  I formulated and edited as necessary the assessment and plan and discussed the findings and management plan with the patient and family. - Carlton Don MD      Again, thank you for allowing me to participate in the care of your patient.      Sincerely,    Carlton Don MD

## 2018-09-10 NOTE — MR AVS SNAPSHOT
After Visit Summary   9/10/2018    Wolf Sen    MRN: [id number]           Patient Information     Date Of Birth          1950        Visit Information        Provider Department      9/10/2018 8:30 AM Carlton Don MD; [employer];  OPHTH NURSE ONLY [facility]        Today's Diagnoses     Type 2 diabetes mellitus with neurological manifestations, controlled (H)    -  1    Pseudophakia        Myopia with presbyopia of both eyes        Epiretinal membrane (ERM) of both eyes        Glaucoma suspect of both eyes          Care Instructions    In order for us to perform a thorough eye examination, your eyes were dilated.  The affects of the dilating drops last for 4- 6 hours.  You will be more sensitive to light and vision will be blurry up close.  Mydriatic sunglasses were given if needed.      There are not any signs of the diabetes affecting the eyes today.  It is important that you get your eyes dilated once yearly and keep good control of your diabetes.     Diabetes weakens the blood vessels all over the body, including the eyes. Damage to the blood vessels in the eyes can cause swelling or bleeding into part of the eye (called the retina). This is called diabetic retinopathy (CALLIE-tin--puh-thee). If not treated, this disease can cause vision loss or blindness.   Symptoms may include blurred or distorted vision, but many people have no symptoms. It's important to see your eye doctor regularly to check for problems.   Early treatment and good control can help protect your vision. Here are the things you can do to help prevent vision loss:      1. Keep your blood sugar levels under tight control.      2. Bring high blood pressure under control.      3. No smoking.      4. Have yearly dilated eye exams.            Follow-ups after your visit        Follow-up notes from your care team     Return in about 1 year (around 9/10/2019) for Diabetic eye  exam, Glaucoma check, Visual field and OCT.      Your next 10 appointments already scheduled     Sep 11, 2018  2:15 PM CDT   [facility]    [address] [place] [id number] [phone]           Bring a current list of meds and any records pertaining to this visit. For Physicals, please bring immunization records and any forms needing to be filled out. Please arrive 10 minutes early to complete paperwork.              Who to contact     If you have questions or need follow up information about today's clinic visit or your schedule please contact [facility] directly at [phone].  Normal or non-critical lab and imaging results will be communicated to you by MyChart, letter or phone within 4 business days after the clinic has received the results. If you do not hear from us within 7 days, please contact the clinic through MyChart or phone. If you have a critical or abnormal lab result, we will notify you by phone as soon as possible.  Submit refill requests through [employer] or call your pharmacy and they will forward the refill request to us. Please allow 3 business days for your refill to be completed.          Additional Information About Your Visit        Care EveryWhere ID     This is your Care EveryWhere ID. This could be used by other organizations to access your [place] medical records  [id number]         Blood Pressure from Last 3 Encounters:   04/04/18 132/73   01/17/18 127/74   10/09/17 138/72    Weight from Last 3 Encounters:   04/04/18 74.4 kg (164 lb)   01/17/18 73 kg (161 lb)   09/26/17 73.2 kg (161 lb 6.4 oz)              We Performed the Following     EYE EXAM, EST PATIENT,COMPREHESV     OCT Optic Nerve RNFL Optovue OU (both eyes)     REFRACTION     SD-OCT Macula Optovue OU (both eyes)        Primary Care Provider Office Phone # Fax #    Kimberly Bell [phone]  [phone]       [address]  [place] [address]        Equal Access to Services     ISRA BALDWIN : Hadii aad ku hadmarceo Sozayraali, waaxda luqadaha, qaybta kaalmada adeteeda, risa brannonin hayaaanjali nievesariel carlosgina idris poole. So [facility] [phone].    ATENCIÓN: Si habla español, tiene a kim disposición servicios gratuitos de asistencia lingüística. Llame al [phone].    We comply with applicable federal civil rights laws and [place] laws. We do not discriminate on the basis of race, color, national origin, age, disability, sex, sexual orientation, or gender identity.            Thank you!     Thank you for choosing [facility]  for your care. Our goal is always to provide you with excellent care. Hearing back from our patients is one way we can continue to improve our services. Please take a few minutes to complete the written survey that you may receive in the mail after your visit with us. Thank you!             Your Updated Medication List - Protect others around you: Learn how to safely use, store and throw away your medicines at www.disposemymeds.org.          This list is accurate as of 9/10/18 11:59 PM.  Always use your most recent med list.                   Brand Name Dispense Instructions for use Diagnosis    amLODIPine 10 MG tablet    NORVASC    90 tablet    Take 1 tablet (10 mg) by mouth daily    Hypertension, goal below 150/90       ASPIRIN PO      Take 81 mg by mouth.        atorvastatin 40 MG tablet    LIPITOR    90 tablet    Take 1 tablet (40 mg) by mouth daily    Hyperlipidemia, unspecified hyperlipidemia type       B-D U/F 31G X 8 MM   Generic drug:  insulin pen needle     100 each    FOR USE AS DIRECTED THREE TIMES DAILY    Type 2 diabetes mellitus with neurological manifestations, controlled (H)       blood glucose lancets standard    no brand specified    100 each    Use to test blood sugar 1 times daily or as directed.    Type 2 diabetes mellitus with neurological  manifestations, controlled (H)       blood glucose monitoring test strip    EMIL CONTOUR NEXT    100 each    USE TO TEST BLOOD SUGARS 3 TIMES DAILY OR AS DIRECTED.    Type 2 diabetes mellitus with neurological manifestations, controlled (H)       * cholecalciferol 89321 units capsule    VITAMIN D3    8 capsule    Take 1 capsule (50,000 Units) by mouth once a week    Vitamin D deficiency       * cholecalciferol 95008 units capsule    VITAMIN D3    8 capsule    Take 1 capsule (50,000 Units) by mouth once a week    Vitamin D deficiency       insulin aspart 100 UNIT/ML injection    NovoLOG FLEXPEN    15 mL    INJECT 5 UNITS UNDER THE SKIN THREE TIMES DAILY WITH MEALS    Type 2 diabetes mellitus with neurological manifestations, controlled (H)       insulin glargine 100 UNIT/ML injection    LANTUS SOLOSTAR    9 mL    Inject 14 Units Subcutaneous At Bedtime    Type 2 diabetes mellitus with neurological manifestations, controlled (H)       lisinopril 40 MG tablet    PRINIVIL/ZESTRIL    90 tablet    Take 1 tablet (40 mg) by mouth daily    Hypertension, goal below 150/90, Type 2 diabetes mellitus with neurological manifestations, controlled (H)       MAPAP 500 MG tablet   Generic drug:  acetaminophen     100 tablet    TAKE 2 TABLETS(1000 MG) BY MOUTH THREE TIMES DAILY AS NEEDED FOR PAIN    Generalized osteoarthritis       metFORMIN 1000 MG tablet    GLUCOPHAGE    180 tablet    Take 1 tablet (1,000 mg) by mouth 2 times daily (with meals)    Type 2 diabetes mellitus with neurological manifestations, controlled (H)       metoprolol tartrate 100 MG tablet    LOPRESSOR    180 tablet    Take 1 tablet (100 mg) by mouth 2 times daily    Hypertension, goal below 150/90       * multivitamin, therapeutic with minerals Tabs tablet     100 tablet    Take 1 tablet by mouth daily    Type 2 diabetes mellitus with neurological manifestations, controlled (H)       * multivitamin, therapeutic with minerals Tabs tablet     100 tablet    Take 1  tablet by mouth daily    Generalized osteoarthritis       order for DME     1 Units    Equipment being ordered: Rolling walker with seat    History of stroke, DM type 2, goal A1C below 8.0       order for DME     3 Month    Equipment being ordered: Lancets for use with Lantus pen (once at night) and Novolog Flex Pen (three times a day).  Dispense 3 month supply with one refill    DM type 2, goal A1c below 7       * Notice:  This list has 4 medication(s) that are the same as other medications prescribed for you. Read the directions carefully, and ask your doctor or other care provider to review them with you.

## 2018-09-11 ENCOUNTER — OFFICE VISIT (OUTPATIENT)
Dept: FAMILY MEDICINE | Facility: CLINIC | Age: 68
End: 2018-09-11
Payer: COMMERCIAL

## 2018-09-11 ENCOUNTER — RADIANT APPOINTMENT (OUTPATIENT)
Dept: GENERAL RADIOLOGY | Facility: CLINIC | Age: 68
End: 2018-09-11
Attending: PREVENTIVE MEDICINE
Payer: COMMERCIAL

## 2018-09-11 ENCOUNTER — APPOINTMENT (OUTPATIENT)
Dept: OPTOMETRY | Facility: CLINIC | Age: 68
End: 2018-09-11
Payer: COMMERCIAL

## 2018-09-11 VITALS
TEMPERATURE: 98.4 F | BODY MASS INDEX: 27.49 KG/M2 | HEIGHT: 64 IN | WEIGHT: 161 LBS | HEART RATE: 67 BPM | OXYGEN SATURATION: 98 % | DIASTOLIC BLOOD PRESSURE: 80 MMHG | SYSTOLIC BLOOD PRESSURE: 136 MMHG

## 2018-09-11 DIAGNOSIS — Z13.89 SCREENING FOR DIABETIC PERIPHERAL NEUROPATHY: ICD-10-CM

## 2018-09-11 DIAGNOSIS — I10 HTN, GOAL BELOW 150/90: ICD-10-CM

## 2018-09-11 DIAGNOSIS — J20.9 ACUTE BRONCHITIS WITH SYMPTOMS > 10 DAYS: Primary | ICD-10-CM

## 2018-09-11 DIAGNOSIS — E11.49 TYPE 2 DIABETES MELLITUS WITH NEUROLOGICAL MANIFESTATIONS, CONTROLLED (H): ICD-10-CM

## 2018-09-11 DIAGNOSIS — E78.5 HYPERLIPIDEMIA LDL GOAL <70: ICD-10-CM

## 2018-09-11 DIAGNOSIS — J20.9 ACUTE BRONCHITIS WITH SYMPTOMS > 10 DAYS: ICD-10-CM

## 2018-09-11 PROCEDURE — 92340 FIT SPECTACLES MONOFOCAL: CPT | Performed by: OPHTHALMOLOGY

## 2018-09-11 PROCEDURE — 99207 C FOOT EXAM  NO CHARGE: CPT | Performed by: PREVENTIVE MEDICINE

## 2018-09-11 PROCEDURE — 71046 X-RAY EXAM CHEST 2 VIEWS: CPT | Mod: FY

## 2018-09-11 PROCEDURE — 99214 OFFICE O/P EST MOD 30 MIN: CPT | Performed by: PREVENTIVE MEDICINE

## 2018-09-11 RX ORDER — BENZONATATE 100 MG/1
100 CAPSULE ORAL 3 TIMES DAILY PRN
Qty: 20 CAPSULE | Refills: 0 | Status: SHIPPED | OUTPATIENT
Start: 2018-09-11 | End: 2018-11-01

## 2018-09-11 RX ORDER — AZITHROMYCIN 250 MG/1
TABLET, FILM COATED ORAL
Qty: 6 TABLET | Refills: 0 | Status: SHIPPED | OUTPATIENT
Start: 2018-09-11 | End: 2018-11-01

## 2018-09-11 ASSESSMENT — PAIN SCALES - GENERAL: PAINLEVEL: NO PAIN (0)

## 2018-09-11 NOTE — PROGRESS NOTES
Please send a letter:    Dear Wolf Sen,    Chest X ray did not show any pneumonias or fluid in the lungs. Plan of care and follow up as discussed in clinic.     Regards,    Kimberly Bell MD MPH

## 2018-09-11 NOTE — PROGRESS NOTES
SUBJECTIVE:   Wolf Sen is a 68 year old male who presents to clinic today for the following health issues:    Here with interpretor     RESPIRATORY SYMPTOMS      Duration: x 2-3 months    Description  cough    Severity: moderate    Accompanying signs and symptoms: blood in cough    History (predisposing factors):  none    Precipitating or alleviating factors: None    Therapies tried and outcome:  none   Travel [facility] 2 months ago, cough started after he came back  Phlegm+ is reddish  Shortness of breath+ does feel winded  Feels something is stuck in his throat  Wheezing+  No post nasal drainage   No rash   No tobacco use   No emesis  No diarrhea  No abdominal pain   Overall, symptoms are better  No night sweats  Blood tinged sputum several times     Diabetes:  -Glucose at home fasting was 76 mg/dl  -taking medication as prescribed     Hyperlipidemia Follow-Up      Rate your low fat/cholesterol diet?: fair    Taking statin?  Yes, no muscle aches from statin    Other lipid medications/supplements?:  none    Hypertension Follow-up      Outpatient blood pressures are being checked at home.  Results are 120-130 systolic.    Low Salt Diet: low salt      Problem list and histories reviewed & adjusted, as indicated.  Additional history: as documented    Patient Active Problem List   Diagnosis     DM type 2, goal A1C below 8.0     HTN, goal below 150/90     Hyperlipidemia LDL goal <70     Overweight (BMI 25.0-29.9)     Cataracts, both eyes     Health Care Home     Advance care planning     Type 2 diabetes mellitus with neurological manifestations, controlled (H)     Pseudophakia of left eye     History of carotid endarterectomy     Cerebral infarction (H)     Aortic valve insufficiency     Vitamin D deficiency     Past Surgical History:   Procedure Laterality Date     CATARACT IOL, RT/LT       COMBINED REPAIR PTOSIS WITH BLEPHAROPLASTY Bilateral 10/9/2017    Procedure: COMBINED REPAIR PTOSIS WITH BLEPHAROPLASTY;   Bilateral upper eyelid blepharoplasty and ptosis repair;  Surgeon: Bibiana Melara MD;  Location: [facility] OR     PHACOEMULSIFICATION WITH STANDARD INTRAOCULAR LENS IMPLANT Right 2/25/2016    Procedure: PHACOEMULSIFICATION WITH STANDARD INTRAOCULAR LENS IMPLANT;  Surgeon: Carlton Don MD;  Location: [facility] OR     PHACOEMULSIFICATION WITH STANDARD INTRAOCULAR LENS IMPLANT Left 2/11/2016    Procedure: PHACOEMULSIFICATION WITH STANDARD INTRAOCULAR LENS IMPLANT;  Surgeon: Carlton Don MD;  Location: [facility] OR     REPAIR PTOSIS Bilateral     10/17       Social History   Substance Use Topics     Smoking status: Never Smoker     Smokeless tobacco: Never Used     Alcohol use No     Family History   Problem Relation Age of Onset     Hypertension Father      Cancer No family hx of      Diabetes No family hx of      Cerebrovascular Disease No family hx of      Thyroid Disease No family hx of      Glaucoma No family hx of      Macular Degeneration No family hx of          Current Outpatient Prescriptions   Medication Sig Dispense Refill     amLODIPine (NORVASC) 10 MG tablet Take 1 tablet (10 mg) by mouth daily 90 tablet 1     ASPIRIN PO Take 81 mg by mouth.         atorvastatin (LIPITOR) 40 MG tablet Take 1 tablet (40 mg) by mouth daily 90 tablet 3     azithromycin (ZITHROMAX) 250 MG tablet Two tablets first day, then one tablet daily for four days. 6 tablet 0     benzonatate (TESSALON) 100 MG capsule Take 1 capsule (100 mg) by mouth 3 times daily as needed 20 capsule 0     blood glucose (NO BRAND SPECIFIED) lancets standard Use to test blood sugar 1 times daily or as directed. 100 each 11     blood glucose monitoring (EMIL CONTOUR NEXT) test strip USE TO TEST BLOOD SUGARS 3 TIMES DAILY OR AS DIRECTED. 100 each 11     cholecalciferol (VITAMIN D3) 71741 UNITS capsule Take 1 capsule (50,000 Units) by mouth once a week 8 capsule 0     insulin aspart (NOVOLOG FLEXPEN) 100 UNIT/ML injection INJECT 5 UNITS UNDER THE  SKIN THREE TIMES DAILY WITH MEALS 15 mL 3     insulin glargine (LANTUS SOLOSTAR) 100 UNIT/ML pen Inject 14 Units Subcutaneous At Bedtime 9 mL 1     insulin pen needle (B-D U/F) 31G X 8 MM Inject Subcutaneous 5 times daily Use 5 pen needles daily or as directed. 100 each 3     lisinopril (PRINIVIL/ZESTRIL) 40 MG tablet Take 1 tablet (40 mg) by mouth daily 90 tablet 1     MAPAP 500 MG tablet TAKE 2 TABLETS(1000 MG) BY MOUTH THREE TIMES DAILY AS NEEDED FOR PAIN 100 tablet 0     metFORMIN (GLUCOPHAGE) 1000 MG tablet Take 1 tablet (1,000 mg) by mouth 2 times daily (with meals) 180 tablet 1     metoprolol tartrate (LOPRESSOR) 100 MG tablet Take 1 tablet (100 mg) by mouth 2 times daily 180 tablet 1     multivitamin, therapeutic with minerals (MULTI-VITAMIN) TABS Take 1 tablet by mouth daily 100 tablet 3     order for DME Equipment being ordered: Lancets for use with Lantus pen (once at night) and Novolog Flex Pen (three times a day).  Dispense 3 month supply with one refill 3 Month 1     ORDER FOR DME Equipment being ordered: Rolling walker with seat 1 Units 0     [DISCONTINUED] insulin aspart (NOVOLOG FLEXPEN) 100 UNIT/ML injection INJECT 5 UNITS UNDER THE SKIN THREE TIMES DAILY WITH MEALS 15 mL 3     [DISCONTINUED] insulin glargine (LANTUS SOLOSTAR) 100 UNIT/ML injection Inject 14 Units Subcutaneous At Bedtime 9 mL 1     No Known Allergies  Recent Labs   Lab Test  04/04/18   1531  01/17/18   1422  09/26/17   1449  09/26/17   1413   11/16/16   1537  02/23/16   1042  11/05/15   0951  06/15/15   1103  02/09/15   1034   01/14/14   1151   A1C  7.7*  9.0*   --   7.9*   < >  7.4*  6.3*  6.5*  6.9*  6.2*   < >   --    LDL  96  160*   --    --    --   91   --   67  50  123   < >   --    HDL   --    --    --    --    --    --    --   41  47  55   < >   --    TRIG   --    --    --    --    --    --    --   63  113  87   < >   --    ALT   --   54   --    --    --    --    --    --    --    --    --   52   CR   --   1.22  1.16   --    " < >  1.40*   --   1.06  1.09  0.94   < >  1.73*   GFRESTIMATED   --   59*  62   --    < >  50*   --   70  67  80   < >  40*   GFRESTBLACK   --   71  75   --    < >  61   --   84  82  >90   GFR Calc     < >  48*   POTASSIUM   --   4.1  4.3   --    < >  4.0   --   4.7  4.5  4.0   < >  5.0   TSH   --   1.35   --    --    --    --   1.32   --    --    --    --    --     < > = values in this interval not displayed.      BP Readings from Last 3 Encounters:   09/11/18 136/80   04/04/18 132/73   01/17/18 127/74    Wt Readings from Last 3 Encounters:   09/11/18 161 lb (73 kg)   04/04/18 164 lb (74.4 kg)   01/17/18 161 lb (73 kg)                  Labs reviewed in EPIC    Reviewed and updated as needed this visit by clinical staff  Allergies  Meds       Reviewed and updated as needed this visit by Provider         ROS:  Constitutional, neuro, ENT, endocrine, pulmonary, cardiac, gastrointestinal, genitourinary, musculoskeletal, integument and psychiatric systems are negative, except as otherwise noted.    OBJECTIVE:                                                    /80  Pulse 67  Temp 98.4  F (36.9  C) (Oral)  Ht 5' 4\" (1.626 m)  Wt 161 lb (73 kg)  SpO2 98%  BMI 27.64 kg/m2  Body mass index is 27.64 kg/(m^2).  GENERAL APPEARANCE: healthy, alert and no distress  EYES: Eyes grossly normal to inspection  HENT: ear canals and TM's normal and nose and mouth without ulcers or lesions  NECK: trachea midline and normal to palpation  RESP: lungs clear to auscultation - no rales, rhonchi or wheezes  CV: regular rates and rhythm, normal S1 S2, no S3 or S4 and no murmur, click or rub  ABDOMEN: soft, non-tender  MS: extremities normal- no gross deformities noted  SKIN: no suspicious lesions or rashes  NEURO: Normal strength and tone, mentation intact and speech normal  DIABETIC FOOT EXAM: normal DP and PT pulses, no trophic changes or ulcerative lesions and normal sensory exam  PSYCH: mentation appears " normal    Diagnostic test results:  Diagnostic Test Results:      CHEST TWO VIEWS 9/11/2018 3:12 PM      HISTORY: Acute bronchitis with symptoms for more than 10 days.     COMPARISON: 10/1/2012     FINDINGS: Heart size and pulmonary vascularity are within normal  limits. The lungs are clear. No pneumothorax or pleural effusion.          IMPRESSION: No radiographic evidence of acute chest abnormality.      BERNARDO NEWMAN MD     ASSESSMENT/PLAN:                                                    1. Acute bronchitis with symptoms > 10 days  -Chest X ray does not show any acute changes  - XR Chest 2 Views; Future  - azithromycin (ZITHROMAX) 250 MG tablet; Two tablets first day, then one tablet daily for four days.  Dispense: 6 tablet; Refill: 0  - benzonatate (TESSALON) 100 MG capsule; Take 1 capsule (100 mg) by mouth 3 times daily as needed  Dispense: 20 capsule; Refill: 0    2. Type 2 diabetes mellitus with neurological manifestations, controlled (H)  -Last HbA1C was 7.7  - insulin aspart (NOVOLOG FLEXPEN) 100 UNIT/ML injection; INJECT 5 UNITS UNDER THE SKIN THREE TIMES DAILY WITH MEALS  Dispense: 15 mL; Refill: 3  - insulin glargine (LANTUS SOLOSTAR) 100 UNIT/ML pen; Inject 14 Units Subcutaneous At Bedtime  Dispense: 9 mL; Refill: 1  - insulin pen needle (B-D U/F) 31G X 8 MM; Inject Subcutaneous 5 times daily Use 5 pen needles daily or as directed.  Dispense: 100 each; Refill: 3  - order for DME; Equipment being ordered: Lancets for use with Lantus pen (once at night) and Novolog Flex Pen (three times a day).  Dispense 3 month supply with one refill  Dispense: 3 Month; Refill: 1    3. HTN, goal below 150/90  -At goal  -continue current medication   -Avoid over the counter decongestants     4. Hyperlipidemia LDL goal <70  -Continue statin  -last LDL was 96    5. Screening for diabetic peripheral neuropathy  - FOOT EXAM  NO CHARGE [58155.114]      Follow up with Provider - if cough is not better in 1-2 weeks then  plan to CHANGE LISINOPRIL TO LOSARTAN 50 mg  Otherwise follow up in 3 months     Kimberly Bell MD MPH    [facility]

## 2018-09-11 NOTE — PATIENT INSTRUCTIONS
At [facility], we strive to deliver an exceptional experience to you, every time we see you.  If you receive a survey in the mail, please send us back your thoughts. We really do value your feedback.    Based on your medical history, these are the current health maintenance/preventive care services that you are due for (some may have been done at this visit.)  Health Maintenance Due   Topic Date Due     PNEUMOCOCCAL (2 of 2 - PCV13) 05/05/2015     FOOT EXAM Q1 YEAR  05/16/2018     INFLUENZA VACCINE (1) 09/01/2018     A1C Q6 MO  10/04/2018       Suggested websites for health information:  Www.[place].org : Up to date and easily searchable information on multiple topics.  Www.medlineplus.gov : medication info, interactive tutorials, watch real surgeries online  Www.familydoctor.org : good info from the Academy of Family Physicians  Www.cdc.gov : public health info, travel advisories, epidemics (H1N1)  Www.aap.org : children's health info, normal development, vaccinations  Www.health.[facility].[place].us : [place] dept of health, public health issues in [place], N1N1    Your care team:                            Family Medicine Internal Medicine   MD Isidro Ventura MD Shantel Branch-Fleming, MD Katya Georgiev PA-C Megan Hill, APRIRVIN Tomlinson MD Pediatrics   Jamison Aguilar, PARUBEN Mackey, MD Jennifer Jean APRN CNP   MD Apoorva Jack MD Deborah Mielke, MD Kim Thein, APRN [facility]      Clinic hours: Monday - Thursday 7 am-7 pm; Fridays 7 am-5 pm.   Urgent care: Monday - Friday 11 am-9 pm; Saturday and Sunday 9 am-5 pm.  Pharmacy : Monday -Thursday 8 am-8 pm; Friday 8 am-6 pm; Saturday and Sunday 9 am-5 pm.     Clinic: [phone]   Pharmacy: [phone]

## 2018-09-11 NOTE — MR AVS SNAPSHOT
After Visit Summary   9/11/2018    Wolf Sen    MRN: [id number]           Patient Information     Date Of Birth          1950        Visit Information        Provider Department      9/11/2018 2:15 PM Kimberly Bell MD; [employer] [facility]        Today's Diagnoses     Acute bronchitis with symptoms > 10 days    -  1    Type 2 diabetes mellitus with neurological manifestations, controlled (H)        HTN, goal below 150/90        Hyperlipidemia LDL goal <70        Screening for diabetic peripheral neuropathy          Care Instructions    At [facility], we strive to deliver an exceptional experience to you, every time we see you.  If you receive a survey in the mail, please send us back your thoughts. We really do value your feedback.    Based on your medical history, these are the current health maintenance/preventive care services that you are due for (some may have been done at this visit.)  Health Maintenance Due   Topic Date Due     PNEUMOCOCCAL (2 of 2 - PCV13) 05/05/2015     FOOT EXAM Q1 YEAR  05/16/2018     INFLUENZA VACCINE (1) 09/01/2018     A1C Q6 MO  10/04/2018       Suggested websites for health information:  Www.COPsync : Up to date and easily searchable information on multiple topics.  Www.medlineplus.gov : medication info, interactive tutorials, watch real surgeries online  Www.familydoctor.org : good info from the Academy of Family Physicians  Www.cdc.gov : public health info, travel advisories, epidemics (H1N1)  Www.aap.org : children's health info, normal development, vaccinations  Www.health.[facility].[place].us : [place] dept of health, public health issues in [place], N1N1    Your care team:                            Family Medicine Internal Medicine   MD Isidro Ventura MD Shantel Branch-Fleming, MD Katya Georgiev PA-C Megan Hill, APRN VERONICA Tomlinson MD Pediatrics   CHRISTINE Quarles, CNP  "MD Jennifer Bates APRMD Apoorva Briones CNP, MD Deborah Mielke, MD Kim Thein, BERE [facility]      Clinic hours: Monday - Thursday 7 am-7 pm; Fridays 7 am-5 pm.   Urgent care: Monday - Friday 11 am-9 pm; Saturday and Sunday 9 am-5 pm.  Pharmacy : Monday -Thursday 8 am-8 pm; Friday 8 am-6 pm; Saturday and Sunday 9 am-5 pm.     Clinic: [phone]   Pharmacy: [phone]              Follow-ups after your visit        Follow-up notes from your care team     Return in about 2 weeks (around 9/25/2018), or if symptoms worsen or fail to improve.      Who to contact     If you have questions or need follow up information about today's clinic visit or your schedule please contact [facility] directly at [phone].  Normal or non-critical lab and imaging results will be communicated to you by MyChart, letter or phone within 4 business days after the clinic has received the results. If you do not hear from us within 7 days, please contact the clinic through MyChart or phone. If you have a critical or abnormal lab result, we will notify you by phone as soon as possible.  Submit refill requests through [employer] or call your pharmacy and they will forward the refill request to us. Please allow 3 business days for your refill to be completed.          Additional Information About Your Visit        Care EveryWhere ID     This is your Care EveryWhere ID. This could be used by other organizations to access your [facility] medical records  [id number]        Your Vitals Were     Pulse Temperature Height Pulse Oximetry BMI (Body Mass Index)       67 98.4  F (36.9  C) (Oral) 5' 4\" (1.626 m) 98% 27.64 kg/m2        Blood Pressure from Last 3 Encounters:   09/11/18 136/80   04/04/18 132/73   01/17/18 127/74    Weight from Last 3 Encounters:   09/11/18 161 lb (73 kg)   04/04/18 164 lb (74.4 kg)   01/17/18 161 lb (73 kg)              We Performed the Following     FOOT EXAM  NO " CHARGE [92179.114]     PAF COMPLETED          Today's Medication Changes          These changes are accurate as of 9/11/18  4:06 PM.  If you have any questions, ask your nurse or doctor.               Start taking these medicines.        Dose/Directions    azithromycin 250 MG tablet   Commonly known as:  ZITHROMAX   Used for:  Acute bronchitis with symptoms > 10 days   Started by:  Kimberly Bell MD        Two tablets first day, then one tablet daily for four days.   Quantity:  6 tablet   Refills:  0       benzonatate 100 MG capsule   Commonly known as:  TESSALON   Used for:  Acute bronchitis with symptoms > 10 days   Started by:  Kimberly Bell MD        Dose:  100 mg   Take 1 capsule (100 mg) by mouth 3 times daily as needed   Quantity:  20 capsule   Refills:  0         These medicines have changed or have updated prescriptions.        Dose/Directions    cholecalciferol 52453 units capsule   Commonly known as:  VITAMIN D3   This may have changed:  Another medication with the same name was removed. Continue taking this medication, and follow the directions you see here.   Used for:  Vitamin D deficiency   Changed by:  Kimberly Bell MD        Dose:  1 capsule   Take 1 capsule (50,000 Units) by mouth once a week   Quantity:  8 capsule   Refills:  0       insulin pen needle 31G X 8 MM   Commonly known as:  B-D U/F   This may have changed:  See the new instructions.   Used for:  Type 2 diabetes mellitus with neurological manifestations, controlled (H)   Changed by:  Kimberly Bell MD        Inject Subcutaneous 5 times daily Use 5 pen needles daily or as directed.   Quantity:  100 each   Refills:  3       multivitamin, therapeutic with minerals Tabs tablet   This may have changed:  Another medication with the same name was removed. Continue taking this medication, and follow the directions you see here.   Used for:  Type 2 diabetes mellitus with neurological manifestations, controlled (H)   Changed by:  Ray  MD Kimberly        Dose:  1 tablet   Take 1 tablet by mouth daily   Quantity:  100 tablet   Refills:  3            Where to get your medicines      These medications were sent to [id number] - [place], [place] - [address] AT [facility] [address], [place] [address]     Phone:  [phone]     azithromycin 250 MG tablet    benzonatate 100 MG capsule    insulin aspart 100 UNIT/ML injection    insulin glargine 100 UNIT/ML injection    insulin pen needle 31G X 8 MM         Some of these will need a paper prescription and others can be bought over the counter.  Ask your nurse if you have questions.     Bring a paper prescription for each of these medications     order for DME                Primary Care Provider Office Phone # Fax #    Kimberly Bell [phone] [address]  [place] [facility] [address]        Equal Access to Services     [facility] HospitalSAVAGE AH: Hadii aad ku hadasho Soomaali, waaxda luqadaha, qaybta kaalmada adeegyada, waxay idiin hayaan adeariel kharaelbert lajesikan . So [facility] [phone].    ATENCIÓN: Si habla español, tiene a kim disposición servicios gratuitos de asistencia lingüística. LlGalion Community Hospital [phone].    We comply with applicable federal civil rights laws and [place] laws. We do not discriminate on the basis of race, color, national origin, age, disability, sex, sexual orientation, or gender identity.            Thank you!     Thank you for choosing [facility]  for your care. Our goal is always to provide you with excellent care. Hearing back from our patients is one way we can continue to improve our services. Please take a few minutes to complete the written survey that you may receive in the mail after your visit with us. Thank you!             Your Updated Medication List - Protect others around you: Learn how to safely use, store and throw away your medicines at www.disposemymeds.org.          This list is  accurate as of 9/11/18  4:06 PM.  Always use your most recent med list.                   Brand Name Dispense Instructions for use Diagnosis    amLODIPine 10 MG tablet    NORVASC    90 tablet    Take 1 tablet (10 mg) by mouth daily    Hypertension, goal below 150/90       ASPIRIN PO      Take 81 mg by mouth.        atorvastatin 40 MG tablet    LIPITOR    90 tablet    Take 1 tablet (40 mg) by mouth daily    Hyperlipidemia, unspecified hyperlipidemia type       azithromycin 250 MG tablet    ZITHROMAX    6 tablet    Two tablets first day, then one tablet daily for four days.    Acute bronchitis with symptoms > 10 days       benzonatate 100 MG capsule    TESSALON    20 capsule    Take 1 capsule (100 mg) by mouth 3 times daily as needed    Acute bronchitis with symptoms > 10 days       blood glucose lancets standard    no brand specified    100 each    Use to test blood sugar 1 times daily or as directed.    Type 2 diabetes mellitus with neurological manifestations, controlled (H)       blood glucose monitoring test strip    EMIL CONTOUR NEXT    100 each    USE TO TEST BLOOD SUGARS 3 TIMES DAILY OR AS DIRECTED.    Type 2 diabetes mellitus with neurological manifestations, controlled (H)       cholecalciferol 04469 units capsule    VITAMIN D3    8 capsule    Take 1 capsule (50,000 Units) by mouth once a week    Vitamin D deficiency       insulin aspart 100 UNIT/ML injection    NovoLOG FLEXPEN    15 mL    INJECT 5 UNITS UNDER THE SKIN THREE TIMES DAILY WITH MEALS    Type 2 diabetes mellitus with neurological manifestations, controlled (H)       insulin glargine 100 UNIT/ML injection    LANTUS SOLOSTAR    9 mL    Inject 14 Units Subcutaneous At Bedtime    Type 2 diabetes mellitus with neurological manifestations, controlled (H)       insulin pen needle 31G X 8 MM    B-D U/F    100 each    Inject Subcutaneous 5 times daily Use 5 pen needles daily or as directed.    Type 2 diabetes mellitus with neurological manifestations,  controlled (H)       lisinopril 40 MG tablet    PRINIVIL/ZESTRIL    90 tablet    Take 1 tablet (40 mg) by mouth daily    Hypertension, goal below 150/90, Type 2 diabetes mellitus with neurological manifestations, controlled (H)       MAPAP 500 MG tablet   Generic drug:  acetaminophen     100 tablet    TAKE 2 TABLETS(1000 MG) BY MOUTH THREE TIMES DAILY AS NEEDED FOR PAIN    Generalized osteoarthritis       metFORMIN 1000 MG tablet    GLUCOPHAGE    180 tablet    Take 1 tablet (1,000 mg) by mouth 2 times daily (with meals)    Type 2 diabetes mellitus with neurological manifestations, controlled (H)       metoprolol tartrate 100 MG tablet    LOPRESSOR    180 tablet    Take 1 tablet (100 mg) by mouth 2 times daily    Hypertension, goal below 150/90       multivitamin, therapeutic with minerals Tabs tablet     100 tablet    Take 1 tablet by mouth daily    Type 2 diabetes mellitus with neurological manifestations, controlled (H)       order for DME     1 Units    Equipment being ordered: Rolling walker with seat    History of stroke, DM type 2, goal A1C below 8.0       order for DME     3 Month    Equipment being ordered: Lancets for use with Lantus pen (once at night) and Novolog Flex Pen (three times a day).  Dispense 3 month supply with one refill    Type 2 diabetes mellitus with neurological manifestations, controlled (H)

## 2018-10-02 DIAGNOSIS — J20.9 ACUTE BRONCHITIS WITH SYMPTOMS > 10 DAYS: ICD-10-CM

## 2018-10-02 NOTE — TELEPHONE ENCOUNTER
Requested Prescriptions   Pending Prescriptions Disp Refills     benzonatate (TESSALON) 100 MG capsule [Pharmacy Med Name: BENZONATATE 100MG CAPSULES]        Last Written Prescription Date:  09/11/18  Last Fill Quantity: 20,   # refills: 0  Last Office Visit: 09/11/18-Ray  Future Office visit:       Routing refill request to provider for review/approval because:  Drug not on the [facility], P or [facility] refill protocol or controlled substance 20 capsule 0     Sig: TAKE 1 CAPSULE(100 MG) BY MOUTH THREE TIMES DAILY AS NEEDED    There is no refill protocol information for this order

## 2018-10-03 RX ORDER — BENZONATATE 100 MG/1
CAPSULE ORAL
Qty: 20 CAPSULE | Refills: 0 | OUTPATIENT
Start: 2018-10-03

## 2018-11-01 ENCOUNTER — RADIANT APPOINTMENT (OUTPATIENT)
Dept: GENERAL RADIOLOGY | Facility: CLINIC | Age: 68
End: 2018-11-01
Attending: FAMILY MEDICINE
Payer: COMMERCIAL

## 2018-11-01 ENCOUNTER — TELEPHONE (OUTPATIENT)
Dept: FAMILY MEDICINE | Facility: CLINIC | Age: 68
End: 2018-11-01

## 2018-11-01 ENCOUNTER — OFFICE VISIT (OUTPATIENT)
Dept: FAMILY MEDICINE | Facility: CLINIC | Age: 68
End: 2018-11-01
Payer: COMMERCIAL

## 2018-11-01 VITALS
SYSTOLIC BLOOD PRESSURE: 138 MMHG | HEIGHT: 64 IN | HEART RATE: 64 BPM | BODY MASS INDEX: 27.31 KG/M2 | DIASTOLIC BLOOD PRESSURE: 74 MMHG | TEMPERATURE: 98 F | WEIGHT: 160 LBS | RESPIRATION RATE: 16 BRPM | OXYGEN SATURATION: 96 %

## 2018-11-01 DIAGNOSIS — M25.561 ACUTE PAIN OF RIGHT KNEE: Primary | ICD-10-CM

## 2018-11-01 DIAGNOSIS — Z23 NEED FOR PROPHYLACTIC VACCINATION AGAINST STREPTOCOCCUS PNEUMONIAE (PNEUMOCOCCUS): ICD-10-CM

## 2018-11-01 DIAGNOSIS — Z23 NEED FOR PROPHYLACTIC VACCINATION AND INOCULATION AGAINST INFLUENZA: ICD-10-CM

## 2018-11-01 DIAGNOSIS — M25.561 ACUTE PAIN OF RIGHT KNEE: ICD-10-CM

## 2018-11-01 PROCEDURE — G0008 ADMIN INFLUENZA VIRUS VAC: HCPCS | Performed by: FAMILY MEDICINE

## 2018-11-01 PROCEDURE — 73562 X-RAY EXAM OF KNEE 3: CPT | Mod: RT

## 2018-11-01 PROCEDURE — 90670 PCV13 VACCINE IM: CPT | Performed by: FAMILY MEDICINE

## 2018-11-01 PROCEDURE — 90662 IIV NO PRSV INCREASED AG IM: CPT | Performed by: FAMILY MEDICINE

## 2018-11-01 PROCEDURE — G0009 ADMIN PNEUMOCOCCAL VACCINE: HCPCS | Performed by: FAMILY MEDICINE

## 2018-11-01 PROCEDURE — 99213 OFFICE O/P EST LOW 20 MIN: CPT | Mod: 25 | Performed by: FAMILY MEDICINE

## 2018-11-01 RX ORDER — KETOROLAC TROMETHAMINE 10 MG/1
10 TABLET, FILM COATED ORAL EVERY 6 HOURS PRN
Qty: 20 TABLET | Refills: 0 | Status: SHIPPED | OUTPATIENT
Start: 2018-11-01 | End: 2018-11-01

## 2018-11-01 RX ORDER — MELOXICAM 7.5 MG/1
7.5 TABLET ORAL DAILY
Qty: 30 TABLET | Refills: 1 | Status: SHIPPED | OUTPATIENT
Start: 2018-11-01 | End: 2019-12-18

## 2018-11-01 ASSESSMENT — PAIN SCALES - GENERAL: PAINLEVEL: WORST PAIN (10)

## 2018-11-01 NOTE — PROGRESS NOTES
SUBJECTIVE:   Wolf Sen is a 68 year old male who presents to clinic today for the following health issues:      Musculoskeletal problem/pain      Duration: one month    Description  Location: right knee    Intensity:  severe    Accompanying signs and symptoms: none    History  Previous similar problem: no   Previous evaluation:  none    Precipitating or alleviating factors:  Trauma or overuse: yes  Aggravating factors include: standing, walking, climbing stairs and overuse    Therapies tried and outcome: advil - no relief.      Problem list and histories reviewed & adjusted, as indicated.  Additional history: as documented    Patient Active Problem List   Diagnosis     DM type 2, goal A1C below 8.0     HTN, goal below 150/90     Hyperlipidemia LDL goal <70     Overweight (BMI 25.0-29.9)     Cataracts, both eyes     Health Care Home     Advance care planning     Type 2 diabetes mellitus with neurological manifestations, controlled (H)     Pseudophakia of left eye     History of carotid endarterectomy     Cerebral infarction (H)     Aortic valve insufficiency     Vitamin D deficiency     Past Surgical History:   Procedure Laterality Date     CATARACT IOL, RT/LT       COMBINED REPAIR PTOSIS WITH BLEPHAROPLASTY Bilateral 10/9/2017    Procedure: COMBINED REPAIR PTOSIS WITH BLEPHAROPLASTY;  Bilateral upper eyelid blepharoplasty and ptosis repair;  Surgeon: Bibiana Melara MD;  Location: [facility] OR     PHACOEMULSIFICATION WITH STANDARD INTRAOCULAR LENS IMPLANT Right 2/25/2016    Procedure: PHACOEMULSIFICATION WITH STANDARD INTRAOCULAR LENS IMPLANT;  Surgeon: Carlton Don MD;  Location: [facility] OR     PHACOEMULSIFICATION WITH STANDARD INTRAOCULAR LENS IMPLANT Left 2/11/2016    Procedure: PHACOEMULSIFICATION WITH STANDARD INTRAOCULAR LENS IMPLANT;  Surgeon: Carlton Don MD;  Location: [facility] OR     REPAIR PTOSIS Bilateral     10/17       Social History   Substance Use Topics     Smoking status: Never  "Smoker     Smokeless tobacco: Never Used     Alcohol use No     Family History   Problem Relation Age of Onset     Hypertension Father      Cancer No family hx of      Diabetes No family hx of      Cerebrovascular Disease No family hx of      Thyroid Disease No family hx of      Glaucoma No family hx of      Macular Degeneration No family hx of            Reviewed and updated as needed this visit by clinical staff  Tobacco  Allergies  Meds  Med Hx  Surg Hx  Fam Hx  Soc Hx      Reviewed and updated as needed this visit by Provider         ROS:  Constitutional, HEENT, cardiovascular, pulmonary, GI, , musculoskeletal, neuro, skin, endocrine and psych systems are negative, except as otherwise noted.    OBJECTIVE:     /74  Pulse 64  Temp 98  F (36.7  C) (Oral)  Resp 16  Ht 5' 4\" (1.626 m)  Wt 160 lb (72.6 kg)  SpO2 96%  BMI 27.46 kg/m2  Body mass index is 27.46 kg/(m^2).  GENERAL: healthy, alert and no distress  NECK: no adenopathy, no asymmetry, masses, or scars and thyroid normal to palpation  RESP: lungs clear to auscultation - no rales, rhonchi or wheezes  CV: regular rate and rhythm, normal S1 S2, no S3 or S4, no murmur, click or rub, no peripheral edema and peripheral pulses strong  ABDOMEN: soft, nontender, no hepatosplenomegaly, no masses and bowel sounds normal  MS: right knee medial joint line tenderness  Diagnostic Test Results:  none     ASSESSMENT/PLAN:     1. Acute pain of right knee  No acute fx or misalignments on xray. Trial treatment with toradol for short term. If no improvements, patient will f/u with Sport Medicine for further evaluation and recommendations.  - XR Knee Right 3 Views; Future  - ketorolac (TORADOL) 10 MG tablet; Take 1 tablet (10 mg) by mouth every 6 hours as needed for moderate pain  Dispense: 20 tablet; Refill: 0  - ORTHO  REFERRAL    2. Need for prophylactic vaccination and inoculation against influenza    - FLU VACCINE, INCREASED ANTIGEN, PRESV FREE, " AGE 65+ [68131]  -      ADMIN VACCINE, FIRST [96741]  -      ADMIN VACCINE, ADDL [61764]    3. Need for prophylactic vaccination against Streptococcus pneumoniae (pneumococcus)    - Pneumococcal vaccine 13 valent PCV13 IM (Prevnar) [41000]    Regular exercise  See Patient Instructions    Carlos Tomlinson MD, MD  [facility]

## 2018-11-01 NOTE — NURSING NOTE
Injectable Influenza Immunization Documentation    1.  Has the patient received the information for the injectable influenza vaccine? YES     2. Is the patient 6 months of age or older? YES     3. Does the patient have any of the following contraindications?         Severe allergy to eggs? No     Severe allergic reaction to previous influenza vaccines? No   Severe allergy to latex? No       History of Guillain-Charlotte syndrome? No     Currently have a temperature greater than 100.4F? No       Vaccination given by Tennille Claros MA    Screening Questionnaire for Adult Immunization    Are you sick today?   No   Do you have allergies to medications, food, a vaccine component or latex?   No   Have you ever had a serious reaction after receiving a vaccination?   No   Do you have a long-term health problem with heart disease, lung disease, asthma, kidney disease, metabolic disease (e.g. diabetes), anemia, or other blood disorder?   No   Do you have cancer, leukemia, HIV/AIDS, or any other immune system problem?   No   In the past 3 months, have you taken medications that affect  your immune system, such as prednisone, other steroids, or anticancer drugs; drugs for the treatment of rheumatoid arthritis, Crohn s disease, or psoriasis; or have you had radiation treatments?   No   Have you had a seizure, or a brain or other nervous system problem?   No   During the past year, have you received a transfusion of blood or blood     products, or been given immune (gamma) globulin or antiviral drug?   No   For women: Are you pregnant or is there a chance you could become        pregnant during the next month?   No   Have you received any vaccinations in the past 4 weeks?   No     Immunization questionnaire answers were all negative.        Per orders of Dr. Tomlinson, injection of pcv 13 given by Tennille Claros. Patient instructed to remain in clinic for 15 minutes afterwards, and to report any adverse reaction to me immediately.       Screening  performed by Tennille Claros on 11/1/2018 at 1:37 PM.

## 2018-11-01 NOTE — PATIENT INSTRUCTIONS
At [facility], we strive to deliver an exceptional experience to you, every time we see you.  If you receive a survey in the mail, please send us back your thoughts. We really do value your feedback.    Based on your medical history, these are the current health maintenance/preventive care services that you are due for (some may have been done at this visit.)  Health Maintenance Due   Topic Date Due     PNEUMOCOCCAL (2 of 2 - PCV13) 05/05/2015     INFLUENZA VACCINE (1) 09/01/2018     A1C Q6 MO  10/04/2018         Suggested websites for health information:  Www.[employer] : Up to date and easily searchable information on multiple topics.  Www.medlineplus.gov : medication info, interactive tutorials, watch real surgeries online  Www.familydoctor.org : good info from the Academy of Family Physicians  Www.cdc.gov : public health info, travel advisories, epidemics (H1N1)  Www.aap.org : children's health info, normal development, vaccinations  Www.health.[facility].[place].us : [place] dept of health, public health issues in [place], N1N1    Your care team:                            Family Medicine Internal Medicine   MD Isidro Ventura MD Shantel Branch-Fleming, MD Katya Georgiev PA-C Nam Ho, MD Pediatrics   CHRISTINE Quarles, MD Apoorva Ball CNP, MD Deborah Mielke, MD Kim Thein, APRN CNP      Clinic hours: Monday - Thursday 7 am-7 pm; Fridays 7 am-5 pm.   Urgent care: Monday - Friday 11 am-9 pm; Saturday and Sunday 9 am-5 pm.  Pharmacy : Monday -Thursday 8 am-8 pm; Friday 8 am-6 pm; Saturday and Sunday 9 am-5 pm.     Clinic: [phone]   Pharmacy: [phone]

## 2018-11-01 NOTE — MR AVS SNAPSHOT
After Visit Summary   11/1/2018    Wolf Sen    MRN: [id number]           Patient Information     Date Of Birth          1950        Visit Information        Provider Department      11/1/2018 12:45 PM Carlos Tomlinson MD; [employer] [facility]        Today's Diagnoses     Acute pain of right knee    -  1    Need for prophylactic vaccination and inoculation against influenza        Need for prophylactic vaccination against Streptococcus pneumoniae (pneumococcus)          Care Instructions    At [facility], we strive to deliver an exceptional experience to you, every time we see you.  If you receive a survey in the mail, please send us back your thoughts. We really do value your feedback.    Based on your medical history, these are the current health maintenance/preventive care services that you are due for (some may have been done at this visit.)  Health Maintenance Due   Topic Date Due     PNEUMOCOCCAL (2 of 2 - PCV13) 05/05/2015     INFLUENZA VACCINE (1) 09/01/2018     A1C Q6 MO  10/04/2018         Suggested websites for health information:  Www.[place].org : Up to date and easily searchable information on multiple topics.  Www.medlineplus.gov : medication info, interactive tutorials, watch real surgeries online  Www.familydoctor.org : good info from the Academy of Family Physicians  Www.cdc.gov : public health info, travel advisories, epidemics (H1N1)  Www.aap.org : children's health info, normal development, vaccinations  Www.health.state.[place].us : [place] dept of health, public health issues in [place], N1N1    Your care team:                            Family Medicine Internal Medicine   MD Isidro Ventura MD Shantel Branch-Fleming, MD Katya Georgiev PA-C Nam Ho, MD Pediatrics   CHRISTINE Quarles, VERONICA Nguyen APRN CNP   MD Apoorva Jack MD Deborah Mielke, MD Kim Thein, APRN CNP       Clinic hours: Monday - Thursday 7 am-7 pm; Fridays 7 am-5 pm.   Urgent care: Monday - Friday 11 am-9 pm; Saturday and Sunday 9 am-5 pm.  Pharmacy : Monday -Thursday 8 am-8 pm; Friday 8 am-6 pm; Saturday and Sunday 9 am-5 pm.     Clinic: [phone]   Pharmacy: [phone]            Follow-ups after your visit        Additional Services     ORTHO  REFERRAL       [facility] is referring you to the Orthopedic  Services at [facility].       The  Representative will assist you in the coordination of your Orthopedic and Musculoskeletal Care as prescribed by your physician.    The  Representative will call you within 1 business day to help schedule your appointment, or you may contact the  Representative at:    All areas ~ [phone]     Type of Referral : Non Surgical / Sport Medicine       Timeframe requested: Routine    Coverage of these services is subject to the terms and limitations of your health insurance plan.  Please call member services at your health plan with any benefit or coverage questions.      If X-rays, CT or MRI's have been performed, please contact the facility where they were done to arrange for , prior to your scheduled appointment.  Please bring this referral request to your appointment and present it to your specialist.                  Who to contact     If you have questions or need follow up information about today's clinic visit or your schedule please [facility] at [phone].  Normal or non-critical lab and imaging results will be communicated to you by MyChart, letter or phone within 4 business days after the clinic has received the results. If you do not hear from us within 7 days, please contact the clinic through MyChart or phone. If you have a critical or abnormal lab result, we will notify you by phone as soon as possible.  Submit refill  "requests through [employer] or call your pharmacy and they will forward the refill request to us. Please allow 3 business days for your refill to be completed.          Additional Information About Your Visit        Care EveryWhere ID     This is your Care EveryWhere ID. This could be used by other organizations to access your [facility] medical records  [id number]        Your Vitals Were     Pulse Temperature Respirations Height Pulse Oximetry BMI (Body Mass Index)    64 98  F (36.7  C) (Oral) 16 5' 4\" (1.626 m) 96% 27.46 kg/m2       Blood Pressure from Last 3 Encounters:   11/01/18 138/74   09/11/18 136/80   04/04/18 132/73    Weight from Last 3 Encounters:   11/01/18 160 lb (72.6 kg)   09/11/18 161 lb (73 kg)   04/04/18 164 lb (74.4 kg)              We Performed the Following          ADMIN VACCINE, ADDL [20233]          ADMIN VACCINE, FIRST [39613]     FLU VACCINE, INCREASED ANTIGEN, PRESV FREE, AGE 65+ [11861]     ORTHO  REFERRAL     Pneumococcal vaccine 13 valent PCV13 IM (Prevnar) [58295]          Today's Medication Changes          These changes are accurate as of 11/1/18  1:29 PM.  If you have any questions, ask your nurse or doctor.               Start taking these medicines.        Dose/Directions    ketorolac 10 MG tablet   Commonly known as:  TORADOL   Used for:  Acute pain of right knee   Started by:  Carlos Tomlinson MD        Dose:  10 mg   Take 1 tablet (10 mg) by mouth every 6 hours as needed for moderate pain   Quantity:  20 tablet   Refills:  0            Where to get your medicines      These medications were sent to [id number] - [place] - [address] AT [facility] [address] [address]     Phone:  [phone]     ketorolac 10 MG tablet                Primary Care Provider Office Phone # Fax #    Kimberly Bell [phone] [address]  [place] [address]        Equal Access to Services     [facility] " GAAR : Hadii aad ku gay Strauss, waaxda luqadaha, qaybta kajenniferda ann-marie, waxcaesar tacho romaanjali gonzalezcoreyelbert steinberg . So [facility] [phone].    ATENCIÓN: Si habla español, tiene a kim disposición servicios gratuitos de asistencia lingüística. Llame al [phone].    We comply with applicable federal civil rights laws and [place] laws. We do not discriminate on the basis of race, color, national origin, age, disability, sex, sexual orientation, or gender identity.            Thank you!     Thank you for choosing [facility]  for your care. Our goal is always to provide you with excellent care. Hearing back from our patients is one way we can continue to improve our services. Please take a few minutes to complete the written survey that you may receive in the mail after your visit with us. Thank you!             Your Updated Medication List - Protect others around you: Learn how to safely use, store and throw away your medicines at www.disposemymeds.org.          This list is accurate as of 11/1/18  1:29 PM.  Always use your most recent med list.                   Brand Name Dispense Instructions for use Diagnosis    amLODIPine 10 MG tablet    NORVASC    90 tablet    Take 1 tablet (10 mg) by mouth daily    Hypertension, goal below 150/90       ASPIRIN PO      Take 81 mg by mouth.        atorvastatin 40 MG tablet    LIPITOR    90 tablet    Take 1 tablet (40 mg) by mouth daily    Hyperlipidemia, unspecified hyperlipidemia type       blood glucose lancets standard    no brand specified    100 each    Use to test blood sugar 1 times daily or as directed.    Type 2 diabetes mellitus with neurological manifestations, controlled (H)       blood glucose monitoring test strip    EMIL CONTOUR NEXT    100 each    USE TO TEST BLOOD SUGARS 3 TIMES DAILY OR AS DIRECTED.    Type 2 diabetes mellitus with neurological manifestations, controlled (H)       cholecalciferol 57091 units capsule    VITAMIN D3     8 capsule    Take 1 capsule (50,000 Units) by mouth once a week    Vitamin D deficiency       insulin aspart 100 UNIT/ML injection    NovoLOG FLEXPEN    15 mL    INJECT 5 UNITS UNDER THE SKIN THREE TIMES DAILY WITH MEALS    Type 2 diabetes mellitus with neurological manifestations, controlled (H)       insulin glargine 100 UNIT/ML injection    LANTUS SOLOSTAR    9 mL    Inject 14 Units Subcutaneous At Bedtime    Type 2 diabetes mellitus with neurological manifestations, controlled (H)       insulin pen needle 31G X 8 MM    B-D U/F    100 each    Inject Subcutaneous 5 times daily Use 5 pen needles daily or as directed.    Type 2 diabetes mellitus with neurological manifestations, controlled (H)       ketorolac 10 MG tablet    TORADOL    20 tablet    Take 1 tablet (10 mg) by mouth every 6 hours as needed for moderate pain    Acute pain of right knee       lisinopril 40 MG tablet    PRINIVIL/ZESTRIL    90 tablet    Take 1 tablet (40 mg) by mouth daily    Hypertension, goal below 150/90, Type 2 diabetes mellitus with neurological manifestations, controlled (H)       MAPAP 500 MG tablet   Generic drug:  acetaminophen     100 tablet    TAKE 2 TABLETS(1000 MG) BY MOUTH THREE TIMES DAILY AS NEEDED FOR PAIN    Generalized osteoarthritis       metFORMIN 1000 MG tablet    GLUCOPHAGE    180 tablet    Take 1 tablet (1,000 mg) by mouth 2 times daily (with meals)    Type 2 diabetes mellitus with neurological manifestations, controlled (H)       metoprolol tartrate 100 MG tablet    LOPRESSOR    180 tablet    Take 1 tablet (100 mg) by mouth 2 times daily    Hypertension, goal below 150/90       multivitamin, therapeutic with minerals Tabs tablet     100 tablet    Take 1 tablet by mouth daily    Type 2 diabetes mellitus with neurological manifestations, controlled (H)       order for DME     1 Units    Equipment being ordered: Rolling walker with seat    History of stroke, DM type 2, goal A1C below 8.0       order for DME     3  Month    Equipment being ordered: Lancets for use with Lantus pen (once at night) and Novolog Flex Pen (three times a day).  Dispense 3 month supply with one refill    Type 2 diabetes mellitus with neurological manifestations, controlled (H)

## 2018-12-23 DIAGNOSIS — M15.9 GENERALIZED OSTEOARTHRITIS: ICD-10-CM

## 2018-12-23 NOTE — TELEPHONE ENCOUNTER
"Requested Prescriptions   Pending Prescriptions Disp Refills     MAPAP 500 MG tablet [Pharmacy Med Name: MAPAP ACETAMINOPHEN 500MG TABLETS] 100 tablet 0          Last Written Prescription Date:  8/31/18  Last Fill Quantity: 100,  # refills: 0   Last Office Visit with [facility], [facility] or [facility] prescribing provider:  11/1/18   Future Office Visit:      Sig: TAKE 2 TABLETS(1000 MG) BY MOUTH THREE TIMES DAILY AS NEEDED FOR PAIN    Analgesics (Non-Narcotic Tylenol and ASA Only) Passed - 12/23/2018  2:54 PM       Passed - Recent (12 mo) or future (30 days) visit within the authorizing provider's specialty    Patient had office visit in the last 12 months or has a visit in the next 30 days with authorizing provider or within the authorizing provider's specialty.  See \"Patient Info\" tab in inbasket, or \"Choose Columns\" in Meds & Orders section of the refill encounter.             Passed - Patient is 7 months old or older    If patient is a peds patient of the age 7 mos -12 years, ok to refill using weight-based dosing.     If >3g daily and/or sig is not \"prn\", check for liver enzymes. If normal in the last year, ok to refill.  If not, refer to the provider.                Timi Faarax  [facility]  "

## 2018-12-27 NOTE — TELEPHONE ENCOUNTER
Prescription approved per [facility] Refill Protocol.      Maile Du RN, BSN

## 2019-01-11 ENCOUNTER — OFFICE VISIT (OUTPATIENT)
Dept: FAMILY MEDICINE | Facility: CLINIC | Age: 69
End: 2019-01-11
Payer: COMMERCIAL

## 2019-01-11 VITALS
RESPIRATION RATE: 14 BRPM | HEIGHT: 64 IN | WEIGHT: 163 LBS | SYSTOLIC BLOOD PRESSURE: 130 MMHG | HEART RATE: 65 BPM | DIASTOLIC BLOOD PRESSURE: 80 MMHG | TEMPERATURE: 97.6 F | OXYGEN SATURATION: 98 % | BODY MASS INDEX: 27.83 KG/M2

## 2019-01-11 DIAGNOSIS — E11.49 TYPE 2 DIABETES MELLITUS WITH NEUROLOGICAL MANIFESTATIONS, CONTROLLED (H): Primary | ICD-10-CM

## 2019-01-11 DIAGNOSIS — I10 HYPERTENSION, GOAL BELOW 150/90: ICD-10-CM

## 2019-01-11 DIAGNOSIS — M15.9 GENERALIZED OSTEOARTHRITIS: ICD-10-CM

## 2019-01-11 DIAGNOSIS — E78.5 HYPERLIPIDEMIA, UNSPECIFIED HYPERLIPIDEMIA TYPE: ICD-10-CM

## 2019-01-11 LAB
CREAT SERPL-MCNC: 1.16 MG/DL (ref 0.66–1.25)
CREAT UR-MCNC: 195 MG/DL
GFR SERPL CREATININE-BSD FRML MDRD: 63 ML/MIN/{1.73_M2}
HBA1C MFR BLD: 8 % (ref 0–5.6)
MICROALBUMIN UR-MCNC: 68 MG/L
MICROALBUMIN/CREAT UR: 34.82 MG/G CR (ref 0–17)

## 2019-01-11 PROCEDURE — 36415 COLL VENOUS BLD VENIPUNCTURE: CPT | Performed by: PHYSICIAN ASSISTANT

## 2019-01-11 PROCEDURE — 82565 ASSAY OF CREATININE: CPT | Performed by: PHYSICIAN ASSISTANT

## 2019-01-11 PROCEDURE — 83036 HEMOGLOBIN GLYCOSYLATED A1C: CPT | Performed by: PHYSICIAN ASSISTANT

## 2019-01-11 PROCEDURE — 99214 OFFICE O/P EST MOD 30 MIN: CPT | Performed by: PHYSICIAN ASSISTANT

## 2019-01-11 PROCEDURE — 82043 UR ALBUMIN QUANTITATIVE: CPT | Performed by: PHYSICIAN ASSISTANT

## 2019-01-11 RX ORDER — ATORVASTATIN CALCIUM 40 MG/1
40 TABLET, FILM COATED ORAL DAILY
Qty: 90 TABLET | Refills: 3 | Status: SHIPPED | OUTPATIENT
Start: 2019-01-11 | End: 2019-05-09

## 2019-01-11 RX ORDER — LISINOPRIL 40 MG/1
40 TABLET ORAL DAILY
Qty: 90 TABLET | Refills: 1 | Status: SHIPPED | OUTPATIENT
Start: 2019-01-11 | End: 2019-04-10 | Stop reason: SINTOL

## 2019-01-11 RX ORDER — AMLODIPINE BESYLATE 10 MG/1
10 TABLET ORAL DAILY
Qty: 90 TABLET | Refills: 1 | Status: SHIPPED | OUTPATIENT
Start: 2019-01-11 | End: 2019-05-09

## 2019-01-11 RX ORDER — METOPROLOL TARTRATE 100 MG
100 TABLET ORAL 2 TIMES DAILY
Qty: 180 TABLET | Refills: 1 | Status: SHIPPED | OUTPATIENT
Start: 2019-01-11 | End: 2019-05-09

## 2019-01-11 RX ORDER — ACETAMINOPHEN 500 MG
TABLET ORAL
Qty: 100 TABLET | Refills: 0 | Status: SHIPPED | OUTPATIENT
Start: 2019-01-11

## 2019-01-11 ASSESSMENT — MIFFLIN-ST. JEOR: SCORE: 1410.36

## 2019-01-11 ASSESSMENT — PAIN SCALES - GENERAL: PAINLEVEL: NO PAIN (0)

## 2019-01-11 NOTE — LETTER
January 17, 2019      Wolf Sen  [address] [place] [id number]        Dear ,    We are writing to inform you of your test results. Kidney function is stable. Please continue to work on your sugar as was discussed in office visit. If you have any questions or concerns, please call the clinic at the number listed above.     Sincerely,    Mali Johns PA-C/[place]    Resulted Orders   CREATININE   Result Value Ref Range    Creatinine 1.16 0.66 - 1.25 mg/dL    GFR Estimate 63 >60 mL/min/[1.73_m2]      Comment:      Non  GFR Calc  Starting 12/18/2018, serum creatinine based estimated GFR (eGFR) will be   calculated using the Chronic Kidney Disease Epidemiology Collaboration   (CKD-EPI) equation.      GFR Estimate If Black 73 >60 mL/min/[1.73_m2]      Comment:       GFR Calc  Starting 12/18/2018, serum creatinine based estimated GFR (eGFR) will be   calculated using the Chronic Kidney Disease Epidemiology Collaboration   (CKD-EPI) equation.     HEMOGLOBIN A1C   Result Value Ref Range    Hemoglobin A1C 8.0 (H) 0 - 5.6 %      Comment:      Normal <5.7% Prediabetes 5.7-6.4%  Diabetes 6.5% or higher - adopted from ADA   consensus guidelines.     Albumin Random Urine Quantitative with Creat Ratio   Result Value Ref Range    Creatinine Urine 195 mg/dL    Albumin Urine mg/L 68 mg/L    Albumin Urine mg/g Cr 34.82 (H) 0 - 17 mg/g Cr

## 2019-01-11 NOTE — PROGRESS NOTES
SUBJECTIVE:   Wolf Sen is a 68 year old male who presents to clinic today for the following health issues:    Diabetes Follow-up      Patient is checking blood sugars: not at all    Diabetic concerns: None     Symptoms of hypoglycemia (low blood sugar): none     Paresthesias (numbness or burning in feet) or sores: No     Date of last diabetic eye exam: 09/10/2018    BP Readings from Last 2 Encounters:   01/11/19 130/80   11/01/18 138/74     Hemoglobin A1C (%)   Date Value   01/11/2019 8.0 (H)   04/04/2018 7.7 (H)     LDL Cholesterol Calculated (mg/dL)   Date Value   11/05/2015 67   06/15/2015 50     LDL Cholesterol Direct (mg/dL)   Date Value   04/04/2018 96   01/17/2018 160 (H)       Diabetes Management Resources  Hyperlipidemia Follow-Up      Rate your low fat/cholesterol diet?: good    Taking statin?  Yes, no muscle aches from statin    Other lipid medications/supplements?:  none      Amount of exercise or physical activity: 4-5 days/week for an average of 15-30 minutes    Problems taking medications regularly: No    Medication side effects: none    Diet: regular (no restrictions)    Hyperlipidemia Follow-Up      Rate your low fat/cholesterol diet?: not monitoring fat    Taking statin?  Yes, no muscle aches from statin    Other lipid medications/supplements?:  none    Hypertension Follow-up      Outpatient blood pressures are not being checked.    Low Salt Diet: no added salt      Problem list and histories reviewed & adjusted, as indicated.  Additional history: as documented    Patient Active Problem List   Diagnosis     DM type 2, goal A1C below 8.0     HTN, goal below 150/90     Hyperlipidemia LDL goal <70     Overweight (BMI 25.0-29.9)     Cataracts, both eyes     Health Care Home     Advance care planning     Type 2 diabetes mellitus with neurological manifestations, controlled (H)     Pseudophakia of left eye     History of carotid endarterectomy     Cerebral infarction (H)     Aortic valve  insufficiency     Vitamin D deficiency     Past Surgical History:   Procedure Laterality Date     CATARACT IOL, RT/LT       COMBINED REPAIR PTOSIS WITH BLEPHAROPLASTY Bilateral 10/9/2017    Procedure: COMBINED REPAIR PTOSIS WITH BLEPHAROPLASTY;  Bilateral upper eyelid blepharoplasty and ptosis repair;  Surgeon: Bibiana Melara MD;  Location: [facility] OR     PHACOEMULSIFICATION WITH STANDARD INTRAOCULAR LENS IMPLANT Right 2/25/2016    Procedure: PHACOEMULSIFICATION WITH STANDARD INTRAOCULAR LENS IMPLANT;  Surgeon: Carlton Don MD;  Location: [facility] OR     PHACOEMULSIFICATION WITH STANDARD INTRAOCULAR LENS IMPLANT Left 2/11/2016    Procedure: PHACOEMULSIFICATION WITH STANDARD INTRAOCULAR LENS IMPLANT;  Surgeon: Carlton Don MD;  Location: [facility] OR     REPAIR PTOSIS Bilateral     10/17       Social History     Tobacco Use     Smoking status: Never Smoker     Smokeless tobacco: Never Used   Substance Use Topics     Alcohol use: No     Family History   Problem Relation Age of Onset     Hypertension Father      Cancer No family hx of      Diabetes No family hx of      Cerebrovascular Disease No family hx of      Thyroid Disease No family hx of      Glaucoma No family hx of      Macular Degeneration No family hx of          Current Outpatient Medications   Medication Sig Dispense Refill     acetaminophen (MAPAP) 500 MG tablet TAKE 2 TABLETS(1000 MG) BY MOUTH THREE TIMES DAILY AS NEEDED FOR PAIN 100 tablet 0     amLODIPine (NORVASC) 10 MG tablet Take 1 tablet (10 mg) by mouth daily 90 tablet 1     atorvastatin (LIPITOR) 40 MG tablet Take 1 tablet (40 mg) by mouth daily 90 tablet 3     insulin aspart (NOVOLOG FLEXPEN) 100 UNIT/ML pen INJECT 5 UNITS UNDER THE SKIN THREE TIMES DAILY WITH MEALS 15 mL 3     insulin glargine (LANTUS SOLOSTAR PEN) 100 UNIT/ML pen Inject 14 Units Subcutaneous At Bedtime 9 mL 1     insulin pen needle (B-D U/F) 31G X 8 MM miscellaneous Inject Subcutaneous 5 times daily Use 5 pen needles  "daily or as directed. 100 each 3     lisinopril (PRINIVIL/ZESTRIL) 40 MG tablet Take 1 tablet (40 mg) by mouth daily 90 tablet 1     metFORMIN (GLUCOPHAGE) 1000 MG tablet Take 1 tablet (1,000 mg) by mouth 2 times daily (with meals) 180 tablet 1     metoprolol tartrate (LOPRESSOR) 100 MG tablet Take 1 tablet (100 mg) by mouth 2 times daily 180 tablet 1     ASPIRIN PO Take 81 mg by mouth.         blood glucose (NO BRAND SPECIFIED) lancets standard Use to test blood sugar 1 times daily or as directed. 100 each 11     blood glucose monitoring (EMIL CONTOUR NEXT) test strip USE TO TEST BLOOD SUGARS 3 TIMES DAILY OR AS DIRECTED. 100 each 11     cholecalciferol (VITAMIN D3) 05569 UNITS capsule Take 1 capsule (50,000 Units) by mouth once a week 8 capsule 0     meloxicam (MOBIC) 7.5 MG tablet Take 1 tablet (7.5 mg) by mouth daily For knee pain. 30 tablet 1     multivitamin, therapeutic with minerals (MULTI-VITAMIN) TABS Take 1 tablet by mouth daily 100 tablet 3     order for DME Equipment being ordered: Lancets for use with Lantus pen (once at night) and Novolog Flex Pen (three times a day).  Dispense 3 month supply with one refill 3 Month 1     ORDER FOR DME Equipment being ordered: Rolling walker with seat 1 Units 0     No Known Allergies    Reviewed and updated as needed this visit by clinical staff  Tobacco       Reviewed and updated as needed this visit by Provider  Tobacco         ROS:  Constitutional, HEENT, cardiovascular, pulmonary, GI, , musculoskeletal, neuro, skin, endocrine and psych systems are negative, except as otherwise noted.    OBJECTIVE:     /80   Pulse 65   Temp 97.6  F (36.4  C) (Oral)   Resp 14   Ht 1.626 m (5' 4\")   Wt 73.9 kg (163 lb)   SpO2 98%   BMI 27.98 kg/m    Body mass index is 27.98 kg/m .  GENERAL: healthy, alert and no distress  NECK: no adenopathy, no asymmetry, masses, or scars and thyroid normal to palpation  RESP: lungs clear to auscultation - no rales, rhonchi or " wheezes  CV: regular rate and rhythm, normal S1 S2, no S3 or S4, no murmur, click or rub, no peripheral edema and peripheral pulses strong  ABDOMEN: soft, nontender, no hepatosplenomegaly, no masses and bowel sounds normal  MS: no gross musculoskeletal defects noted, no edema  Diabetic foot exam: normal DP and PT pulses, no trophic changes or ulcerative lesions and normal sensory exam    Diagnostic Test Results:  Results for orders placed or performed in visit on 01/11/19   CREATININE   Result Value Ref Range    Creatinine 1.16 0.66 - 1.25 mg/dL    GFR Estimate 63 >60 mL/min/[1.73_m2]    GFR Estimate If Black 73 >60 mL/min/[1.73_m2]   HEMOGLOBIN A1C   Result Value Ref Range    Hemoglobin A1C 8.0 (H) 0 - 5.6 %   Albumin Random Urine Quantitative with Creat Ratio   Result Value Ref Range    Creatinine Urine 195 mg/dL    Albumin Urine mg/L 68 mg/L    Albumin Urine mg/g Cr 34.82 (H) 0 - 17 mg/g Cr       ASSESSMENT/PLAN:               ICD-10-CM    1. Type 2 diabetes mellitus with neurological manifestations, controlled (H) E11.49 CREATININE     HEMOGLOBIN A1C     Albumin Random Urine Quantitative with Creat Ratio     insulin glargine (LANTUS SOLOSTAR PEN) 100 UNIT/ML pen     insulin pen needle (B-D U/F) 31G X 8 MM miscellaneous     lisinopril (PRINIVIL/ZESTRIL) 40 MG tablet     insulin aspart (NOVOLOG FLEXPEN) 100 UNIT/ML pen     metFORMIN (GLUCOPHAGE) 1000 MG tablet     DIABETES EDUCATOR REFERRAL   2. Hypertension, goal below 150/90 I10 amLODIPine (NORVASC) 10 MG tablet     lisinopril (PRINIVIL/ZESTRIL) 40 MG tablet     metoprolol tartrate (LOPRESSOR) 100 MG tablet   3. Hyperlipidemia, unspecified hyperlipidemia type E78.5 atorvastatin (LIPITOR) 40 MG tablet   4. Generalized osteoarthritis M15.9 acetaminophen (MAPAP) 500 MG tablet     1. Please do not miss insulin  You should be doing 14 units of the insulin at night and 5 units with each meal  Continue all the same oral medications   Follow up in 6 months  2.  Stable  Continue current medications   3. Stable  No SE form statin  Continue current medication     Mali Johns PA-C  [facility]

## 2019-01-11 NOTE — PATIENT INSTRUCTIONS
Please do not miss insulin  You should be doing 14 units of the insulin at night and 5 units with each meal    Continue all the same medications

## 2019-01-29 ENCOUNTER — PATIENT OUTREACH (OUTPATIENT)
Dept: GERIATRIC MEDICINE | Facility: CLINIC | Age: 69
End: 2019-01-29

## 2019-01-29 NOTE — PROGRESS NOTES
[facility] Care Coordination Contact      [facility] Six-Month Telephone Assessment    6 month telephone assessment completed on 1/29/19.    ER visits: No  Hospitalizations: No  TCU stays: No  Significant health status changes: none  Falls/Injuries: No  ADL/IADL changes: No  Changes in services: No    Caregiver Assessment follow up:  N/A    Goals: See POC in chart for goal progress documentation.      Will see member in 6 months for an annual health risk assessment.   Encouraged member to call CC with any questions or concerns in the meantime.     Viki Avalos RN, PHN  [facility]

## 2019-03-11 ENCOUNTER — OFFICE VISIT (OUTPATIENT)
Dept: OPTOMETRY | Facility: CLINIC | Age: 69
End: 2019-03-11
Payer: COMMERCIAL

## 2019-03-11 DIAGNOSIS — H04.123 DRY EYE SYNDROME OF BOTH EYES: Primary | ICD-10-CM

## 2019-03-11 PROCEDURE — 99213 OFFICE O/P EST LOW 20 MIN: CPT | Performed by: OPTOMETRIST

## 2019-03-11 ASSESSMENT — EXTERNAL EXAM - RIGHT EYE: OD_EXAM: NORMAL

## 2019-03-11 ASSESSMENT — EXTERNAL EXAM - LEFT EYE: OS_EXAM: NORMAL

## 2019-03-11 ASSESSMENT — VISUAL ACUITY
OS_SC: 20/30
OS_SC+: -1
METHOD: SNELLEN - LINEAR
OD_SC+: -1
OD_SC: 20/20

## 2019-03-11 ASSESSMENT — SLIT LAMP EXAM - LIDS
COMMENTS: BLEPHARITIS
COMMENTS: BLEPHARITIS

## 2019-03-11 NOTE — PROGRESS NOTES
Chief Complaint   Patient presents with     Eye Problem     Feels like something in eyes       Laterality:  both eyes      Duration:  2 months     Associated symptoms:  eye pain, redness, photophobia, tearing, and discharge     Pain scale:  0/10     Course:  gradually improving     Treatments tried:  no treatments     Left eye > right eye usually about noon- not all the time    Medical, surgical and family histories reviewed and updated 3/11/2019.       OBJECTIVE: See Ophthalmology exam    ASSESSMENT:    ICD-10-CM    1. Dry eye syndrome of both eyes H04.123 carboxymethylcellulose (REFRESH) 1 % ophthalmic solution      PLAN:     Patient Instructions   Refresh tears 1 drop both eyes 4 x day.    Use one drop of artificial tears both eyes 3-4 x daily.  Continue to use the drops regardless if your eyes are comfortable.  Artificial tears work best as a preventative and not as well after your eyes are starting to bother you.  It may take 4- 6 weeks of using the drops before you notice improvement.  If after that time you are still having problems schedule an appointment for an evaluation and discussion of different treatments.  Dry eyes are a chronic condition and you may have more symptoms at certain times of the year.    Use warm washcloth at night to cleanse eyelids.    Return as needed or if signs/symptoms do not improve after a month of treatment.    Annual exam due 9/19 with Dr. Don.    Basil Gutierrez, LIDIA

## 2019-03-11 NOTE — PATIENT INSTRUCTIONS
Refresh tears 1 drop both eyes 4 x day.    Use one drop of artificial tears both eyes 3-4 x daily.  Continue to use the drops regardless if your eyes are comfortable.  Artificial tears work best as a preventative and not as well after your eyes are starting to bother you.  It may take 4- 6 weeks of using the drops before you notice improvement.  If after that time you are still having problems schedule an appointment for an evaluation and discussion of different treatments.  Dry eyes are a chronic condition and you may have more symptoms at certain times of the year.    Use warm washcloth at night to cleanse eyelids.    Return as needed or if signs/symptoms do not improve after a month of treatment.    Annual exam due 9/19 with Dr. Don.    Basil Gutierrez, LIDIA

## 2019-03-11 NOTE — LETTER
3/11/2019         RE: Wolf Sen  [address]  [facility] [address]        Dear Colleague,    Thank you for referring your patient, Wolf Sen, to the [facility]. Please see a copy of my visit note below.    Chief Complaint   Patient presents with     Eye Problem     Feels like something in eyes       Laterality:  both eyes      Duration:  2 months     Associated symptoms:  eye pain, redness, photophobia, tearing, and discharge     Pain scale:  0/10     Course:  gradually improving     Treatments tried:  no treatments     Left eye > right eye usually about noon- not all the time    Medical, surgical and family histories reviewed and updated 3/11/2019.       OBJECTIVE: See Ophthalmology exam    ASSESSMENT:    ICD-10-CM    1. Dry eye syndrome of both eyes H04.123 carboxymethylcellulose (REFRESH) 1 % ophthalmic solution      PLAN:     Patient Instructions   Refresh tears 1 drop both eyes 4 x day.    Use one drop of artificial tears both eyes 3-4 x daily.  Continue to use the drops regardless if your eyes are comfortable.  Artificial tears work best as a preventative and not as well after your eyes are starting to bother you.  It may take 4- 6 weeks of using the drops before you notice improvement.  If after that time you are still having problems schedule an appointment for an evaluation and discussion of different treatments.  Dry eyes are a chronic condition and you may have more symptoms at certain times of the year.    Use warm washcloth at night to cleanse eyelids.    Return as needed or if signs/symptoms do not improve after a month of treatment.    Annual exam due 9/19 with Dr. Don.    Basil Gutierrez OD           Again, thank you for allowing me to participate in the care of your patient.        Sincerely,        Basil Gutierrez OD

## 2019-03-20 ENCOUNTER — OFFICE VISIT (OUTPATIENT)
Dept: FAMILY MEDICINE | Facility: CLINIC | Age: 69
End: 2019-03-20
Payer: COMMERCIAL

## 2019-03-20 VITALS
SYSTOLIC BLOOD PRESSURE: 175 MMHG | DIASTOLIC BLOOD PRESSURE: 87 MMHG | HEIGHT: 64 IN | OXYGEN SATURATION: 97 % | HEART RATE: 58 BPM | RESPIRATION RATE: 18 BRPM | TEMPERATURE: 97.8 F | WEIGHT: 163 LBS | BODY MASS INDEX: 27.83 KG/M2

## 2019-03-20 DIAGNOSIS — L30.9 DERMATITIS: ICD-10-CM

## 2019-03-20 DIAGNOSIS — E11.49 TYPE 2 DIABETES MELLITUS WITH NEUROLOGICAL MANIFESTATIONS, CONTROLLED (H): Primary | ICD-10-CM

## 2019-03-20 DIAGNOSIS — I10 HTN, GOAL BELOW 150/90: ICD-10-CM

## 2019-03-20 PROCEDURE — 99214 OFFICE O/P EST MOD 30 MIN: CPT | Performed by: PREVENTIVE MEDICINE

## 2019-03-20 RX ORDER — TRIAMCINOLONE ACETONIDE 1 MG/G
OINTMENT TOPICAL 2 TIMES DAILY
Qty: 30 G | Refills: 0 | Status: SHIPPED | OUTPATIENT
Start: 2019-03-20 | End: 2021-01-19

## 2019-03-20 RX ORDER — HYDROCHLOROTHIAZIDE 25 MG/1
25 TABLET ORAL DAILY
Qty: 30 TABLET | Refills: 0 | Status: SHIPPED | OUTPATIENT
Start: 2019-03-20 | End: 2019-04-10 | Stop reason: DRUGHIGH

## 2019-03-20 ASSESSMENT — PAIN SCALES - GENERAL: PAINLEVEL: NO PAIN (0)

## 2019-03-20 ASSESSMENT — MIFFLIN-ST. JEOR: SCORE: 1410.36

## 2019-03-20 NOTE — PROGRESS NOTES
SUBJECTIVE:   Wolf Sen is a 68 year old male who presents to clinic today for the following health issues:      Rash  Onset: 2-3 weeks    Description:   Location: Neck  Character: red  Itching (Pruritis): YES    Progression of Symptoms:  same and intermittent    Accompanying Signs & Symptoms:  Fever: no   Body aches or joint pain: no   Sore throat symptoms: no   Recent cold symptoms: no     History:   Previous similar rash: no     Precipitating factors:   Exposure to similar rash: no   New exposures: None   Recent travel: no     Alleviating factors:  none    Therapies Tried and outcome: none  No changes in soaps or creams  Not intermittent   No drainage     Diabetes Follow-up      Patient is checking blood sugars: not at all, has forgotten to check glucose the last few days     Diabetic concerns: None     Symptoms of hypoglycemia (low blood sugar): none     Paresthesias (numbness or burning in feet) or sores: No     Date of last diabetic eye exam: UTD    BP Readings from Last 2 Encounters:   03/20/19 175/87   01/11/19 130/80     Hemoglobin A1C (%)   Date Value   01/11/2019 8.0 (H)   04/04/2018 7.7 (H)     LDL Cholesterol Calculated (mg/dL)   Date Value   11/05/2015 67   06/15/2015 50     LDL Cholesterol Direct (mg/dL)   Date Value   04/04/2018 96   01/17/2018 160 (H)       Diabetes Management Resources    Hyperlipidemia Follow-Up      Rate your low fat/cholesterol diet?: fair    Taking statin?  Yes, no muscle aches from statin    Other lipid medications/supplements?:  none    Hypertension Follow-up      Outpatient blood pressures are not being checked.    Low Salt Diet: low salt      Problem list and histories reviewed & adjusted, as indicated.  Additional history: as documented    Patient Active Problem List   Diagnosis     DM type 2, goal A1C below 8.0     HTN, goal below 150/90     Hyperlipidemia LDL goal <70     Overweight (BMI 25.0-29.9)     Cataracts, both eyes     Health Care Home     Advance care  planning     Type 2 diabetes mellitus with neurological manifestations, controlled (H)     Pseudophakia of left eye     History of carotid endarterectomy     Cerebral infarction (H)     Aortic valve insufficiency     Vitamin D deficiency     Past Surgical History:   Procedure Laterality Date     CATARACT IOL, RT/LT       COMBINED REPAIR PTOSIS WITH BLEPHAROPLASTY Bilateral 10/9/2017    Procedure: COMBINED REPAIR PTOSIS WITH BLEPHAROPLASTY;  Bilateral upper eyelid blepharoplasty and ptosis repair;  Surgeon: Bibiana Melara MD;  Location: [facility] OR     PHACOEMULSIFICATION WITH STANDARD INTRAOCULAR LENS IMPLANT Right 2/25/2016    Procedure: PHACOEMULSIFICATION WITH STANDARD INTRAOCULAR LENS IMPLANT;  Surgeon: Carlton Don MD;  Location: [facility] OR     PHACOEMULSIFICATION WITH STANDARD INTRAOCULAR LENS IMPLANT Left 2/11/2016    Procedure: PHACOEMULSIFICATION WITH STANDARD INTRAOCULAR LENS IMPLANT;  Surgeon: Carlton Don MD;  Location: [facility] OR     REPAIR PTOSIS Bilateral     10/17       Social History     Tobacco Use     Smoking status: Never Smoker     Smokeless tobacco: Never Used   Substance Use Topics     Alcohol use: No     Family History   Problem Relation Age of Onset     Hypertension Father      Cancer No family hx of      Diabetes No family hx of      Cerebrovascular Disease No family hx of      Thyroid Disease No family hx of      Glaucoma No family hx of      Macular Degeneration No family hx of          Current Outpatient Medications   Medication Sig Dispense Refill     acetaminophen (MAPAP) 500 MG tablet TAKE 2 TABLETS(1000 MG) BY MOUTH THREE TIMES DAILY AS NEEDED FOR PAIN 100 tablet 0     amLODIPine (NORVASC) 10 MG tablet Take 1 tablet (10 mg) by mouth daily 90 tablet 1     ASPIRIN PO Take 81 mg by mouth.         atorvastatin (LIPITOR) 40 MG tablet Take 1 tablet (40 mg) by mouth daily 90 tablet 3     blood glucose (NO BRAND SPECIFIED) lancets standard Use to test blood sugar 1 times daily or  as directed. 100 each 11     blood glucose monitoring (EMIL CONTOUR NEXT) test strip USE TO TEST BLOOD SUGARS 3 TIMES DAILY OR AS DIRECTED. 100 each 11     carboxymethylcellulose (REFRESH) 1 % ophthalmic solution Place 1 drop into both eyes 4 times daily 15 each 12     cholecalciferol (VITAMIN D3) 49598 UNITS capsule Take 1 capsule (50,000 Units) by mouth once a week 8 capsule 0     hydrochlorothiazide (HYDRODIURIL) 25 MG tablet Take 1 tablet (25 mg) by mouth daily 30 tablet 0     insulin aspart (NOVOLOG FLEXPEN) 100 UNIT/ML pen INJECT 5 UNITS UNDER THE SKIN THREE TIMES DAILY WITH MEALS 15 mL 3     insulin glargine (LANTUS SOLOSTAR PEN) 100 UNIT/ML pen Inject 14 Units Subcutaneous At Bedtime 9 mL 1     insulin pen needle (B-D U/F) 31G X 8 MM miscellaneous Inject Subcutaneous 5 times daily Use 5 pen needles daily or as directed. 100 each 3     lisinopril (PRINIVIL/ZESTRIL) 40 MG tablet Take 1 tablet (40 mg) by mouth daily 90 tablet 1     meloxicam (MOBIC) 7.5 MG tablet Take 1 tablet (7.5 mg) by mouth daily For knee pain. 30 tablet 1     metFORMIN (GLUCOPHAGE) 1000 MG tablet Take 1 tablet (1,000 mg) by mouth 2 times daily (with meals) 180 tablet 1     metoprolol tartrate (LOPRESSOR) 100 MG tablet Take 1 tablet (100 mg) by mouth 2 times daily 180 tablet 1     multivitamin, therapeutic with minerals (MULTI-VITAMIN) TABS Take 1 tablet by mouth daily 100 tablet 3     order for DME Equipment being ordered: Lancets for use with Lantus pen (once at night) and Novolog Flex Pen (three times a day).  Dispense 3 month supply with one refill 3 Month 1     ORDER FOR DME Equipment being ordered: Rolling walker with seat 1 Units 0     triamcinolone (KENALOG) 0.1 % external ointment Apply topically 2 times daily Use for a maximum of 2 weeks 30 g 0     No Known Allergies  Recent Labs   Lab Test 01/11/19  1326 04/04/18  1531 01/17/18  1422 09/26/17  1449  11/16/16  1537 02/23/16  1042 11/05/15  0951 06/15/15  1103 02/09/15  1034   "01/14/14  1151   A1C 8.0* 7.7* 9.0*  --    < > 7.4* 6.3* 6.5* 6.9* 6.2*   < >  --    LDL  --  96 160*  --   --  91  --  67 50 123   < >  --    HDL  --   --   --   --   --   --   --  41 47 55   < >  --    TRIG  --   --   --   --   --   --   --  63 113 87   < >  --    ALT  --   --  54  --   --   --   --   --   --   --   --  52   CR 1.16  --  1.22 1.16   < > 1.40*  --  1.06 1.09 0.94   < > 1.73*   GFRESTIMATED 63  --  59* 62   < > 50*  --  70 67 80   < > 40*   GFRESTBLACK 73  --  71 75   < > 61  --  84 82 >90   GFR Calc     < > 48*   POTASSIUM  --   --  4.1 4.3   < > 4.0  --  4.7 4.5 4.0   < > 5.0   TSH  --   --  1.35  --   --   --  1.32  --   --   --   --   --     < > = values in this interval not displayed.      BP Readings from Last 3 Encounters:   03/20/19 175/87   01/11/19 130/80   11/01/18 138/74    Wt Readings from Last 3 Encounters:   03/20/19 73.9 kg (163 lb)   01/11/19 73.9 kg (163 lb)   11/01/18 72.6 kg (160 lb)                  Labs reviewed in EPIC    Reviewed and updated as needed this visit by clinical staff  Tobacco  Allergies  Meds  Problems  Med Hx  Surg Hx  Fam Hx  Soc Hx        Reviewed and updated as needed this visit by Provider  Tobacco  Allergies  Meds  Problems  Med Hx  Surg Hx  Fam Hx         ROS:  Constitutional, neuro, ENT, endocrine, pulmonary, cardiac, gastrointestinal, genitourinary, musculoskeletal, integument and psychiatric systems are negative, except as otherwise noted.    OBJECTIVE:                                                    /87   Pulse 58   Temp 97.8  F (36.6  C) (Oral)   Resp 18   Ht 1.626 m (5' 4\")   Wt 73.9 kg (163 lb)   SpO2 97%   BMI 27.98 kg/m    Body mass index is 27.98 kg/m .      GENERAL APPEARANCE: healthy, alert and no distress  EYES: Eyes grossly normal to inspection and conjunctivae and sclerae normal  HENT: nose and mouth without ulcers or lesions  NECK: no adenopathy and trachea midline and normal to palpation  RESP: " lungs clear to auscultation - no rales, rhonchi or wheezes  CV: regular rates and rhythm, normal S1 S2, no S3 or S4 and no murmur, click or rub  ABDOMEN: soft, non-tender and no rebound or guarding   MS: extremities normal- no gross deformities noted and peripheral pulses normal  SKIN: Mild erythematous papules and scaling noted on the neck, no blisters, no drainage   NEURO: Normal strength and tone, mentation intact and speech normal  PSYCH: mentation appears normal      Diagnostic test results:  Diagnostic Test Results:  No results found for this or any previous visit (from the past 24 hour(s)).     ASSESSMENT/PLAN:                                                    1. Type 2 diabetes mellitus with neurological manifestations, controlled (H)  -Last HbA1C was 8  -Continue medications the same.    Periodic blood sugar testing.  Regular foot checks  Limit carbohydrates and sweets in diet  Update eye exam annually   Regular exercise, 150 minutes per week  Hypoglycemia precautions     2. HTN, goal below 150/90  -Elevated reading  -Has had normal readings in the past  -Max dose of Amlodipine, 40 mg of Lisinopril. Will defer further increase in beta blocker due to hear rate of 58  -Hence, added diuretic   - hydrochlorothiazide (HYDRODIURIL) 25 MG tablet; Take 1 tablet (25 mg) by mouth daily  Dispense: 30 tablet; Refill: 0    3. Dermatitis  -Vaseline or Aquaphor  - triamcinolone (KENALOG) 0.1 % external ointment; Apply topically 2 times daily Use for a maximum of 2 weeks  Dispense: 30 g; Refill: 0      Follow up with Provider - 2 weeks for Blood pressure check, if not at goal then follow up with me in clinic      Kimberly Bell MD MPH    [facility]

## 2019-03-20 NOTE — PATIENT INSTRUCTIONS
================================================================================  Normal Values   Blood pressure  <140/90 for most adults    <130/80 for some chronic diseases (ask your care team about yours)    BMI (body mass index)  18.5-25 kg/m2 (based on height and weight)     Thank you for visiting [facility]    Normal or non-critical lab and imaging results will be communicated to you by MyChart, letter or phone within 7 days.  If you do not hear from us within 10 days, please call the clinic. If you have a critical or abnormal lab result, we will notify you by phone as soon as possible.     If you have any questions regarding your visit please contact:     Team Comfort:   Clinic Hours Telephone Number   Dr. Cedric Tomlinson Dr. [place] 7am-5pm  Monday - Friday [phone]  Adrian RN  Mireya RN  Xin RN   Pharmacy 8:00am-8pm Monday-Friday    9am-5pm Saturday-Sunday [phone]   Urgent Care 11am-9pm Monday-Friday        9am-5pm Saturday-Sunday [phone]     After hours, weekend or if you need to make an appointment with your primary provider please call [phone].   After Hours nurse advise: call [facility]: [phone]    Medication Refills:  Call your pharmacy and they will forward the refill to us. Please allow 3 business days for your refills to be completed.

## 2019-04-04 ENCOUNTER — ALLIED HEALTH/NURSE VISIT (OUTPATIENT)
Dept: NURSING | Facility: CLINIC | Age: 69
End: 2019-04-04
Payer: COMMERCIAL

## 2019-04-04 VITALS — DIASTOLIC BLOOD PRESSURE: 86 MMHG | SYSTOLIC BLOOD PRESSURE: 170 MMHG | HEART RATE: 70 BPM

## 2019-04-04 DIAGNOSIS — I10 HTN, GOAL BELOW 150/90: Primary | ICD-10-CM

## 2019-04-04 NOTE — TELEPHONE ENCOUNTER
Plan does not cover ketorolac (TORADOL) 10 MG tablet.  Please call [phone] to initiate Prior Auth or change med.    Insurance type and ID number: ID# [id number]      Additional Information: Preferred alternative per fax are Ibuprofen, Meloxicam, naproxen    Barbara Myers     4

## 2019-04-04 NOTE — NURSING NOTE
Ancillary BP check    HTN Guidelines:  Age 18-59 BP range:  Less than 140/90  Age 60-85 with Diabetes:  Less than 140/90  Age 60-85 without Diabetes:  less than 150/90        Wolf Sen is a 69 year old male who comes in today for a Blood Pressure check.    Patient is taking medication as prescribed  Patient is not tolerating medications well.  Patient is monitoring Blood Pressure at home.  Average readings if yes are 150/88    BP goal: < 140/90mmHg (patient 18+ years of age with or without diabetes)    BP reading today: FAILED     BP Readings from Last 1 Encounters:   04/04/19 170/86     A regular cuff was used.  Pulse: 70    Vitals reviewed     Each reading recorded in vital signs section of chart: yes    Symptoms: headaches    Need for urgent appointment, based on criteria in ancillary BP workflow (elevated blood pressure and any of the following: dizziness, blurry vision, lightheadedness, severe shortness of breath, chest pain or visual disturbance): No       Plan: Not at goal without red flags, message routed to provider for further directions and follow up.      Completed by: Ganesh Joya MA on 4/4/2019 at 9:42 AM  [facility]

## 2019-04-10 ENCOUNTER — OFFICE VISIT (OUTPATIENT)
Dept: FAMILY MEDICINE | Facility: CLINIC | Age: 69
End: 2019-04-10
Payer: COMMERCIAL

## 2019-04-10 ENCOUNTER — ANCILLARY PROCEDURE (OUTPATIENT)
Dept: GENERAL RADIOLOGY | Facility: CLINIC | Age: 69
End: 2019-04-10
Attending: PREVENTIVE MEDICINE
Payer: COMMERCIAL

## 2019-04-10 VITALS
TEMPERATURE: 97.5 F | HEART RATE: 55 BPM | SYSTOLIC BLOOD PRESSURE: 178 MMHG | DIASTOLIC BLOOD PRESSURE: 99 MMHG | BODY MASS INDEX: 28.01 KG/M2 | OXYGEN SATURATION: 97 % | RESPIRATION RATE: 14 BRPM | WEIGHT: 163.2 LBS

## 2019-04-10 DIAGNOSIS — I10 HTN, GOAL BELOW 150/90: ICD-10-CM

## 2019-04-10 DIAGNOSIS — R05.9 COUGH: ICD-10-CM

## 2019-04-10 DIAGNOSIS — M54.41 ACUTE MIDLINE LOW BACK PAIN WITH RIGHT-SIDED SCIATICA: ICD-10-CM

## 2019-04-10 DIAGNOSIS — E11.49 TYPE 2 DIABETES MELLITUS WITH NEUROLOGICAL MANIFESTATIONS, CONTROLLED (H): Primary | ICD-10-CM

## 2019-04-10 PROCEDURE — 99214 OFFICE O/P EST MOD 30 MIN: CPT | Performed by: PREVENTIVE MEDICINE

## 2019-04-10 PROCEDURE — 71046 X-RAY EXAM CHEST 2 VIEWS: CPT

## 2019-04-10 RX ORDER — LOSARTAN POTASSIUM AND HYDROCHLOROTHIAZIDE 25; 100 MG/1; MG/1
1 TABLET ORAL DAILY
Qty: 90 TABLET | Refills: 0 | Status: SHIPPED | OUTPATIENT
Start: 2019-04-10 | End: 2019-05-09

## 2019-04-10 RX ORDER — HYDRALAZINE HYDROCHLORIDE 25 MG/1
25 TABLET, FILM COATED ORAL
Qty: 180 TABLET | Refills: 0 | Status: SHIPPED | OUTPATIENT
Start: 2019-04-10 | End: 2019-09-20

## 2019-04-10 ASSESSMENT — PAIN SCALES - GENERAL: PAINLEVEL: NO PAIN (0)

## 2019-04-10 NOTE — PATIENT INSTRUCTIONS
At [facility], we strive to deliver an exceptional experience to you, every time we see you.  If you receive a survey in the mail, please send us back your thoughts. We really do value your feedback.    Based on your medical history, these are the current health maintenance/preventive care services that you are due for (some may have been done at this visit.)  Health Maintenance Due   Topic Date Due     ZOSTER IMMUNIZATION (2 of 3) 04/27/2016     LIPID MONITORING Q1 YEAR  04/04/2019     MEDICARE ANNUAL WELLNESS VISIT  04/04/2019         Suggested websites for health information:  Www.[employer].org : Up to date and easily searchable information on multiple topics.  Www.[employer].gov : medication info, interactive tutorials, watch real surgeries online  Www.familydoctor.org : good info from the Academy of Family Physicians  Www.cdc.gov : public health info, travel advisories, epidemics (H1N1)  Www.aap.org : children's health info, normal development, vaccinations  Www.health.[facility].[place].us : [place] dept of health, public health issues in [place], N1N1    Your care team:                            Family Medicine Internal Medicine   MD Isidro Ventura MD Shantel Branch-Fleming, MD Katya Georgiev PA-C Nam Ho, MD Pediatrics   CHRISTINE Quarles, VERONICA Nguyen APRN CNP   MD Apoorva Jack MD Deborah Mielke, MD Kim Thein, APRN CNP      Clinic hours: Monday - Thursday 7 am-7 pm; Fridays 7 am-5 pm.   Urgent care: Monday - Friday 11 am-9 pm; Saturday and Sunday 9 am-5 pm.  Pharmacy : Monday -Thursday 8 am-8 pm; Friday 8 am-6 pm; Saturday and Sunday 9 am-5 pm.     Clinic: [phone]   Pharmacy: [phone]

## 2019-04-10 NOTE — RESULT ENCOUNTER NOTE
Please send a letter:    Dear Wolf Sen,    Chest X ray does not show any acute infections, or fluid in the lungs. Plan of care and follow up as discussed in clinic.  Please let me know if you have any questions and thank you for choosing [facility].    Regards,    Kimberly Bell MD MPH

## 2019-04-10 NOTE — PROGRESS NOTES
SUBJECTIVE:   Wolf Sen is a 69 year old male who presents to clinic today for the following   health issues:    Hypertension Follow-up      Outpatient blood pressures are being checked at home.  Results are 155/85.    Low Salt Diet: not monitoring salt      Amount of exercise or physical activity: 6-7 days/week for an average of 15-30 minutes    Problems taking medications regularly: No    Medication side effects: none    Diet: regular (no restrictions)    Diabetes Follow-up    Patient is checking blood sugars: once daily.  Results are as follows:         am - 140 mg/dl    Diabetic concerns: None     Symptoms of hypoglycemia (low blood sugar): none     Paresthesias (numbness or burning in feet) or sores: No     Date of last diabetic eye exam: UTD    BP Readings from Last 2 Encounters:   04/10/19 (!) 178/99   04/04/19 170/86     Hemoglobin A1C (%)   Date Value   01/11/2019 8.0 (H)   04/04/2018 7.7 (H)     LDL Cholesterol Calculated (mg/dL)   Date Value   11/05/2015 67   06/15/2015 50     LDL Cholesterol Direct (mg/dL)   Date Value   04/04/2018 96   01/17/2018 160 (H)       Diabetes Management Resources    Hyperlipidemia Follow-Up      Rate your low fat/cholesterol diet?: good    Taking statin?  Yes, no muscle aches from statin    Other lipid medications/supplements?:  none    Low back pain:    Right sided low back pain with radiation down to right foot, numbness+  The patient does not have any focal weakness or numbness, no urine or stool incontinence, no hematuria, no saddle anesthesia and no gait abnormalities.   Symptoms for 3 weeks  Feels cramping in his legs too  No trauma, no falls, no rash, no fever      Cough and chest congestion for one month. More at night, some phlegm, no blood.   Yellow sputum, no shortness of breath, no wheezing, no pedal edema.    Additional history: as documented    Reviewed  and updated as needed this visit by clinical staff         Reviewed and updated as needed this visit by  Provider  Tobacco  Allergies  Meds  Problems  Med Hx  Surg Hx  Fam Hx         Patient Active Problem List   Diagnosis     DM type 2, goal A1C below 8.0     HTN, goal below 150/90     Hyperlipidemia LDL goal <70     Overweight (BMI 25.0-29.9)     Cataracts, both eyes     Health Care Home     Advance care planning     Type 2 diabetes mellitus with neurological manifestations, controlled (H)     Pseudophakia of left eye     History of carotid endarterectomy     Cerebral infarction (H)     Aortic valve insufficiency     Vitamin D deficiency     Past Surgical History:   Procedure Laterality Date     CATARACT IOL, RT/LT       COMBINED REPAIR PTOSIS WITH BLEPHAROPLASTY Bilateral 10/9/2017    Procedure: COMBINED REPAIR PTOSIS WITH BLEPHAROPLASTY;  Bilateral upper eyelid blepharoplasty and ptosis repair;  Surgeon: Bibiana Melara MD;  Location: [facility]     PHACOEMULSIFICATION WITH STANDARD INTRAOCULAR LENS IMPLANT Right 2/25/2016    Procedure: PHACOEMULSIFICATION WITH STANDARD INTRAOCULAR LENS IMPLANT;  Surgeon: Carlton Don MD;  Location: [facility] OR     PHACOEMULSIFICATION WITH STANDARD INTRAOCULAR LENS IMPLANT Left 2/11/2016    Procedure: PHACOEMULSIFICATION WITH STANDARD INTRAOCULAR LENS IMPLANT;  Surgeon: Carlton Don MD;  Location: [facility]     REPAIR PTOSIS Bilateral     10/17       Social History     Tobacco Use     Smoking status: Never Smoker     Smokeless tobacco: Never Used   Substance Use Topics     Alcohol use: No     Family History   Problem Relation Age of Onset     Hypertension Father      Cancer No family hx of      Diabetes No family hx of      Cerebrovascular Disease No family hx of      Thyroid Disease No family hx of      Glaucoma No family hx of      Macular Degeneration No family hx of          Current Outpatient Medications   Medication Sig Dispense Refill     hydrALAZINE (APRESOLINE) 25 MG tablet Take 1 tablet (25 mg) by mouth 2 times daily 180 tablet 0      losartan-hydrochlorothiazide (HYZAAR) 100-25 MG tablet Take 1 tablet by mouth daily 90 tablet 0     acetaminophen (MAPAP) 500 MG tablet TAKE 2 TABLETS(1000 MG) BY MOUTH THREE TIMES DAILY AS NEEDED FOR PAIN 100 tablet 0     amLODIPine (NORVASC) 10 MG tablet Take 1 tablet (10 mg) by mouth daily 90 tablet 1     ASPIRIN PO Take 81 mg by mouth.         atorvastatin (LIPITOR) 40 MG tablet Take 1 tablet (40 mg) by mouth daily 90 tablet 3     blood glucose (NO BRAND SPECIFIED) lancets standard Use to test blood sugar 1 times daily or as directed. 100 each 11     blood glucose monitoring (EMIL CONTOUR NEXT) test strip USE TO TEST BLOOD SUGARS 3 TIMES DAILY OR AS DIRECTED. 100 each 11     carboxymethylcellulose (REFRESH) 1 % ophthalmic solution Place 1 drop into both eyes 4 times daily 15 each 12     cholecalciferol (VITAMIN D3) 98299 UNITS capsule Take 1 capsule (50,000 Units) by mouth once a week 8 capsule 0     insulin aspart (NOVOLOG FLEXPEN) 100 UNIT/ML pen INJECT 5 UNITS UNDER THE SKIN THREE TIMES DAILY WITH MEALS 15 mL 3     insulin glargine (LANTUS SOLOSTAR PEN) 100 UNIT/ML pen Inject 14 Units Subcutaneous At Bedtime 9 mL 1     insulin pen needle (B-D U/F) 31G X 8 MM miscellaneous Inject Subcutaneous 5 times daily Use 5 pen needles daily or as directed. 100 each 3     meloxicam (MOBIC) 7.5 MG tablet Take 1 tablet (7.5 mg) by mouth daily For knee pain. 30 tablet 1     metFORMIN (GLUCOPHAGE) 1000 MG tablet Take 1 tablet (1,000 mg) by mouth 2 times daily (with meals) 180 tablet 1     metoprolol tartrate (LOPRESSOR) 100 MG tablet Take 1 tablet (100 mg) by mouth 2 times daily 180 tablet 1     multivitamin, therapeutic with minerals (MULTI-VITAMIN) TABS Take 1 tablet by mouth daily 100 tablet 3     order for DME Equipment being ordered: Lancets for use with Lantus pen (once at night) and Novolog Flex Pen (three times a day).  Dispense 3 month supply with one refill 3 Month 1     ORDER FOR DME Equipment being ordered:  Rolling walker with seat 1 Units 0     triamcinolone (KENALOG) 0.1 % external ointment Apply topically 2 times daily Use for a maximum of 2 weeks 30 g 0     No Known Allergies  Recent Labs   Lab Test 01/11/19  1326 04/04/18  1531 01/17/18  1422 09/26/17  1449  11/16/16  1537 02/23/16  1042 11/05/15  0951 06/15/15  1103 02/09/15  1034  01/14/14  1151   A1C 8.0* 7.7* 9.0*  --    < > 7.4* 6.3* 6.5* 6.9* 6.2*   < >  --    LDL  --  96 160*  --   --  91  --  67 50 123   < >  --    HDL  --   --   --   --   --   --   --  41 47 55   < >  --    TRIG  --   --   --   --   --   --   --  63 113 87   < >  --    ALT  --   --  54  --   --   --   --   --   --   --   --  52   CR 1.16  --  1.22 1.16   < > 1.40*  --  1.06 1.09 0.94   < > 1.73*   GFRESTIMATED 63  --  59* 62   < > 50*  --  70 67 80   < > 40*   GFRESTBLACK 73  --  71 75   < > 61  --  84 82 >90   GFR Calc     < > 48*   POTASSIUM  --   --  4.1 4.3   < > 4.0  --  4.7 4.5 4.0   < > 5.0   TSH  --   --  1.35  --   --   --  1.32  --   --   --   --   --     < > = values in this interval not displayed.      BP Readings from Last 3 Encounters:   04/10/19 (!) 178/99   04/04/19 170/86   03/20/19 175/87    Wt Readings from Last 3 Encounters:   04/10/19 74 kg (163 lb 3.2 oz)   03/20/19 73.9 kg (163 lb)   01/11/19 73.9 kg (163 lb)                  Labs reviewed in EPIC    ROS:  Constitutional, neuro, ENT, endocrine, pulmonary, cardiac, gastrointestinal, genitourinary, musculoskeletal, integument and psychiatric systems are negative, except as otherwise noted.    OBJECTIVE:                                                    BP (!) 178/99   Pulse 55   Temp 97.5  F (36.4  C) (Oral)   Resp 14   Wt 74 kg (163 lb 3.2 oz)   SpO2 97%   BMI 28.01 kg/m    Body mass index is 28.01 kg/m .      GENERAL APPEARANCE: healthy, alert and no distress  EYES: Eyes grossly normal to inspection and conjunctivae and sclerae normal  HENT: nose and mouth without ulcers or lesions  NECK: no  adenopathy and trachea midline and normal to palpation  RESP: lungs clear to auscultation - no rales, rhonchi or wheezes  CV: regular rates and rhythm, normal S1 S2, no S3 or S4 and no murmur, click or rub  ABDOMEN: soft, non-tender and no rebound or guarding   MS: extremities normal- no gross deformities noted and peripheral pulses normal  SKIN: no suspicious lesions or rashes  NEURO: Normal strength and tone, mentation intact and speech normal  PSYCH: mentation appears normal      Lumbar spine: No swelling, bruising, erythema atrophy or deformity.  No tenderness noted on palpation of spinous processes.  Range of motion of the lumbar spine is full in all directions without focal deficit.  Strength is full.  SLR negative bilaterally.  Distal motor and sensory exam is intact.  Reflexes are 2+ and symmetrical.  Distal pulses intact. No sacroiliac tenderness bilaterally.  Great toe extension normal.       Diagnostic test results:  Diagnostic Test Results:  No results found for this or any previous visit (from the past 24 hour(s)).       CHEST TWO VIEWS   4/10/2019 12:13 PM      HISTORY: Cough.     COMPARISON: Chest x-ray 9/11/2018.                                                                      IMPRESSION: PA and lateral views of the chest. Lungs are clear. Heart  is normal in size. No effusions are evident. No pneumothorax. Slight  increased density left lung apex likely results from overlapping bone  shadows. Mild degenerative lower thoracic spine changes are noted.     HERMILO MENCHACA MD     ASSESSMENT/PLAN:                                                    1. Type 2 diabetes mellitus with neurological manifestations, controlled (H)  -Increased HbA1C to 8  -Continue current regimen for now, if further increase then medication changes will be made     2. HTN, goal below 150/90  -Not at goal  -Stopped Lisinopril. Possible cough side effect. Changed to ARB Diuretic combination.   -if not at goal at next visit then  refer to Cardiology for evaluation of resistant Hypertension    - losartan-hydrochlorothiazide (HYZAAR) 100-25 MG tablet; Take 1 tablet by mouth daily  Dispense: 90 tablet; Refill: 0  - hydrALAZINE (APRESOLINE) 25 MG tablet; Take 1 tablet (25 mg) by mouth 2 times daily  Dispense: 180 tablet; Refill: 0    3. Cough  -No acute changes on X ray  -likely ACE inhibitor related  - XR Chest 2 Views; Future    4. Acute midline low back pain with right-sided sciatica  - [facility]; Future      Follow up with Provider - 4 weeks      Kimberly Bell MD MPH    [facility]

## 2019-04-10 NOTE — LETTER
April 11, 2019      Wolf Sen  [address] [address] [place] [id number]    Dear Wolf Sen,     Chest X ray does not show any acute infections, or fluid in the lungs. Plan of care and follow up as discussed in clinic.   Please let me know if you have any questions and thank you for choosing [facility].     Regards,     Kimberly Bell MD MPH/smj    Resulted Orders   XR Chest 2 Views    Narrative    CHEST TWO VIEWS   4/10/2019 12:13 PM     HISTORY: Cough.    COMPARISON: Chest x-ray 9/11/2018.      Impression    IMPRESSION: PA and lateral views of the chest. Lungs are clear. Heart  is normal in size. No effusions are evident. No pneumothorax. Slight  increased density left lung apex likely results from overlapping bone  shadows. Mild degenerative lower thoracic spine changes are noted.    HERMILO MENCHACA MD

## 2019-04-23 ENCOUNTER — THERAPY VISIT (OUTPATIENT)
Dept: PHYSICAL THERAPY | Facility: CLINIC | Age: 69
End: 2019-04-23
Attending: PREVENTIVE MEDICINE
Payer: COMMERCIAL

## 2019-04-23 DIAGNOSIS — M54.41 ACUTE MIDLINE LOW BACK PAIN WITH RIGHT-SIDED SCIATICA: ICD-10-CM

## 2019-04-23 PROCEDURE — 97161 PT EVAL LOW COMPLEX 20 MIN: CPT | Mod: GP | Performed by: PHYSICAL THERAPIST

## 2019-04-23 PROCEDURE — 97110 THERAPEUTIC EXERCISES: CPT | Mod: GP | Performed by: PHYSICAL THERAPIST

## 2019-04-23 PROCEDURE — 97112 NEUROMUSCULAR REEDUCATION: CPT | Mod: GP | Performed by: PHYSICAL THERAPIST

## 2019-04-24 PROBLEM — M54.41 ACUTE MIDLINE LOW BACK PAIN WITH RIGHT-SIDED SCIATICA: Status: ACTIVE | Noted: 2019-04-24

## 2019-04-24 NOTE — PROGRESS NOTES
[facility] Initial Evaluation  Subjective:  The history is provided by the patient. A  was used.   Wolf Sen is a 69 year old male with a lumbar condition.  Condition occurred with:  Insidious onset.  Condition occurred: for unknown reasons.  This is a new condition  MD: 4/10/2019    2 MONTH DURATION. .    Patient reports pain:  Lumbar spine right.  Radiates to:  Gluteals right, thigh right, lower leg right and foot right.  Pain is described as cramping and is intermittent and reported as 9/10.  Associated symptoms:  Loss of motion/stiffness and loss of strength. Pain is worse during the night.  Symptoms are exacerbated by bending, sitting, walking and lifting Relieved by: TYLENOL.  Since onset symptoms are unchanged.  Special tests:  X-ray.  Previous treatment: NONE.    General health as reported by patient is fair.  Pertinent medical history includes:  High blood pressure, stroke and diabetes.  Medical allergies: no.  Other surgeries include:  Other.  Current medications:  High blood pressure medication and pain medication.  Current occupation is NOT WORKING. .        Barriers include:  None as reported by the patient.    Red flags:  Chest pain (SUGGESTED HE MAKE HIS MD AWARE. ).                        Objective:  System         Lumbar/SI Evaluation    Lumbar Myotomes:    T12-L3 (Hip Flex):  Left: 5    Right: 5  L2-4 (Quads):  Left:  5    Right:  5  L4 (Ankle DF):  Left:  5    Right:  5  L5 (Great Toe Ext): Left: 5    Right: 5   S1 (Toe Raise):  Left: 5    Right: 5                                                                 Terry Lumbar Evaluation    Posture:  Sitting: fair        Correction of Posture: better    Movement Loss:  Flexion (Flex): min    Side Glide R (SG R): pain and nil  Side Glide L (SG L): pain and nil  Test Movements:  FIS: During: peripheralizing and increases      EIS: During: centralizing    Repeat EIS: During: centralizing          SGIS L:  During: no effect      Conclusion: derangement.    Rx: neutral spine, sitting with towel roll, Therapeutic Ex, Therapeutic activity, NMR,  Manual therapy.                                            ROS    Assessment/Plan:    Patient is a 69 year old male with lumbar complaints.    Patient has the following significant findings with corresponding treatment plan.                Diagnosis 1:  ACUTE MIDLINE LOW BACK PAIN WITH R SIDED SCIATICA.  Pain -  hot/cold therapy, US, electric stimulation, manual therapy, splint/taping/bracing/orthotics, self management, education, directional preference exercise and home program  Decreased ROM/flexibility - manual therapy, therapeutic exercise, therapeutic activity and home program  Decreased function - therapeutic activities and home program  Impaired posture - neuro re-education, therapeutic activities and home program    Therapy Evaluation Codes:   1) History comprised of:   Personal factors that impact the plan of care:      Past/current experiences.    Comorbidity factors that impact the plan of care are:      Diabetes and Stroke.     Medications impacting care: None.  2) Examination of Body Systems comprised of:   Body structures and functions that impact the plan of care:      Lumbar spine.   Activity limitations that impact the plan of care are:      Bathing, Bending, Dressing, Lifting, Working, Sleeping and Transfers. .  3) Clinical presentation characteristics are:   Stable/Uncomplicated.  4) Decision-Making    Low complexity using standardized patient assessment instrument and/or measureable assessment of functional outcome.  Cumulative Therapy Evaluation is: Low complexity.    Previous and current functional limitations:  (See Goal Flow Sheet for this information)    Short term and Long term goals: (See Goal Flow Sheet for this information)     Communication ability:  Patient appears to be able to clearly communicate and understand verbal and written communication and  follow directions correctly.  Treatment Explanation - The following has been discussed with the patient:   RX ordered/plan of care  Anticipated outcomes  Possible risks and side effects  This patient would benefit from PT intervention to resume normal activities.   Rehab potential is good.    Frequency:  1 X week, once daily  Duration:  for 6 weeks  Discharge Plan:  Achieve all LTG.  Independent in home treatment program.  Reach maximal therapeutic benefit.      Please refer to the daily flowsheet for treatment today, total treatment time and time spent performing 1:1 timed codes.

## 2019-04-24 NOTE — PROGRESS NOTES
[facility] Initial Evaluation  Subjective:  The history is provided by the patient. A  was used.   Wolf Sen is a 69 year old male with a lumbar condition.  Condition occurred with:  Insidious onset.  Condition occurred: for unknown reasons.  This is a new condition  MD: 4/10/2019    2 MONTH DURATION. .    Patient reports pain:  Lumbar spine right.  Radiates to:  Gluteals right, thigh right, lower leg right and foot right.  Pain is described as cramping and is intermittent and reported as 9/10.  Associated symptoms:  Loss of motion/stiffness and loss of strength. Pain is worse during the night.  Symptoms are exacerbated by bending, sitting, walking and lifting Relieved by: TYLENOL.  Since onset symptoms are unchanged.  Special tests:  X-ray.  Previous treatment: NONE.    General health as reported by patient is fair.  Pertinent medical history includes:  High blood pressure, stroke and diabetes.  Medical allergies: no.  Other surgeries include:  Other.  Current medications:  High blood pressure medication and pain medication.  Current occupation is NOT WORKING. .        Barriers include:  None as reported by the patient.    Red flags:  Chest pain (SUGGESTED HE MAKE HIS MD AWARE. ).                        Objective:  System         Lumbar/SI Evaluation    Lumbar Myotomes:    T12-L3 (Hip Flex):  Left: 5    Right: 5  L2-4 (Quads):  Left:  5    Right:  5  L4 (Ankle DF):  Left:  5    Right:  5  L5 (Great Toe Ext): Left: 5    Right: 5   S1 (Toe Raise):  Left: 5    Right: 5                                                                 Terry Lumbar Evaluation    Posture:  Sitting: fair        Correction of Posture: better    Movement Loss:  Flexion (Flex): min    Side Glide R (SG R): pain and nil  Side Glide L (SG L): pain and nil  Test Movements:  FIS: During: peripheralizing and increases      EIS: During: centralizing    Repeat EIS: During: centralizing          SGIS L:  During: no effect      Conclusion: derangement.    Rx: neutral spine, sitting with towel roll, Therapeutic Ex, Therapeutic activity, NMR,  Manual therapy.                                            ROS    Assessment/Plan:    Patient is a 69 year old male with lumbar complaints.    Patient has the following significant findings with corresponding treatment plan.                Diagnosis 1:  ACUTE MIDLINE LOW BACK PAIN WITH R SIDED SCIATICA.  Pain -  hot/cold therapy, US, electric stimulation, manual therapy, splint/taping/bracing/orthotics, self management, education, directional preference exercise and home program  Decreased ROM/flexibility - manual therapy, therapeutic exercise, therapeutic activity and home program  Decreased function - therapeutic activities and home program  Impaired posture - neuro re-education, therapeutic activities and home program    Therapy Evaluation Codes:   1) History comprised of:   Personal factors that impact the plan of care:      Past/current experiences.    Comorbidity factors that impact the plan of care are:      Diabetes and Stroke.     Medications impacting care: None.  2) Examination of Body Systems comprised of:   Body structures and functions that impact the plan of care:      Lumbar spine.   Activity limitations that impact the plan of care are:      Bathing, Bending, Dressing, Lifting, Working, Sleeping and Transfers. .  3) Clinical presentation characteristics are:   Stable/Uncomplicated.  4) Decision-Making    Low complexity using standardized patient assessment instrument and/or measureable assessment of functional outcome.  Cumulative Therapy Evaluation is: Low complexity.    Previous and current functional limitations:  (See Goal Flow Sheet for this information)    Short term and Long term goals: (See Goal Flow Sheet for this information)     Communication ability:  Patient appears to be able to clearly communicate and understand verbal and written communication and  "follow directions correctly.  Treatment Explanation - The following has been discussed with the patient:   {RX EXPLANATION:934232::\"RX ordered/plan of care\",\"Anticipated outcomes\",\"Possible risks and side effects\"}  This patient would benefit from PT intervention to resume normal activities.   Rehab potential is good.    Frequency:  1 X week, once daily  Duration:  for 6 weeks  Discharge Plan:  {Discharge Plan:676065::\"Achieve all LTG.\",\"Independent in home treatment program.\",\"Reach maximal therapeutic benefit.\"}    Please refer to the daily flowsheet for treatment today, total treatment time and time spent performing 1:1 timed codes.       "

## 2019-04-24 NOTE — PROGRESS NOTES
[facility] Initial Evaluation  Subjective:  The history is provided by the patient. A  was used.   Wolf Sen is a 69 year old male with a lumbar condition.  Condition occurred with:  Insidious onset.  Condition occurred: for unknown reasons.  This is a new condition  MD: 4/10/2019    2 MONTH DURATION. .    Patient reports pain:  Lumbar spine right.  Radiates to:  Gluteals right, thigh right, lower leg right and foot right.  Pain is described as cramping and is intermittent and reported as 9/10.  Associated symptoms:  Loss of motion/stiffness and loss of strength. Pain is worse during the night.  Symptoms are exacerbated by bending, sitting, walking and lifting Relieved by: TYLENOL.  Since onset symptoms are unchanged.  Special tests:  X-ray.  Previous treatment: NONE.    General health as reported by patient is fair.  Pertinent medical history includes:  High blood pressure, stroke and diabetes.  Medical allergies: no.  Other surgeries include:  Other.  Current medications:  High blood pressure medication and pain medication.  Current occupation is NOT WORKING. .        Barriers include:  None as reported by the patient.    Red flags:  Chest pain (SUGGESTED HE MAKE HIS MD AWARE. ).                        Objective:  System         Lumbar/SI Evaluation    Lumbar Myotomes:    T12-L3 (Hip Flex):  Left: 5    Right: 5  L2-4 (Quads):  Left:  5    Right:  5  L4 (Ankle DF):  Left:  5    Right:  5  L5 (Great Toe Ext): Left: 5    Right: 5   S1 (Toe Raise):  Left: 5    Right: 5                                                                 Terry Lumbar Evaluation    Posture:  Sitting: fair        Correction of Posture: better    Movement Loss:  Flexion (Flex): min    Side Glide R (SG R): pain and nil  Side Glide L (SG L): pain and nil  Test Movements:  FIS: During: peripheralizing and increases      EIS: During: centralizing    Repeat EIS: During: centralizing          SGIS L:  During: no effect      Conclusion: derangement.    Rx: neutral spine, sitting with towel roll, Therapeutic Ex, Therapeutic activity, NMR,  Manual therapy.                                            ROS    Assessment/Plan:    Patient is a 69 year old male with lumbar complaints.    Patient has the following significant findings with corresponding treatment plan.                Diagnosis 1:  ACUTE MIDLINE LOW BACK PAIN WITH R SIDED SCIATICA.  Pain -  hot/cold therapy, US, electric stimulation, manual therapy, splint/taping/bracing/orthotics, self management, education, directional preference exercise and home program  Decreased ROM/flexibility - manual therapy, therapeutic exercise, therapeutic activity and home program  Decreased function - therapeutic activities and home program  Impaired posture - neuro re-education, therapeutic activities and home program    Therapy Evaluation Codes:   1) History comprised of:   Personal factors that impact the plan of care:      Past/current experiences.    Comorbidity factors that impact the plan of care are:      Diabetes and Stroke.     Medications impacting care: None.  2) Examination of Body Systems comprised of:   Body structures and functions that impact the plan of care:      Lumbar spine.   Activity limitations that impact the plan of care are:      Bathing, Bending, Dressing, Lifting, Working, Sleeping and Transfers. .  3) Clinical presentation characteristics are:   Stable/Uncomplicated.  4) Decision-Making    Low complexity using standardized patient assessment instrument and/or measureable assessment of functional outcome.  Cumulative Therapy Evaluation is: Low complexity.    Previous and current functional limitations:  (See Goal Flow Sheet for this information)    Short term and Long term goals: (See Goal Flow Sheet for this information)     Communication ability:  Patient appears to be able to clearly communicate and understand verbal and written communication and  follow directions correctly.  Treatment Explanation - The following has been discussed with the patient:   RX ordered/plan of care  Anticipated outcomes  Possible risks and side effects  This patient would benefit from PT intervention to resume normal activities.   Rehab potential is good.    Frequency:  1 X week, once daily  Duration:  for 6 weeks  Discharge Plan:  Achieve all LTG.  Independent in home treatment program.  Reach maximal therapeutic benefit.      Please refer to the daily flowsheet for treatment today, total treatment time and time spent performing 1:1 timed codes.

## 2019-04-24 NOTE — PROGRESS NOTES
[facility] Initial Evaluation  Subjective:  The history is provided by the patient. A  was used.   Wolf Sen is a 69 year old male with a lumbar condition.  Condition occurred with:  Insidious onset.  Condition occurred: for unknown reasons.  This is a new condition  MD: 4/10/2019    2 MONTH DURATION. .    Patient reports pain:  Lumbar spine right.  Radiates to:  Gluteals right, thigh right, lower leg right and foot right.  Pain is described as cramping and is intermittent and reported as 9/10.  Associated symptoms:  Loss of motion/stiffness and loss of strength. Pain is worse during the night.  Symptoms are exacerbated by bending, sitting, walking and lifting Relieved by: TYLENOL.  Since onset symptoms are unchanged.  Special tests:  X-ray.  Previous treatment: NONE.    General health as reported by patient is fair.  Pertinent medical history includes:  High blood pressure, stroke and diabetes.  Medical allergies: no.  Other surgeries include:  Other.  Current medications:  High blood pressure medication and pain medication.  Current occupation is NOT WORKING. .        Barriers include:  None as reported by the patient.    Red flags:  Chest pain (SUGGESTED HE MAKE HIS MD AWARE. ).                        Objective:  System         Lumbar/SI Evaluation    Lumbar Myotomes:    T12-L3 (Hip Flex):  Left: 5    Right: 5  L2-4 (Quads):  Left:  5    Right:  5  L4 (Ankle DF):  Left:  5    Right:  5  L5 (Great Toe Ext): Left: 5    Right: 5   S1 (Toe Raise):  Left: 5    Right: 5                                                                 Terry Lumbar Evaluation    Posture:  Sitting: fair        Correction of Posture: better    Movement Loss:  Flexion (Flex): min    Side Glide R (SG R): pain and nil  Side Glide L (SG L): pain and nil  Test Movements:  FIS: During: peripheralizing and increases      EIS: During: centralizing    Repeat EIS: During: centralizing          SGIS L:  During: no effect      Conclusion: derangement.    Rx: neutral spine, sitting with towel roll, Therapeutic Ex, Therapeutic activity, NMR,  Manual therapy.                                            ROS    Assessment/Plan:    Patient is a 69 year old male with lumbar complaints.    Patient has the following significant findings with corresponding treatment plan.                Diagnosis 1:  ACUTE MIDLINE LOW BACK PAIN WITH R SIDED SCIATICA.  Pain -  hot/cold therapy, US, electric stimulation, manual therapy, splint/taping/bracing/orthotics, self management, education, directional preference exercise and home program  Decreased ROM/flexibility - manual therapy, therapeutic exercise, therapeutic activity and home program  Decreased function - therapeutic activities and home program  Impaired posture - neuro re-education, therapeutic activities and home program    Therapy Evaluation Codes:   1) History comprised of:   Personal factors that impact the plan of care:      Past/current experiences.    Comorbidity factors that impact the plan of care are:      Diabetes and Stroke.     Medications impacting care: None.  2) Examination of Body Systems comprised of:   Body structures and functions that impact the plan of care:      Lumbar spine.   Activity limitations that impact the plan of care are:      Bathing, Bending, Dressing, Lifting, Working, Sleeping and Transfers. .  3) Clinical presentation characteristics are:   Stable/Uncomplicated.  4) Decision-Making    Low complexity using standardized patient assessment instrument and/or measureable assessment of functional outcome.  Cumulative Therapy Evaluation is: Low complexity.    Previous and current functional limitations:  (See Goal Flow Sheet for this information)    Short term and Long term goals: (See Goal Flow Sheet for this information)     Communication ability:  Patient appears to be able to clearly communicate and understand verbal and written communication and  follow directions correctly.  Treatment Explanation - The following has been discussed with the patient:   RX ordered/plan of care  Anticipated outcomes  Possible risks and side effects  This patient would benefit from PT intervention to resume normal activities.   Rehab potential is good.    Frequency:  1 X week, once daily  Duration:  for 6 weeks  Discharge Plan:  Achieve all LTG.  Independent in home treatment program.  Reach maximal therapeutic benefit.    Please refer to the daily flowsheet for treatment today, total treatment time and time spent performing 1:1 timed codes.

## 2019-04-30 ENCOUNTER — THERAPY VISIT (OUTPATIENT)
Dept: PHYSICAL THERAPY | Facility: CLINIC | Age: 69
End: 2019-04-30
Payer: COMMERCIAL

## 2019-04-30 DIAGNOSIS — M54.41 ACUTE MIDLINE LOW BACK PAIN WITH RIGHT-SIDED SCIATICA: ICD-10-CM

## 2019-04-30 PROCEDURE — 97110 THERAPEUTIC EXERCISES: CPT | Mod: GP | Performed by: PHYSICAL THERAPIST

## 2019-04-30 PROCEDURE — 97112 NEUROMUSCULAR REEDUCATION: CPT | Mod: GP | Performed by: PHYSICAL THERAPIST

## 2019-04-30 PROCEDURE — 97140 MANUAL THERAPY 1/> REGIONS: CPT | Mod: GP | Performed by: PHYSICAL THERAPIST

## 2019-05-09 ENCOUNTER — OFFICE VISIT (OUTPATIENT)
Dept: FAMILY MEDICINE | Facility: CLINIC | Age: 69
End: 2019-05-09
Payer: COMMERCIAL

## 2019-05-09 VITALS
DIASTOLIC BLOOD PRESSURE: 74 MMHG | HEART RATE: 78 BPM | WEIGHT: 164 LBS | BODY MASS INDEX: 28 KG/M2 | RESPIRATION RATE: 12 BRPM | SYSTOLIC BLOOD PRESSURE: 144 MMHG | OXYGEN SATURATION: 94 % | HEIGHT: 64 IN | TEMPERATURE: 97.7 F

## 2019-05-09 DIAGNOSIS — I10 HYPERTENSION, GOAL BELOW 150/90: ICD-10-CM

## 2019-05-09 DIAGNOSIS — E11.49 TYPE 2 DIABETES MELLITUS WITH NEUROLOGICAL MANIFESTATIONS, CONTROLLED (H): ICD-10-CM

## 2019-05-09 DIAGNOSIS — E78.5 HYPERLIPIDEMIA, UNSPECIFIED HYPERLIPIDEMIA TYPE: ICD-10-CM

## 2019-05-09 LAB
CHOLEST SERPL-MCNC: 223 MG/DL
HBA1C MFR BLD: 10.6 % (ref 0–5.6)
HDLC SERPL-MCNC: 42 MG/DL
LDLC SERPL CALC-MCNC: 144 MG/DL
NONHDLC SERPL-MCNC: 181 MG/DL
TRIGL SERPL-MCNC: 185 MG/DL

## 2019-05-09 PROCEDURE — 83036 HEMOGLOBIN GLYCOSYLATED A1C: CPT | Performed by: FAMILY MEDICINE

## 2019-05-09 PROCEDURE — 36415 COLL VENOUS BLD VENIPUNCTURE: CPT | Performed by: FAMILY MEDICINE

## 2019-05-09 PROCEDURE — 80061 LIPID PANEL: CPT | Performed by: FAMILY MEDICINE

## 2019-05-09 PROCEDURE — 99214 OFFICE O/P EST MOD 30 MIN: CPT | Performed by: FAMILY MEDICINE

## 2019-05-09 RX ORDER — CARVEDILOL 12.5 MG/1
12.5 TABLET ORAL 2 TIMES DAILY WITH MEALS
Qty: 180 TABLET | Refills: 1 | Status: SHIPPED | OUTPATIENT
Start: 2019-05-09 | End: 2019-05-16

## 2019-05-09 RX ORDER — ATORVASTATIN CALCIUM 40 MG/1
40 TABLET, FILM COATED ORAL DAILY
Qty: 90 TABLET | Refills: 3 | Status: SHIPPED | OUTPATIENT
Start: 2019-05-09 | End: 2019-09-20

## 2019-05-09 RX ORDER — LOSARTAN POTASSIUM AND HYDROCHLOROTHIAZIDE 25; 100 MG/1; MG/1
1 TABLET ORAL DAILY
Qty: 90 TABLET | Refills: 1 | Status: SHIPPED | OUTPATIENT
Start: 2019-05-09 | End: 2019-09-20

## 2019-05-09 RX ORDER — AMLODIPINE BESYLATE 10 MG/1
10 TABLET ORAL DAILY
Qty: 90 TABLET | Refills: 1 | Status: SHIPPED | OUTPATIENT
Start: 2019-05-09 | End: 2019-09-20

## 2019-05-09 RX ORDER — LANCETS
EACH MISCELLANEOUS
Qty: 300 EACH | Refills: 11 | Status: SHIPPED | OUTPATIENT
Start: 2019-05-09 | End: 2021-01-19

## 2019-05-09 RX ORDER — METOPROLOL TARTRATE 100 MG
100 TABLET ORAL 2 TIMES DAILY
Qty: 180 TABLET | Refills: 1 | Status: CANCELLED | OUTPATIENT
Start: 2019-05-09

## 2019-05-09 RX ORDER — GLUCOSAMINE HCL/CHONDROITIN SU 500-400 MG
CAPSULE ORAL
Qty: 100 EACH | Refills: 3 | Status: SHIPPED | OUTPATIENT
Start: 2019-05-09 | End: 2019-10-15

## 2019-05-09 ASSESSMENT — PAIN SCALES - GENERAL: PAINLEVEL: NO PAIN (0)

## 2019-05-09 ASSESSMENT — MIFFLIN-ST. JEOR: SCORE: 1409.9

## 2019-05-09 NOTE — PATIENT INSTRUCTIONS
At [facility], we strive to deliver an exceptional experience to you, every time we see you.  If you receive a survey in the mail, please send us back your thoughts. We really do value your feedback.    Based on your medical history, these are the current health maintenance/preventive care services that you are due for (some may have been done at this visit.)  Health Maintenance Due   Topic Date Due     ZOSTER IMMUNIZATION (2 of 3) 04/27/2016     MEDICARE ANNUAL WELLNESS VISIT  04/04/2019     LIPID MONITORING Q1 YEAR  04/04/2019         Suggested websites for health information:  Www.[employer] : Up to date and easily searchable information on multiple topics.  Www.Picitup.gov : medication info, interactive tutorials, watch real surgeries online  Www.familydoctor.org : good info from the Academy of Family Physicians  Www.cdc.gov : public health info, travel advisories, epidemics (H1N1)  Www.aap.org : children's health info, normal development, vaccinations  Www.health.[facility].[place].us : [place] dept of health, public health issues in [place], N1N1    Your care team:                            Family Medicine Internal Medicine   MD Isidro Ventura MD Shantel Branch-Fleming, MD Katya Georgiev PA-C Nam Ho, MD Pediatrics   CHRISTINE Quarels, VERONICA Nguyen APRN CNP   MD Apoorva Jack MD Deborah Mielke, MD Kim Thein, APRN CNP      Clinic hours: Monday - Thursday 7 am-7 pm; Fridays 7 am-5 pm.   Urgent care: Monday - Friday 11 am-9 pm; Saturday and Sunday 9 am-5 pm.  Pharmacy : Monday -Thursday 8 am-8 pm; Friday 8 am-6 pm; Saturday and Sunday 9 am-5 pm.     Clinic: [phone]   Pharmacy: [phone]

## 2019-05-09 NOTE — PROGRESS NOTES
SUBJECTIVE:   Wolf Sne is a 69 year old male who presents to clinic today for the following   health issues:      Diabetes Follow-up    Patient is checking blood sugars: once daily.  Results are as follows:         am - 200    Diabetic concerns: None     Symptoms of hypoglycemia (low blood sugar): none     Paresthesias (numbness or burning in feet) or sores: No     Date of last diabetic eye exam: up to date    Diabetes Management Resources    Hyperlipidemia Follow-Up      Rate your low fat/cholesterol diet?: fair    Taking statin?  Yes, no muscle aches from statin    Other lipid medications/supplements?:  none    Hypertension Follow-up      Outpatient blood pressures are being checked at home.  Results are 170/70's.    Low Salt Diet: low salt    BP Readings from Last 2 Encounters:   05/09/19 170/78   04/10/19 (!) 178/99     Hemoglobin A1C (%)   Date Value   01/11/2019 8.0 (H)   04/04/2018 7.7 (H)     LDL Cholesterol Calculated (mg/dL)   Date Value   11/05/2015 67   06/15/2015 50     LDL Cholesterol Direct (mg/dL)   Date Value   04/04/2018 96   01/17/2018 160 (H)       Amount of exercise or physical activity: 6-7 days/week for an average of 15-30 minutes    Problems taking medications regularly: No    Medication side effects: none    Diet: low salt, low fat/cholesterol and diabetic      Additional history: as documented    Reviewed  and updated as needed this visit by clinical staff  Tobacco  Allergies  Meds  Med Hx  Surg Hx  Fam Hx  Soc Hx        Reviewed and updated as needed this visit by Provider         Patient Active Problem List   Diagnosis     DM type 2, goal A1C below 8.0     HTN, goal below 150/90     Hyperlipidemia LDL goal <70     Overweight (BMI 25.0-29.9)     Cataracts, both eyes     Health Care Home     Advance care planning     Type 2 diabetes mellitus with neurological manifestations, controlled (H)     Pseudophakia of left eye     History of carotid endarterectomy     Cerebral infarction  "(H)     Aortic valve insufficiency     Vitamin D deficiency     Acute midline low back pain with right-sided sciatica     Past Surgical History:   Procedure Laterality Date     CATARACT IOL, RT/LT       COMBINED REPAIR PTOSIS WITH BLEPHAROPLASTY Bilateral 10/9/2017    Procedure: COMBINED REPAIR PTOSIS WITH BLEPHAROPLASTY;  Bilateral upper eyelid blepharoplasty and ptosis repair;  Surgeon: Bibiana Melara MD;  Location: [facility] OR     PHACOEMULSIFICATION WITH STANDARD INTRAOCULAR LENS IMPLANT Right 2/25/2016    Procedure: PHACOEMULSIFICATION WITH STANDARD INTRAOCULAR LENS IMPLANT;  Surgeon: Carlton Don MD;  Location: [facility] OR     PHACOEMULSIFICATION WITH STANDARD INTRAOCULAR LENS IMPLANT Left 2/11/2016    Procedure: PHACOEMULSIFICATION WITH STANDARD INTRAOCULAR LENS IMPLANT;  Surgeon: Calrton Don MD;  Location: [facility] OR     REPAIR PTOSIS Bilateral     10/17       Social History     Tobacco Use     Smoking status: Never Smoker     Smokeless tobacco: Never Used   Substance Use Topics     Alcohol use: No     Family History   Problem Relation Age of Onset     Hypertension Father      Cancer No family hx of      Diabetes No family hx of      Cerebrovascular Disease No family hx of      Thyroid Disease No family hx of      Glaucoma No family hx of      Macular Degeneration No family hx of            ROS:  Constitutional, HEENT, cardiovascular, pulmonary, GI, , musculoskeletal, neuro, skin, endocrine and psych systems are negative, except as otherwise noted.    OBJECTIVE:     /74   Pulse 78   Temp 97.7  F (36.5  C) (Oral)   Resp 12   Ht 1.626 m (5' 4\")   Wt 74.4 kg (164 lb)   SpO2 94%   BMI 28.15 kg/m    Body mass index is 28.15 kg/m .  GENERAL: healthy, alert and no distress  NECK: no adenopathy, no asymmetry, masses, or scars and thyroid normal to palpation  RESP: lungs clear to auscultation - no rales, rhonchi or wheezes  CV: regular rate and rhythm, normal S1 S2, no S3 or S4, no murmur, " click or rub, no peripheral edema and peripheral pulses strong  ABDOMEN: soft, nontender, no hepatosplenomegaly, no masses and bowel sounds normal  MS: no gross musculoskeletal defects noted, no edema  Diabetic foot exam: normal DP and PT pulses, no trophic changes or ulcerative lesions and normal sensory exam    Diagnostic Test Results:  none     ASSESSMENT/PLAN:     1. Type 2 diabetes mellitus with neurological manifestations, controlled (H)  Not controlled. Increased Lantus to 18 U daily. Recheck in 1 month. May increase 1-2 U daily until fbs around 130-150.  - insulin aspart (NOVOLOG FLEXPEN) 100 UNIT/ML pen; INJECT 10 UNITS UNDER THE SKIN THREE TIMES DAILY WITH MEALS  Dispense: 15 mL; Refill: 3  - insulin glargine (LANTUS SOLOSTAR PEN) 100 UNIT/ML pen; Inject 18 Units Subcutaneous At Bedtime  Dispense: 9 mL; Refill: 1  - metFORMIN (GLUCOPHAGE) 1000 MG tablet; Take 1 tablet (1,000 mg) by mouth 2 times daily (with meals) For diabetes.  Dispense: 180 tablet; Refill: 1  - Hemoglobin A1c    2. Hypertension, goal below 150/90  Not controlled. Discontinue metoprolol. Added carvedilol. Recheck in 1 month.  - losartan-hydrochlorothiazide (HYZAAR) 100-25 MG tablet; Take 1 tablet by mouth daily For blood pressure.  Dispense: 90 tablet; Refill: 1  - amLODIPine (NORVASC) 10 MG tablet; Take 1 tablet (10 mg) by mouth daily For blood pressure.  Dispense: 90 tablet; Refill: 1  - carvedilol (COREG) 12.5 MG tablet; Take 1 tablet (12.5 mg) by mouth 2 times daily (with meals) For blood pressure.  Dispense: 180 tablet; Refill: 1    3. Hyperlipidemia, unspecified hyperlipidemia type  Controlled.  - Lipid panel reflex to direct LDL Non-fasting  - atorvastatin (LIPITOR) 40 MG tablet; Take 1 tablet (40 mg) by mouth daily For cholesterol.  Dispense: 90 tablet; Refill: 3    See Patient Instructions    Carlos Tomlinson MD, MD  [facility]

## 2019-05-14 ENCOUNTER — THERAPY VISIT (OUTPATIENT)
Dept: PHYSICAL THERAPY | Facility: CLINIC | Age: 69
End: 2019-05-14
Payer: COMMERCIAL

## 2019-05-14 DIAGNOSIS — M54.41 ACUTE MIDLINE LOW BACK PAIN WITH RIGHT-SIDED SCIATICA: ICD-10-CM

## 2019-05-14 PROCEDURE — 97035 APP MDLTY 1+ULTRASOUND EA 15: CPT | Mod: GP | Performed by: PHYSICAL THERAPIST

## 2019-05-14 PROCEDURE — 97140 MANUAL THERAPY 1/> REGIONS: CPT | Mod: GP | Performed by: PHYSICAL THERAPIST

## 2019-05-16 ENCOUNTER — OFFICE VISIT (OUTPATIENT)
Dept: FAMILY MEDICINE | Facility: CLINIC | Age: 69
End: 2019-05-16
Payer: COMMERCIAL

## 2019-05-16 VITALS
HEART RATE: 87 BPM | WEIGHT: 162 LBS | SYSTOLIC BLOOD PRESSURE: 125 MMHG | DIASTOLIC BLOOD PRESSURE: 72 MMHG | OXYGEN SATURATION: 96 % | BODY MASS INDEX: 27.66 KG/M2 | HEIGHT: 64 IN | TEMPERATURE: 98.3 F | RESPIRATION RATE: 16 BRPM

## 2019-05-16 DIAGNOSIS — I10 HYPERTENSION, GOAL BELOW 150/90: Primary | ICD-10-CM

## 2019-05-16 DIAGNOSIS — N48.1 BALANITIS: ICD-10-CM

## 2019-05-16 PROCEDURE — 99214 OFFICE O/P EST MOD 30 MIN: CPT | Performed by: FAMILY MEDICINE

## 2019-05-16 RX ORDER — FLUCONAZOLE 150 MG/1
150 TABLET ORAL ONCE
Qty: 1 TABLET | Refills: 0 | Status: SHIPPED | OUTPATIENT
Start: 2019-05-16 | End: 2019-05-16

## 2019-05-16 RX ORDER — METOPROLOL SUCCINATE 25 MG/1
25 TABLET, EXTENDED RELEASE ORAL DAILY
Qty: 90 TABLET | Refills: 1 | Status: SHIPPED | OUTPATIENT
Start: 2019-05-16 | End: 2019-09-20

## 2019-05-16 RX ORDER — HYDROCORTISONE 2.5 %
CREAM (GRAM) TOPICAL 2 TIMES DAILY
Qty: 60 G | Refills: 3 | Status: SHIPPED | OUTPATIENT
Start: 2019-05-16 | End: 2021-01-19

## 2019-05-16 RX ORDER — KETOCONAZOLE 20 MG/G
CREAM TOPICAL DAILY
Qty: 60 G | Refills: 3 | Status: SHIPPED | OUTPATIENT
Start: 2019-05-16 | End: 2021-01-19

## 2019-05-16 ASSESSMENT — MIFFLIN-ST. JEOR: SCORE: 1400.83

## 2019-05-16 ASSESSMENT — PAIN SCALES - GENERAL: PAINLEVEL: NO PAIN (0)

## 2019-05-16 NOTE — PROGRESS NOTES
SUBJECTIVE:   Wolf Sen is a 69 year old male who presents to clinic today for the following   health issues:      Hypertension Follow-up      Outpatient blood pressures are being checked at home.  Results are 140/80.    Low Salt Diet: low salt      Amount of exercise or physical activity: 6-7 days/week for an average of 15-30 minutes    Problems taking medications regularly: No    Medication side effects: dizziness, weak, nausea, drowsiness    Diet: low salt, low fat/cholesterol and diabetic      Rash  Onset: two weeks    Description:   Location: groin  Character: raised, red, blister, sores  Itching (Pruritis): YES    Progression of Symptoms:  same    Accompanying Signs & Symptoms:  Fever: no   Body aches or joint pain: no   Sore throat symptoms: no   Recent cold symptoms: no     History:   Previous similar rash: no     Precipitating factors:   Exposure to similar rash: no   New exposures: None   Recent travel: no     Alleviating factors:  None.    Therapies Tried and outcome: None.      Additional history: as documented    Reviewed  and updated as needed this visit by clinical staff  Tobacco  Allergies  Med Hx  Surg Hx  Fam Hx  Soc Hx        Reviewed and updated as needed this visit by Provider         Patient Active Problem List   Diagnosis     DM type 2, goal A1C below 8.0     HTN, goal below 150/90     Hyperlipidemia LDL goal <70     Overweight (BMI 25.0-29.9)     Cataracts, both eyes     Health Care Home     Advance care planning     Type 2 diabetes mellitus with neurological manifestations, controlled (H)     Pseudophakia of left eye     History of carotid endarterectomy     Cerebral infarction (H)     Aortic valve insufficiency     Vitamin D deficiency     Acute midline low back pain with right-sided sciatica     Past Surgical History:   Procedure Laterality Date     CATARACT IOL, RT/LT       COMBINED REPAIR PTOSIS WITH BLEPHAROPLASTY Bilateral 10/9/2017    Procedure: COMBINED REPAIR PTOSIS WITH  "BLEPHAROPLASTY;  Bilateral upper eyelid blepharoplasty and ptosis repair;  Surgeon: Bibiana Melara MD;  Location: [facility]     PHACOEMULSIFICATION WITH STANDARD INTRAOCULAR LENS IMPLANT Right 2/25/2016    Procedure: PHACOEMULSIFICATION WITH STANDARD INTRAOCULAR LENS IMPLANT;  Surgeon: Carlton Don MD;  Location: [facility]     PHACOEMULSIFICATION WITH STANDARD INTRAOCULAR LENS IMPLANT Left 2/11/2016    Procedure: PHACOEMULSIFICATION WITH STANDARD INTRAOCULAR LENS IMPLANT;  Surgeon: Carlton Don MD;  Location: [facility]     REPAIR PTOSIS Bilateral     10/17       Social History     Tobacco Use     Smoking status: Never Smoker     Smokeless tobacco: Never Used   Substance Use Topics     Alcohol use: No     Family History   Problem Relation Age of Onset     Hypertension Father      Cancer No family hx of      Diabetes No family hx of      Cerebrovascular Disease No family hx of      Thyroid Disease No family hx of      Glaucoma No family hx of      Macular Degeneration No family hx of            ROS:  Constitutional, HEENT, cardiovascular, pulmonary, GI, , musculoskeletal, neuro, skin, endocrine and psych systems are negative, except as otherwise noted.    OBJECTIVE:     /72 (BP Location: Left arm, Patient Position: Chair, Cuff Size: Adult Large)   Pulse 87   Temp 98.3  F (36.8  C) (Oral)   Resp 16   Ht 1.626 m (5' 4\")   Wt 73.5 kg (162 lb)   SpO2 96%   BMI 27.81 kg/m    Body mass index is 27.81 kg/m .  GENERAL: healthy, alert and no distress  NECK: no adenopathy, no asymmetry, masses, or scars and thyroid normal to palpation  RESP: lungs clear to auscultation - no rales, rhonchi or wheezes  CV: regular rate and rhythm, normal S1 S2, no S3 or S4, no murmur, click or rub, no peripheral edema and peripheral pulses strong  ABDOMEN: soft, nontender, no hepatosplenomegaly, no masses and bowel sounds normal  MS: no gross musculoskeletal defects noted, no edema  SKIN: glans of penis weepy, " whitish debris  Diagnostic Test Results:  none     ASSESSMENT/PLAN:     1. Hypertension, goal below 150/90  Controlled. Monitor. Recheck at next visit for diabetes in 2 months.  - metoprolol succinate ER (TOPROL-XL) 25 MG 24 hr tablet; Take 1 tablet (25 mg) by mouth daily For blood pressure.  Dispense: 90 tablet; Refill: 1    2. Balanitis  Due to uncontrolled diabetes. Treat with antifungals and topical steroid. RTC if not improving.  - fluconazole (DIFLUCAN) 150 MG tablet; Take 1 tablet (150 mg) by mouth once for 1 dose  Dispense: 1 tablet; Refill: 0  - ketoconazole (NIZORAL) 2 % external cream; Apply topically daily  Dispense: 60 g; Refill: 3  - hydrocortisone 2.5 % cream; Apply topically 2 times daily  Dispense: 60 g; Refill: 3    See Patient Instructions    Carlos Tomlinson MD, MD  [facility]

## 2019-05-16 NOTE — PATIENT INSTRUCTIONS
At [facility], we strive to deliver an exceptional experience to you, every time we see you.  If you receive a survey in the mail, please send us back your thoughts. We really do value your feedback.    Based on your medical history, these are the current health maintenance/preventive care services that you are due for (some may have been done at this visit.)  Health Maintenance Due   Topic Date Due     ZOSTER IMMUNIZATION (2 of 3) 04/27/2016     MEDICARE ANNUAL WELLNESS VISIT  04/04/2019         Suggested websites for health information:  Www.[employer]tests.org : Up to date and easily searchable information on multiple topics.  Www.medlineplus.gov : medication info, interactive tutorials, watch real surgeries online  Www.familydoctor.org : good info from the Academy of Family Physicians  Www.cdc.gov : public health info, travel advisories, epidemics (H1N1)  Www.aap.org : children's health info, normal development, vaccinations  Www.health.[facility].[place].us : [place] dept of health, public health issues in [place], N1N1    Your care team:                            Family Medicine Internal Medicine   MD Isidro Ventura MD Shantel Branch-Fleming, MD Katya Georgiev PA-C Nam Ho, MD Pediatrics   CHIRSTINE Quarles, VERONICA Nguyen APRN CNP   MD Apoorva Jack MD Deborah Mielke, MD Kim Thein, APRN CNP      Clinic hours: Monday - Thursday 7 am-7 pm; Fridays 7 am-5 pm.   Urgent care: Monday - Friday 11 am-9 pm; Saturday and Sunday 9 am-5 pm.  Pharmacy : Monday -Thursday 8 am-8 pm; Friday 8 am-6 pm; Saturday and Sunday 9 am-5 pm.     Clinic: [phone]   Pharmacy: [phone]

## 2019-06-19 ENCOUNTER — PATIENT OUTREACH (OUTPATIENT)
Dept: GERIATRIC MEDICINE | Facility: CLINIC | Age: 69
End: 2019-06-19

## 2019-09-05 ENCOUNTER — OFFICE VISIT (OUTPATIENT)
Dept: OPTOMETRY | Facility: CLINIC | Age: 69
End: 2019-09-05
Payer: COMMERCIAL

## 2019-09-05 DIAGNOSIS — H04.123 DRY EYE SYNDROME OF BOTH EYES: Primary | ICD-10-CM

## 2019-09-05 PROCEDURE — 99213 OFFICE O/P EST LOW 20 MIN: CPT | Performed by: OPTOMETRIST

## 2019-09-05 ASSESSMENT — VISUAL ACUITY
OS_SC: 20/50
OS_PH_SC: 20/25
OD_SC: 20/20
METHOD: SNELLEN - LINEAR
OS_SC+: -1

## 2019-09-05 ASSESSMENT — SLIT LAMP EXAM - LIDS
COMMENTS: BLEPHARITIS
COMMENTS: BLEPHARITIS

## 2019-09-05 ASSESSMENT — EXTERNAL EXAM - LEFT EYE: OS_EXAM: NORMAL

## 2019-09-05 ASSESSMENT — EXTERNAL EXAM - RIGHT EYE: OD_EXAM: NORMAL

## 2019-09-05 NOTE — PATIENT INSTRUCTIONS
Systane Ultra 1 drop both eyes 4 x day.    Use one drop of artificial tears both eyes 3-4 x daily.  Continue to use the drops regardless if your eyes are comfortable.  Artificial tears work best as a preventative and not as well after your eyes are starting to bother you.  It may take 4- 6 weeks of using the drops before you notice improvement.  If after that time you are still having problems schedule an appointment for an evaluation and discussion of different treatments.  Dry eyes are a chronic condition and you may have more symptoms at certain times of the year.     Use warm washcloth at night to cleanse eyelids.     Return as needed or if signs/symptoms do not improve after a month of treatment.     Annual exam due 9/19 with Dr. Don. OCT/VF due to glaucoma suspect.    Basil Gutierrez, LIDAI

## 2019-09-05 NOTE — PROGRESS NOTES
Chief Complaint   Patient presents with     Eye Problem Both Eyes     Burning and watery       The watery and burning eyes started 2 or 3 months ago. He bothers him most while driving. He used artifical tears about once every 3 days. He has not used any drops since last week.      Medical, surgical and family histories reviewed and updated 9/5/2019.       OBJECTIVE: See Ophthalmology exam    ASSESSMENT:    ICD-10-CM    1. Dry eye syndrome of both eyes H04.123 polyethylene glycol-propylene glycol (SYSTANE ULTRA) 0.4-0.3 % SOLN ophthalmic solution      PLAN:     Patient Instructions   Systane Ultra 1 drop both eyes 4 x day.    Use one drop of artificial tears both eyes 3-4 x daily.  Continue to use the drops regardless if your eyes are comfortable.  Artificial tears work best as a preventative and not as well after your eyes are starting to bother you.  It may take 4- 6 weeks of using the drops before you notice improvement.  If after that time you are still having problems schedule an appointment for an evaluation and discussion of different treatments.  Dry eyes are a chronic condition and you may have more symptoms at certain times of the year.     Use warm washcloth at night to cleanse eyelids.     Return as needed or if signs/symptoms do not improve after a month of treatment.     Annual exam due 9/19 with Dr. Don.    Basil Gutierrez, LIDIA

## 2019-09-05 NOTE — LETTER
9/5/2019         RE: Wolf Sen  [address]  [facility] [address]        Dear Colleague,    Thank you for referring your patient, Wolf Sen, to the [facility]. Please see a copy of my visit note below.    Chief Complaint   Patient presents with     Eye Problem Both Eyes     Burning and watery       The watery and burning eyes started 2 or 3 months ago. He bothers him most while driving. He used artifical tears about once every 3 days. He has not used any drops since last week.      Medical, surgical and family histories reviewed and updated 9/5/2019.       OBJECTIVE: See Ophthalmology exam    ASSESSMENT:    ICD-10-CM    1. Dry eye syndrome of both eyes H04.123 polyethylene glycol-propylene glycol (SYSTANE ULTRA) 0.4-0.3 % SOLN ophthalmic solution      PLAN:     Patient Instructions   Systane Ultra 1 drop both eyes 4 x day.    Use one drop of artificial tears both eyes 3-4 x daily.  Continue to use the drops regardless if your eyes are comfortable.  Artificial tears work best as a preventative and not as well after your eyes are starting to bother you.  It may take 4- 6 weeks of using the drops before you notice improvement.  If after that time you are still having problems schedule an appointment for an evaluation and discussion of different treatments.  Dry eyes are a chronic condition and you may have more symptoms at certain times of the year.     Use warm washcloth at night to cleanse eyelids.     Return as needed or if signs/symptoms do not improve after a month of treatment.     Annual exam due 9/19 with Dr. Don.    Basil Gutierrez OD           Again, thank you for allowing me to participate in the care of your patient.        Sincerely,        Basil Gutierrez OD

## 2019-09-10 PROBLEM — M54.41 ACUTE MIDLINE LOW BACK PAIN WITH RIGHT-SIDED SCIATICA: Status: RESOLVED | Noted: 2019-04-24 | Resolved: 2019-09-10

## 2019-09-10 NOTE — PROGRESS NOTES
DISCHARGE SUMMARY    Wolf Sen was seen 3 times for evaluation and treatment.  Patient did not return for further treatment and current status is unknown.  Due to short treatment duration, no objective or functional changes were made.  Please see goal flow sheet from episode noted date below and initial evaluation for further information.  Patient is discharged from therapy and therapy episode is resolved as of 09/10/19.      Linked Episodes   Type: Episode: Status: Noted: Resolved: Last update: Updated by:   PHYSICAL THERAPY SPINE: 4/23/2019 Active 4/23/2019 5/14/2019 12:43 PM Noe Benitez, PT      Comments:

## 2019-09-20 ENCOUNTER — OFFICE VISIT (OUTPATIENT)
Dept: FAMILY MEDICINE | Facility: CLINIC | Age: 69
End: 2019-09-20
Payer: COMMERCIAL

## 2019-09-20 VITALS
WEIGHT: 158 LBS | RESPIRATION RATE: 18 BRPM | BODY MASS INDEX: 26.98 KG/M2 | HEART RATE: 80 BPM | TEMPERATURE: 98.3 F | DIASTOLIC BLOOD PRESSURE: 77 MMHG | SYSTOLIC BLOOD PRESSURE: 140 MMHG | OXYGEN SATURATION: 96 % | HEIGHT: 64 IN

## 2019-09-20 DIAGNOSIS — E78.5 HYPERLIPIDEMIA, UNSPECIFIED HYPERLIPIDEMIA TYPE: ICD-10-CM

## 2019-09-20 DIAGNOSIS — Z23 ENCOUNTER FOR IMMUNIZATION: ICD-10-CM

## 2019-09-20 DIAGNOSIS — E11.49 TYPE 2 DIABETES MELLITUS WITH NEUROLOGICAL MANIFESTATIONS, CONTROLLED (H): Primary | ICD-10-CM

## 2019-09-20 DIAGNOSIS — I10 ESSENTIAL HYPERTENSION WITH GOAL BLOOD PRESSURE LESS THAN 140/90: ICD-10-CM

## 2019-09-20 LAB
ANION GAP SERPL CALCULATED.3IONS-SCNC: 7 MMOL/L (ref 3–14)
BUN SERPL-MCNC: 26 MG/DL (ref 7–30)
CALCIUM SERPL-MCNC: 9.5 MG/DL (ref 8.5–10.1)
CHLORIDE SERPL-SCNC: 98 MMOL/L (ref 94–109)
CO2 SERPL-SCNC: 30 MMOL/L (ref 20–32)
CREAT SERPL-MCNC: 1.54 MG/DL (ref 0.66–1.25)
GFR SERPL CREATININE-BSD FRML MDRD: 45 ML/MIN/{1.73_M2}
GLUCOSE SERPL-MCNC: 202 MG/DL (ref 70–99)
HBA1C MFR BLD: 11.5 % (ref 0–5.6)
LDLC SERPL DIRECT ASSAY-MCNC: 120 MG/DL
POTASSIUM SERPL-SCNC: 4.1 MMOL/L (ref 3.4–5.3)
SODIUM SERPL-SCNC: 135 MMOL/L (ref 133–144)

## 2019-09-20 PROCEDURE — 36415 COLL VENOUS BLD VENIPUNCTURE: CPT | Performed by: FAMILY MEDICINE

## 2019-09-20 PROCEDURE — 90662 IIV NO PRSV INCREASED AG IM: CPT | Performed by: FAMILY MEDICINE

## 2019-09-20 PROCEDURE — 99213 OFFICE O/P EST LOW 20 MIN: CPT | Mod: 25 | Performed by: FAMILY MEDICINE

## 2019-09-20 PROCEDURE — G0008 ADMIN INFLUENZA VIRUS VAC: HCPCS | Performed by: FAMILY MEDICINE

## 2019-09-20 PROCEDURE — 80048 BASIC METABOLIC PNL TOTAL CA: CPT | Performed by: FAMILY MEDICINE

## 2019-09-20 PROCEDURE — 83721 ASSAY OF BLOOD LIPOPROTEIN: CPT | Performed by: FAMILY MEDICINE

## 2019-09-20 PROCEDURE — 83036 HEMOGLOBIN GLYCOSYLATED A1C: CPT | Performed by: FAMILY MEDICINE

## 2019-09-20 RX ORDER — HYDRALAZINE HYDROCHLORIDE 25 MG/1
25 TABLET, FILM COATED ORAL
Qty: 180 TABLET | Refills: 0 | Status: SHIPPED | OUTPATIENT
Start: 2019-09-20 | End: 2019-12-18

## 2019-09-20 RX ORDER — AMLODIPINE BESYLATE 10 MG/1
10 TABLET ORAL EVERY EVENING
Qty: 90 TABLET | Refills: 1 | Status: SHIPPED | OUTPATIENT
Start: 2019-09-20 | End: 2020-06-18

## 2019-09-20 RX ORDER — LOSARTAN POTASSIUM AND HYDROCHLOROTHIAZIDE 25; 100 MG/1; MG/1
1 TABLET ORAL EVERY MORNING
Qty: 90 TABLET | Refills: 1 | Status: SHIPPED | OUTPATIENT
Start: 2019-09-20 | End: 2019-12-18

## 2019-09-20 RX ORDER — ATORVASTATIN CALCIUM 40 MG/1
40 TABLET, FILM COATED ORAL DAILY
Qty: 90 TABLET | Refills: 3 | Status: SHIPPED | OUTPATIENT
Start: 2019-09-20 | End: 2019-10-16

## 2019-09-20 RX ORDER — METOPROLOL SUCCINATE 25 MG/1
25 TABLET, EXTENDED RELEASE ORAL EVERY EVENING
Qty: 90 TABLET | Refills: 1 | Status: SHIPPED | OUTPATIENT
Start: 2019-09-20 | End: 2020-08-12

## 2019-09-20 ASSESSMENT — MIFFLIN-ST. JEOR: SCORE: 1382.68

## 2019-09-20 ASSESSMENT — PAIN SCALES - GENERAL: PAINLEVEL: NO PAIN (0)

## 2019-09-20 NOTE — PATIENT INSTRUCTIONS
At [facility], we strive to deliver an exceptional experience to you, every time we see you.  If you receive a survey in the mail, please send us back your thoughts. We really do value your feedback.    Based on your medical history, these are the current health maintenance/preventive care services that you are due for (some may have been done at this visit.)  Health Maintenance Due   Topic Date Due     ZOSTER IMMUNIZATION (2 of 3) 04/27/2016     BMP  01/17/2019     MEDICARE ANNUAL WELLNESS VISIT  04/04/2019     INFLUENZA VACCINE (1) 09/01/2019     EYE EXAM  09/11/2019         Suggested websites for health information:  Www.[place].org : Up to date and easily searchable information on multiple topics.  Www.medlineplus.gov : medication info, interactive tutorials, watch real surgeries online  Www.familydoctor.org : good info from the Academy of Family Physicians  Www.cdc.gov : public health info, travel advisories, epidemics (H1N1)  Www.aap.org : children's health info, normal development, vaccinations  Www.health.[facility].[place].us : [place] dept of health, public health issues in [place], N1N1    Your care team:                            Family Medicine Internal Medicine   MD Isidro Ventura MD Shantel Branch-Fleming, MD Katya Georgiev PA-C Nam Ho, MD Pediatrics   CHRISTINE Quarles, MD Apoorva Ball CNP, MD Deborah Mielke, MD Kim Thein, APRN [facility]      Clinic hours: Monday - Thursday 7 am-7 pm; Fridays 7 am-5 pm.   Urgent care: Monday - Friday 11 am-9 pm; Saturday and Sunday 9 am-5 pm.  Pharmacy : Monday -Thursday 8 am-8 pm; Friday 8 am-6 pm; Saturday and Sunday 9 am-5 pm.     Clinic: [phone]   Pharmacy: [phone]

## 2019-09-20 NOTE — PROGRESS NOTES
Subjective     Wolf Sen is a 69 year old male who presents to clinic today for the following health issues:    HPI   Hypertension Follow-up  Blood pressure lower than usual x3 days about 3 days ago. BP reading around 110-111/58. Symptoms Lightheaded, confusion, increased tired, shocks easily/anxious more than usual from any noise      Do you check your blood pressure regularly outside of the clinic? Yes     Are you following a low salt diet? Yes    Are your blood pressures ever more than 140 on the top number (systolic) OR more   than 90 on the bottom number (diastolic), for example 140/90? Yes      Patient Active Problem List   Diagnosis     DM type 2, goal A1C below 8.0     HTN, goal below 150/90     Hyperlipidemia LDL goal <70     Overweight (BMI 25.0-29.9)     Cataracts, both eyes     Health Care Home     Advance care planning     Type 2 diabetes mellitus with neurological manifestations, controlled (H)     Pseudophakia of left eye     History of carotid endarterectomy     Cerebral infarction (H)     Aortic valve insufficiency     Vitamin D deficiency     Past Surgical History:   Procedure Laterality Date     CATARACT IOL, RT/LT       COMBINED REPAIR PTOSIS WITH BLEPHAROPLASTY Bilateral 10/9/2017    Procedure: COMBINED REPAIR PTOSIS WITH BLEPHAROPLASTY;  Bilateral upper eyelid blepharoplasty and ptosis repair;  Surgeon: Bibiana Melara MD;  Location: [facility]     PHACOEMULSIFICATION WITH STANDARD INTRAOCULAR LENS IMPLANT Right 2/25/2016    Procedure: PHACOEMULSIFICATION WITH STANDARD INTRAOCULAR LENS IMPLANT;  Surgeon: Carlton Don MD;  Location: [facility] OR     PHACOEMULSIFICATION WITH STANDARD INTRAOCULAR LENS IMPLANT Left 2/11/2016    Procedure: PHACOEMULSIFICATION WITH STANDARD INTRAOCULAR LENS IMPLANT;  Surgeon: Carlton Don MD;  Location: [facility] OR     REPAIR PTOSIS Bilateral     10/17       Social History     Tobacco Use     Smoking status: Never Smoker     Smokeless tobacco: Never Used  "  Substance Use Topics     Alcohol use: No     Family History   Problem Relation Age of Onset     Hypertension Father      Cancer No family hx of      Diabetes No family hx of      Cerebrovascular Disease No family hx of      Thyroid Disease No family hx of      Glaucoma No family hx of      Macular Degeneration No family hx of            Reviewed and updated as needed this visit by Provider         Review of Systems   ROS COMP: Constitutional, HEENT, cardiovascular, pulmonary, GI, , musculoskeletal, neuro, skin, endocrine and psych systems are negative, except as otherwise noted.      Objective    BP (!) 140/77 (BP Location: Right arm, Patient Position: Chair, Cuff Size: Adult Regular)   Pulse 80   Temp 98.3  F (36.8  C) (Oral)   Resp 18   Ht 1.626 m (5' 4\")   Wt 71.7 kg (158 lb)   SpO2 96%   BMI 27.12 kg/m    Body mass index is 27.12 kg/m .  Physical Exam   GENERAL: healthy, alert and no distress  NECK: no adenopathy, no asymmetry, masses, or scars and thyroid normal to palpation  RESP: lungs clear to auscultation - no rales, rhonchi or wheezes  CV: regular rate and rhythm, normal S1 S2, no S3 or S4, no murmur, click or rub, no peripheral edema and peripheral pulses strong  ABDOMEN: soft, nontender, no hepatosplenomegaly, no masses and bowel sounds normal  MS: no gross musculoskeletal defects noted, no edema    Diagnostic Test Results:  Labs reviewed in Epic        Assessment & Plan     1. Type 2 diabetes mellitus with neurological manifestations, controlled (H)  Recheck today. Patient will increased mealtime insulin since PP's are not at goal. Recheck in 3 months.  - Hemoglobin A1c  - insulin aspart (NOVOLOG FLEXPEN) 100 UNIT/ML pen; INJECT 15 UNITS UNDER THE SKIN THREE TIMES DAILY WITH MEALS  Dispense: 15 mL; Refill: 1  - insulin glargine (LANTUS SOLOSTAR) 100 UNIT/ML pen; Inject 18 Units Subcutaneous At Bedtime  Dispense: 9 mL; Refill: 1  - metFORMIN (GLUCOPHAGE) 1000 MG tablet; Take 1 tablet (1,000 " "mg) by mouth 2 times daily (with meals) For diabetes.  Dispense: 180 tablet; Refill: 1    2. Essential hypertension with goal blood pressure less than 140/90  Elevated. C/o low's. Patient take all medications in the morning. Will spread out between morning and evening to see if this helps. Recheck in 1 month.  - BASIC METABOLIC PANEL  - losartan-hydrochlorothiazide (HYZAAR) 100-25 MG tablet; Take 1 tablet by mouth every morning For blood pressure.  Dispense: 90 tablet; Refill: 1  - hydrALAZINE (APRESOLINE) 25 MG tablet; Take 1 tablet (25 mg) by mouth 2 times daily  Dispense: 180 tablet; Refill: 0  - amLODIPine (NORVASC) 10 MG tablet; Take 1 tablet (10 mg) by mouth every evening For blood pressure.  Dispense: 90 tablet; Refill: 1  - metoprolol succinate ER (TOPROL-XL) 25 MG 24 hr tablet; Take 1 tablet (25 mg) by mouth every evening For blood pressure.  Dispense: 90 tablet; Refill: 1    3. Hyperlipidemia, unspecified hyperlipidemia type  Recheck while on atorvastatin.  - LDL cholesterol direct  - atorvastatin (LIPITOR) 40 MG tablet; Take 1 tablet (40 mg) by mouth daily For cholesterol.  Dispense: 90 tablet; Refill: 3    4. Encounter for immunization    - FLU VACCINE, INCREASED ANTIGEN, PRESV FREE, AGE 65+ [27657]  - ADMIN INFLUENZA  (For MEDICARE Patients ONLY) []     BMI:   Estimated body mass index is 27.12 kg/m  as calculated from the following:    Height as of this encounter: 1.626 m (5' 4\").    Weight as of this encounter: 71.7 kg (158 lb).           See Patient Instructions    Return in about 4 weeks (around 10/18/2019) for BP Recheck.    Carlos Tomlinson MD, MD  [facility]    "

## 2019-09-20 NOTE — LETTER
September 23, 2019      Wolf Sen  [address] [address] [place], [place] [address]  [place] [address]        Dear Wolf,     Your diabetes and cholesterol are not controlled. Please take your current medications. Please check blood sugar readings before meals and 2-hours after meals. Please bring in these readings to next appointment for review.     Sincerely,   Carlos Tomlinson MD     Resulted Orders   BASIC METABOLIC PANEL   Result Value Ref Range    Sodium 135 133 - 144 mmol/L    Potassium 4.1 3.4 - 5.3 mmol/L    Chloride 98 94 - 109 mmol/L    Carbon Dioxide 30 20 - 32 mmol/L    Anion Gap 7 3 - 14 mmol/L    Glucose 202 (H) 70 - 99 mg/dL      Comment:      Non Fasting    Urea Nitrogen 26 7 - 30 mg/dL    Creatinine 1.54 (H) 0.66 - 1.25 mg/dL    GFR Estimate 45 (L) >60 mL/min/[1.73_m2]      Comment:      Non  GFR Calc  Starting 12/18/2018, serum creatinine based estimated GFR (eGFR) will be   calculated using the Chronic Kidney Disease Epidemiology Collaboration   (CKD-EPI) equation.      GFR Estimate If Black 52 (L) >60 mL/min/[1.73_m2]      Comment:       GFR Calc  Starting 12/18/2018, serum creatinine based estimated GFR (eGFR) will be   calculated using the Chronic Kidney Disease Epidemiology Collaboration   (CKD-EPI) equation.      Calcium 9.5 8.5 - 10.1 mg/dL   Hemoglobin A1c   Result Value Ref Range    Hemoglobin A1C 11.5 (H) 0 - 5.6 %      Comment:      Normal <5.7% Prediabetes 5.7-6.4%  Diabetes 6.5% or higher - adopted from ADA   consensus guidelines.     LDL cholesterol direct   Result Value Ref Range    LDL Cholesterol Direct 120 (H) <100 mg/dL      Comment:      Above desirable:  100-129 mg/dl  Borderline High:  130-159 mg/dL  High:             160-189 mg/dL  Very high:       >189 mg/dl

## 2019-09-23 NOTE — RESULT ENCOUNTER NOTE
Letter sent to patients home address with results.  Sharath Rodrigez,  [facility]

## 2019-10-15 ENCOUNTER — OFFICE VISIT (OUTPATIENT)
Dept: FAMILY MEDICINE | Facility: CLINIC | Age: 69
End: 2019-10-15
Payer: COMMERCIAL

## 2019-10-15 VITALS
BODY MASS INDEX: 26.8 KG/M2 | WEIGHT: 157 LBS | TEMPERATURE: 98 F | HEART RATE: 87 BPM | DIASTOLIC BLOOD PRESSURE: 81 MMHG | SYSTOLIC BLOOD PRESSURE: 138 MMHG | HEIGHT: 64 IN | OXYGEN SATURATION: 98 % | RESPIRATION RATE: 18 BRPM

## 2019-10-15 DIAGNOSIS — E11.49 TYPE 2 DIABETES MELLITUS WITH NEUROLOGICAL MANIFESTATIONS, CONTROLLED (H): ICD-10-CM

## 2019-10-15 DIAGNOSIS — E78.5 HYPERLIPIDEMIA, UNSPECIFIED HYPERLIPIDEMIA TYPE: ICD-10-CM

## 2019-10-15 DIAGNOSIS — I10 ESSENTIAL HYPERTENSION WITH GOAL BLOOD PRESSURE LESS THAN 140/90: Primary | ICD-10-CM

## 2019-10-15 DIAGNOSIS — E78.5 HYPERLIPIDEMIA LDL GOAL <70: ICD-10-CM

## 2019-10-15 LAB — LDLC SERPL DIRECT ASSAY-MCNC: 129 MG/DL

## 2019-10-15 PROCEDURE — 99214 OFFICE O/P EST MOD 30 MIN: CPT | Performed by: FAMILY MEDICINE

## 2019-10-15 PROCEDURE — 36415 COLL VENOUS BLD VENIPUNCTURE: CPT | Performed by: FAMILY MEDICINE

## 2019-10-15 PROCEDURE — 83721 ASSAY OF BLOOD LIPOPROTEIN: CPT | Performed by: FAMILY MEDICINE

## 2019-10-15 RX ORDER — GLUCOSAMINE HCL/CHONDROITIN SU 500-400 MG
CAPSULE ORAL
Qty: 100 EACH | Refills: 11 | Status: SHIPPED | OUTPATIENT
Start: 2019-10-15 | End: 2020-09-04

## 2019-10-15 ASSESSMENT — PAIN SCALES - GENERAL: PAINLEVEL: NO PAIN (0)

## 2019-10-15 ASSESSMENT — MIFFLIN-ST. JEOR: SCORE: 1378.15

## 2019-10-15 NOTE — PATIENT INSTRUCTIONS
At [facility], we strive to deliver an exceptional experience to you, every time we see you.  If you receive a survey in the mail, please send us back your thoughts. We really do value your feedback.    Based on your medical history, these are the current health maintenance/preventive care services that you are due for (some may have been done at this visit.)  Health Maintenance Due   Topic Date Due     ZOSTER IMMUNIZATION (2 of 3) 04/27/2016     MEDICARE ANNUAL WELLNESS VISIT  04/04/2019     EYE EXAM  09/11/2019         Suggested websites for health information:  Www.[facility].org : Up to date and easily searchable information on multiple topics.  Www.[employer].gov : medication info, interactive tutorials, watch real surgeries online  Www.familydoctor.org : good info from the Academy of Family Physicians  Www.cdc.gov : public health info, travel advisories, epidemics (H1N1)  Www.aap.org : children's health info, normal development, vaccinations  Www.health.[facility].[place].us : [place] dept of health, public health issues in [place], N1N1    Your care team:                            Family Medicine Internal Medicine   MD Isidro Ventura MD Shantel Branch-Fleming, MD Katya Georgiev PA-C Nam Ho, MD Pediatrics   CHRISTINE Quarles, MD Apoorva Ball CNP, MD Deborah Mielke, MD Kim Thein, APRN CNP      Clinic hours: Monday - Thursday 7 am-7 pm; Fridays 7 am-5 pm.   Urgent care: Monday - Friday 11 am-9 pm; Saturday and Sunday 9 am-5 pm.  Pharmacy : Monday -Thursday 8 am-8 pm; Friday 8 am-6 pm; Saturday and Sunday 9 am-5 pm.     Clinic: [phone]   Pharmacy: [phone]

## 2019-10-15 NOTE — PROGRESS NOTES
Subjective     Wolf Sen is a 69 year old male who presents to clinic today for the following health issues:    HPI   Diabetes Follow-up      How often are you checking your blood sugar? Two times daily    What time of day are you checking your blood sugars (select all that apply)?  Before and after meals    Have you had any blood sugars above 200?  Yes sometimes, this morning 200    Have you had any blood sugars below 70?  No    What symptoms do you notice when your blood sugar is low?  None    What concerns do you have today about your diabetes? None     Do you have any of these symptoms? (Select all that apply) numbness in leg - patient requesting medication     Have you had a diabetic eye exam in the last 12 months? Yes- Date of last eye exam: 11/4/18    BP Readings from Last 2 Encounters:   10/15/19 138/81   09/20/19 (!) 140/77     Hemoglobin A1C (%)   Date Value   09/20/2019 11.5 (H)   05/09/2019 10.6 (H)     LDL Cholesterol Calculated (mg/dL)   Date Value   05/09/2019 144 (H)   11/05/2015 67     LDL Cholesterol Direct (mg/dL)   Date Value   09/20/2019 120 (H)   04/04/2018 96       Diabetes Management Resources  Hypertension Follow-up      Do you check your blood pressure regularly outside of the clinic? Yes     Are you following a low salt diet? Yes    Are your blood pressures ever more than 140 on the top number (systolic) OR more   than 90 on the bottom number (diastolic), for example 140/90? Yes  <120/80      Patient Active Problem List   Diagnosis     DM type 2, goal A1C below 8.0     HTN, goal below 150/90     Hyperlipidemia LDL goal <70     Overweight (BMI 25.0-29.9)     Cataracts, both eyes     Health Care Home     Advance care planning     Type 2 diabetes mellitus with neurological manifestations, controlled (H)     Pseudophakia of left eye     History of carotid endarterectomy     Cerebral infarction (H)     Aortic valve insufficiency     Vitamin D deficiency     Past Surgical History:   Procedure  "Laterality Date     CATARACT IOL, RT/LT       COMBINED REPAIR PTOSIS WITH BLEPHAROPLASTY Bilateral 10/9/2017    Procedure: COMBINED REPAIR PTOSIS WITH BLEPHAROPLASTY;  Bilateral upper eyelid blepharoplasty and ptosis repair;  Surgeon: Bibiana Melara MD;  Location: [facility] OR     PHACOEMULSIFICATION WITH STANDARD INTRAOCULAR LENS IMPLANT Right 2/25/2016    Procedure: PHACOEMULSIFICATION WITH STANDARD INTRAOCULAR LENS IMPLANT;  Surgeon: Carlton Don MD;  Location: [facility] OR     PHACOEMULSIFICATION WITH STANDARD INTRAOCULAR LENS IMPLANT Left 2/11/2016    Procedure: PHACOEMULSIFICATION WITH STANDARD INTRAOCULAR LENS IMPLANT;  Surgeon: Carlton Don MD;  Location: [facility] OR     REPAIR PTOSIS Bilateral     10/17       Social History     Tobacco Use     Smoking status: Never Smoker     Smokeless tobacco: Never Used   Substance Use Topics     Alcohol use: No     Family History   Problem Relation Age of Onset     Hypertension Father      Cancer No family hx of      Diabetes No family hx of      Cerebrovascular Disease No family hx of      Thyroid Disease No family hx of      Glaucoma No family hx of      Macular Degeneration No family hx of            Reviewed and updated as needed this visit by Provider         Review of Systems   ROS COMP: Constitutional, HEENT, cardiovascular, pulmonary, GI, , musculoskeletal, neuro, skin, endocrine and psych systems are negative, except as otherwise noted.      Objective    /81 (BP Location: Left arm, Patient Position: Chair, Cuff Size: Adult Regular)   Pulse 87   Temp 98  F (36.7  C) (Oral)   Resp 18   Ht 1.626 m (5' 4\")   Wt 71.2 kg (157 lb)   SpO2 98%   BMI 26.95 kg/m    Body mass index is 26.95 kg/m .  Physical Exam   GENERAL: healthy, alert and no distress  NECK: no adenopathy, no asymmetry, masses, or scars and thyroid normal to palpation  RESP: lungs clear to auscultation - no rales, rhonchi or wheezes  CV: regular rate and rhythm, normal S1 S2, no S3 " "or S4, no murmur, click or rub, no peripheral edema and peripheral pulses strong  ABDOMEN: soft, nontender, no hepatosplenomegaly, no masses and bowel sounds normal  MS: no gross musculoskeletal defects noted, no edema  Diabetic foot exam: normal DP and PT pulses, no trophic changes or ulcerative lesions and normal sensory exam    Diagnostic Test Results:  Labs reviewed in Epic        Assessment & Plan     1. Type 2 diabetes mellitus with neurological manifestations, controlled (H)  Not controlled. Increased glargine from 18U to 22U. Short-acting insulin from 10U to 15U TID ac meals. Recheck in 2 months.  - insulin glargine (LANTUS SOLOSTAR) 100 UNIT/ML pen; Inject 22 Units Subcutaneous At Bedtime  Dispense: 30 mL; Refill: 1  - alcohol swab prep pads; Use to swab area of injection/rachell as directed.  Dispense: 100 each; Refill: 11    2. Essential hypertension with goal blood pressure less than 140/90  Controlled.    3. Hyperlipidemia, unspecified hyperlipidemia type  Recheck adjust dose if needed.  - LDL cholesterol direct     BMI:   Estimated body mass index is 26.95 kg/m  as calculated from the following:    Height as of this encounter: 1.626 m (5' 4\").    Weight as of this encounter: 71.2 kg (157 lb).           Regular exercise  See Patient Instructions    Return in about 2 months (around 12/15/2019) for Diabetes.    Carlos Tomlinson MD, MD  [facility]    "

## 2019-10-16 ENCOUNTER — TELEPHONE (OUTPATIENT)
Dept: FAMILY MEDICINE | Facility: CLINIC | Age: 69
End: 2019-10-16

## 2019-10-16 RX ORDER — ATORVASTATIN CALCIUM 80 MG/1
80 TABLET, FILM COATED ORAL DAILY
Qty: 90 TABLET | Refills: 3 | Status: SHIPPED | OUTPATIENT
Start: 2019-10-16 | End: 2020-11-06

## 2019-10-16 NOTE — TELEPHONE ENCOUNTER
This writer attempted to contact Wolf on 10/16/19 via  # [phone]      Reason for call results and did not leave message.      If patient calls back:   Registered Nurse called. Follow Triage Call workflow        Dorota Goetz, RN         Notes recorded by Carlos Tomlinson MD on 10/16/2019 at 6:59 AM CDT  Patient needs Laotian . Please notify patient that his cholesterol is not at goal. Would like patient to increase current cholesterol medication, atorvastatin, dose from 40 mg daily to 80 mg daily. New dose sent to pharmacy. Please have patient follow up in 2 months for diabetes visit. Will recheck labs at visit.    Carlos Tomlinson MD            VM was in Sinhala language. Did not leave VM

## 2019-11-04 ENCOUNTER — OFFICE VISIT (OUTPATIENT)
Dept: OPHTHALMOLOGY | Facility: CLINIC | Age: 69
End: 2019-11-04
Payer: COMMERCIAL

## 2019-11-04 DIAGNOSIS — E11.49 TYPE 2 DIABETES MELLITUS WITH NEUROLOGICAL MANIFESTATIONS, CONTROLLED (H): ICD-10-CM

## 2019-11-04 DIAGNOSIS — H40.003 GLAUCOMA SUSPECT OF BOTH EYES: Primary | ICD-10-CM

## 2019-11-04 PROCEDURE — 92014 COMPRE OPH EXAM EST PT 1/>: CPT | Performed by: OPHTHALMOLOGY

## 2019-11-04 PROCEDURE — 92133 CPTRZD OPH DX IMG PST SGM ON: CPT | Performed by: OPHTHALMOLOGY

## 2019-11-04 PROCEDURE — 92083 EXTENDED VISUAL FIELD XM: CPT | Performed by: OPHTHALMOLOGY

## 2019-11-04 ASSESSMENT — REFRACTION_MANIFEST
OS_ADD: +2.50
OS_CYLINDER: +0.25
OD_SPHERE: PLANO
OS_AXIS: 040
OD_AXIS: 035
OD_CYLINDER: +0.25
OD_ADD: +2.50
OS_SPHERE: -0.50

## 2019-11-04 ASSESSMENT — CUP TO DISC RATIO
OS_RATIO: 0.5
OD_RATIO: 0.6

## 2019-11-04 ASSESSMENT — EXTERNAL EXAM - LEFT EYE: OS_EXAM: NORMAL

## 2019-11-04 ASSESSMENT — VISUAL ACUITY
METHOD: SNELLEN - LINEAR
OS_SC: 20/50
OD_SC: 20/25

## 2019-11-04 ASSESSMENT — TONOMETRY
OD_IOP_MMHG: 13
IOP_METHOD: TONOPEN
OS_IOP_MMHG: 15

## 2019-11-04 ASSESSMENT — EXTERNAL EXAM - RIGHT EYE: OD_EXAM: NORMAL

## 2019-11-27 DIAGNOSIS — I10 HTN, GOAL BELOW 150/90: ICD-10-CM

## 2019-11-27 DIAGNOSIS — I10 HTN, GOAL BELOW 150/90: Primary | ICD-10-CM

## 2019-11-27 RX ORDER — HYDROCHLOROTHIAZIDE 12.5 MG/1
25 TABLET ORAL DAILY
Qty: 180 TABLET | Refills: 0 | Status: SHIPPED | OUTPATIENT
Start: 2019-11-27 | End: 2021-01-19

## 2019-11-27 RX ORDER — LOSARTAN POTASSIUM 100 MG/1
100 TABLET ORAL DAILY
Qty: 90 TABLET | Refills: 0 | Status: SHIPPED | OUTPATIENT
Start: 2019-11-27 | End: 2020-03-23

## 2019-11-27 NOTE — TELEPHONE ENCOUNTER
losartan-hydrochlorothiazide (HYZAAR) 100-25 MG tablet is on backorder. Please send separate Rxs.            Timi Faarax  [facility]

## 2019-11-27 NOTE — TELEPHONE ENCOUNTER
"Requested Prescriptions   Pending Prescriptions Disp Refills     hydrALAZINE (APRESOLINE) 25 MG tablet [Pharmacy Med Name: HYDRALAZINE  25MG TABLETS(ORANGE)]  Last Written Prescription Date:  9/20/19  Last Fill Quantity: 180,  # refills: 0   Last office visit: 10/15/2019 with prescribing provider:  Davi   Future Office Visit:   Next 5 appointments (look out 90 days)    Dec 12, 2019 11:40 AM CST  Office Visit with Carlos Tomlinson MD  [facility] [address] [place] [id number]  [phone]          180 tablet 0     Sig: TAKE 1 TABLET(25 MG) BY MOUTH TWICE DAILY       Vasodilators Passed - 11/27/2019 11:30 AM        Passed - Most recent BP less than 140/90 on record     BP Readings from Last 3 Encounters:   10/15/19 138/81   09/20/19 (!) 140/77   05/16/19 125/72                 Passed - Most recent encounter is not a hospital encounter. Patient has recent (12 mos) or future (1 mos) visit with authorizing provider's specialty     Patient's most recent encounter is NOT a hospital encounter and has had an office visit in the last 12 months or has a visit in the next 30 days with authorizing provider or within the authorizing provider's specialty.      See \"Patient Info\" tab in inbasket, or \"Choose Columns\" in Meds & Orders section of the refill encounter.      If most recent encounter is a hospital encounter AND the patient does NOT have an appointment scheduled with the authorizing provider or authorizing provider's specialty within the next 30 days, forward refill to authorizing provider for medication review.          Passed - Medication is active on med list        Passed - Patient is of age 18 years or older          "

## 2019-12-01 RX ORDER — HYDRALAZINE HYDROCHLORIDE 25 MG/1
TABLET, FILM COATED ORAL
Qty: 180 TABLET | Refills: 0 | Status: SHIPPED | OUTPATIENT
Start: 2019-12-01 | End: 2019-12-18

## 2019-12-17 NOTE — PROGRESS NOTES
Type 2 diabetes mellitus with neurological manifestations, controlled (H)  - No Signs of retinopathy  - Recommend better control of BS     Pseudophakia     Myopia with presbyopia of both eyes  - Rx per [facility] for glasses (optional)  - REFRACTION     Epiretinal membrane (ERM) of both eyes  - SD-OCT Macula Optovue OU (both eyes)     Glaucoma suspect of both eyes  - Based on appearance of optic nerve. IOP wnl  - OCT Optic Nerve RNFL Optovue OU (both eyes)     Return in 12 months for Diabetic eye exam, Glaucoma check, Visual field and OCT.

## 2019-12-18 ENCOUNTER — OFFICE VISIT (OUTPATIENT)
Dept: FAMILY MEDICINE | Facility: CLINIC | Age: 69
End: 2019-12-18
Payer: COMMERCIAL

## 2019-12-18 VITALS
DIASTOLIC BLOOD PRESSURE: 82 MMHG | RESPIRATION RATE: 16 BRPM | HEART RATE: 81 BPM | BODY MASS INDEX: 27.66 KG/M2 | HEIGHT: 64 IN | WEIGHT: 162 LBS | OXYGEN SATURATION: 97 % | TEMPERATURE: 97.4 F | SYSTOLIC BLOOD PRESSURE: 170 MMHG

## 2019-12-18 DIAGNOSIS — E78.5 HYPERLIPIDEMIA LDL GOAL <70: ICD-10-CM

## 2019-12-18 DIAGNOSIS — N18.30 CKD (CHRONIC KIDNEY DISEASE) STAGE 3, GFR 30-59 ML/MIN (H): ICD-10-CM

## 2019-12-18 DIAGNOSIS — I10 ESSENTIAL HYPERTENSION WITH GOAL BLOOD PRESSURE LESS THAN 140/90: ICD-10-CM

## 2019-12-18 LAB
ANION GAP SERPL CALCULATED.3IONS-SCNC: 7 MMOL/L (ref 3–14)
BUN SERPL-MCNC: 25 MG/DL (ref 7–30)
CALCIUM SERPL-MCNC: 9 MG/DL (ref 8.5–10.1)
CHLORIDE SERPL-SCNC: 105 MMOL/L (ref 94–109)
CO2 SERPL-SCNC: 28 MMOL/L (ref 20–32)
CREAT SERPL-MCNC: 1.37 MG/DL (ref 0.66–1.25)
CREAT UR-MCNC: 373 MG/DL
GFR SERPL CREATININE-BSD FRML MDRD: 51 ML/MIN/{1.73_M2}
GLUCOSE SERPL-MCNC: 62 MG/DL (ref 70–99)
HBA1C MFR BLD: 8.3 % (ref 0–5.6)
LDLC SERPL DIRECT ASSAY-MCNC: 123 MG/DL
MICROALBUMIN UR-MCNC: 102 MG/L
MICROALBUMIN/CREAT UR: 27.35 MG/G CR (ref 0–17)
POTASSIUM SERPL-SCNC: 3.9 MMOL/L (ref 3.4–5.3)
SODIUM SERPL-SCNC: 140 MMOL/L (ref 133–144)
TSH SERPL DL<=0.005 MIU/L-ACNC: 1.88 MU/L (ref 0.4–4)

## 2019-12-18 PROCEDURE — 84443 ASSAY THYROID STIM HORMONE: CPT | Performed by: PREVENTIVE MEDICINE

## 2019-12-18 PROCEDURE — 36415 COLL VENOUS BLD VENIPUNCTURE: CPT | Performed by: PREVENTIVE MEDICINE

## 2019-12-18 PROCEDURE — 83036 HEMOGLOBIN GLYCOSYLATED A1C: CPT | Performed by: PREVENTIVE MEDICINE

## 2019-12-18 PROCEDURE — 82043 UR ALBUMIN QUANTITATIVE: CPT | Performed by: PREVENTIVE MEDICINE

## 2019-12-18 PROCEDURE — 80048 BASIC METABOLIC PNL TOTAL CA: CPT | Performed by: PREVENTIVE MEDICINE

## 2019-12-18 PROCEDURE — 83721 ASSAY OF BLOOD LIPOPROTEIN: CPT | Performed by: PREVENTIVE MEDICINE

## 2019-12-18 PROCEDURE — 99214 OFFICE O/P EST MOD 30 MIN: CPT | Performed by: PREVENTIVE MEDICINE

## 2019-12-18 RX ORDER — HYDRALAZINE HYDROCHLORIDE 25 MG/1
25 TABLET, FILM COATED ORAL
Qty: 180 TABLET | Refills: 1 | Status: SHIPPED | OUTPATIENT
Start: 2019-12-18 | End: 2020-11-06

## 2019-12-18 RX ORDER — LOSARTAN POTASSIUM AND HYDROCHLOROTHIAZIDE 25; 100 MG/1; MG/1
1 TABLET ORAL EVERY MORNING
Qty: 90 TABLET | Refills: 1 | Status: CANCELLED | OUTPATIENT
Start: 2019-12-18

## 2019-12-18 ASSESSMENT — MIFFLIN-ST. JEOR: SCORE: 1400.83

## 2019-12-18 NOTE — PATIENT INSTRUCTIONS
At [facility], we strive to deliver an exceptional experience to you, every time we see you. If you receive a survey, please complete it as we do value your feedback.  If you have MyChart, you can expect to receive results automatically within 24 hours of their completion.  Your provider will send a note interpreting your results as well.   If you do not have MyChart, you should receive your results in about a week by mail.    Your care team:     Family Medicine   CHRISTINE Wilkinson MD Emily Bunt, BERE MARTIN   S. MD Leanna Molina MD Angela Wermerskirchen, MD    Internal Medicine  Madhu Mendieta MD coming 2020     Clinic hours: Monday - Wednesday 7 am-7 pm   Thursdays and Fridays 7 am-5 pm.     [facility]: Monday - Friday 11 am-9 pm,   Saturday and Sunday 9 am-5 pm.    [facility]: Monday -Thursday 8 am-8 pm; Friday 8 am-6 pm; Saturday and Sunday 9 am-5 pm.     [facility]: Monday - Thursday 8 am - 7 pm; Friday 8 am - 6 pm    Clinic: [phone]   [facility]: [phone] [facility]: [phone]

## 2019-12-18 NOTE — PROGRESS NOTES
Subjective     Wolf Sen is a 69 year old male who presents to clinic today for the following health issues:    Has been out of Hyzaar and hydralazine for 2 weeks    HPI   Diabetes Follow-up  How often are you checking your blood sugar? Two times daily  Blood sugar testing frequency justification:  Controlled  What time of day are you checking your blood sugars (select all that apply)?  After meals  Have you had any blood sugars above 200?  No  Have you had any blood sugars below 70?  No    What symptoms do you notice when your blood sugar is low?  None    What concerns do you have today about your diabetes? None     Do you have any of these symptoms? (Select all that apply)  Numbness in feet     Have you had a diabetic eye exam in the last 12 months? Yes, 11/4/19     Fasting glucose: 109 mg/dl   Diabetes Management Resources    Hyperlipidemia Follow-Up      Are you regularly taking any medication or supplement to lower your cholesterol?   Yes- Atorvastatin    Are you having muscle aches or other side effects that you think could be caused by your cholesterol lowering medication?  No    Hypertension Follow-up      Do you check your blood pressure regularly outside of the clinic? Yes     Are you following a low salt diet? Yes    Are your blood pressures ever more than 140 on the top number (systolic) OR more   than 90 on the bottom number (diastolic), for example 140/90? Yes   At home 120-130 systolic    BP Readings from Last 2 Encounters:   12/18/19 (!) 170/82   10/15/19 138/81     Hemoglobin A1C (%)   Date Value   12/18/2019 8.3 (H)   09/20/2019 11.5 (H)     LDL Cholesterol Calculated (mg/dL)   Date Value   05/09/2019 144 (H)   11/05/2015 67     LDL Cholesterol Direct (mg/dL)   Date Value   10/15/2019 129 (H)   09/20/2019 120 (H)       Patient Active Problem List   Diagnosis     DM type 2, goal A1C below 8.0     HTN, goal below 150/90     Hyperlipidemia LDL goal <70     Overweight (BMI 25.0-29.9)     Cataracts,  both eyes     Health Care Home     Advance care planning     Type 2 diabetes mellitus with neurological manifestations, controlled (H)     Pseudophakia of left eye     History of carotid endarterectomy     Cerebral infarction (H)     Aortic valve insufficiency     Vitamin D deficiency     CKD (chronic kidney disease) stage 3, GFR 30-59 ml/min (H)     Past Surgical History:   Procedure Laterality Date     CATARACT IOL, RT/LT       COMBINED REPAIR PTOSIS WITH BLEPHAROPLASTY Bilateral 10/9/2017    Procedure: COMBINED REPAIR PTOSIS WITH BLEPHAROPLASTY;  Bilateral upper eyelid blepharoplasty and ptosis repair;  Surgeon: Bibiana Melara MD;  Location: [facility] OR     PHACOEMULSIFICATION WITH STANDARD INTRAOCULAR LENS IMPLANT Right 2/25/2016    Procedure: PHACOEMULSIFICATION WITH STANDARD INTRAOCULAR LENS IMPLANT;  Surgeon: Carlton Don MD;  Location: [facility] OR     PHACOEMULSIFICATION WITH STANDARD INTRAOCULAR LENS IMPLANT Left 2/11/2016    Procedure: PHACOEMULSIFICATION WITH STANDARD INTRAOCULAR LENS IMPLANT;  Surgeon: Carlton Don MD;  Location: [facility] OR     REPAIR PTOSIS Bilateral     10/17       Social History     Tobacco Use     Smoking status: Never Smoker     Smokeless tobacco: Never Used   Substance Use Topics     Alcohol use: No     Family History   Problem Relation Age of Onset     Hypertension Father      Cancer No family hx of      Diabetes No family hx of      Cerebrovascular Disease No family hx of      Thyroid Disease No family hx of      Glaucoma No family hx of      Macular Degeneration No family hx of          Current Outpatient Medications   Medication Sig Dispense Refill     acetaminophen (MAPAP) 500 MG tablet TAKE 2 TABLETS(1000 MG) BY MOUTH THREE TIMES DAILY AS NEEDED FOR PAIN 100 tablet 0     alcohol swab prep pads Use to swab area of injection/rachell as directed. 100 each 11     amLODIPine (NORVASC) 10 MG tablet Take 1 tablet (10 mg) by mouth every evening For blood pressure. 90 tablet  1     ASPIRIN PO Take 81 mg by mouth.         atorvastatin (LIPITOR) 80 MG tablet Take 1 tablet (80 mg) by mouth daily For cholesterol. 90 tablet 3     blood glucose (NO BRAND SPECIFIED) test strip Use to test blood sugar 3 times daily or as directed. To accompany: Blood Glucose Monitor Brands: per insurance. 300 strip 11     blood glucose calibration (NO BRAND SPECIFIED) solution To accompany: Blood Glucose Monitor Brands: per insurance. 1 Bottle 3     blood glucose monitoring (NO BRAND SPECIFIED) meter device kit Use to test blood sugar 3 times daily or as directed. 1 kit 0     carboxymethylcellulose (REFRESH) 1 % ophthalmic solution Place 1 drop into both eyes 4 times daily 15 each 12     hydrALAZINE (APRESOLINE) 25 MG tablet Take 1 tablet (25 mg) by mouth 2 times daily 180 tablet 1     hydrochlorothiazide (HYDRODIURIL) 12.5 MG tablet Take 2 tablets (25 mg) by mouth daily 180 tablet 0     hydrocortisone 2.5 % cream Apply topically 2 times daily 60 g 3     insulin aspart (NOVOLOG FLEXPEN) 100 UNIT/ML pen INJECT 15 UNITS UNDER THE SKIN THREE TIMES DAILY WITH MEALS 30 mL 1     insulin glargine (LANTUS SOLOSTAR) 100 UNIT/ML pen Inject 22 Units Subcutaneous At Bedtime 30 mL 1     insulin pen needle (B-D U/F) 31G X 8 MM miscellaneous Inject Subcutaneous 5 times daily Use 5 pen needles daily or as directed. 100 each 3     ketoconazole (NIZORAL) 2 % external cream Apply topically daily 60 g 3     losartan (COZAAR) 100 MG tablet Take 1 tablet (100 mg) by mouth daily 90 tablet 0     metFORMIN (GLUCOPHAGE) 1000 MG tablet Take 1 tablet (1,000 mg) by mouth 2 times daily (with meals) For diabetes. 180 tablet 1     metoprolol succinate ER (TOPROL-XL) 25 MG 24 hr tablet Take 1 tablet (25 mg) by mouth every evening For blood pressure. 90 tablet 1     multivitamin, therapeutic with minerals (MULTI-VITAMIN) TABS Take 1 tablet by mouth daily 100 tablet 3     ORDER FOR DME Equipment being ordered: Rolling walker with seat 1 Units 0      polyethylene glycol-propylene glycol (SYSTANE ULTRA) 0.4-0.3 % SOLN ophthalmic solution Place 1 drop into both eyes 4 times daily 6 mL 12     thin (NO BRAND SPECIFIED) lancets Use with lanceting device. To accompany: Blood Glucose Monitor Brands: per insurance. 300 each 11     triamcinolone (KENALOG) 0.1 % external ointment Apply topically 2 times daily Use for a maximum of 2 weeks 30 g 0     No Known Allergies  Recent Labs   Lab Test 12/18/19  1257 10/15/19  1419 09/20/19  1416 05/09/19  1517 01/11/19  1326  01/17/18  1422  02/23/16  1042 11/05/15  0951 06/15/15  1103  01/14/14  1151   A1C 8.3*  --  11.5* 10.6* 8.0*   < > 9.0*   < > 6.3* 6.5* 6.9*   < >  --    LDL  --  129* 120* 144*  --    < > 160*   < >  --  67 50   < >  --    HDL  --   --   --  42  --   --   --   --   --  41 47   < >  --    TRIG  --   --   --  185*  --   --   --   --   --  63 113   < >  --    ALT  --   --   --   --   --   --  54  --   --   --   --   --  52   CR  --   --  1.54*  --  1.16  --  1.22   < >  --  1.06 1.09   < > 1.73*   GFRESTIMATED  --   --  45*  --  63  --  59*   < >  --  70 67   < > 40*   GFRESTBLACK  --   --  52*  --  73  --  71   < >  --  84 82   < > 48*   POTASSIUM  --   --  4.1  --   --   --  4.1   < >  --  4.7 4.5   < > 5.0   TSH  --   --   --   --   --   --  1.35  --  1.32  --   --   --   --     < > = values in this interval not displayed.      BP Readings from Last 3 Encounters:   12/18/19 (!) 170/82   10/15/19 138/81   09/20/19 (!) 140/77    Wt Readings from Last 3 Encounters:   12/18/19 73.5 kg (162 lb)   10/15/19 71.2 kg (157 lb)   09/20/19 71.7 kg (158 lb)              Reviewed and updated as needed this visit by Provider  Tobacco  Allergies  Meds  Problems  Med Hx  Surg Hx  Fam Hx         Review of Systems   ROS COMP: Constitutional, HEENT, cardiovascular, pulmonary, gi and gu systems are negative, except as otherwise noted.      Objective    BP (!) 170/82 (BP Location: Left arm)   Pulse 81   Temp 97.4  F  "(36.3  C) (Oral)   Resp 16   Ht 1.626 m (5' 4\")   Wt 73.5 kg (162 lb)   SpO2 97%   BMI 27.81 kg/m    Body mass index is 27.81 kg/m .  Physical Exam   GENERAL APPEARANCE: healthy, alert and no distress  EYES: Eyes grossly normal to inspection and conjunctivae and sclerae normal  RESP: lungs clear to auscultation - no rales, rhonchi or wheezes  CV: regular rates and rhythm, normal S1 S2  ABDOMEN: soft, non-tender and no rebound or guarding   MS: extremities normal- no gross deformities noted and peripheral pulses normal  SKIN: no suspicious lesions or rashes  NEURO: Normal strength and tone, mentation intact and speech normal  PSYCH: mentation appears normal      Diagnostic Test Results:  Labs reviewed in Epic  Results for orders placed or performed in visit on 12/18/19 (from the past 24 hour(s))   Hemoglobin A1c   Result Value Ref Range    Hemoglobin A1C 8.3 (H) 0 - 5.6 %           Assessment & Plan     Wolf was seen today for recheck medication.    Diagnoses and all orders for this visit:    Uncontrolled type 2 diabetes mellitus with stage 3 chronic kidney disease, with long-term current use of insulin (H)  -     Albumin Random Urine Quantitative with Creat Ratio  -     TSH WITH FREE T4 REFLEX  -     insulin pen needle (B-D U/F) 31G X 8 MM miscellaneous; Inject Subcutaneous 5 times daily Use 5 pen needles daily or as directed.  -     insulin glargine (LANTUS SOLOSTAR) 100 UNIT/ML pen; Inject 22 Units Subcutaneous At Bedtime  -     insulin aspart (NOVOLOG FLEXPEN) 100 UNIT/ML pen; INJECT 15 UNITS UNDER THE SKIN THREE TIMES DAILY WITH MEALS  -     LDL cholesterol direct  -     Basic metabolic panel  -     Hemoglobin A1c  -HbA1C has improved from 11.5 to 8.3  Continue medications the same.    Periodic blood sugar testing.  Regular foot checks  Limit carbohydrates and sweets in diet  Update eye exam annually   Regular exercise, 150 minutes per week  Hypoglycemia precautions     Essential hypertension with goal blood " pressure less than 140/90  -     hydrALAZINE (APRESOLINE) 25 MG tablet; Take 1 tablet (25 mg) by mouth 2 times daily  -has been out of several medications  -scheduled on Ancillary for recheck in 2 weeks     Hyperlipidemia LDL goal <70  -     LDL cholesterol direct  -on Atorvastatin 80 mg daily, tolerating so far     CKD (chronic kidney disease) stage 3, GFR 30-59 ml/min (H)  -     Basic metabolic panel  -last GFR was 45  -await results from today   -discussed avoiding use of NSAIDs           Regular exercise    Return in about 2 months (around 2/18/2020) for Routine Visit, Lab Work, Physical Exam, BP Recheck.    Kimberly Bell MD MPH    [facility]

## 2019-12-19 NOTE — RESULT ENCOUNTER NOTE
Please send a letter:    Dear Wolf Sen,    Three month glucose number has improved from last check significantly. Continue current medication.  The rest of your labs are stable for you.  Glucose was low at 62, make sure you are not skipping any meals as you are on insulin.   Follow up in clinic in 3 Months.     Regards,    Kimberly Bell MD MPH

## 2019-12-20 ENCOUNTER — OFFICE VISIT (OUTPATIENT)
Dept: FAMILY MEDICINE | Facility: CLINIC | Age: 69
End: 2019-12-20
Payer: COMMERCIAL

## 2019-12-20 VITALS
SYSTOLIC BLOOD PRESSURE: 162 MMHG | HEIGHT: 64 IN | TEMPERATURE: 97.3 F | DIASTOLIC BLOOD PRESSURE: 98 MMHG | RESPIRATION RATE: 16 BRPM | WEIGHT: 162 LBS | BODY MASS INDEX: 27.66 KG/M2 | OXYGEN SATURATION: 98 % | HEART RATE: 73 BPM

## 2019-12-20 DIAGNOSIS — Z00.00 ENCOUNTER FOR MEDICARE ANNUAL WELLNESS EXAM: Primary | ICD-10-CM

## 2019-12-20 DIAGNOSIS — I10 ESSENTIAL HYPERTENSION WITH GOAL BLOOD PRESSURE LESS THAN 140/90: ICD-10-CM

## 2019-12-20 PROCEDURE — 99207 C PAF COMPLETED  NO CHARGE: CPT | Performed by: PREVENTIVE MEDICINE

## 2019-12-20 PROCEDURE — G0438 PPPS, INITIAL VISIT: HCPCS | Performed by: PREVENTIVE MEDICINE

## 2019-12-20 ASSESSMENT — MIFFLIN-ST. JEOR: SCORE: 1400.83

## 2019-12-20 ASSESSMENT — PAIN SCALES - GENERAL: PAINLEVEL: NO PAIN (0)

## 2019-12-20 NOTE — PROGRESS NOTES
"  SUBJECTIVE:   Wolf Sen is a 69 year old male who presents for Preventive Visit.      Are you in the first 12 months of your Medicare Part B coverage?  No    Physical Health:    In general, how would you rate your overall physical health? excellent    Outside of work, how many days during the week do you exercise? 4-5 days/week    Outside of work, approximately how many minutes a day do you exercise?30-45 minutes    If you drink alcohol do you typically have >3 drinks per day or >7 drinks per week? No    Do you usually eat at least 4 servings of fruit and vegetables a day, include whole grains & fiber and avoid regularly eating high fat or \"junk\" foods? Yes    Do you have any problems taking medications regularly?  No    Do you have any side effects from medications? none    Needs assistance for the following daily activities: no assistance needed    Which of the following safety concerns are present in your home?  none identified     Hearing impairment: No    In the past 6 months, have you been bothered by leaking of urine? no    Mental Health:    In general, how would you rate your overall mental or emotional health? fair  PHQ-2 Score:      Do you feel safe in your environment? Yes    Have you ever done Advance Care Planning? (For example, a Health Directive, POLST, or a discussion with a medical provider or your loved ones about your wishes): No, advance care planning information given to patient to review.  Patient declined advance care planning discussion at this time.    Additional concerns to address?  No    Fall risk:  Fallen 2 or more times in the past year?: No  Any fall with injury in the past year?: No    Cognitive Screenin) Repeat 3 items (Leader, Season, Table)    2) Clock draw: ABNORMAL   3) 3 item recall: Recalls 2 objects   Results: ABNORMAL clock, 1-2 items recalled: PROBABLE COGNITIVE IMPAIRMENT, **INFORM PROVIDER**    Mini-CogTM Copyright S Reyes. Licensed by the author for use in " [facility]; reprinted with permission (soob@[facility]). All rights reserved.      Do you have sleep apnea, excessive snoring or daytime drowsiness?: no          Reviewed and updated as needed this visit by clinical staff  Tobacco  Allergies  Meds  Problems  Med Hx  Surg Hx  Fam Hx         Reviewed and updated as needed this visit by Provider  Tobacco  Allergies  Meds  Problems  Med Hx  Surg Hx  Fam Hx        Social History     Tobacco Use     Smoking status: Never Smoker     Smokeless tobacco: Never Used   Substance Use Topics     Alcohol use: No                           Current providers sharing in care for this patient include:   Patient Care Team:  Kimberly Bell MD as PCP - General (Family Practice)  Viki Avalos, RN as Lead Care Coordinator  Rianna Nettles as Other (see comments)  Jodi Snider as Other (see comments)  Carlos Tomlinson MD as Assigned PCP    The following health maintenance items are reviewed in Epic and correct as of today:  Health Maintenance   Topic Date Due     ZOSTER IMMUNIZATION (2 of 3) 04/27/2016     MEDICARE ANNUAL WELLNESS VISIT  04/04/2019     FALL RISK ASSESSMENT  01/11/2020     LIPID  05/09/2020     A1C  06/18/2020     DIABETIC FOOT EXAM  10/15/2020     EYE EXAM  11/04/2020     BMP  12/18/2020     MICROALBUMIN  12/18/2020     TSH W/FREE T4 REFLEX  12/18/2021     COLONOSCOPY  06/01/2022     ADVANCE CARE PLANNING  06/30/2022     DTAP/TDAP/TD IMMUNIZATION (2 - Td) 10/12/2022     HEPATITIS C SCREENING  Completed     PHQ-2  Completed     INFLUENZA VACCINE  Completed     PNEUMOCOCCAL IMMUNIZATION 65+ LOW/MEDIUM RISK  Completed     AORTIC ANEURYSM SCREENING (SYSTEM ASSIGNED)  Completed     IPV IMMUNIZATION  Aged Out     MENINGITIS IMMUNIZATION  Aged Out     Labs reviewed in EPIC  BP Readings from Last 3 Encounters:   12/20/19 (!) 162/98   12/18/19 (!) 170/82   10/15/19 138/81    Wt Readings from Last 3 Encounters:   12/20/19 73.5 kg (162 lb)   12/18/19 73.5 kg  (162 lb)   10/15/19 71.2 kg (157 lb)                  Patient Active Problem List   Diagnosis     DM type 2, goal A1C below 8.0     HTN, goal below 150/90     Hyperlipidemia LDL goal <70     Overweight (BMI 25.0-29.9)     Cataracts, both eyes     Health Care Home     Advance care planning     Type 2 diabetes mellitus with neurological manifestations, controlled (H)     Pseudophakia of left eye     History of carotid endarterectomy     Cerebral infarction (H)     Aortic valve insufficiency     Vitamin D deficiency     CKD (chronic kidney disease) stage 3, GFR 30-59 ml/min (H)     Past Surgical History:   Procedure Laterality Date     CATARACT IOL, RT/LT       COMBINED REPAIR PTOSIS WITH BLEPHAROPLASTY Bilateral 10/9/2017    Procedure: COMBINED REPAIR PTOSIS WITH BLEPHAROPLASTY;  Bilateral upper eyelid blepharoplasty and ptosis repair;  Surgeon: Bibiana Melara MD;  Location: [facility] OR     PHACOEMULSIFICATION WITH STANDARD INTRAOCULAR LENS IMPLANT Right 2/25/2016    Procedure: PHACOEMULSIFICATION WITH STANDARD INTRAOCULAR LENS IMPLANT;  Surgeon: Carlton Don MD;  Location: [facility] OR     PHACOEMULSIFICATION WITH STANDARD INTRAOCULAR LENS IMPLANT Left 2/11/2016    Procedure: PHACOEMULSIFICATION WITH STANDARD INTRAOCULAR LENS IMPLANT;  Surgeon: Carlton Don MD;  Location: [facility] OR     REPAIR PTOSIS Bilateral     10/17       Social History     Tobacco Use     Smoking status: Never Smoker     Smokeless tobacco: Never Used   Substance Use Topics     Alcohol use: No     Family History   Problem Relation Age of Onset     Hypertension Father      Cancer No family hx of      Diabetes No family hx of      Cerebrovascular Disease No family hx of      Thyroid Disease No family hx of      Glaucoma No family hx of      Macular Degeneration No family hx of          Current Outpatient Medications   Medication Sig Dispense Refill     acetaminophen (MAPAP) 500 MG tablet TAKE 2 TABLETS(1000 MG) BY MOUTH THREE TIMES DAILY  AS NEEDED FOR PAIN 100 tablet 0     alcohol swab prep pads Use to swab area of injection/rachell as directed. 100 each 11     amLODIPine (NORVASC) 10 MG tablet Take 1 tablet (10 mg) by mouth every evening For blood pressure. 90 tablet 1     ASPIRIN PO Take 81 mg by mouth.         atorvastatin (LIPITOR) 80 MG tablet Take 1 tablet (80 mg) by mouth daily For cholesterol. 90 tablet 3     blood glucose (NO BRAND SPECIFIED) test strip Use to test blood sugar 3 times daily or as directed. To accompany: Blood Glucose Monitor Brands: per insurance. 300 strip 11     blood glucose calibration (NO BRAND SPECIFIED) solution To accompany: Blood Glucose Monitor Brands: per insurance. 1 Bottle 3     blood glucose monitoring (NO BRAND SPECIFIED) meter device kit Use to test blood sugar 3 times daily or as directed. 1 kit 0     carboxymethylcellulose (REFRESH) 1 % ophthalmic solution Place 1 drop into both eyes 4 times daily 15 each 12     hydrALAZINE (APRESOLINE) 25 MG tablet Take 1 tablet (25 mg) by mouth 2 times daily 180 tablet 1     hydrochlorothiazide (HYDRODIURIL) 12.5 MG tablet Take 2 tablets (25 mg) by mouth daily 180 tablet 0     hydrocortisone 2.5 % cream Apply topically 2 times daily 60 g 3     insulin aspart (NOVOLOG FLEXPEN) 100 UNIT/ML pen INJECT 15 UNITS UNDER THE SKIN THREE TIMES DAILY WITH MEALS 30 mL 1     insulin glargine (LANTUS SOLOSTAR) 100 UNIT/ML pen Inject 22 Units Subcutaneous At Bedtime 30 mL 1     insulin pen needle (B-D U/F) 31G X 8 MM miscellaneous Inject Subcutaneous 5 times daily Use 5 pen needles daily or as directed. 100 each 3     ketoconazole (NIZORAL) 2 % external cream Apply topically daily 60 g 3     losartan (COZAAR) 100 MG tablet Take 1 tablet (100 mg) by mouth daily 90 tablet 0     metFORMIN (GLUCOPHAGE) 1000 MG tablet Take 1 tablet (1,000 mg) by mouth 2 times daily (with meals) For diabetes. 180 tablet 1     metoprolol succinate ER (TOPROL-XL) 25 MG 24 hr tablet Take 1 tablet (25 mg) by mouth  "every evening For blood pressure. 90 tablet 1     multivitamin, therapeutic with minerals (MULTI-VITAMIN) TABS Take 1 tablet by mouth daily 100 tablet 3     ORDER FOR DME Equipment being ordered: Rolling walker with seat 1 Units 0     polyethylene glycol-propylene glycol (SYSTANE ULTRA) 0.4-0.3 % SOLN ophthalmic solution Place 1 drop into both eyes 4 times daily 6 mL 12     thin (NO BRAND SPECIFIED) lancets Use with lanceting device. To accompany: Blood Glucose Monitor Brands: per insurance. 300 each 11     triamcinolone (KENALOG) 0.1 % external ointment Apply topically 2 times daily Use for a maximum of 2 weeks 30 g 0     No Known Allergies  Recent Labs   Lab Test 12/18/19  1257 10/15/19  1419 09/20/19  1416 05/09/19  1517  01/17/18  1422  11/05/15  0951 06/15/15  1103  01/14/14  1151   A1C 8.3*  --  11.5* 10.6*   < > 9.0*   < > 6.5* 6.9*   < >  --    * 129* 120* 144*   < > 160*   < > 67 50   < >  --    HDL  --   --   --  42  --   --   --  41 47   < >  --    TRIG  --   --   --  185*  --   --   --  63 113   < >  --    ALT  --   --   --   --   --  54  --   --   --   --  52   CR 1.37*  --  1.54*  --    < > 1.22   < > 1.06 1.09   < > 1.73*   GFRESTIMATED 51*  --  45*  --    < > 59*   < > 70 67   < > 40*   GFRESTBLACK 59*  --  52*  --    < > 71   < > 84 82   < > 48*   POTASSIUM 3.9  --  4.1  --   --  4.1   < > 4.7 4.5   < > 5.0   TSH 1.88  --   --   --   --  1.35   < >  --   --   --   --     < > = values in this interval not displayed.      Pneumonia Vaccine:Adults age 65+ who received Pneumovax (PPSV23) at 65 years or older: Should be given PCV13 > 1 year after their most recent PPSV23    ROS:  Constitutional, HEENT, cardiovascular, pulmonary, gi and gu systems are negative, except as otherwise noted.    OBJECTIVE:   BP (!) 162/98   Pulse 73   Temp 97.3  F (36.3  C) (Oral)   Resp 16   Ht 1.626 m (5' 4\")   Wt 73.5 kg (162 lb)   SpO2 98%   BMI 27.81 kg/m   Estimated body mass index is 27.81 kg/m  as " "calculated from the following:    Height as of this encounter: 1.626 m (5' 4\").    Weight as of this encounter: 73.5 kg (162 lb).  EXAM:   GENERAL APPEARANCE: healthy, alert and no distress  EYES: Eyes grossly normal to inspection and conjunctivae and sclerae normal  HENT: nose and mouth without ulcers or lesions  NECK: no adenopathy and trachea midline and normal to palpation  RESP: lungs clear to auscultation - no rales, rhonchi or wheezes  CV: regular rates and rhythm, normal S1 S2, no S3 or S4 and no murmur, click or rub  ABDOMEN: soft, non-tender and no rebound or guarding   MS: extremities normal- no gross deformities noted and peripheral pulses normal  SKIN: no suspicious lesions or rashes  NEURO: Normal strength and tone, mentation intact and speech normal  PSYCH: mentation appears normal      Diagnostic Test Results:    Office Visit on 12/18/2019   Component Date Value Ref Range Status     Creatinine Urine 12/18/2019 373  mg/dL Final     Albumin Urine mg/L 12/18/2019 102  mg/L Final     Albumin Urine mg/g Cr 12/18/2019 27.35* 0 - 17 mg/g Cr Final     TSH 12/18/2019 1.88  0.40 - 4.00 mU/L Final     LDL Cholesterol Direct 12/18/2019 123* <100 mg/dL Final    Comment: Above desirable:  100-129 mg/dl  Borderline High:  130-159 mg/dL  High:             160-189 mg/dL  Very high:       >189 mg/dl       Sodium 12/18/2019 140  133 - 144 mmol/L Final     Potassium 12/18/2019 3.9  3.4 - 5.3 mmol/L Final     Chloride 12/18/2019 105  94 - 109 mmol/L Final     Carbon Dioxide 12/18/2019 28  20 - 32 mmol/L Final     Anion Gap 12/18/2019 7  3 - 14 mmol/L Final     Glucose 12/18/2019 62* 70 - 99 mg/dL Final    Non Fasting     Urea Nitrogen 12/18/2019 25  7 - 30 mg/dL Final     Creatinine 12/18/2019 1.37* 0.66 - 1.25 mg/dL Final     GFR Estimate 12/18/2019 51* >60 mL/min/[1.73_m2] Final    Comment: Non  GFR Calc  Starting 12/18/2018, serum creatinine based estimated GFR (eGFR) will be   calculated using the " "Chronic Kidney Disease Epidemiology Collaboration   (CKD-EPI) equation.       GFR Estimate If Black 12/18/2019 59* >60 mL/min/[1.73_m2] Final    Comment:  GFR Calc  Starting 12/18/2018, serum creatinine based estimated GFR (eGFR) will be   calculated using the Chronic Kidney Disease Epidemiology Collaboration   (CKD-EPI) equation.       Calcium 12/18/2019 9.0  8.5 - 10.1 mg/dL Final     Hemoglobin A1C 12/18/2019 8.3* 0 - 5.6 % Final    Comment: Normal <5.7% Prediabetes 5.7-6.4%  Diabetes 6.5% or higher - adopted from ADA   consensus guidelines.         Labs reviewed in Epic  No results found for any visits on 12/20/19.    ASSESSMENT / PLAN:   Wolf was seen today for physical.    Diagnoses and all orders for this visit:    Encounter for Medicare annual wellness exam  -U care card for patient completed    Uncontrolled type 2 diabetes mellitus with stage 3 chronic kidney disease, with long-term current use of insulin (H)  -Labs reviewed in detail  -HbA1C has improved from 11.5 to 8.3  -follow up in 3 months  -Hypoglycemia precautions    Essential hypertension with goal blood pressure less than 140/90  -still high  -Unclear if he has been able to get all his blood pressure medications filled from the pharmacy  -He had been out of Hydralazine at last visit  -Also hyzaar has been on back order, hence changed to Losartan and hydrochlorothiazide separately  -Has blood pressure check on Ancillary 12/30/19, asked him to bring his medication bottles so we can be sure he is taking all the necessary medication.    Other orders  -     PAF COMPLETED        COUNSELING:  Reviewed preventive health counseling, as reflected in patient instructions       Regular exercise       Healthy diet/nutrition       Vision screening    Estimated body mass index is 27.81 kg/m  as calculated from the following:    Height as of this encounter: 1.626 m (5' 4\").    Weight as of this encounter: 73.5 kg (162 lb).    Weight management " plan: Discussed healthy diet and exercise guidelines     reports that he has never smoked. He has never used smokeless tobacco.      Appropriate preventive services were discussed with this patient, including applicable screening as appropriate for cardiovascular disease, diabetes, osteopenia/osteoporosis, and glaucoma.  As appropriate for age/gender, discussed screening for colorectal cancer, prostate cancer, breast cancer, and cervical cancer. Checklist reviewing preventive services available has been given to the patient.    Reviewed patients plan of care and provided an AVS. The Basic Care Plan (routine screening as documented in Health Maintenance) for Wolf meets the Care Plan requirement. This Care Plan has been established and reviewed with the Patient.    Counseling Resources:  ATP IV Guidelines  Pooled Cohorts Equation Calculator  Breast Cancer Risk Calculator  FRAX Risk Assessment  ICSI Preventive Guidelines  Dietary Guidelines for Americans, 2010  USDA's MyPlate  ASA Prophylaxis  Lung CA Screening    Kimberly Bell MD MPH    [facility]

## 2019-12-20 NOTE — PATIENT INSTRUCTIONS
Patient Education   Personalized Prevention Plan  You are due for the preventive services outlined below.  Your care team is available to assist you in scheduling these services.  If you have already completed any of these items, please share that information with your care team to update in your medical record.  Health Maintenance Due   Topic Date Due     Zoster (Shingles) Vaccine (2 of 3) 04/27/2016     Annual Wellness Visit  04/04/2019     FALL RISK ASSESSMENT  01/11/2020         At [facility], we strive to deliver an exceptional experience to you, every time we see you.  If you receive a survey in the mail, please send us back your thoughts. We really do value your feedback.    Based on your medical history, these are the current health maintenance/preventive care services that you are due for (some may have been done at this visit.)  Health Maintenance Due   Topic Date Due     ZOSTER IMMUNIZATION (2 of 3) 04/27/2016     MEDICARE ANNUAL WELLNESS VISIT  04/04/2019     FALL RISK ASSESSMENT  01/11/2020         Suggested websites for health information:  Www.[facility] : Up to date and easily searchable information on multiple topics.  Www.medlineplus.gov : medication info, interactive tutorials, watch real surgeries online  Www.familydoctor.org : good info from the Academy of Family Physicians  Www.cdc.gov : public health info, travel advisories, epidemics (H1N1)  Www.aap.org : children's health info, normal development, vaccinations  Www.health.state.[place].us : [place] dept of health, public health issues in [place], N1N1    Your care team:                            Family Medicine Internal Medicine   MD Isidro Ventura MD Shantel Branch-Fleming, MD Katya Georgiev PA-C Nam Ho, MD Pediatrics   CHRISTINE Quarles, MD Apoorva Ball CNP, MD Deborah Mielke, MD Kim Thein, APRN CNP      Clinic hours: Monday - Thursday 7  am-7 pm; Fridays 7 am-5 pm.   Urgent care: Monday - Friday 11 am-9 pm; Saturday and Sunday 9 am-5 pm.  Pharmacy : Monday -Thursday 8 am-8 pm; Friday 8 am-6 pm; Saturday and Sunday 9 am-5 pm.     Clinic: [phone]   Pharmacy: [phone]

## 2019-12-30 ENCOUNTER — ALLIED HEALTH/NURSE VISIT (OUTPATIENT)
Dept: NURSING | Facility: CLINIC | Age: 69
End: 2019-12-30
Payer: COMMERCIAL

## 2019-12-30 VITALS — DIASTOLIC BLOOD PRESSURE: 77 MMHG | SYSTOLIC BLOOD PRESSURE: 137 MMHG | HEART RATE: 85 BPM

## 2019-12-30 DIAGNOSIS — I10 HTN, GOAL BELOW 150/90: Primary | ICD-10-CM

## 2019-12-30 PROCEDURE — 99207 ZZC NO CHARGE NURSE ONLY: CPT

## 2019-12-30 NOTE — PROGRESS NOTES
Ancillary BP check     HTN Guidelines:   Age 18-59 BP range: Less than 140/90   Age 60-85 with Diabetes: Less than 140/90   Age 60-85 without Diabetes: less than 150/90       Wolf Sen is a 69 year old male who comes in today for a Blood Pressure check.       Outpatient blood pressures are being checked at home.  Results are 127/67 Today.     Low Salt Diet: no added salt     Problems taking medications regularly: No Symptoms: cough, headaches, shortness of breath, light-headedness, fatigue, nausea, sexual dysfunction, new onset of swelling or edema, weakness or new onset of chest pain     Medication side effects: none     BP goal: < 150/90 mmHg (patient 60+ years of age without diabetes)   BP reading today: Passed   BP Readings from Last 1 Encounters:   12/30/19 137/77      A large cuff was used.   Pulse: 85     Vitals reviewed      If urgent criteria present from ancillary BP workflow, discussed with clinic RN or provider (elevated blood pressure and any of the following: dizziness, blurry vision, lightheadedness, severe shortness of breath, chest pain or visual disturbance): No     Plan: At goal follow up as directed by provider.     Medications reviewed with patient.     Completed by: JAYY Amato MA     [facility]

## 2020-01-27 ENCOUNTER — TELEPHONE (OUTPATIENT)
Dept: GERIATRIC MEDICINE | Facility: CLINIC | Age: 70
End: 2020-01-27

## 2020-01-27 NOTE — TELEPHONE ENCOUNTER
[facility] Six-Month Telephone Assessment    6 month telephone assessment completed on 1/27/20.    ER visits: No  Hospitalizations: No  TCU stays: No  Significant health status changes: none  Falls/Injuries: No  ADL/IADL changes: No  Changes in services: No    Caregiver Assessment follow up:  N/A    Goals: See POC in chart for goal progress documentation.      Will see member in 6 months for an annual health risk assessment.   Encouraged member to call CC with any questions or concerns in the meantime.     Viki Avalos RN, PHN  [facility]

## 2020-02-03 ENCOUNTER — PATIENT OUTREACH (OUTPATIENT)
Dept: GERIATRIC MEDICINE | Facility: CLINIC | Age: 70
End: 2020-02-03

## 2020-02-03 NOTE — PROGRESS NOTES
Care Coordinator received email from [facility] to contact Tennille Downey at [phone] with [facility].   Care Coordinator called member with Stan Rivas  with [facility] Certified Language line.  Member states he went to [facility] today to check it out and would like to go there for ADC services.   Care Coordinator explained that member would need to be assessed for EW.   Scheduled home visit for 2/11/19 at 1 p.m.  Care Coordinator contacted [facility] to schedule .     Viki Avalos RN, PHN  [facility]

## 2020-02-11 ENCOUNTER — PATIENT OUTREACH (OUTPATIENT)
Dept: GERIATRIC MEDICINE | Facility: CLINIC | Age: 70
End: 2020-02-11

## 2020-02-11 DIAGNOSIS — Z76.89 HEALTH CARE HOME: ICD-10-CM

## 2020-02-11 ASSESSMENT — ACTIVITIES OF DAILY LIVING (ADL)
DEPENDENT_IADLS:: CLEANING;COOKING;LAUNDRY;SHOPPING;MEAL PREPARATION;MEDICATION MANAGEMENT;MONEY MANAGEMENT;TRANSPORTATION

## 2020-02-11 NOTE — PROGRESS NOTES
[facility] Care Coordination Contact    [facility] Change in Condition Assessment    Home visit for Change in Condition Health Risk Assessment with Wolf Sen completed on February 11, 2020    Reason for Early reassessment: Request for Elderly Waiver services Yes, if yes explain Request for equip and Adult Day Services    Type of residence:: [facility] home  Current living arrangement:: I live in a private home with family     Assessment completed with:: Patient, [place] , Laine, via [employer]    Current Care Plan  Member currently receiving the following home care services:   N/A  Member currently receiving the following community resources:    None    Medication Review  Medication reconciliation completed in Epic: Yes  Medication set-up & administration: Independent and sets up on own daily. - needs reminders from family.   Care Coordinator will order member pill box.   Self-administers medications.  Medication Risk Assessment Medication (1 or more, place referral to MTM): Taking 1 or more high-risk medications for adults >65 years  MTM Referral Placed: No: declined need    Mental/Behavioral Health   Depression Screening: See PHQ assessment flowsheet.   Mental health DX:: No      Mental Health Diagnosis: No - Member does not have dx of mental health however states she gets sad and anxious -he stays up and unable to sleep as he worries. Gets very lonely and feels burden to family at times.  States he stares at tv - is unsure what to do.  Mental Health Services: None: No further intervention needed at this time.    Falls Assessment:   Fallen 2 or more times in the past year?: No   Any fall with injury in the past year?: No    ADL/IADL Dependencies:   Dependent ADLs:: Independent  Dependent IADLs:: Cleaning, Cooking, Laundry, Shopping, Meal Preparation, Medication Management, Money Management, Transportation    [facility] sponsored benefits: Shared information re: Silver Sneakers/gym  memberships, ASA, Calcium +D.    PCA Assessment completed at visit: Not applicable     Elderly Waiver Eligibility: Yes-will open to EW.  [phone] and [phone] faxed to [facility] 2/12/20.     Care Plan & Recommendations: Once EW open - 1. Referral to ADC, 2. Order pill box.     See [facility] for detailed assessment information.    Follow-Up Plan: Member informed of future contact, plan to f/u with member with a 6 month telephone assessment.  Contact information shared with member and family, encouraged member to call with any questions or concerns at any time.    [place] care continuum providers: Please refer to Health Care Home on the Epic Problem List to view this patient's [facility] Care Plan Summary.    Viki Avalos RN, PHN  [facility]

## 2020-02-19 NOTE — PROGRESS NOTES
2/19/20: Care Coordinator placed call to [facility] to check on EW paperwork.  They have paperwork but have not yet processed.  Will be completed in next few days.     Viki Avalos RN, N  [facility]

## 2020-02-24 NOTE — PROGRESS NOTES
Care Coordinator received fax from [facility] - paperwork was processed and U code removed.     MMIS entered successfully  - EW opened for 3/1 per MMIS.     Care Coordinator spoke with member with language line  - informed member of EW open effective 3/1.     Care Coordinator will order pill box on 3/1.   Care Coordinator contacted [facility] - spoke with Olesya Tao - member will start next week.  2d/week + transportation.     Paperwork complete.    Viki Avalos RN, PHN  [facility]

## 2020-02-27 ENCOUNTER — PATIENT OUTREACH (OUTPATIENT)
Dept: GERIATRIC MEDICINE | Facility: CLINIC | Age: 70
End: 2020-02-27

## 2020-02-27 NOTE — LETTER
February 27, 2020      WOLF MOLINA  [address]  [place] [facility] [address]      Dear Wolf:    At [facility], we are dedicated to improving your health and well-being. Enclosed is the Comprehensive Care Plan that we developed with you on 2/11/2020. Please review the Care Plan carefully.    As a reminder, some of the things we discussed at your visit include:    Your physical and mental health    Ways to reduce falls    Health care needs you may have    Don t forget to contact your care coordinator if you:    Have been hospitalized or plan to be hospitalized     Have had a fall     Have experienced a change in physical health    Are experiencing emotional problems     If you do not agree with your Care Plan, have questions about it, or have experienced a change in your needs, please call me at [phone]. If you are hearing impaired, please call the [place] Relay at 474 or [phone] (yywjpi-fj-qblowc relay service).    Sincerely,    Viki Avalos RN, PHN    E-mail: [email or web address]  Phone: [phone]      [facility]") is a health plan that contracts with both Medicare and the [place] Medical Assistance (Medicaid) program to provide benefits of both programs to enrollees. Enrollment in [facility] depends on contract renewal.    [id number]     [id number] (11/18)

## 2020-02-27 NOTE — PROGRESS NOTES
[facility] Care Coordination Contact    Received after visit chart from care coordinator.  Completed following tasks: Mailed copy of care plan to client, Updated services in access and Submitted referrals/auths for adc w/rides  Chart was returned to CC.   , Provider Signature - No POC Shared:  Member indicates that they do not want their POC shared with any EW providers.     and Order placed with [facility] (p: [phone]; f: [phone]) for pill box.  Order placed on 2/27/20. Access updated.  As required, authorization submitted to health plan.  Bobbi Gray  Case Management Specialist  [facility]  [phone]

## 2020-03-17 ENCOUNTER — TRANSFERRED RECORDS (OUTPATIENT)
Dept: HEALTH INFORMATION MANAGEMENT | Facility: CLINIC | Age: 70
End: 2020-03-17

## 2020-03-22 DIAGNOSIS — I10 HTN, GOAL BELOW 150/90: ICD-10-CM

## 2020-03-22 NOTE — TELEPHONE ENCOUNTER
"Requested Prescriptions   Pending Prescriptions Disp Refills     losartan (COZAAR) 100 MG tablet [Pharmacy Med Name: LOSARTAN 100MG TABLETS] 90 tablet 0     Sig: TAKE 1 TABLET BY MOUTH EVERY DAY       Angiotensin-II Receptors Failed - 3/22/2020 11:35 AM        Failed - Normal serum creatinine on file in past 12 months     Recent Labs   Lab Test 12/18/19  1257   CR 1.37*       Ok to refill medication if creatinine is low          Passed - Last blood pressure under 140/90 in past 12 months     BP Readings from Last 3 Encounters:   12/30/19 137/77   12/20/19 (!) 162/98   12/18/19 (!) 170/82                 Passed - Recent (12 mo) or future (30 days) visit within the authorizing provider's specialty     Patient has had an office visit with the authorizing provider or a provider within the authorizing providers department within the previous 12 mos or has a future within next 30 days. See \"Patient Info\" tab in inbasket, or \"Choose Columns\" in Meds & Orders section of the refill encounter.              Passed - Medication is active on med list        Passed - Patient is age 18 or older        Passed - Normal serum potassium on file in past 12 months     Recent Labs   Lab Test 12/18/19  1257   POTASSIUM 3.9                       Last Written Prescription Date:11/27/2019    Last Fill Quantity: 90,  # refills: 0   Last office visit: 12/20/2019 with prescribing provider:  Ray   Future Office Visit:      "

## 2020-03-23 RX ORDER — LOSARTAN POTASSIUM 100 MG/1
TABLET ORAL
Qty: 90 TABLET | Refills: 0 | Status: SHIPPED | OUTPATIENT
Start: 2020-03-23 | End: 2020-06-18

## 2020-03-25 NOTE — PROGRESS NOTES
Per [facility], the pill boxes were delivered.    Wolf Sen 1950 pill box x2 ######## Delivered          Eloise Johnson  Care Management Specialist   [facility]   [phone]

## 2020-06-07 NOTE — PROGRESS NOTES
[facility] Care Coordination Contact      [facility] Refusal Telephone Assessment    Member refused home visit HRA on 6/19/19 with member - , Rob, through [facility] language line (reason: no needs at this time - independent).    ER visits: No  Hospitalizations: No  Health concerns: None  Falls/Injuries: No  ADL/IADL Dependencies:  None - states he is independent        Member currently receiving the following home care services:   N/A  Member currently receiving the following community resources:  N/A  Informal support(s):  family    Advanced Care Planning discussion, complete code section.    [facility] sponsored benefits: Shared information re: Silver Sneakers/gym memberships, ASA, Calcium +D.    Follow-Up Plan: Member informed of future contact, plan to f/u with member with a 6 month telephone assessment and offer a home visit.  Contact information shared with member and family, encouraged member to call with any questions or concerns at any time.    MMIS entered  [facility] POC completed.     Viki Avalos RN, PHN  [facility]         No

## 2020-06-16 DIAGNOSIS — I10 HTN, GOAL BELOW 150/90: ICD-10-CM

## 2020-06-16 DIAGNOSIS — I10 ESSENTIAL HYPERTENSION WITH GOAL BLOOD PRESSURE LESS THAN 140/90: ICD-10-CM

## 2020-06-18 RX ORDER — AMLODIPINE BESYLATE 10 MG/1
TABLET ORAL
Qty: 90 TABLET | Refills: 1 | Status: SHIPPED | OUTPATIENT
Start: 2020-06-18 | End: 2021-01-19

## 2020-06-18 RX ORDER — LOSARTAN POTASSIUM 100 MG/1
100 TABLET ORAL DAILY
Qty: 90 TABLET | Refills: 0 | Status: SHIPPED | OUTPATIENT
Start: 2020-06-18 | End: 2020-08-12

## 2020-06-18 NOTE — TELEPHONE ENCOUNTER
Routing refill request to provider for review/approval because:  Labs not current:  Creatinine    Lexii Nur RN, [facility] [place] Triage

## 2020-06-29 ENCOUNTER — OFFICE VISIT (OUTPATIENT)
Dept: OPTOMETRY | Facility: CLINIC | Age: 70
End: 2020-06-29
Payer: COMMERCIAL

## 2020-06-29 ENCOUNTER — TELEPHONE (OUTPATIENT)
Dept: OPTOMETRY | Facility: CLINIC | Age: 70
End: 2020-06-29

## 2020-06-29 DIAGNOSIS — H11.32 SUBCONJUNCTIVAL HEMORRHAGE, LEFT: Primary | ICD-10-CM

## 2020-06-29 PROCEDURE — 99213 OFFICE O/P EST LOW 20 MIN: CPT | Performed by: OPTOMETRIST

## 2020-06-29 ASSESSMENT — VISUAL ACUITY
METHOD: SNELLEN - LINEAR
OS_SC: 20/25
OD_SC: 20/20

## 2020-06-29 ASSESSMENT — SLIT LAMP EXAM - LIDS: COMMENTS: NORMAL

## 2020-06-29 NOTE — PROGRESS NOTES
"Chief Complaint   Patient presents with     Eye Problem Left Eye     Red Eye/ OS       Do you wear contact lenses? No, he does wear glasses sometimes. Has had cataract surgery.     Eye Problem Left Eye      In left eye. Onset was sudden. This started 1 day ago (Last night). Occurring constantly. Associated symptoms include redness. Negative for foreign body sensation. Treatments tried include no treatments. Pain was noted as 0/10. Additional comments: Red Eye/ OS   Comments      Patient said that last night he noticed his eye was very red. There is no pain, might be \"a little uncomfortable\". More aware of left eye.  His niece is here with him today, no  needed.         Daja Ponce Optometric Assistant      Review of Symptoms    EYES: See HPI  CONSTITUTIONAL: patient reports feeling healthy           Medical, surgical and family histories reviewed and updated 6/29/2020.         OBJECTIVE: See Ophthalmology exam    ASSESSMENT:    ICD-10-CM    1. Subconjunctival hemorrhage, left  H11.32       PLAN:    Patient Instructions   Patient was advised of today's exam findings.  Avoid vigorous exercise  Try not to rub or touch eye  Will take 2 weeks to resolve   Return to clinic as needed     Kelsey Vigil O.D.  [address] [place] [address]   [phone]                    "

## 2020-06-29 NOTE — PATIENT INSTRUCTIONS
Patient was advised of today's exam findings.  Avoid vigorous exercise  Try not to rub or touch eye  Will take 2 weeks to resolve   Return to clinic as needed     Kelsey Vigil O.D.  [address] [facility] [address]. [place] [address] [phone]

## 2020-06-29 NOTE — TELEPHONE ENCOUNTER
Pt's sister in law called stating that pt needed to be seen and was wondering if he could just walk in. Pulled up chart and realized that he was suppose to be seen here at 11am, but no showed because she claims he didn't know where it was. So she is wondering when he can be rescheduled.  Please contact Mable to discuss.

## 2020-06-29 NOTE — LETTER
"    6/29/2020         RE: Wolf Sen  [address] [address] [place] [address]        Dear Colleague,    Thank you [facility] your [facility], Wolf Sen[facility]. Please see a copy of my visit note below.    Chief Complaint   Patient presents with     Eye Problem Left Eye     Red Eye/ OS       Do you wear contact lenses? No, he does wear glasses sometimes. Has had cataract surgery.     Eye Problem Left Eye      In left eye. Onset was sudden. This started 1 day ago (Last night). Occurring constantly. Associated symptoms include redness. Negative for foreign body sensation. Treatments tried include no treatments. Pain was noted as 0/10. Additional comments: Red Eye/ OS   Comments      Patient said that last night he noticed his eye was very red. There is no pain, might be \"a little uncomfortable\". More aware of left eye.  His niece is here with him today, no  needed.         Daja Ponce Optometric Assistant      Review of Symptoms    EYES: See HPI  CONSTITUTIONAL: patient reports feeling healthy           Medical, surgical and family histories reviewed and updated 6/29/2020.         OBJECTIVE: See Ophthalmology exam    ASSESSMENT:    ICD-10-CM    1. Subconjunctival hemorrhage, left  H11.32       PLAN:    Patient Instructions   Patient was advised of today's exam findings.  Avoid vigorous exercise  Try not to rub or touch eye  Will take 2 weeks to resolve   Return to clinic as needed     Kelsey Vigil O.D.  [address] [facility] [address] [facility] [address] [facility]. [place] [address]   [phone]                        Again, thank you for allowing me to participate in the care of your patient.        Sincerely,        Kelsey Vigil, OD    "

## 2020-08-10 DIAGNOSIS — I10 ESSENTIAL HYPERTENSION WITH GOAL BLOOD PRESSURE LESS THAN 140/90: ICD-10-CM

## 2020-08-10 DIAGNOSIS — I10 HTN, GOAL BELOW 150/90: ICD-10-CM

## 2020-08-10 DIAGNOSIS — E11.49 TYPE 2 DIABETES MELLITUS WITH NEUROLOGICAL MANIFESTATIONS, CONTROLLED (H): ICD-10-CM

## 2020-08-12 RX ORDER — METOPROLOL SUCCINATE 25 MG/1
TABLET, EXTENDED RELEASE ORAL
Qty: 90 TABLET | Refills: 0 | Status: SHIPPED | OUTPATIENT
Start: 2020-08-12 | End: 2020-09-16

## 2020-08-12 RX ORDER — LOSARTAN POTASSIUM 100 MG/1
100 TABLET ORAL DAILY
Qty: 90 TABLET | Refills: 0 | Status: SHIPPED | OUTPATIENT
Start: 2020-08-12 | End: 2020-12-15

## 2020-08-12 NOTE — TELEPHONE ENCOUNTER
Routing refill request to provider for review/approval because:  Labs not current:  A1C and creatinine  Metoprolol filled per protocol.    Lexii Nur RN, [facility] Triage

## 2020-08-18 DIAGNOSIS — E11.49 TYPE 2 DIABETES MELLITUS WITH NEUROLOGICAL MANIFESTATIONS, CONTROLLED (H): ICD-10-CM

## 2020-08-20 NOTE — TELEPHONE ENCOUNTER
Routing refill request to provider for review/approval because:  Labs not current:  A1C  Needs appointment also.    Lexii Nur RN, [facility] Triage

## 2020-09-04 DIAGNOSIS — E11.49 TYPE 2 DIABETES MELLITUS WITH NEUROLOGICAL MANIFESTATIONS, CONTROLLED (H): ICD-10-CM

## 2020-09-04 RX ORDER — ISOPROPYL ALCOHOL 0.75 G/1
SWAB TOPICAL
Qty: 100 EACH | Refills: 4 | Status: SHIPPED | OUTPATIENT
Start: 2020-09-04 | End: 2021-02-11

## 2020-09-04 NOTE — TELEPHONE ENCOUNTER
Prescription approved per [facility] Refill Protocol.    Requested Prescriptions   Pending Prescriptions Disp Refills     Alcohol Swabs (B-D SINGLE USE SWABS REGULAR) PADS [Pharmacy Med Name: B-D ALCOHOL SWABS]       Sig: USE TO SWAB AREA OF INJECTION/RONEY AS DIRECTED       There is no refill protocol information for this order          Maile Du RN, BSN, PHN

## 2020-09-15 DIAGNOSIS — I10 ESSENTIAL HYPERTENSION WITH GOAL BLOOD PRESSURE LESS THAN 140/90: ICD-10-CM

## 2020-09-16 RX ORDER — METOPROLOL SUCCINATE 25 MG/1
TABLET, EXTENDED RELEASE ORAL
Qty: 90 TABLET | Refills: 0 | Status: SHIPPED | OUTPATIENT
Start: 2020-09-16 | End: 2020-12-19

## 2020-09-21 ENCOUNTER — PATIENT OUTREACH (OUTPATIENT)
Dept: GERIATRIC MEDICINE | Facility: CLINIC | Age: 70
End: 2020-09-21

## 2020-09-21 NOTE — PROGRESS NOTES
[facility] Care Coordination Contact    Called member to complete six month assessment and left a message requesting a return call.    Viki Avalos RN, PHN  [facility]

## 2020-09-23 NOTE — PROGRESS NOTES
[facility] Care Coordination Contact      [facility] Six-Month Telephone Assessment    6 month telephone assessment completed on 9/23/20.    ER visits: No  Hospitalizations: No  TCU stays: No  Significant health status changes: N/A  Falls/Injuries: No  ADL/IADL changes: No  Changes in services: No    Caregiver Assessment follow up:  N/A    Goals: See POC in chart for goal progress documentation.  States not attending ADC at this time d/t covid but managing well.     Will see member in 6 months for an annual health risk assessment.   Encouraged member to call CC with any questions or concerns in the meantime.     Viki Avalos RN, PHN  [facility]

## 2020-09-23 NOTE — PROGRESS NOTES
[facility] Care Coordination Contact    Called member to complete six month assessment and left a message requesting a return call.    Viki Avalos RN, PHN  [facility]

## 2020-11-05 DIAGNOSIS — E78.5 HYPERLIPIDEMIA LDL GOAL <70: ICD-10-CM

## 2020-11-05 DIAGNOSIS — I10 ESSENTIAL HYPERTENSION WITH GOAL BLOOD PRESSURE LESS THAN 140/90: ICD-10-CM

## 2020-11-05 NOTE — LETTER
[address]  [address]  [phone]    November 9, 2020      Wolf Sen  [address] [place] [address]      Dear Wolf,    We have refilled your lipitor.   We will need to see you for an office visit and fasting labs before any additional refills can be given.  Please call [phone] to schedule this appointment.      Thank you,    [facility]

## 2020-11-06 RX ORDER — HYDRALAZINE HYDROCHLORIDE 25 MG/1
TABLET, FILM COATED ORAL
Qty: 180 TABLET | Refills: 1 | Status: SHIPPED | OUTPATIENT
Start: 2020-11-06 | End: 2021-01-19

## 2020-11-06 RX ORDER — ATORVASTATIN CALCIUM 80 MG/1
80 TABLET, FILM COATED ORAL DAILY
Qty: 90 TABLET | Refills: 0 | Status: SHIPPED | OUTPATIENT
Start: 2020-11-06 | End: 2021-01-19

## 2020-11-06 NOTE — TELEPHONE ENCOUNTER
Please schedule patient. Sue refill given.   Needs appointment and labs for further refills.     Mireya Win RN  [facility]

## 2020-11-12 NOTE — LETTER
Wolf Sen  [address]  [facility] [address]          11/16/20      Dear Wolf Sen        At [facility] we care about your health and are committed to providing quality patient care. Regular appointments are a vital part of the care and management of your health and can help prevent many of the complications that can occur.      We have refilled your medication(s)  We will need you to schedule a office visit with labs before any additional refills can be given.  Please call [phone] to schedule this appointment.      Thank you,    [facility]

## 2020-11-16 NOTE — TELEPHONE ENCOUNTER
Please schedule patient. Sue refill given.   Needs appointment and labs for further refills.     Mireya Win RN  [facility]

## 2020-11-24 ENCOUNTER — DOCUMENTATION ONLY (OUTPATIENT)
Dept: FAMILY MEDICINE | Facility: CLINIC | Age: 70
End: 2020-11-24

## 2020-11-24 DIAGNOSIS — I10 HTN, GOAL BELOW 150/90: ICD-10-CM

## 2020-11-24 DIAGNOSIS — E11.49 TYPE 2 DIABETES MELLITUS WITH NEUROLOGICAL MANIFESTATIONS, CONTROLLED (H): Primary | ICD-10-CM

## 2020-11-24 DIAGNOSIS — E11.49 TYPE 2 DIABETES MELLITUS WITH NEUROLOGICAL MANIFESTATIONS, CONTROLLED (H): ICD-10-CM

## 2020-11-24 DIAGNOSIS — E78.5 HYPERLIPIDEMIA LDL GOAL <70: ICD-10-CM

## 2020-11-24 NOTE — PROGRESS NOTES
These lab tests are due per Health Maintenance.  Patient is scheduled for a Lab appointment on 11/27/2020 .Please review pended order, complete necessary items, and sign order.  If testing is not needed, please delete order.  Thank you.

## 2020-11-27 DIAGNOSIS — I10 HTN, GOAL BELOW 150/90: ICD-10-CM

## 2020-11-27 DIAGNOSIS — E11.49 TYPE 2 DIABETES MELLITUS WITH NEUROLOGICAL MANIFESTATIONS, CONTROLLED (H): ICD-10-CM

## 2020-11-27 DIAGNOSIS — E78.5 HYPERLIPIDEMIA LDL GOAL <70: ICD-10-CM

## 2020-11-27 LAB
ANION GAP SERPL CALCULATED.3IONS-SCNC: 7 MMOL/L (ref 3–14)
BUN SERPL-MCNC: 23 MG/DL (ref 7–30)
CALCIUM SERPL-MCNC: 9.9 MG/DL (ref 8.5–10.1)
CHLORIDE SERPL-SCNC: 105 MMOL/L (ref 94–109)
CHOLEST SERPL-MCNC: 174 MG/DL
CO2 SERPL-SCNC: 28 MMOL/L (ref 20–32)
CREAT SERPL-MCNC: 1.37 MG/DL (ref 0.66–1.25)
CREAT UR-MCNC: 287 MG/DL
GFR SERPL CREATININE-BSD FRML MDRD: 51 ML/MIN/{1.73_M2}
GLUCOSE SERPL-MCNC: 114 MG/DL (ref 70–99)
HBA1C MFR BLD: 6.7 % (ref 0–5.6)
HDLC SERPL-MCNC: 45 MG/DL
LDLC SERPL CALC-MCNC: 93 MG/DL
MICROALBUMIN UR-MCNC: 1050 MG/L
MICROALBUMIN/CREAT UR: 365.85 MG/G CR (ref 0–17)
NONHDLC SERPL-MCNC: 129 MG/DL
POTASSIUM SERPL-SCNC: 4.7 MMOL/L (ref 3.4–5.3)
SODIUM SERPL-SCNC: 140 MMOL/L (ref 133–144)
TRIGL SERPL-MCNC: 178 MG/DL

## 2020-11-27 PROCEDURE — 82043 UR ALBUMIN QUANTITATIVE: CPT | Performed by: NURSE PRACTITIONER

## 2020-11-27 PROCEDURE — 80048 BASIC METABOLIC PNL TOTAL CA: CPT | Performed by: NURSE PRACTITIONER

## 2020-11-27 PROCEDURE — 83036 HEMOGLOBIN GLYCOSYLATED A1C: CPT | Performed by: NURSE PRACTITIONER

## 2020-11-27 PROCEDURE — 80061 LIPID PANEL: CPT | Performed by: NURSE PRACTITIONER

## 2020-11-27 PROCEDURE — 36415 COLL VENOUS BLD VENIPUNCTURE: CPT | Performed by: NURSE PRACTITIONER

## 2020-11-30 ENCOUNTER — TELEPHONE (OUTPATIENT)
Dept: FAMILY MEDICINE | Facility: CLINIC | Age: 70
End: 2020-11-30

## 2020-11-30 DIAGNOSIS — E11.49 TYPE 2 DIABETES MELLITUS WITH NEUROLOGICAL MANIFESTATIONS, CONTROLLED (H): ICD-10-CM

## 2020-11-30 NOTE — LETTER
December 1, 2020      Wolf Sen  [address] [address] [place] [address]        Dear ,      At [facility] we care about your health and are committed to providing quality patient care. Regular appointments are a vital part of the care and management of your health and can help prevent many of the complications that can occur.       It has come to our attention that you have been given a one time 90 day refill of metFORMIN (GLUCOPHAGE) 1000 MG tablet and that you are due for a visit with your provider to recheck diabetes and lab work for further refills.  Please call [facility] at [phone] or use [employer] to schedule your appointment.       Sincerely,   [facility] Care Team

## 2020-11-30 NOTE — TELEPHONE ENCOUNTER
Pharmacy asking for this to be changed to a 90 day supply:    metFORMIN (GLUCOPHAGE) 1000 MG tablet 60 tablet 0 11/27/2020

## 2020-11-30 NOTE — TELEPHONE ENCOUNTER
Patient was prescribed #60 tabs with 0 refills of Metformin 1000 mg on 11/27/20 as he is due for visit for any further refills. Has not had visit since 12/20/2019.  Patient asking for 90 day supply.  RN team unable to convert previous prescription to 90 days as not enough was originally prescribed. Patient due for visit, nothing scheduled as of today.  Sending to provider to review and advise on requested amount.    Trinidad Ornelas RN  [facility]

## 2020-12-01 NOTE — TELEPHONE ENCOUNTER
Please notify patient that rx sent for 90-day supply but needs appointment within 1 month to recheck diabetes. Labs are due. Okay with virtual or clinic visit.    Carlos Tomlinson MD

## 2020-12-01 NOTE — TELEPHONE ENCOUNTER
Reminder letter to schedule needed appt for further refills mailed to pt's home address.      Andie GIRARD (R))

## 2020-12-11 DIAGNOSIS — I10 HTN, GOAL BELOW 150/90: ICD-10-CM

## 2020-12-11 NOTE — LETTER
Wolf Sen  [address]  [facility] [address]          12/16/20      Dear Wolf Sen        At [facility] we care about your health and are committed to providing quality patient care. Regular appointments are a vital part of the care and management of your health and can help prevent many of the complications that can occur.      We have refilled your medication(s) for 1 refill  We will need you to schedule a Video Visit before any additional refills can be given.  Please call [phone] to schedule this appointment.      Thank you,    [facility]

## 2020-12-15 RX ORDER — LOSARTAN POTASSIUM 100 MG/1
100 TABLET ORAL DAILY
Qty: 90 TABLET | Refills: 0 | Status: SHIPPED | OUTPATIENT
Start: 2020-12-15 | End: 2021-01-19

## 2020-12-15 NOTE — TELEPHONE ENCOUNTER
Routing refill request to provider for review/approval because:      No Normal serum creatinine on file in past 12 months    Dorota Goetz RN

## 2020-12-16 NOTE — TELEPHONE ENCOUNTER
One refill provided. Due for follow up, video visit OK. Kimberly Bell MD MPH     Sent letter.  [facility]  2nd Floor  Primary Care

## 2020-12-17 DIAGNOSIS — I10 ESSENTIAL HYPERTENSION WITH GOAL BLOOD PRESSURE LESS THAN 140/90: ICD-10-CM

## 2020-12-19 RX ORDER — METOPROLOL SUCCINATE 25 MG/1
TABLET, EXTENDED RELEASE ORAL
Qty: 90 TABLET | Refills: 0 | Status: SHIPPED | OUTPATIENT
Start: 2020-12-19 | End: 2021-01-19

## 2020-12-24 NOTE — PROGRESS NOTES
Please send a letter:    Dear Wolf Sen,    X rays of the neck did not show any acute changes. Mild arthritis was seen. Plan of care and follow up as discussed in clinic. Please let me know if you have any questions and thank you for choosing [facility].    Regards,    Kimberly Bell MD MPH   Admission

## 2021-01-05 ENCOUNTER — PATIENT OUTREACH (OUTPATIENT)
Dept: GERIATRIC MEDICINE | Facility: CLINIC | Age: 71
End: 2021-01-05

## 2021-01-05 DIAGNOSIS — Z76.89 HEALTH CARE HOME: ICD-10-CM

## 2021-01-05 NOTE — PROGRESS NOTES
[facility] Care Coordination Contact    [facility] Home Visit Assessment     Home visit for Health Risk Assessment with Wolf Sen completed on January 5, 2021    Type of residence:: Town home  Current living arrangement:: I live in a private home with family     Assessment completed with:: Patient, Avery Pierce  via [employer].     Current Care Plan  Member currently receiving the following home care services: N/A     Member currently receiving the following community resources: Day Care      Medication Review  Medication reconciliation completed in Epic: Yes  Medication set-up & administration: Independent and sets up on own daily.  Self-administers medications.  Medication Risk Assessment Medication (1 or more, place referral to MTM): Taking 1 or more high-risk medications for adults >65 years  MTM Referral Placed: No: declined    Mental/Behavioral Health   Depression Screening:           Mental health DX:: No        Falls Assessment:   Fallen 2 or more times in the past year?: No   Any fall with injury in the past year?: No    ADL/IADL Dependencies:   Dependent ADLs:: Independent  Dependent IADLs:: Cleaning, Cooking, Laundry, Shopping, Meal Preparation, Medication Management, Money Management, Transportation    [facility] Plan sponsored benefits: Shared information re: Silver Sneakers/gym memberships, ASA, Calcium +D.    PCA Assessment completed at visit: Not Applicable     Elderly Waiver Eligibility: Yes-will continue on EW    Care Plan & Recommendations: Adult Day Care    See LTCC for detailed assessment information.    Follow-Up Plan: Member informed of future contact, plan to f/u with member with a 6 month telephone assessment.  Contact information shared with member and family, encouraged member to call with any questions or concerns at any time.    [place] care continuum providers: Please refer to Health Care Home on the [facility] Problem List to view this patient's [place]  [facility] Care Plan Summary.    Viki Avalos RN, N  [facility]

## 2021-01-07 ENCOUNTER — PATIENT OUTREACH (OUTPATIENT)
Dept: GERIATRIC MEDICINE | Facility: CLINIC | Age: 71
End: 2021-01-07

## 2021-01-07 NOTE — LETTER
January 7, 2021      WOLF MOLINA  [address]  [place] [facility] [place] [facility] [address]      Dear Wolf:    At [facility], we are dedicated to improving your health and well-being. Enclosed is the Comprehensive Care Plan that we developed with you on 1/5/2021. Please review the Care Plan carefully.    As a reminder, some of the things we discussed at your visit include:    Your physical and mental health    Ways to reduce falls    Health care needs you may have    Don t forget to contact your care coordinator if you:    Have been hospitalized or plan to be hospitalized     Have had a fall     Have experienced a change in physical health    Are experiencing emotional problems     If you do not agree with your Care Plan, have questions about it, or have experienced a change in your needs, please call me at [phone]. If you are hearing impaired, please call the [place] Relay at 913 or [phone] (ttaqjc-ki-ybtgaj relay service).    Sincerely,    Viki Avalos RN, PHN    E-mail: [email or web address]  Phone: [phone]      [facility] ([facility]) is a health plan that contracts with both Medicare and the [place] Medical Assistance (Medicaid) program to provide benefits of both programs to enrollees. Enrollment in [facility] depends on contract renewal.    [id number]     [id number] (11/18)

## 2021-01-07 NOTE — PROGRESS NOTES
[facility] Care Coordination Contact    Received after visit chart from care coordinator.  Completed following tasks: Mailed copy of care plan to client, Updated services in access and Submitted referrals/auths for ADC with transportation.    and Provider Signature - No POC Shared:  Member indicates that they do not want their POC shared with any EW providers.     Mailed POC signature page to member to sign and return.    Eloise Johnson  Care Management Specialist   [facility]   [phone]

## 2021-01-19 ENCOUNTER — OFFICE VISIT (OUTPATIENT)
Dept: FAMILY MEDICINE | Facility: CLINIC | Age: 71
End: 2021-01-19
Payer: COMMERCIAL

## 2021-01-19 VITALS
DIASTOLIC BLOOD PRESSURE: 68 MMHG | BODY MASS INDEX: 26.98 KG/M2 | HEIGHT: 64 IN | SYSTOLIC BLOOD PRESSURE: 124 MMHG | TEMPERATURE: 98.7 F | RESPIRATION RATE: 18 BRPM | HEART RATE: 75 BPM | WEIGHT: 158 LBS

## 2021-01-19 DIAGNOSIS — I10 BENIGN ESSENTIAL HTN: ICD-10-CM

## 2021-01-19 DIAGNOSIS — E78.5 HYPERLIPIDEMIA LDL GOAL <70: ICD-10-CM

## 2021-01-19 DIAGNOSIS — N48.1 BALANITIS: ICD-10-CM

## 2021-01-19 DIAGNOSIS — Z23 FLU VACCINE NEED: ICD-10-CM

## 2021-01-19 DIAGNOSIS — Z00.00 ENCOUNTER FOR MEDICARE ANNUAL WELLNESS EXAM: Primary | ICD-10-CM

## 2021-01-19 PROCEDURE — 99214 OFFICE O/P EST MOD 30 MIN: CPT | Mod: 25 | Performed by: PREVENTIVE MEDICINE

## 2021-01-19 PROCEDURE — 90662 IIV NO PRSV INCREASED AG IM: CPT | Performed by: PREVENTIVE MEDICINE

## 2021-01-19 PROCEDURE — 99397 PER PM REEVAL EST PAT 65+ YR: CPT | Mod: 25 | Performed by: PREVENTIVE MEDICINE

## 2021-01-19 PROCEDURE — G0008 ADMIN INFLUENZA VIRUS VAC: HCPCS | Performed by: PREVENTIVE MEDICINE

## 2021-01-19 RX ORDER — AMLODIPINE BESYLATE 10 MG/1
TABLET ORAL
Qty: 90 TABLET | Refills: 1 | Status: SHIPPED | OUTPATIENT
Start: 2021-01-19 | End: 2021-07-21

## 2021-01-19 RX ORDER — LANCETS
EACH MISCELLANEOUS
Qty: 300 EACH | Refills: 11 | Status: SHIPPED | OUTPATIENT
Start: 2021-01-19

## 2021-01-19 RX ORDER — ATORVASTATIN CALCIUM 80 MG/1
80 TABLET, FILM COATED ORAL DAILY
Qty: 90 TABLET | Refills: 1 | Status: SHIPPED | OUTPATIENT
Start: 2021-01-19 | End: 2021-07-21

## 2021-01-19 RX ORDER — HYDROCHLOROTHIAZIDE 25 MG/1
25 TABLET ORAL DAILY
Qty: 90 TABLET | Refills: 1 | Status: SHIPPED | OUTPATIENT
Start: 2021-01-19 | End: 2021-07-21

## 2021-01-19 RX ORDER — LOSARTAN POTASSIUM 100 MG/1
100 TABLET ORAL DAILY
Qty: 90 TABLET | Refills: 1 | Status: SHIPPED | OUTPATIENT
Start: 2021-01-19 | End: 2021-03-23

## 2021-01-19 RX ORDER — INSULIN ASPART 100 [IU]/ML
INJECTION, SOLUTION INTRAVENOUS; SUBCUTANEOUS
Qty: 30 ML | Refills: 1 | Status: SHIPPED | OUTPATIENT
Start: 2021-01-19 | End: 2024-04-23

## 2021-01-19 RX ORDER — KETOCONAZOLE 20 MG/G
CREAM TOPICAL DAILY
Qty: 60 G | Refills: 1 | Status: SHIPPED | OUTPATIENT
Start: 2021-01-19

## 2021-01-19 RX ORDER — HYDRALAZINE HYDROCHLORIDE 25 MG/1
TABLET, FILM COATED ORAL
Qty: 180 TABLET | Refills: 1 | Status: SHIPPED | OUTPATIENT
Start: 2021-01-19 | End: 2021-07-21

## 2021-01-19 RX ORDER — PEN NEEDLE, DIABETIC 31 GX5/16"
NEEDLE, DISPOSABLE MISCELLANEOUS
Qty: 100 EACH | Refills: 3 | Status: SHIPPED | OUTPATIENT
Start: 2021-01-19 | End: 2021-12-17 | Stop reason: DRUGHIGH

## 2021-01-19 RX ORDER — METOPROLOL SUCCINATE 25 MG/1
TABLET, EXTENDED RELEASE ORAL
Qty: 90 TABLET | Refills: 1 | Status: SHIPPED | OUTPATIENT
Start: 2021-01-19 | End: 2021-09-15

## 2021-01-19 ASSESSMENT — MIFFLIN-ST. JEOR: SCORE: 1369.74

## 2021-01-19 ASSESSMENT — PAIN SCALES - GENERAL: PAINLEVEL: NO PAIN (0)

## 2021-01-19 NOTE — PATIENT INSTRUCTIONS
At [facility], we strive to deliver an exceptional experience to you, every time we see you. If you receive a survey, please complete it as we do value your feedback.  If you have MyChart, you can expect to receive results automatically within 24 hours of their completion.  Your provider will send a note interpreting your results as well.   If you do not have MyChart, you should receive your results in about a week by mail.    Your care team:                            Family Medicine Internal Medicine   MD Isidro Ventura, MD Belia Bowles, MD Mary Roth PA-C, APRN CNP    Carlos Tomlinson, MD Pediatrics   Jamison Aguilar, PALindsayC  Ginna Mackey, CNP Vicki Wallace, MD Jennifer Nguyen APRN CNP   MD Apoorva Jack MD Deborah Mielke, MD Dominique Wallace, APRN CNP  Jennifer Weston, PALindsayC  Danna Barker, CNP  MD Leanna Molina MD Angela Wermerskirchen, MD      Clinic hours: Monday - Thursday 7 am-7 pm; Fridays 7 am-5 pm.   Urgent care: Monday - Friday 11 am-9 pm; Saturday and Sunday 9 am-5 pm.    Clinic: [phone]       [facility]: Monday - Thursday 8 am - 7 pm; Friday 8 am - 6 pm  [facility]: [phone]     Use www.[email or web address].org for 24/7 diagnosis and treatment of dozens of conditions.      Patient Education   Personalized Prevention Plan  You are due for the preventive services outlined below.  Your care team is available to assist you in scheduling these services.  If you have already completed any of these items, please share that information with your care team to update in your medical record.  Health Maintenance Due   Topic Date Due     Zoster (Shingles) Vaccine (2 of 3) 04/27/2016     Flu Vaccine (1) 09/01/2020     Diabetic Foot Exam  10/15/2020     Eye Exam  11/04/2020

## 2021-01-19 NOTE — PROGRESS NOTES
"  SUBJECTIVE:   Wolf Sen is a 70 year old male who presents for Preventive Visit.    Patient has been advised of split billing requirements and indicates understanding: Yes  Are you in the first 12 months of your Medicare Part B coverage?  No    Physical Health:    In general, how would you rate your overall physical health? good    Outside of work, how many days during the week do you exercise? 6-7 days/week    Outside of work, approximately how many minutes a day do you exercise?15-30 minutes    If you drink alcohol do you typically have >3 drinks per day or >7 drinks per week? No    Do you usually eat at least 4 servings of fruit and vegetables a day, include whole grains & fiber and avoid regularly eating high fat or \"junk\" foods? Yes    Do you have any problems taking medications regularly?  No    Do you have any side effects from medications? none    Needs assistance for the following daily activities: no assistance needed    Which of the following safety concerns are present in your home?  none identified     Hearing impairment: No    In the past 6 months, have you been bothered by leaking of urine? no    Mental Health:    In general, how would you rate your overall mental or emotional health? fair  PHQ-2 Score:  0    Do you feel safe in your environment? Yes    Have you ever done Advance Care Planning? (For example, a Health Directive, POLST, or a discussion with a medical provider or your loved ones about your wishes): Yes, advance care planning is on file.    Additional concerns to address?  No    Fall risk:  Fallen 2 or more times in the past year?: No  Any fall with injury in the past year?: No    Cognitive Screenin) Repeat 3 items (Leader, Season, Table)    2) Clock draw: ABNORMAL he pointed to 11 but michelle the hands on 12 for the hour  3) 3 item recall: Recalls NO objects   Results: 0 items recalled: PROBABLE COGNITIVE IMPAIRMENT, **INFORM PROVIDER**    There is a language barrier during " Cognitive testing     Mini-CogTM Copyright SRUTHI Chambers. Licensed by the author for use in [facility]; reprinted with permission (antonio@.[facility]). All rights reserved.      Do you have sleep apnea, excessive snoring or daytime drowsiness?: no        Diabetes Follow-up    How often are you checking your blood sugar? One time daily  What time of day are you checking your blood sugars (select all that apply)?  Before meals  Have you had any blood sugars above 200?  No  Have you had any blood sugars below 70?  No    What symptoms do you notice when your blood sugar is low?  None    What concerns do you have today about your diabetes? None     Do you have any of these symptoms? (Select all that apply)  Numbness in feet    Have you had a diabetic eye exam in the last 12 months? Yes- Date of last eye exam: 6/2020,  Location: [facility]      Hyperlipidemia Follow-Up      Are you regularly taking any medication or supplement to lower your cholesterol?   Yes- statin    Are you having muscle aches or other side effects that you think could be caused by your cholesterol lowering medication?  No    Hypertension Follow-up      Do you check your blood pressure regularly outside of the clinic? No     Are you following a low salt diet? Yes    Are your blood pressures ever more than 140 on the top number (systolic) OR more   than 90 on the bottom number (diastolic), for example 140/90? No    BP Readings from Last 2 Encounters:   01/19/21 124/68   12/30/19 137/77     Hemoglobin A1C (%)   Date Value   11/27/2020 6.7 (H)   12/18/2019 8.3 (H)     LDL Cholesterol Calculated (mg/dL)   Date Value   11/27/2020 93   05/09/2019 144 (H)     LDL Cholesterol Direct (mg/dL)   Date Value   12/18/2019 123 (H)   10/15/2019 129 (H)         Reviewed and updated as needed this visit by clinical staff  Tobacco  Allergies  Meds              Reviewed and updated as needed this visit by Provider                Social History     Tobacco Use      Smoking status: Never Smoker     Smokeless tobacco: Never Used   Substance Use Topics     Alcohol use: No                           Current providers sharing in care for this patient include:   Patient Care Team:  Kimberly Bell MD as PCP - General (Family Practice)  Viki Avalos RN as Lead Care Coordinator  Kimberly Bell MD as Assigned PCP  Carlton Don MD as Assigned Surgical Provider    The following health maintenance items are reviewed in Epic and correct as of today:  Health Maintenance   Topic Date Due     ZOSTER IMMUNIZATION (2 of 3) 04/27/2016     INFLUENZA VACCINE (1) 09/01/2020     DIABETIC FOOT EXAM  10/15/2020     EYE EXAM  11/04/2020     A1C  05/27/2021     BMP  11/27/2021     LIPID  11/27/2021     MICROALBUMIN  11/27/2021     FALL RISK ASSESSMENT  01/05/2022     MEDICARE ANNUAL WELLNESS VISIT  01/19/2022     COLORECTAL CANCER SCREENING  06/01/2022     DTAP/TDAP/TD IMMUNIZATION (2 - Td) 10/12/2022     ADVANCE CARE PLANNING  12/20/2024     HEPATITIS C SCREENING  Completed     PHQ-2  Completed     Pneumococcal Vaccine: 65+ Years  Completed     AORTIC ANEURYSM SCREENING (SYSTEM ASSIGNED)  Completed     Pneumococcal Vaccine: Pediatrics (0 to 5 Years) and At-Risk Patients (6 to 64 Years)  Aged Out     IPV IMMUNIZATION  Aged Out     MENINGITIS IMMUNIZATION  Aged Out     Lab work is in process  Labs reviewed in EPIC  BP Readings from Last 3 Encounters:   01/19/21 124/68   12/30/19 137/77   12/20/19 (!) 162/98    Wt Readings from Last 3 Encounters:   01/19/21 71.7 kg (158 lb)   12/20/19 73.5 kg (162 lb)   12/18/19 73.5 kg (162 lb)                  Patient Active Problem List   Diagnosis     DM type 2, goal A1C below 8.0     HTN, goal below 150/90     Hyperlipidemia LDL goal <70     Overweight (BMI 25.0-29.9)     Cataracts, both eyes     Advance care planning     Type 2 diabetes mellitus with neurological manifestations, controlled (H)     Pseudophakia of left eye     History of carotid  endarterectomy     Cerebral infarction (H)     Aortic valve insufficiency     Vitamin D deficiency     CKD (chronic kidney disease) stage 3, GFR 30-59 ml/min     Past Surgical History:   Procedure Laterality Date     CATARACT IOL, RT/LT       COMBINED REPAIR PTOSIS WITH BLEPHAROPLASTY Bilateral 10/9/2017    Procedure: COMBINED REPAIR PTOSIS WITH BLEPHAROPLASTY;  Bilateral upper eyelid blepharoplasty and ptosis repair;  Surgeon: Bibiana Melara MD;  Location: [facility]     PHACOEMULSIFICATION WITH STANDARD INTRAOCULAR LENS IMPLANT Right 2/25/2016    Procedure: PHACOEMULSIFICATION WITH STANDARD INTRAOCULAR LENS IMPLANT;  Surgeon: Carlton Don MD;  Location: [facility]     PHACOEMULSIFICATION WITH STANDARD INTRAOCULAR LENS IMPLANT Left 2/11/2016    Procedure: PHACOEMULSIFICATION WITH STANDARD INTRAOCULAR LENS IMPLANT;  Surgeon: Carlton Don MD;  Location: [facility]     REPAIR PTOSIS Bilateral     10/17       Social History     Tobacco Use     Smoking status: Never Smoker     Smokeless tobacco: Never Used   Substance Use Topics     Alcohol use: No     Family History   Problem Relation Age of Onset     Hypertension Father      Cancer No family hx of      Diabetes No family hx of      Cerebrovascular Disease No family hx of      Thyroid Disease No family hx of      Glaucoma No family hx of      Macular Degeneration No family hx of          Current Outpatient Medications   Medication Sig Dispense Refill     acetaminophen (MAPAP) 500 MG tablet TAKE 2 TABLETS(1000 MG) BY MOUTH THREE TIMES DAILY AS NEEDED FOR PAIN 100 tablet 0     Alcohol Swabs (B-D SINGLE USE SWABS REGULAR) PADS USE TO SWAB AREA OF INJECTION/RONEY AS DIRECTED 100 each 4     amLODIPine (NORVASC) 10 MG tablet TAKE 1 TABLET(10 MG) BY MOUTH EVERY EVENING FOR BLOOD PRESSURE 90 tablet 1     ASPIRIN PO Take 81 mg by mouth.         atorvastatin (LIPITOR) 80 MG tablet Take 1 tablet (80 mg) by mouth daily For cholesterol. 90 tablet 1     blood glucose  (NO BRAND SPECIFIED) test strip Use to test blood sugar 3 times daily or as directed. To accompany: Blood Glucose Monitor Brands: per insurance. 300 strip 11     blood glucose calibration (NO BRAND SPECIFIED) solution To accompany: Blood Glucose Monitor Brands: per insurance. 1 Bottle 3     blood glucose monitoring (NO BRAND SPECIFIED) meter device kit Use to test blood sugar 3 times daily or as directed. 1 kit 0     hydrALAZINE (APRESOLINE) 25 MG tablet TAKE 1 TABLET(25 MG) BY MOUTH TWICE DAILY 180 tablet 1     hydrochlorothiazide (HYDRODIURIL) 25 MG tablet Take 1 tablet (25 mg) by mouth daily 90 tablet 1     insulin aspart (NOVOLOG FLEXPEN) 100 UNIT/ML pen INJECT 15 UNITS UNDER THE SKIN THREE TIMES DAILY WITH MEALS 30 mL 1     insulin glargine (LANTUS SOLOSTAR) 100 UNIT/ML pen Inject 22 Units Subcutaneous At Bedtime 30 mL 0     insulin pen needle (B-D U/F) 31G X 8 MM miscellaneous Inject Subcutaneous 5 times daily Use 5 pen needles daily or as directed. 100 each 3     ketoconazole (NIZORAL) 2 % external cream Apply topically daily 60 g 1     losartan (COZAAR) 100 MG tablet Take 1 tablet (100 mg) by mouth daily 90 tablet 1     metFORMIN (GLUCOPHAGE) 1000 MG tablet Take 1 tablet (1,000 mg) by mouth 2 times daily (with meals) 180 tablet 1     metoprolol succinate ER (TOPROL-XL) 25 MG 24 hr tablet TAKE 1 TABLET(25 MG) BY MOUTH EVERY EVENING FOR BLOOD PRESSURE 90 tablet 1     multivitamin, therapeutic with minerals (MULTI-VITAMIN) TABS Take 1 tablet by mouth daily 100 tablet 3     ORDER FOR DME Equipment being ordered: Rolling walker with seat 1 Units 0     thin (NO BRAND SPECIFIED) lancets Use with lanceting device. To accompany: Blood Glucose Monitor Brands: per insurance. 300 each 11     No Known Allergies  Recent Labs   Lab Test 11/27/20  1104 12/18/19  1257 10/15/19  1419 09/20/19  1416 05/09/19  1517 01/17/18  1422 01/17/18  1422 11/05/15  0951 11/05/15  0951 01/14/14  1151 01/14/14  1151   A1C 6.7* 8.3*  --   "11.5* 10.6*   < > 9.0*   < > 6.5*   < >  --    LDL 93 123* 129* 120* 144*   < > 160*   < > 67   < >  --    HDL 45  --   --   --  42  --   --   --  41   < >  --    TRIG 178*  --   --   --  185*  --   --   --  63   < >  --    ALT  --   --   --   --   --   --  54  --   --   --  52   CR 1.37* 1.37*  --  1.54*  --    < > 1.22   < > 1.06   < > 1.73*   GFRESTIMATED 51* 51*  --  45*  --    < > 59*   < > 70   < > 40*   GFRESTBLACK 59* 59*  --  52*  --    < > 71   < > 84   < > 48*   POTASSIUM 4.7 3.9  --  4.1  --   --  4.1   < > 4.7   < > 5.0   TSH  --  1.88  --   --   --   --  1.35   < >  --   --   --     < > = values in this interval not displayed.      Pneumonia Vaccine:Adults age 65+ who received Pneumovax (PPSV23) at 65 years or older: Should be given PCV13 > 1 year after their most recent PPSV23    ROS:  Constitutional, HEENT, cardiovascular, pulmonary, gi and gu systems are negative, except as otherwise noted.    OBJECTIVE:   /68   Pulse 75   Temp 98.7  F (37.1  C) (Oral)   Resp 18   Ht 1.613 m (5' 3.5\")   Wt 71.7 kg (158 lb)   BMI 27.55 kg/m   Estimated body mass index is 27.55 kg/m  as calculated from the following:    Height as of this encounter: 1.613 m (5' 3.5\").    Weight as of this encounter: 71.7 kg (158 lb).  EXAM:   GENERAL APPEARANCE: healthy, alert and no distress  EYES: Eyes grossly normal to inspection and conjunctivae and sclerae normal  RESP: lungs clear to auscultation - no rales, rhonchi or wheezes  CV: regular rates and rhythm, normal S1 S2, no S3 or S4 and no murmur, click or rub  ABDOMEN: soft, non-tender and no rebound or guarding   MS: extremities normal- no gross deformities noted and peripheral pulses normal  SKIN: no suspicious lesions or rashes  NEURO: Normal strength and tone, mentation intact and speech normal  PSYCH: mentation appears normal      Diagnostic Test Results:  Labs reviewed in Epic  No results found for any visits on 01/19/21.     Orders Only on 11/27/2020 "   Component Date Value Ref Range Status     Creatinine Urine 11/27/2020 287  mg/dL Final     Albumin Urine mg/L 11/27/2020 1,050  mg/L Final     Albumin Urine mg/g Cr 11/27/2020 365.85* 0 - 17 mg/g Cr Final     Sodium 11/27/2020 140  133 - 144 mmol/L Final     Potassium 11/27/2020 4.7  3.4 - 5.3 mmol/L Final     Chloride 11/27/2020 105  94 - 109 mmol/L Final     Carbon Dioxide 11/27/2020 28  20 - 32 mmol/L Final     Anion Gap 11/27/2020 7  3 - 14 mmol/L Final     Glucose 11/27/2020 114* 70 - 99 mg/dL Final    Fasting specimen     Urea Nitrogen 11/27/2020 23  7 - 30 mg/dL Final     Creatinine 11/27/2020 1.37* 0.66 - 1.25 mg/dL Final     GFR Estimate 11/27/2020 51* >60 mL/min/[1.73_m2] Final    Comment: Non  GFR Calc  Starting 12/18/2018, serum creatinine based estimated GFR (eGFR) will be   calculated using the Chronic Kidney Disease Epidemiology Collaboration   (CKD-EPI) equation.       GFR Estimate If Black 11/27/2020 59* >60 mL/min/[1.73_m2] Final    Comment:  GFR Calc  Starting 12/18/2018, serum creatinine based estimated GFR (eGFR) will be   calculated using the Chronic Kidney Disease Epidemiology Collaboration   (CKD-EPI) equation.       Calcium 11/27/2020 9.9  8.5 - 10.1 mg/dL Final     Hemoglobin A1C 11/27/2020 6.7* 0 - 5.6 % Final    Comment: Normal <5.7% Prediabetes 5.7-6.4%  Diabetes 6.5% or higher - adopted from ADA   consensus guidelines.       Cholesterol 11/27/2020 174  <200 mg/dL Final     Triglycerides 11/27/2020 178* <150 mg/dL Final    Comment: Borderline high:  150-199 mg/dl  High:             200-499 mg/dl  Very high:       >499 mg/dl  Fasting specimen       HDL Cholesterol 11/27/2020 45  >39 mg/dL Final     LDL Cholesterol Calculated 11/27/2020 93  <100 mg/dL Final    Desirable:       <100 mg/dl     Non HDL Cholesterol 11/27/2020 129  <130 mg/dL Final         ASSESSMENT / PLAN:   Wolf was seen today for physical.    Diagnoses and all orders for this  visit:    Encounter for Medicare annual wellness exam    Uncontrolled type 2 diabetes mellitus with stage 3 chronic kidney disease, with long-term current use of insulin (H)  -     blood glucose (NO BRAND SPECIFIED) test strip; Use to test blood sugar 3 times daily or as directed. To accompany: Blood Glucose Monitor Brands: per insurance.  -     thin (NO BRAND SPECIFIED) lancets; Use with lanceting device. To accompany: Blood Glucose Monitor Brands: per insurance.  -     insulin pen needle (B-D U/F) 31G X 8 MM miscellaneous; Inject Subcutaneous 5 times daily Use 5 pen needles daily or as directed.  -     insulin aspart (NOVOLOG FLEXPEN) 100 UNIT/ML pen; INJECT 15 UNITS UNDER THE SKIN THREE TIMES DAILY WITH MEALS  -     insulin glargine (LANTUS SOLOSTAR) 100 UNIT/ML pen; Inject 22 Units Subcutaneous At Bedtime  -     Hemoglobin A1c; Future  -     Albumin Random Urine Quantitative with Creat Ratio; Future  -     TSH with free T4 reflex; Future  -     Vitamin B12; Future  -Last HbA1C was improved and at goal at 6.7    Continue medications the same.    Periodic blood sugar testing.  Regular foot checks  Limit carbohydrates and sweets in diet  Update eye exam annually   Regular exercise, 150 minutes per week  Hypoglycemia precautions       Balanitis  -     ketoconazole (NIZORAL) 2 % external cream; Apply topically daily  -patient requested refills     Essential hypertension with goal blood pressure less than 140/90  -     amLODIPine (NORVASC) 10 MG tablet; TAKE 1 TABLET(10 MG) BY MOUTH EVERY EVENING FOR BLOOD PRESSURE  -     hydrALAZINE (APRESOLINE) 25 MG tablet; TAKE 1 TABLET(25 MG) BY MOUTH TWICE DAILY  -     metoprolol succinate ER (TOPROL-XL) 25 MG 24 hr tablet; TAKE 1 TABLET(25 MG) BY MOUTH EVERY EVENING FOR BLOOD PRESSURE  -     Comprehensive metabolic panel; Future  -     hydrochlorothiazide (HYDRODIURIL) 25 MG tablet; Take 1 tablet (25 mg) by mouth daily  -     losartan (COZAAR) 100 MG tablet; Take 1 tablet (100  "mg) by mouth daily  -at goal  -continue current medication     Hyperlipidemia LDL goal <70  -     atorvastatin (LIPITOR) 80 MG tablet; Take 1 tablet (80 mg) by mouth daily For cholesterol.  -     Lipid panel reflex to direct LDL Non-fasting; Future  -     Comprehensive metabolic panel; Future  The 10-year ASCVD risk score (Whit JIMENEZ Jr., et al., 2013) is: 38.4%    Values used to calculate the score:      Age: 70 years      Sex: Male      Is Non- : No      Diabetic: Yes      Tobacco smoker: No      Systolic Blood Pressure: 124 mmHg      Is BP treated: Yes      HDL Cholesterol: 45 mg/dL      Total Cholesterol: 174 mg/dL    Flu vaccine need  -     FLUZONE HIGH DOSE 65+  [06113]    Other orders  -     metFORMIN (GLUCOPHAGE) 1000 MG tablet; Take 1 tablet (1,000 mg) by mouth 2 times daily (with meals)        Patient has been advised of split billing requirements and indicates understanding: Yes    COUNSELING:  Reviewed preventive health counseling, as reflected in patient instructions       Regular exercise       Healthy diet/nutrition       Vision screening       Fall risk prevention    Estimated body mass index is 27.55 kg/m  as calculated from the following:    Height as of this encounter: 1.613 m (5' 3.5\").    Weight as of this encounter: 71.7 kg (158 lb).    Weight management plan: Discussed healthy diet and exercise guidelines    He reports that he has never smoked. He has never used smokeless tobacco.    Appropriate preventive services were discussed with this patient, including applicable screening as appropriate for cardiovascular disease, diabetes, osteopenia/osteoporosis, and glaucoma.  As appropriate for age/gender, discussed screening for colorectal cancer, prostate cancer, breast cancer, and cervical cancer. Checklist reviewing preventive services available has been given to the patient.    Reviewed patients plan of care and provided an AVS. The Basic Care Plan (routine screening as " documented in Health Maintenance) for Wolf meets the Care Plan requirement. This Care Plan has been established and reviewed with the Patient.    Counseling Resources:  ATP IV Guidelines  Pooled Cohorts Equation Calculator  Breast Cancer Risk Calculator  BRCA-Related Cancer Risk Assessment: FHS-7 Tool  FRAX Risk Assessment  ICSI Preventive Guidelines  Dietary Guidelines for Americans, 2010  USDA's MyPlate  ASA Prophylaxis  Lung CA Screening    Kimberly Bell MD MPH    [facility]

## 2021-02-11 DIAGNOSIS — E11.49 TYPE 2 DIABETES MELLITUS WITH NEUROLOGICAL MANIFESTATIONS, CONTROLLED (H): ICD-10-CM

## 2021-02-11 RX ORDER — ISOPROPYL ALCOHOL 0.75 G/1
SWAB TOPICAL
Qty: 100 EACH | Refills: 4 | Status: SHIPPED | OUTPATIENT
Start: 2021-02-11

## 2021-02-11 NOTE — TELEPHONE ENCOUNTER
Prescription approved per [facility] Refill Protocol.      Maile Du RN, BSN, PHN

## 2021-03-17 ENCOUNTER — TELEPHONE (OUTPATIENT)
Dept: FAMILY MEDICINE | Facility: CLINIC | Age: 71
End: 2021-03-17

## 2021-03-17 ENCOUNTER — OFFICE VISIT (OUTPATIENT)
Dept: FAMILY MEDICINE | Facility: CLINIC | Age: 71
End: 2021-03-17
Payer: COMMERCIAL

## 2021-03-17 VITALS
DIASTOLIC BLOOD PRESSURE: 66 MMHG | HEIGHT: 64 IN | BODY MASS INDEX: 27.17 KG/M2 | HEART RATE: 75 BPM | SYSTOLIC BLOOD PRESSURE: 112 MMHG | WEIGHT: 159.13 LBS | OXYGEN SATURATION: 98 % | TEMPERATURE: 97.9 F

## 2021-03-17 DIAGNOSIS — R00.2 PALPITATIONS: Primary | ICD-10-CM

## 2021-03-17 DIAGNOSIS — D64.9 ANEMIA, UNSPECIFIED TYPE: ICD-10-CM

## 2021-03-17 DIAGNOSIS — R79.9 ABNORMAL FINDING OF BLOOD CHEMISTRY, UNSPECIFIED: ICD-10-CM

## 2021-03-17 DIAGNOSIS — R94.31 NONSPECIFIC ABNORMAL ELECTROCARDIOGRAM (ECG) (EKG): ICD-10-CM

## 2021-03-17 DIAGNOSIS — R01.1 UNDIAGNOSED CARDIAC MURMURS: ICD-10-CM

## 2021-03-17 DIAGNOSIS — R06.2 WHEEZING: ICD-10-CM

## 2021-03-17 DIAGNOSIS — R53.83 FATIGUE, UNSPECIFIED TYPE: ICD-10-CM

## 2021-03-17 LAB
ALBUMIN SERPL-MCNC: 3.9 G/DL (ref 3.4–5)
ALP SERPL-CCNC: 85 U/L (ref 40–150)
ALT SERPL W P-5'-P-CCNC: 52 U/L (ref 0–70)
ANION GAP SERPL CALCULATED.3IONS-SCNC: 6 MMOL/L (ref 3–14)
AST SERPL W P-5'-P-CCNC: 37 U/L (ref 0–45)
BASOPHILS # BLD AUTO: 0 10E9/L (ref 0–0.2)
BASOPHILS NFR BLD AUTO: 0.6 %
BILIRUB SERPL-MCNC: 0.7 MG/DL (ref 0.2–1.3)
BUN SERPL-MCNC: 22 MG/DL (ref 7–30)
CALCIUM SERPL-MCNC: 9.6 MG/DL (ref 8.5–10.1)
CHLORIDE SERPL-SCNC: 107 MMOL/L (ref 94–109)
CO2 SERPL-SCNC: 25 MMOL/L (ref 20–32)
CREAT SERPL-MCNC: 1.44 MG/DL (ref 0.66–1.25)
CREAT UR-MCNC: 261 MG/DL
DIFFERENTIAL METHOD BLD: ABNORMAL
EOSINOPHIL # BLD AUTO: 0.5 10E9/L (ref 0–0.7)
EOSINOPHIL NFR BLD AUTO: 6.8 %
ERYTHROCYTE [DISTWIDTH] IN BLOOD BY AUTOMATED COUNT: 14.4 % (ref 10–15)
FERRITIN SERPL-MCNC: 69 NG/ML (ref 26–388)
GFR SERPL CREATININE-BSD FRML MDRD: 48 ML/MIN/{1.73_M2}
GLUCOSE SERPL-MCNC: 107 MG/DL (ref 70–99)
HCT VFR BLD AUTO: 38.1 % (ref 40–53)
HGB BLD-MCNC: 12.4 G/DL (ref 13.3–17.7)
LYMPHOCYTES # BLD AUTO: 2 10E9/L (ref 0.8–5.3)
LYMPHOCYTES NFR BLD AUTO: 29.8 %
MCH RBC QN AUTO: 25 PG (ref 26.5–33)
MCHC RBC AUTO-ENTMCNC: 32.5 G/DL (ref 31.5–36.5)
MCV RBC AUTO: 77 FL (ref 78–100)
MICROALBUMIN UR-MCNC: 245 MG/L
MICROALBUMIN/CREAT UR: 93.87 MG/G CR (ref 0–17)
MONOCYTES # BLD AUTO: 0.6 10E9/L (ref 0–1.3)
MONOCYTES NFR BLD AUTO: 9.5 %
NEUTROPHILS # BLD AUTO: 3.6 10E9/L (ref 1.6–8.3)
NEUTROPHILS NFR BLD AUTO: 53.3 %
PLATELET # BLD AUTO: 243 10E9/L (ref 150–450)
POTASSIUM SERPL-SCNC: 3.7 MMOL/L (ref 3.4–5.3)
PROT SERPL-MCNC: 8.4 G/DL (ref 6.8–8.8)
RBC # BLD AUTO: 4.96 10E12/L (ref 4.4–5.9)
SODIUM SERPL-SCNC: 138 MMOL/L (ref 133–144)
TSH SERPL DL<=0.005 MIU/L-ACNC: 0.64 MU/L (ref 0.4–4)
WBC # BLD AUTO: 6.7 10E9/L (ref 4–11)

## 2021-03-17 PROCEDURE — 83550 IRON BINDING TEST: CPT | Performed by: NURSE PRACTITIONER

## 2021-03-17 PROCEDURE — 85025 COMPLETE CBC W/AUTO DIFF WBC: CPT | Performed by: NURSE PRACTITIONER

## 2021-03-17 PROCEDURE — 99214 OFFICE O/P EST MOD 30 MIN: CPT | Performed by: NURSE PRACTITIONER

## 2021-03-17 PROCEDURE — 82728 ASSAY OF FERRITIN: CPT | Performed by: NURSE PRACTITIONER

## 2021-03-17 PROCEDURE — 36415 COLL VENOUS BLD VENIPUNCTURE: CPT | Performed by: NURSE PRACTITIONER

## 2021-03-17 PROCEDURE — 84443 ASSAY THYROID STIM HORMONE: CPT | Performed by: NURSE PRACTITIONER

## 2021-03-17 PROCEDURE — 83540 ASSAY OF IRON: CPT | Performed by: NURSE PRACTITIONER

## 2021-03-17 PROCEDURE — 82043 UR ALBUMIN QUANTITATIVE: CPT | Performed by: NURSE PRACTITIONER

## 2021-03-17 PROCEDURE — 80053 COMPREHEN METABOLIC PANEL: CPT | Performed by: NURSE PRACTITIONER

## 2021-03-17 PROCEDURE — 93000 ELECTROCARDIOGRAM COMPLETE: CPT | Performed by: NURSE PRACTITIONER

## 2021-03-17 RX ORDER — POLYETHYLENE GLYCOL 400 AND PROPYLENE GLYCOL 4; 3 MG/ML; MG/ML
SOLUTION/ DROPS OPHTHALMIC
COMMUNITY
Start: 2020-09-08 | End: 2023-03-15

## 2021-03-17 ASSESSMENT — PAIN SCALES - GENERAL: PAINLEVEL: NO PAIN (0)

## 2021-03-17 ASSESSMENT — MIFFLIN-ST. JEOR: SCORE: 1374.85

## 2021-03-17 NOTE — TELEPHONE ENCOUNTER
Please call patient.  Could not find him after his visit today  Tried calling with an  at 4:15 but no answer.    -EKG was slightly abnormal but no specific diagnosis.    -plan for outpatient stress test and heart monitor.    -call Miry[facility] Strong to schedule:  [phone]    -slightly anemic.  Please complete stool sample ( from lab) when able to see if there is blood in the stool.  If so, we will have you do a colonoscopy.  If not, we will pursue further workup as to why you are anemic.

## 2021-03-17 NOTE — TELEPHONE ENCOUNTER
This writer attempted to contact Karine (son- consent on file) on 03/17/21      Reason for call provider message and left message.      If patient calls back:   Relay message from provider below, (read verbatim), document that pt called and close encounter        Terri White RN

## 2021-03-17 NOTE — Clinical Note
Can we try to reach out to this patient again to give him the plan as per patient instructions?  If he has any symptoms as he did the other day (dizziness, palpitation, chest pain, shortness of breath, etc), he should go to the emergency room.    Thanks!  Ginna

## 2021-03-17 NOTE — PATIENT INSTRUCTIONS
-plan for outpatient stress test and heart monitor.    -call [facility] to schedule:  [phone]    -slightly anemic.  Please complete stool sample ( from lab) when able to see if there is blood in the stool.  If so, we will have you do a colonoscopy.  If not, we will pursue further workup as to why you are anemic.

## 2021-03-17 NOTE — PROGRESS NOTES
Assessment & Plan     Palpitations  Single episode but associated symptoms concerning and has new onset murmur.  Will check labs.  Normal xray.  EKG with nonspecific changes.  Recommend testing as below.  Of note, patient left after labs (was supposed to return to room).  Attempted to call to inform of plan but no answer.  RNs to continue to try to reach patient.   - Albumin Random Urine Quantitative with Creat Ratio  - TSH with free T4 reflex  - CBC with platelets and differential  - Ferritin  - Comprehensive metabolic panel (BMP + Alb, Alk Phos, ALT, AST, Total. Bili, TP)  - EKG 12-lead complete w/read - Clinics  - CARDIOLOGY EVAL ADULT REFERRAL; Future  - Zio Patch Holter 48 Hour Adult Pediatric; Future  - Echocardiogram Complete; Future  - Echocardiogram Dobutamine Stress; Future    Wheezing  Normal exam and xray.    - XR Chest 2 Views; Future    Undiagnosed cardiac murmurs  - TSH with free T4 reflex  - Ferritin  - EKG 12-lead complete w/read - Clinics  - CARDIOLOGY EVAL ADULT REFERRAL; Future  - Zio Patch Holter 48 Hour Adult Pediatric; Future  - Echocardiogram Complete; Future  - Echocardiogram Dobutamine Stress; Future    Nonspecific abnormal electrocardiogram (ECG) (EKG)  - CARDIOLOGY EVAL ADULT REFERRAL; Future    Anemia, unspecified type  As seen previously here and on labs from  as far back as 2013.  Normal colonoscopy in 2012.  Normal ferritin, iron studies.    - Iron and iron binding capacity  - Occult blood fecal HGB immuno; Future    Diagnosis or treatment significantly limited by social determinants of health - language/cultural barriers.  3minutes spent on the date of the encounter doing chart review, history and exam, documentation and further activities as noted above       Patient Instructions   -plan for outpatient stress test and heart monitor.    -call Miryam Strong to schedule:  [phone]    -slightly anemic.  Please complete stool sample ( from lab) when able to see if there is  "blood in the stool.  If so, we will have you do a colonoscopy.  If not, we will pursue further workup as to why you are anemic.        Return in about 2 weeks (around 3/31/2021) for Follow up.    BERE Bergeron CNP [facility] is a 70 year old who presents for the following health issues  accompanied by his  over phone:    HPI     Hypertension Follow-up  Heart was beating abnormal yesterday.      Do you check your blood pressure regularly outside of the clinic? No     Are you following a low salt diet? Yes    Are your blood pressures ever more than 140 on the top number (systolic) OR more   than 90 on the bottom number (diastolic), for example 140/90? Yes      How many servings of fruits and vegetables do you eat daily?  0-1    On average, how many sweetened beverages do you drink each day (Examples: soda, juice, sweet tea, etc.  Do NOT count diet or artificially sweetened beverages)?   0    How many days per week do you exercise enough to make your heart beat faster? 7    How many minutes a day do you exercise enough to make your heart beat faster? 10 - 19    How many days per week do you miss taking your medication? 0  First COVID vaccine on 3/4/21.  Yesterday, HR was very fast.  Made him feel very tired.  Felt short of breath. Felt \"off\".  No dizziness, headache.  Episode lasted  30-40 minutes.  Denies chest pain.  Better with snack, water and going to bed.  Occurred at 3 pm in afternoon.  Was worried about family situation at the time.    Denies upper respiratory tract infection symptoms.    Wheezing especially while laying down for last month.  Activity tolerance normal but breathing more labored      Review of Systems   Constitutional, HEENT, cardiovascular, pulmonary, GI, , musculoskeletal, neuro, skin, endocrine and psych systems are negative, except as otherwise noted.      Objective    /66 (BP Location: Left arm, Patient Position: " "Sitting, Cuff Size: Adult Regular)   Pulse 75   Temp 97.9  F (36.6  C) (Oral)   Ht 1.613 m (5' 3.5\")   Wt 72.2 kg (159 lb 2 oz)   SpO2 98%   BMI 27.75 kg/m    Body mass index is 27.75 kg/m .  Physical Exam   GENERAL: healthy, alert and no distress  EYES: Eyes grossly normal to inspection, PERRL and conjunctivae and sclerae normal  HENT: ear canals and TM's normal, nose and mouth without ulcers or lesions  NECK: no adenopathy, no asymmetry, masses, or scars and thyroid normal to palpation  RESP: lungs clear to auscultation - no rales, rhonchi or wheezes  CV: regular rate and rhythm, normal S1 S2, no S3 or S4, no murmur, click or rub, no peripheral edema and peripheral pulses strong  ABDOMEN: soft, nontender, no hepatosplenomegaly, no masses and bowel sounds normal  MS: no gross musculoskeletal defects noted, no edema  SKIN: no suspicious lesions or rashes  NEURO: Normal strength and tone, mentation intact and speech normal  PSYCH: mentation appears normal, affect normal/bright    EKG - Reviewed and interpreted by me appears normal, NSR, normal axis, normal intervals, no acute ST/T changes c/w ischemia, no LVH by voltage criteria, nonspecific ST-T changes which were not present on previous tracings from 2017  Results for orders placed or performed in visit on 03/17/21   Albumin Random Urine Quantitative with Creat Ratio     Status: Abnormal   Result Value Ref Range    Creatinine Urine 261 mg/dL    Albumin Urine mg/L 245 mg/L    Albumin Urine mg/g Cr 93.87 (H) 0 - 17 mg/g Cr   TSH with free T4 reflex     Status: None   Result Value Ref Range    TSH 0.64 0.40 - 4.00 mU/L   CBC with platelets and differential     Status: Abnormal   Result Value Ref Range    WBC 6.7 4.0 - 11.0 10e9/L    RBC Count 4.96 4.4 - 5.9 10e12/L    Hemoglobin 12.4 (L) 13.3 - 17.7 g/dL    Hematocrit 38.1 (L) 40.0 - 53.0 %    MCV 77 (L) 78 - 100 fl    MCH 25.0 (L) 26.5 - 33.0 pg    MCHC 32.5 31.5 - 36.5 g/dL    RDW 14.4 10.0 - 15.0 %    " Platelet Count 243 150 - 450 10e9/L    % Neutrophils 53.3 %    % Lymphocytes 29.8 %    % Monocytes 9.5 %    % Eosinophils 6.8 %    % Basophils 0.6 %    Absolute Neutrophil 3.6 1.6 - 8.3 10e9/L    Absolute Lymphocytes 2.0 0.8 - 5.3 10e9/L    Absolute Monocytes 0.6 0.0 - 1.3 10e9/L    Absolute Eosinophils 0.5 0.0 - 0.7 10e9/L    Absolute Basophils 0.0 0.0 - 0.2 10e9/L    Diff Method Automated Method    Ferritin     Status: None   Result Value Ref Range    Ferritin 69 26 - 388 ng/mL   Comprehensive metabolic panel (BMP + Alb, Alk Phos, ALT, AST, Total. Bili, TP)     Status: Abnormal   Result Value Ref Range    Sodium 138 133 - 144 mmol/L    Potassium 3.7 3.4 - 5.3 mmol/L    Chloride 107 94 - 109 mmol/L    Carbon Dioxide 25 20 - 32 mmol/L    Anion Gap 6 3 - 14 mmol/L    Glucose 107 (H) 70 - 99 mg/dL    Urea Nitrogen 22 7 - 30 mg/dL    Creatinine 1.44 (H) 0.66 - 1.25 mg/dL    GFR Estimate 48 (L) >60 mL/min/[1.73_m2]    GFR Estimate If Black 55 (L) >60 mL/min/[1.73_m2]    Calcium 9.6 8.5 - 10.1 mg/dL    Bilirubin Total 0.7 0.2 - 1.3 mg/dL    Albumin 3.9 3.4 - 5.0 g/dL    Protein Total 8.4 6.8 - 8.8 g/dL    Alkaline Phosphatase 85 40 - 150 U/L    ALT 52 0 - 70 U/L    AST 37 0 - 45 U/L   Iron and iron binding capacity     Status: None   Result Value Ref Range    Iron 82 35 - 180 ug/dL    Iron Binding Cap 287 240 - 430 ug/dL    Iron Saturation Index 29 15 - 46 %     No results found.

## 2021-03-18 LAB
IRON SATN MFR SERPL: 29 % (ref 15–46)
IRON SERPL-MCNC: 82 UG/DL (ref 35–180)
TIBC SERPL-MCNC: 287 UG/DL (ref 240–430)

## 2021-03-18 NOTE — TELEPHONE ENCOUNTER
This writer attempted to contact patient on 03/18/21      Reason for call results and left message.      If patient calls back:   Registered Nurse called. Follow Triage Call workflow        Krystle Jackson RN

## 2021-03-19 NOTE — TELEPHONE ENCOUNTER
Per CC'd chart sent to RN team today (3/19/21):    From: Ginna Mackey APRN CNP   Sent: 3/19/2021  11:14 AM CDT   To: [facility]     Can we try to reach out to this patient again to give him the plan as per patient instructions?  If he has any symptoms as he did the other day (dizziness, palpitation, chest pain, shortness of breath, etc), he should go to the emergency room.     Thanks!   Ginna

## 2021-03-19 NOTE — TELEPHONE ENCOUNTER
Called and spoke with patient via [employer] , #[phone].    Relayed all test results, as noted below from Ginna Mackey.   Patient is unable to write down scheduling number for [facility] to set up stress test and heart monitor, but is understanding to have this done.  Patient asked for writer to call daughter, Beba, and relay results and scheduling information.  Patient was understanding and agreed with all. He will plan to walk in to clinic tomorrow for lab  for occult blood stool test.  Patient denies having any chest pain, shortness of breath, heart palpitations, dizziness, weakness. Patient is understanding to go to the ED if he does develop any of these symptoms.    No questions from patient at this time.    Writer did call to patient's daughter, Gwen.  Relayed all results and provider plans to her as well. Gave Gwen scheduling number to call to set up visits for heart monitor and stress test at . Reviewed signs and symptoms with Gwen, regarding when patient may need to go to ED (chest pain or pressure, SOB, dizziness, weakness, blurred vision, numbness or tingling in extremities).  Gwen verbalized understanding and agreed with all, no questions at this time.    Trinidad Ornelas RN  [facility]

## 2021-03-19 NOTE — PROGRESS NOTES
Noted. Provider comments and instructions were copied and pasted into current TE from 3/17/21, sent to RN team for outreach attempt.    Trinidad Ornelas RN  [facility]

## 2021-03-20 DIAGNOSIS — D64.9 ANEMIA, UNSPECIFIED TYPE: ICD-10-CM

## 2021-03-20 DIAGNOSIS — I10 BENIGN ESSENTIAL HTN: ICD-10-CM

## 2021-03-20 LAB — HEMOCCULT STL QL IA: NEGATIVE

## 2021-03-20 PROCEDURE — 82274 ASSAY TEST FOR BLOOD FECAL: CPT | Performed by: NURSE PRACTITIONER

## 2021-03-22 RX ORDER — METOPROLOL SUCCINATE 25 MG/1
TABLET, EXTENDED RELEASE ORAL
Qty: 90 TABLET | Refills: 1 | OUTPATIENT
Start: 2021-03-22

## 2021-03-23 ENCOUNTER — ANCILLARY PROCEDURE (OUTPATIENT)
Dept: CARDIOLOGY | Facility: CLINIC | Age: 71
End: 2021-03-23
Attending: NURSE PRACTITIONER
Payer: COMMERCIAL

## 2021-03-23 DIAGNOSIS — R01.1 UNDIAGNOSED CARDIAC MURMURS: ICD-10-CM

## 2021-03-23 DIAGNOSIS — R00.2 PALPITATIONS: ICD-10-CM

## 2021-03-23 PROCEDURE — 93224 XTRNL ECG REC UP TO 48 HRS: CPT | Performed by: INTERNAL MEDICINE

## 2021-03-23 RX ORDER — LOSARTAN POTASSIUM 100 MG/1
TABLET ORAL
Qty: 90 TABLET | Refills: 1 | Status: SHIPPED | OUTPATIENT
Start: 2021-03-23 | End: 2021-09-15

## 2021-03-23 NOTE — PATIENT INSTRUCTIONS
Patient has been prescribed a ZioPatch holter for 2 days.  Patient was instructed regarding the indication, function, care and prompt return of the ZioPatch holter monitor. The monitor, with S/N [id number],  was placed on the patient with instructions regarding care of the skin, electrodes, and monitor, as well as documentation in the patient diary. Patient demonstrated understanding of this information and agreed to call [employer] with further questions or concerns.

## 2021-04-19 ENCOUNTER — ANCILLARY PROCEDURE (OUTPATIENT)
Dept: CARDIOLOGY | Facility: CLINIC | Age: 71
End: 2021-04-19
Attending: NURSE PRACTITIONER
Payer: COMMERCIAL

## 2021-04-19 DIAGNOSIS — R01.1 UNDIAGNOSED CARDIAC MURMURS: ICD-10-CM

## 2021-04-19 DIAGNOSIS — R00.2 PALPITATIONS: ICD-10-CM

## 2021-04-19 PROCEDURE — 93017 CV STRESS TEST TRACING ONLY: CPT | Performed by: STUDENT IN AN ORGANIZED HEALTH CARE EDUCATION/TRAINING PROGRAM

## 2021-04-19 PROCEDURE — 93016 CV STRESS TEST SUPVJ ONLY: CPT | Performed by: INTERNAL MEDICINE

## 2021-04-19 PROCEDURE — 93321 DOPPLER ECHO F-UP/LMTD STD: CPT | Performed by: STUDENT IN AN ORGANIZED HEALTH CARE EDUCATION/TRAINING PROGRAM

## 2021-04-19 PROCEDURE — 93352 ADMIN ECG CONTRAST AGENT: CPT | Performed by: STUDENT IN AN ORGANIZED HEALTH CARE EDUCATION/TRAINING PROGRAM

## 2021-04-19 PROCEDURE — 93325 DOPPLER ECHO COLOR FLOW MAPG: CPT | Performed by: STUDENT IN AN ORGANIZED HEALTH CARE EDUCATION/TRAINING PROGRAM

## 2021-04-19 PROCEDURE — 93018 CV STRESS TEST I&R ONLY: CPT | Performed by: STUDENT IN AN ORGANIZED HEALTH CARE EDUCATION/TRAINING PROGRAM

## 2021-04-19 PROCEDURE — 93350 STRESS TTE ONLY: CPT | Performed by: STUDENT IN AN ORGANIZED HEALTH CARE EDUCATION/TRAINING PROGRAM

## 2021-04-19 RX ADMIN — Medication 3 ML: at 15:39

## 2021-04-19 RX ADMIN — DOBUTAMINE HYDROCHLORIDE 10 MCG/KG/MIN: 200 INJECTION INTRAVENOUS at 15:36

## 2021-04-23 RX ORDER — METOPROLOL TARTRATE 1 MG/ML
5 INJECTION, SOLUTION INTRAVENOUS EVERY 5 MIN PRN
Status: ACTIVE | OUTPATIENT
Start: 2021-04-23

## 2021-04-23 RX ORDER — DOBUTAMINE HYDROCHLORIDE 200 MG/100ML
2.5-2 INJECTION INTRAVENOUS CONTINUOUS
Status: ACTIVE | OUTPATIENT
Start: 2021-04-23

## 2021-04-26 ENCOUNTER — TELEPHONE (OUTPATIENT)
Dept: FAMILY MEDICINE | Facility: CLINIC | Age: 71
End: 2021-04-26

## 2021-04-26 DIAGNOSIS — R94.39 ABNORMAL STRESS TEST: Primary | ICD-10-CM

## 2021-04-26 NOTE — TELEPHONE ENCOUNTER
Called patient via [employer]  service-    Patient notified of Provider's message as written.  Patient verbalized understanding.    Also connected patient with [facility]  while  was still on the line, to schedule an appointment with cardiology    Appointment scheduled for the next available that they have which is 5/21/2021 at 8:30 AM with Dr. Ramos, Eric Roe  [facility]   [address], [place] [address]  Ph. [phone]      Will route to provider to please advise- is it ok to wait until 5/21/2021?  Due to language barrier- Patient would also like the result letter to include the cardiology appointment date, time, and location address- will need to ask team to mail out a revised result letter to include this info once provider verifies if ok to wait until 5/21/2021    Myrtle Tabor RN  [facility]

## 2021-04-26 NOTE — TELEPHONE ENCOUNTER
Called patient via [employer]  service-  Advised, per provider, it is ok to wait until 5/21/2021 to see cardiology  Explained that we will mail out a result letter with this appointment information    Patient verbalized understanding.    Myrtle Tabor RN  [facility]

## 2021-04-26 NOTE — TELEPHONE ENCOUNTER
Please call patient to discuss results.  His stress test was not normal.  Should continue with plan to see cardiology.  Needs to schedule at [facility] as soon as able:  [phone].  Thanks!  Ginna      The above message was previously interpreted incorrectly by TC to be normal results so was forwarded to MA.  RNs, please attempt to reach patient again to notify results are NOT normal.

## 2021-04-26 NOTE — LETTER
April 26, 2021      Wolf Sen  [address]  [facility] [place] [facility] [address]            Dear Wolf Sen  [address]  [place] [address]        Dear ,    The Stress test was Not normal.  Should continue with plan to see cardiology.       Appointment scheduled for the next available that they have which is 5/21/2021 at 8:30 AM with Eric Kemp  [facility] [place]   [address], [place] [address]  Ph. [phone]    If further questions, feel free to reach us back at [phone].    Sincerely,        Ginna Mackey, CNP

## 2021-04-26 NOTE — TELEPHONE ENCOUNTER
Printed revised letter with indicated information and this will go out in tomorrow's mail.  Caterina Leary MA  [facility]  2nd Floor  Primary Care

## 2021-06-12 ENCOUNTER — HEALTH MAINTENANCE LETTER (OUTPATIENT)
Age: 71
End: 2021-06-12

## 2021-06-17 RX ORDER — INSULIN GLARGINE 100 [IU]/ML
INJECTION, SOLUTION SUBCUTANEOUS
Qty: 30 ML | Refills: 0 | Status: SHIPPED | OUTPATIENT
Start: 2021-06-17 | End: 2021-09-15

## 2021-06-17 NOTE — TELEPHONE ENCOUNTER
Routing refill request to provider for review/approval because:  Labs not current:  A1C    Keely ORTIZN, RN

## 2021-06-17 NOTE — LETTER
June 18, 2021    Wolf Sen  [address]  [place] [address]    Dear Wolf,       We recently received a refill request for LANTUS SOLOSTAR 100 UNIT/ML soln.  We have refilled this for a one time 30 day supply only because you are due for a:    Diabetes office visit and lab appointment      Please schedule this lab appointment 4-5 days prior to the office visit.     Please call at your earliest convenience so that there will not be a delay with your future refills.          Thank you,   Your [facility] Team/sp  [phone]

## 2021-07-21 DIAGNOSIS — E78.5 HYPERLIPIDEMIA LDL GOAL <70: ICD-10-CM

## 2021-07-21 DIAGNOSIS — I10 BENIGN ESSENTIAL HTN: ICD-10-CM

## 2021-07-21 RX ORDER — HYDRALAZINE HYDROCHLORIDE 25 MG/1
TABLET, FILM COATED ORAL
Qty: 180 TABLET | Refills: 1 | Status: SHIPPED | OUTPATIENT
Start: 2021-07-21 | End: 2021-12-17

## 2021-07-21 RX ORDER — HYDROCHLOROTHIAZIDE 25 MG/1
TABLET ORAL
Qty: 90 TABLET | Refills: 1 | Status: SHIPPED | OUTPATIENT
Start: 2021-07-21 | End: 2021-12-17

## 2021-07-21 RX ORDER — ATORVASTATIN CALCIUM 80 MG/1
TABLET, FILM COATED ORAL
Qty: 90 TABLET | Refills: 0 | Status: SHIPPED | OUTPATIENT
Start: 2021-07-21 | End: 2021-09-15

## 2021-07-21 RX ORDER — AMLODIPINE BESYLATE 10 MG/1
TABLET ORAL
Qty: 90 TABLET | Refills: 1 | Status: SHIPPED | OUTPATIENT
Start: 2021-07-21 | End: 2021-12-24

## 2021-07-21 NOTE — TELEPHONE ENCOUNTER
Routing refill request to provider for review/approval because:  Labs out of range:  cr  Sushma Valadez BSN, RN

## 2021-07-26 ENCOUNTER — PATIENT OUTREACH (OUTPATIENT)
Dept: GERIATRIC MEDICINE | Facility: CLINIC | Age: 71
End: 2021-07-26

## 2021-07-26 NOTE — PROGRESS NOTES
[facility] Care Coordination Contact      [facility] Six-Month Telephone Assessment    6 month telephone assessment completed on 7/26/21 with member and ketty language line Italian , Rob.     ER visits: No  Hospitalizations: No  TCU stays: No  Significant health status changes: N/A  Falls/Injuries: No  ADL/IADL changes: No  Changes in services: No    Caregiver Assessment follow up:  N/A    Goals: See POC in chart for goal progress documentation.  Member states that he has not started back to [facility] yet b/c of remaining socially distanced.   He hopes to go back in near future as he enjoyed going.     Will see member in 6 months for an annual health risk assessment.   Encouraged member to call CC with any questions or concerns in the meantime.     Viki Avalos RN, PHN  [facility]

## 2021-08-25 NOTE — TELEPHONE ENCOUNTER
Using  services, left voicemail message with pt's son to call and schedule an appointment.    Adelaida Schmitt, Patient Representative

## 2021-09-15 ENCOUNTER — OFFICE VISIT (OUTPATIENT)
Dept: FAMILY MEDICINE | Facility: CLINIC | Age: 71
End: 2021-09-15
Payer: COMMERCIAL

## 2021-09-15 VITALS
WEIGHT: 150 LBS | HEIGHT: 64 IN | HEART RATE: 68 BPM | DIASTOLIC BLOOD PRESSURE: 77 MMHG | OXYGEN SATURATION: 95 % | SYSTOLIC BLOOD PRESSURE: 135 MMHG | TEMPERATURE: 98.9 F | BODY MASS INDEX: 25.61 KG/M2

## 2021-09-15 DIAGNOSIS — R01.1 HEART MURMUR: ICD-10-CM

## 2021-09-15 DIAGNOSIS — E78.5 HYPERLIPIDEMIA LDL GOAL <70: ICD-10-CM

## 2021-09-15 DIAGNOSIS — E55.9 VITAMIN D DEFICIENCY: ICD-10-CM

## 2021-09-15 DIAGNOSIS — Z23 ENCOUNTER FOR IMMUNIZATION: ICD-10-CM

## 2021-09-15 DIAGNOSIS — R91.8 LUNG FIELD ABNORMAL FINDING ON EXAMINATION: ICD-10-CM

## 2021-09-15 DIAGNOSIS — R94.39 ABNORMAL STRESS TEST: ICD-10-CM

## 2021-09-15 DIAGNOSIS — N18.30 STAGE 3 CHRONIC KIDNEY DISEASE, UNSPECIFIED WHETHER STAGE 3A OR 3B CKD (H): ICD-10-CM

## 2021-09-15 DIAGNOSIS — I10 BENIGN ESSENTIAL HTN: ICD-10-CM

## 2021-09-15 DIAGNOSIS — M79.10 MYALGIA: ICD-10-CM

## 2021-09-15 DIAGNOSIS — I10 ESSENTIAL HYPERTENSION WITH GOAL BLOOD PRESSURE LESS THAN 140/90: ICD-10-CM

## 2021-09-15 LAB
ALBUMIN SERPL-MCNC: 4.1 G/DL (ref 3.4–5)
ALP SERPL-CCNC: 133 U/L (ref 40–150)
ALT SERPL W P-5'-P-CCNC: 40 U/L (ref 0–70)
ANION GAP SERPL CALCULATED.3IONS-SCNC: 5 MMOL/L (ref 3–14)
AST SERPL W P-5'-P-CCNC: 27 U/L (ref 0–45)
BASOPHILS # BLD AUTO: 0 10E3/UL (ref 0–0.2)
BASOPHILS NFR BLD AUTO: 0 %
BILIRUB SERPL-MCNC: 0.6 MG/DL (ref 0.2–1.3)
BUN SERPL-MCNC: 24 MG/DL (ref 7–30)
CALCIUM SERPL-MCNC: 9.7 MG/DL (ref 8.5–10.1)
CHLORIDE BLD-SCNC: 103 MMOL/L (ref 94–109)
CK SERPL-CCNC: 157 U/L (ref 30–300)
CO2 SERPL-SCNC: 32 MMOL/L (ref 20–32)
CREAT SERPL-MCNC: 1.56 MG/DL (ref 0.66–1.25)
EOSINOPHIL # BLD AUTO: 0.8 10E3/UL (ref 0–0.7)
EOSINOPHIL NFR BLD AUTO: 9 %
ERYTHROCYTE [DISTWIDTH] IN BLOOD BY AUTOMATED COUNT: 14.4 % (ref 10–15)
GFR SERPL CREATININE-BSD FRML MDRD: 43 ML/MIN/1.73M2
GLUCOSE BLD-MCNC: 89 MG/DL (ref 70–99)
HCT VFR BLD AUTO: 39.7 % (ref 40–53)
HGB BLD-MCNC: 13 G/DL (ref 13.3–17.7)
IMM GRANULOCYTES # BLD: 0 10E3/UL
IMM GRANULOCYTES NFR BLD: 0 %
LYMPHOCYTES # BLD AUTO: 2.7 10E3/UL (ref 0.8–5.3)
LYMPHOCYTES NFR BLD AUTO: 31 %
MCH RBC QN AUTO: 24.9 PG (ref 26.5–33)
MCHC RBC AUTO-ENTMCNC: 32.7 G/DL (ref 31.5–36.5)
MCV RBC AUTO: 76 FL (ref 78–100)
MONOCYTES # BLD AUTO: 0.8 10E3/UL (ref 0–1.3)
MONOCYTES NFR BLD AUTO: 9 %
NEUTROPHILS # BLD AUTO: 4.4 10E3/UL (ref 1.6–8.3)
NEUTROPHILS NFR BLD AUTO: 51 %
PLATELET # BLD AUTO: 293 10E3/UL (ref 150–450)
POTASSIUM BLD-SCNC: 3.8 MMOL/L (ref 3.4–5.3)
PROT SERPL-MCNC: 9.2 G/DL (ref 6.8–8.8)
RBC # BLD AUTO: 5.23 10E6/UL (ref 4.4–5.9)
SODIUM SERPL-SCNC: 140 MMOL/L (ref 133–144)
TSH SERPL DL<=0.005 MIU/L-ACNC: 1.21 MU/L (ref 0.4–4)
VIT B12 SERPL-MCNC: 1522 PG/ML (ref 193–986)
WBC # BLD AUTO: 8.7 10E3/UL (ref 4–11)

## 2021-09-15 PROCEDURE — 82607 VITAMIN B-12: CPT | Performed by: PREVENTIVE MEDICINE

## 2021-09-15 PROCEDURE — 82043 UR ALBUMIN QUANTITATIVE: CPT | Performed by: PREVENTIVE MEDICINE

## 2021-09-15 PROCEDURE — 85025 COMPLETE CBC W/AUTO DIFF WBC: CPT | Performed by: PREVENTIVE MEDICINE

## 2021-09-15 PROCEDURE — 84443 ASSAY THYROID STIM HORMONE: CPT | Performed by: PREVENTIVE MEDICINE

## 2021-09-15 PROCEDURE — 36415 COLL VENOUS BLD VENIPUNCTURE: CPT | Performed by: PREVENTIVE MEDICINE

## 2021-09-15 PROCEDURE — 82550 ASSAY OF CK (CPK): CPT | Performed by: PREVENTIVE MEDICINE

## 2021-09-15 PROCEDURE — 90662 IIV NO PRSV INCREASED AG IM: CPT | Performed by: PREVENTIVE MEDICINE

## 2021-09-15 PROCEDURE — 99215 OFFICE O/P EST HI 40 MIN: CPT | Mod: 25 | Performed by: PREVENTIVE MEDICINE

## 2021-09-15 PROCEDURE — 82306 VITAMIN D 25 HYDROXY: CPT | Performed by: PREVENTIVE MEDICINE

## 2021-09-15 PROCEDURE — G0008 ADMIN INFLUENZA VIRUS VAC: HCPCS | Performed by: PREVENTIVE MEDICINE

## 2021-09-15 PROCEDURE — 80053 COMPREHEN METABOLIC PANEL: CPT | Performed by: PREVENTIVE MEDICINE

## 2021-09-15 RX ORDER — LOSARTAN POTASSIUM 100 MG/1
100 TABLET ORAL DAILY
Qty: 90 TABLET | Refills: 1 | Status: SHIPPED | OUTPATIENT
Start: 2021-09-15 | End: 2022-06-14

## 2021-09-15 RX ORDER — METOPROLOL SUCCINATE 25 MG/1
TABLET, EXTENDED RELEASE ORAL
Qty: 90 TABLET | Refills: 1 | Status: SHIPPED | OUTPATIENT
Start: 2021-09-15 | End: 2022-06-14

## 2021-09-15 RX ORDER — ATORVASTATIN CALCIUM 80 MG/1
TABLET, FILM COATED ORAL
Qty: 90 TABLET | Refills: 1 | Status: SHIPPED | OUTPATIENT
Start: 2021-09-15 | End: 2022-06-13

## 2021-09-15 ASSESSMENT — PAIN SCALES - GENERAL: PAINLEVEL: SEVERE PAIN (7)

## 2021-09-15 ASSESSMENT — MIFFLIN-ST. JEOR: SCORE: 1328.46

## 2021-09-15 NOTE — PATIENT INSTRUCTIONS
Preventive Health Recommendations:     See your health care provider every year to    Review health changes.     Discuss preventive care.      Review your medicines if your doctor has prescribed any.      Talk with your health care provider about whether you should have a test to screen for prostate cancer (PSA).    Every 3 years, have a diabetes test (fasting glucose). If you are at risk for diabetes, you should have this test more often.    Every 5 years, have a cholesterol test. Have this test more often if you are at risk for high cholesterol or heart disease.     Every 10 years, have a colonoscopy. Or, have a yearly FIT test (stool test). These exams will check for colon cancer.    Talk to with your health care provider about screening for Abdominal Aortic Aneurysm if you have a family history of AAA or have a history of smoking.    Shots:     Get a flu shot each year.     Get a tetanus shot every 10 years.     Talk to your doctor about your pneumonia vaccines. There are now two you should receive - Pneumovax (PPSV 23) and Prevnar (PCV 13).     Talk to your pharmacist about a shingles vaccine.     Talk to your doctor about the hepatitis B vaccine.  Nutrition:     Eat at least 5 servings of fruits and vegetables each day.     Eat whole-grain bread, whole-wheat pasta and brown rice instead of white grains and rice.     Get adequate Calcium and Vitamin D.   Lifestyle    Exercise for at least 150 minutes a week (30 minutes a day, 5 days a week). This will help you control your weight and prevent disease.     Limit alcohol to one drink per day.     No smoking.     Wear sunscreen to prevent skin cancer.    See your dentist every six months for an exam and cleaning.    See your eye doctor every 1 to 2 years to screen for conditions such as glaucoma, macular degeneration, cataracts, etc.    Personalized Prevention Plan  You are due for the preventive services outlined below.  Your care team is available to assist you  in scheduling these services.  If you have already completed any of these items, please share that information with your care team to update in your medical record.  Health Maintenance Due   Topic Date Due     ANNUAL REVIEW OF HM ORDERS  Never done     Zoster (Shingles) Vaccine (2 of 3) 04/27/2016     Diabetic Foot Exam  10/15/2020     Eye Exam  11/04/2020     A1C Lab  05/27/2021     Flu Vaccine (1) 09/01/2021     Patient Education   Personalized Prevention Plan  You are due for the preventive services outlined below.  Your care team is available to assist you in scheduling these services.  If you have already completed any of these items, please share that information with your care team to update in your medical record.  Health Maintenance Due   Topic Date Due     ANNUAL REVIEW OF HM ORDERS  Never done     Zoster (Shingles) Vaccine (2 of 3) 04/27/2016     Diabetic Foot Exam  10/15/2020     Eye Exam  11/04/2020     A1C Lab  05/27/2021     Flu Vaccine (1) 09/01/2021

## 2021-09-15 NOTE — Clinical Note
Patient left without completion of Chest X ray, he needs to have that done. Could someone please call him to remind him. Thank you, Kimberly Bell MD MPH

## 2021-09-15 NOTE — PROGRESS NOTES
"  SUBJECTIVE:   CC: Wolf Sen is an 71 year old male who presents for preventative health visit.     {Split Bill scripting  The purpose of this visit is to discuss your medical history and prevent health problems before you are sick. You may be responsible for a co-pay, coinsurance, or deductible if your visit today includes services such as checking on a sore throat, having an x-ray or lab test, or treating and evaluating a new or existing condition :389739}  Patient has been advised of split billing requirements and indicates understanding: {Yes and No:941611}  HPI  {Add if <65 person on Medicare  - Required Questions (Optional):547867}  {Outside tests to abstract? :908808}    {additional problems to add (Optional):181059}    Today's PHQ-2 Score:   PHQ-2 ( 1999 Pfizer) 1/19/2021   Q1: Little interest or pleasure in doing things 0   Q2: Feeling down, depressed or hopeless 0   PHQ-2 Score 0       Abuse: Current or Past(Physical, Sexual or Emotional)- { :124747}  Do you feel safe in your environment? { :908732}        Social History     Tobacco Use     Smoking status: Never Smoker     Smokeless tobacco: Never Used   Substance Use Topics     Alcohol use: No     {Rooming Staff- Complete this question if Prescreen response is not shown below for today's visit. If you drink alcohol do you typically have >3 drinks per day or >7 drinks per week? (Optional):827429}    No flowsheet data found.{add AUDIT responses (Optional) (A score of 7 for adult men is an indication of hazardous drinking; a score of 8 or more is an indication of an alcohol use disorder.  A score of 7 or more for adult women is an indication of hazardous drinking or an alchohol use disorder):563398}    Last PSA: No results found for: PSA    Reviewed orders with patient. Reviewed health maintenance and updated orders accordingly - { :772929::\"Yes\"}  {Chronicprobdata (optional):755128}    Reviewed and updated as needed this visit by clinical staff         " "        Reviewed and updated as needed this visit by Provider                {HISTORY OPTIONS (Optional):253867}    Review of Systems  {MALE ROS (Optional):574606::\"CONSTITUTIONAL: NEGATIVE for fever, chills, change in weight\",\"INTEGUMENTARY/SKIN: NEGATIVE for worrisome rashes, moles or lesions\",\"EYES: NEGATIVE for vision changes or irritation\",\"ENT: NEGATIVE for ear, mouth and throat problems\",\"RESP: NEGATIVE for significant cough or SOB\",\"CV: NEGATIVE for chest pain, palpitations or peripheral edema\",\"GI: NEGATIVE for nausea, abdominal pain, heartburn, or change in bowel habits\",\" male: negative for dysuria, hematuria, decreased urinary stream, erectile dysfunction, urethral discharge\",\"MUSCULOSKELETAL: NEGATIVE for significant arthralgias or myalgia\",\"NEURO: NEGATIVE for weakness, dizziness or paresthesias\",\"PSYCHIATRIC: NEGATIVE for changes in mood or affect\"}    OBJECTIVE:   There were no vitals taken for this visit.    Physical Exam  {Exam Choices (Optional):644022}    {Diagnostic Test Results (Optional):235061::\"Diagnostic Test Results:\",\"Labs reviewed in Epic\"}    ASSESSMENT/PLAN:   {Diag Picklist:044657}    Patient has been advised of split billing requirements and indicates understanding: {YES / NO:399145::\"Yes\"}  COUNSELING:   {MALE COUNSELING MESSAGES:843107::\"Reviewed preventive health counseling, as reflected in patient instructions\"}    Estimated body mass index is 27.75 kg/m  as calculated from the following:    Height as of 3/17/21: 1.613 m (5' 3.5\").    Weight as of 3/17/21: 72.2 kg (159 lb 2 oz).     {Weight Management Plan (ACO) Complete if BMI is abnormal-  Ages 18-64  BMI >24.9.  Age 65+ with BMI <23 or >30 (Optional):179700}    He reports that he has never smoked. He has never used smokeless tobacco.      Counseling Resources:  ATP IV Guidelines  Pooled Cohorts Equation Calculator  FRAX Risk Assessment  ICSI Preventive Guidelines  Dietary Guidelines for Americans, 2010  USDA's MyPlate  ASA " Prophylaxis  Lung CA Screening    Kimberly Bell MD  [facility]

## 2021-09-15 NOTE — PROGRESS NOTES
Assessment & Plan     Uncontrolled type 2 diabetes mellitus with stage 3 chronic kidney disease, with long-term current use of insulin (H)  - REVIEW OF HEALTH MAINTENANCE PROTOCOL ORDERS  - Comprehensive metabolic panel  - TSH with free T4 reflex  - metFORMIN (GLUCOPHAGE) 1000 MG tablet  Dispense: 180 tablet; Refill: 1  - insulin glargine (LANTUS SOLOSTAR) 100 UNIT/ML pen  Dispense: 30 mL; Refill: 0  - Albumin Random Urine Quantitative with Creat Ratio  - Comprehensive metabolic panel  - TSH with free T4 reflex  - Albumin Random Urine Quantitative with Creat Ratio  -labs from today are pending     Lab Results   Component Value Date    A1C 6.7 11/27/2020    A1C 8.3 12/18/2019    A1C 11.5 09/20/2019    A1C 10.6 05/09/2019    A1C 8.0 01/11/2019         Hyperlipidemia LDL goal <70  -continue statin   - atorvastatin (LIPITOR) 80 MG tablet  Dispense: 90 tablet; Refill: 1    LDL Cholesterol Calculated   Date Value Ref Range Status   11/27/2020 93 <100 mg/dL Final     Comment:     Desirable:       <100 mg/dl     The 10-year ASCVD risk score (Whitzabrina JIMENEZ Jr., et al., 2013) is: 45.5%    Values used to calculate the score:      Age: 71 years      Sex: Male      Is Non- : No      Diabetic: Yes      Tobacco smoker: No      Systolic Blood Pressure: 135 mmHg      Is BP treated: Yes      HDL Cholesterol: 45 mg/dL      Total Cholesterol: 174 mg/dL    Essential hypertension with goal blood pressure less than 140/90  -at goal  -continue current medication     Abnormal stress test  -stress test done 4/21  -did not follow up with Cardiology due to concerns about cost and bills from testing     Benign essential HTN  -refills on medication provided   - metoprolol succinate ER (TOPROL-XL) 25 MG 24 hr tablet  Dispense: 90 tablet; Refill: 1  - losartan (COZAAR) 100 MG tablet  Dispense: 90 tablet; Refill: 1    Stage 3 chronic kidney disease, unspecified whether stage 3a or 3b CKD  -no use of NSAIDs    Lung field  "abnormal finding on examination  -crackles hears on exam  -patient did not get Chest X ray, will have office call him and remind him to come in for X ray   - XR Chest 2 Views    Myalgia  -await labs   - CBC with Platelets & Differential  - Comprehensive metabolic panel  - TSH with free T4 reflex  - Vitamin B12  - CK total  - CBC with Platelets & Differential  - Comprehensive metabolic panel  - TSH with free T4 reflex  - Vitamin B12  - CK total  -has lost some weight since last check  -needs close monitoring  -if initial labs are normal, consider multiple myeloma evaluation   -hemoglobin a little better than last check, microcytic anemia, ferritin and iron studies normal 3/21. Stool negative for blood. Colonoscopy in 2012.     Wt Readings from Last 2 Encounters:   09/15/21 68 kg (150 lb)   03/17/21 72.2 kg (159 lb 2 oz)       Encounter for immunization  - INFLUENZA, QUAD, HIGH DOSE, PF, 65YR + (FLUZONE HD)    Vitamin D deficiency  - Vitamin D Deficiency    Heart murmur  -noted on exam  -ECHO stress test done 4/21 and did show mild to moderate aortic regurgitation       Ordering of each unique test  Prescription drug management  40 minutes spent on the date of the encounter doing chart review, history and exam, documentation and further activities per the note       BMI:   Estimated body mass index is 26.15 kg/m  as calculated from the following:    Height as of this encounter: 1.613 m (5' 3.5\").    Weight as of this encounter: 68 kg (150 lb).       Return in about 4 weeks (around 10/13/2021) if symptoms worsen or fail to improve, and for a follow up.   Requesting completion of handicapped parking forms.   I also completed forms for insurance gift cards.     Kimberly Bell MD MPH    [facility] MORENO Bloom is a 71 year old who presents for the following health issues :    HPI       [place]ux[place]ish Interpretor used for visit     Diabetes Follow-up    How often are you checking your blood sugar? " One time daily  What time of day are you checking your blood sugars (select all that apply)?  Before meals  Have you had any blood sugars above 200?  No  Have you had any blood sugars below 70?  No    What symptoms do you notice when your blood sugar is low?  None    What concerns do you have today about your diabetes? None    Have you had a diabetic eye exam in the last 12 months? No    Hyperlipidemia Follow-Up      Are you regularly taking any medication or supplement to lower your cholesterol?   Yes- statin    Are you having muscle aches or other side effects that you think could be caused by your cholesterol lowering medication?  No    Hypertension Follow-up      Do you check your blood pressure regularly outside of the clinic? Yes     Are you following a low salt diet? Yes    Are your blood pressures ever more than 140 on the top number (systolic) OR more   than 90 on the bottom number (diastolic), for example 140/90? No    BP Readings from Last 2 Encounters:   09/15/21 135/77   03/17/21 112/66     Hemoglobin A1C (%)   Date Value   11/27/2020 6.7 (H)   12/18/2019 8.3 (H)     LDL Cholesterol Calculated (mg/dL)   Date Value   11/27/2020 93   05/09/2019 144 (H)     LDL Cholesterol Direct (mg/dL)   Date Value   12/18/2019 123 (H)   10/15/2019 129 (H)       Chronic Kidney Disease Follow-up      Do you take any over the counter pain medicine?: No      Does not feel very good  Aching in the limbs and joints  Not sleeping well  Decreased appetite  Has lost some weight  Has nausea with meals  Symptoms for the last few weeks    Abnormal stress test:  -did not go due to cost reasons  -did not see Cardiology due to cost and the bill he got  -has a bill for the Holter monitor that he still has to pay so does not want to incur any further costs     Wt Readings from Last 2 Encounters:   09/15/21 68 kg (150 lb)   03/17/21 72.2 kg (159 lb 2 oz)       Review of Systems   Constitutional, HEENT, cardiovascular, pulmonary, gi and  "gu systems are negative, except as otherwise noted.      Objective    /77 (BP Location: Left arm, Patient Position: Chair, Cuff Size: Adult Regular)   Pulse 68   Temp 98.9  F (37.2  C) (Tympanic)   Ht 1.613 m (5' 3.5\")   Wt 68 kg (150 lb)   SpO2 95%   BMI 26.15 kg/m    Body mass index is 26.15 kg/m .  Physical Exam   GENERAL APPEARANCE: healthy, alert and no distress  EYES: Eyes grossly normal to inspection and conjunctivae and sclerae normal  NECK:  trachea midline and normal to palpation  RESP: crackles+ left lung base+  CV: regular rates and rhythm, normal S1 S2, Grade 3/6 systolic murmur+   ABDOMEN: soft, non-tender and no rebound or guarding   MS: extremities normal- no gross deformities noted and peripheral pulses normal  SKIN: no suspicious lesions or rashes  NEURO: Normal strength and tone, mentation intact and speech normal  PSYCH: mentation appears normal  Diabetic foot exam: normal DP and PT pulses, no trophic changes or ulcerative lesions and normal sensory exam      Results for orders placed or performed in visit on 09/15/21 (from the past 24 hour(s))   CBC with Platelets & Differential    Narrative    The following orders were created for panel order CBC with Platelets & Differential.  Procedure                               Abnormality         Status                     ---------                               -----------         ------                     CBC with platelets and d...[[id number]]  Abnormal            Final result                 Please view results for these tests on the individual orders.   CBC with platelets and differential   Result Value Ref Range    WBC Count 8.7 4.0 - 11.0 10e3/uL    RBC Count 5.23 4.40 - 5.90 10e6/uL    Hemoglobin 13.0 (L) 13.3 - 17.7 g/dL    Hematocrit 39.7 (L) 40.0 - 53.0 %    MCV 76 (L) 78 - 100 fL    MCH 24.9 (L) 26.5 - 33.0 pg    MCHC 32.7 31.5 - 36.5 g/dL    RDW 14.4 10.0 - 15.0 %    Platelet Count 293 150 - 450 10e3/uL    % Neutrophils 51 %    % " Lymphocytes 31 %    % Monocytes 9 %    % Eosinophils 9 %    % Basophils 0 %    % Immature Granulocytes 0 %    Absolute Neutrophils 4.4 1.6 - 8.3 10e3/uL    Absolute Lymphocytes 2.7 0.8 - 5.3 10e3/uL    Absolute Monocytes 0.8 0.0 - 1.3 10e3/uL    Absolute Eosinophils 0.8 (H) 0.0 - 0.7 10e3/uL    Absolute Basophils 0.0 0.0 - 0.2 10e3/uL    Absolute Immature Granulocytes 0.0 <=0.0 10e3/uL

## 2021-09-16 ENCOUNTER — ANCILLARY PROCEDURE (OUTPATIENT)
Dept: GENERAL RADIOLOGY | Facility: CLINIC | Age: 71
End: 2021-09-16
Attending: PREVENTIVE MEDICINE
Payer: COMMERCIAL

## 2021-09-16 ENCOUNTER — TELEPHONE (OUTPATIENT)
Dept: FAMILY MEDICINE | Facility: CLINIC | Age: 71
End: 2021-09-16

## 2021-09-16 DIAGNOSIS — R91.8 LUNG FIELD ABNORMAL FINDING ON EXAMINATION: ICD-10-CM

## 2021-09-16 LAB
CREAT UR-MCNC: 128 MG/DL
DEPRECATED CALCIDIOL+CALCIFEROL SERPL-MC: 28 UG/L (ref 20–75)
MICROALBUMIN UR-MCNC: 38 MG/L
MICROALBUMIN/CREAT UR: 29.69 MG/G CR (ref 0–17)

## 2021-09-16 PROCEDURE — 71046 X-RAY EXAM CHEST 2 VIEWS: CPT | Performed by: RADIOLOGY

## 2021-09-16 PROCEDURE — 71046 X-RAY EXAM CHEST 2 VIEWS: CPT

## 2021-09-16 NOTE — TELEPHONE ENCOUNTER
Forms for disability parking have been received, completed by provider.     Forms are mailed to pt, copied for abstracting, and one copy for our records.    Thank you,  Adelaida Schmitt,   [facility]

## 2021-09-17 ENCOUNTER — TELEPHONE (OUTPATIENT)
Dept: FAMILY MEDICINE | Facility: CLINIC | Age: 71
End: 2021-09-17

## 2021-09-17 DIAGNOSIS — J18.9 PNEUMONIA OF LEFT UPPER LOBE DUE TO INFECTIOUS ORGANISM: Primary | ICD-10-CM

## 2021-09-17 RX ORDER — AZITHROMYCIN 250 MG/1
TABLET, FILM COATED ORAL
Qty: 6 TABLET | Refills: 0 | Status: SHIPPED | OUTPATIENT
Start: 2021-09-17 | End: 2021-09-22

## 2021-09-17 RX ORDER — AMOXICILLIN AND CLAVULANATE POTASSIUM 562.5; 437.5; 62.5 MG/1; MG/1; MG/1
2 TABLET, MULTILAYER, EXTENDED RELEASE ORAL 2 TIMES DAILY
Qty: 20 TABLET | Refills: 0 | Status: SHIPPED | OUTPATIENT
Start: 2021-09-17 | End: 2021-09-22

## 2021-09-17 NOTE — TELEPHONE ENCOUNTER
This dosage is for treatment of community acquired pneumonia in the out patient setting in a patient over 65 years with co morbidities. This is per Up to Date:    Augmentin XR: Amoxicillin 1000 mg and clavulanate potassium 62.5 mg   Extended release: 2 g twice daily as part of an appropriate combination regimen (ATS/IDSA [[place] 2019]).     Thank you,    Kimberly Bell MD MPH

## 2021-09-17 NOTE — TELEPHONE ENCOUNTER
----- Message from Kimberly Bell MD sent at 9/17/2021  9:33 AM CDT -----  Please CALL patient with Stan interpretor: (I will send My Chart message too)     Dear Wolf Sen,    Chest X ray is showing a changes in the upper part of the left lung consistent with Pneumonia.   I have sent in 2 antibiotics to your pharmacy for this. Very important to take as prescribed.  Please follow up with me in 48-72 hours, if in person not available then Video visit.     Regards,    Kimberly Bell MD MPH

## 2021-09-17 NOTE — RESULT ENCOUNTER NOTE
Please CALL patient with Japanese interpretor: (I will send My Chart message too)     Dear Wolf Sen,    Chest X ray is showing a changes in the upper part of the left lung consistent with Pneumonia.   I have sent in 2 antibiotics to your pharmacy for this. Very important to take as prescribed.  Please follow up with me in 48-72 hours, if in person not available then Video visit.     Regards,    Kimberly Bell MD MPH

## 2021-09-17 NOTE — TELEPHONE ENCOUNTER
Please confirm. Want 1000 mg XR4 tabs a day dosing for this 72 year old patient?            [facility]

## 2021-09-17 NOTE — TELEPHONE ENCOUNTER
Spoke with patient via Stan . Patient given result note as below. Patient verbalized understanding      Lexii ORTIZN, RN

## 2021-09-17 NOTE — TELEPHONE ENCOUNTER
Pharmacist wanted to confirm that provider wanted the Augmentin xr 1000-62.5 tablets.  This would be a high dose for a 72 year old so they want to confirm this is dose wanted.  Susmha Valadez BSN, RN

## 2021-09-20 NOTE — RESULT ENCOUNTER NOTE
Wolf,     Urine sample is showing less abnormal protein.  Vitamin D levels are normal.  Vitamin B 12 levels are not low.  Thyroid function is normal.  Electrolytes, glucose and liver function tests are normal.  Kidney function is low. Avoid Ibuprofen, Advil and Aleve.  Blood counts are low but a little improved from last check.  Please follow up in 2-4 weeks so I can recheck your weight and follow up on the recent pneumonia.     Please do not hesitate to call us at [phone] if you have any questions or concerns.    Thank you,    Kimberly Bell MD MPH

## 2021-09-21 DIAGNOSIS — J18.9 PNEUMONIA OF LEFT UPPER LOBE DUE TO INFECTIOUS ORGANISM: ICD-10-CM

## 2021-09-21 NOTE — TELEPHONE ENCOUNTER
Left voicemail message with pt's son (concsent to discuss all JESSICA) that we called - unable to leave voicemail, only text with our number, per voicemail setup.    Signing and done-ing to clear queue, per Janine, 9/21/21.    Adelaida Schmitt,   [facility]

## 2021-09-21 NOTE — TELEPHONE ENCOUNTER
Patient has upcoming appointment tomorrow with Dr Kimberly Bell.   Will postpone message to be certain it is addressed by provider.   Anna Lozano RN

## 2021-09-21 NOTE — TELEPHONE ENCOUNTER
Reason for call:  Medication   If this is a refill request, has the caller requested the refill from the pharmacy already? No  Will the patient be using a [facility]? No  Name of the pharmacy and phone number for the current request: [facility]     Name of the medication requested: SEE BELOW     Other request: N/A    Phone number to reach patient:  Cell number on file:    Telephone Information:   Mobile [phone]       Best Time:  ANY    Can we leave a detailed message on this number?  YES    Travel screening: Not Applicable

## 2021-09-22 ENCOUNTER — VIRTUAL VISIT (OUTPATIENT)
Dept: FAMILY MEDICINE | Facility: CLINIC | Age: 71
End: 2021-09-22
Payer: COMMERCIAL

## 2021-09-22 ENCOUNTER — TELEPHONE (OUTPATIENT)
Dept: FAMILY MEDICINE | Facility: CLINIC | Age: 71
End: 2021-09-22

## 2021-09-22 DIAGNOSIS — N18.30 STAGE 3 CHRONIC KIDNEY DISEASE, UNSPECIFIED WHETHER STAGE 3A OR 3B CKD (H): ICD-10-CM

## 2021-09-22 DIAGNOSIS — J18.9 PNEUMONIA OF LEFT UPPER LOBE DUE TO INFECTIOUS ORGANISM: Primary | ICD-10-CM

## 2021-09-22 PROCEDURE — 99213 OFFICE O/P EST LOW 20 MIN: CPT | Mod: 95 | Performed by: PREVENTIVE MEDICINE

## 2021-09-22 NOTE — PROGRESS NOTES
"Wolf is a 71 year old who is being evaluated via a billable telephone visit.      What phone number would you like to be contacted at? Home number   How would you like to obtain your AVS? MyChart    Assessment & Plan     Pneumonia of left upper lobe due to infectious organism  -I had prescribed Augmentin and a macrolide for out patient management of pneumonia  -looks like only got Azithromycin from the pharmacy.  -will have office contact pharmacy to make sure patient got both medications.  -feeling much better  -follow up in 4 weeks  -will repeat Chest X ray then and orthostatic blood pressures due to some dizziness when getting up from sitting, orthostatic precautions reviewed      Stage 3 chronic kidney disease, unspecified whether stage 3a or 3b CKD  -avoid NSAIDs    20 minutes spent on the date of the encounter doing chart review, history and exam, documentation and further activities per the note       BMI:   Estimated body mass index is 26.15 kg/m  as calculated from the following:    Height as of 9/15/21: 1.613 m (5' 3.5\").    Weight as of 9/15/21: 68 kg (150 lb).       Return in about 4 weeks (around 10/20/2021) for Follow up, with me, in person.    Kimberly Bell MD MPH    [facility]    Subjective   Wolf is a 71 year old who presents for the following health issues :    HPI       Visit done with [place]oth[place] Interpretor       Follow up on Pneumonia:  -Feels much better  -No chest pain  -Cough much better  -no shortness of breath or problems breathing  -no wheezing  -No fever  -No emesis  -No diarrhea  -no abdominal pain  -looks like he only took the Azithromycin and not the Augmentin for treatment of pneumonia.     Blood pressure readings have been normal 130 systolic   Some dizziness when getting up from a sitting to standing position.   No falls       Chest X Ray 9/16/21:                                                                   IMPRESSION: Left upper lobe lung opacity likely " represents pneumonia.  Continued radiographic follow-up until complete resolution  recommended.          Review of Systems   Constitutional, HEENT, cardiovascular, pulmonary, gi and gu systems are negative, except as otherwise noted.      Objective           Vitals:  No vitals were obtained today due to virtual visit.    Physical Exam   healthy, alert and no distress  PSYCH: Alert and oriented times 3; coherent speech, normal   rate and volume, able to articulate logical thoughts, able   to abstract reason, no tangential thoughts, no hallucinations   or delusions  His affect is normal  RESP: No cough, no audible wheezing, able to talk in full sentences  Remainder of exam unable to be completed due to telephone visits          Phone call duration: 16 minutes

## 2021-09-22 NOTE — TELEPHONE ENCOUNTER
Called the Pharmacy and they received both medication but patient only picked up one of them up. Pharmacy is going to reach out to the patient and have him  the other medication.    Deisy Castañeda RN      Provider please close encounter upon review      Kimberly Bell MD  [facility]  I had sent in 2 antibiotics on 9/17/21 for pneumonia. Seems patient only got one (Azithromycin). Please reach out to his pharmacy to make sure they got the orders and they provide him the script. Thank you . Kimberly Bell MD MPH

## 2021-09-23 RX ORDER — AZITHROMYCIN 250 MG/1
TABLET, FILM COATED ORAL
Qty: 6 TABLET | Refills: 0 | OUTPATIENT
Start: 2021-09-23 | End: 2021-09-28

## 2021-09-23 NOTE — TELEPHONE ENCOUNTER
Refill not indicated. This was prescribed for treatment of pneumonia and is not a chronic medication.   Kimberly Bell MD MPH

## 2021-10-26 DIAGNOSIS — E78.5 HYPERLIPIDEMIA LDL GOAL <70: ICD-10-CM

## 2021-10-27 RX ORDER — ATORVASTATIN CALCIUM 80 MG/1
TABLET, FILM COATED ORAL
Qty: 90 TABLET | Refills: 1 | OUTPATIENT
Start: 2021-10-27

## 2021-11-09 NOTE — PROGRESS NOTES
"  Assessment & Plan     Pneumonia of left upper lobe due to infectious organism  He was initially diagnosed with this on September 16  At the point of time x-ray showed left upper lobe infiltrate  He presented with some dizziness and weakness  He did not initially  one of the 2 antibiotics  He picked up later that her antibiotic and use it but has his weakness got worse he was taken to [facility]  There he was found to have the persisting left upper lobe infiltrate  He was discharged on doxycycline and Omnicef  He is still feeling very weak  Dizziness has improved a little bit  On clinical examination today I hear bronchial breathing on the left upper lobe and also a lot of crackles in the bases  I am concerned that the pneumonia is not resolving  Other differential diagnosis include aspiration but he denies any choking on food  We also have to make sure there is no obstruction  I am concerned that this might not will be pneumonia in the first place  We will proceed with CT chest without contrast for further information  - CT Chest w/o Contrast; Future    Stage 3 chronic kidney disease, unspecified whether stage 3a or 3b CKD (H)  Creatinine stable at around 1.5 which is his baseline    Benign essential HTN  Blood pressure controlled  He does get dizzy at times when he stands up  Asked him to do this slowly        25 minutes spent on the date of the encounter doing chart review, history and exam, documentation and further activities per the note       BMI:   Estimated body mass index is 25.63 kg/m  as calculated from the following:    Height as of 9/15/21: 1.613 m (5' 3.5\").    Weight as of this encounter: 66.7 kg (147 lb).           Return in about 1 week (around 11/19/2021).    Madhu Mendieta MD  [facility]    Klaus Bloom is a 71 year old who presents for the following health issues     HPI       Hospital Follow-up Visit:    Hospital/Nursing Home/IP Rehab Facility: " [facility]   Date of Admission: 11/4/21  Date of Discharge: 11/4/21  Reason(s) for Admission: Pneumonia      Was your hospitalization related to COVID-19? No   Problems taking medications regularly:  None  Medication changes since discharge: None  Problems adhering to non-medication therapy:  None    Summary of hospitalization:  CareEverywhere information obtained and reviewed  Diagnostic Tests/Treatments reviewed.  Follow up needed: next week  Other Healthcare Providers Involved in Patient s Care:         None  Update since discharge: fluctuating course.       Post Discharge Medication Reconciliation: discharge medications reconciled, continue medications without change.  Plan of care communicated with patient and family                  Review of Systems   Constitutional, HEENT, cardiovascular, pulmonary, gi and gu systems are negative, except as otherwise noted.      Objective    BP (!) 140/80 (BP Location: Left arm, Patient Position: Sitting, Cuff Size: Adult Regular)   Pulse 77   Temp 98.6  F (37  C) (Tympanic)   Wt 66.7 kg (147 lb)   SpO2 98%   BMI 25.63 kg/m    Body mass index is 25.63 kg/m .  Physical Exam   GENERAL: healthy, alert and no distress  NECK: no adenopathy, no asymmetry, masses, or scars and thyroid normal to palpation  RESP: Bronchial breathing left upper lobe  Crackles throughout the chest on left side  CV: regular rate and rhythm, normal S1 S2, no S3 or S4, no murmur, click or rub, no peripheral edema and peripheral pulses strong  ABDOMEN: soft, nontender, no hepatosplenomegaly, no masses and bowel sounds normal  MS: no gross musculoskeletal defects noted, no edema

## 2021-11-09 NOTE — PATIENT INSTRUCTIONS
At [facility], we strive to deliver an exceptional experience to you, every time we see you. If you receive a survey, please complete it as we do value your feedback.  If you have MyChart, you can expect to receive results automatically within 24 hours of their completion.  Your provider will send a note interpreting your results as well.   If you do not have MyChart, you should receive your results in about a week by mail.    Your care team:                            Family Medicine Internal Medicine   MD Isidro Ventura MD Shantel Branch-Fleming, MD Srinivasa Vaka, MD Katya Belousova, PARUBEN Vernon, APRN CNP    Carlos Tomlinson, MD Pediatrics   Jamison Aguilar, PARUBEN Mackey, CNP MD Jennifer Bates APRN CNP   MD Apoorva Jack MD Deborah Mielke, MD Dominique Wallace, APRN [facility]      Clinic hours: Monday - Thursday 7 am-6 pm; Fridays 7 am-5 pm.   Urgent care: Monday - Friday 10 am- 8 pm; Saturday and Sunday 9 am-5 pm.    Clinic: [phone]       [facility]: Monday - Thursday 8 am - 7 pm; Friday 8 am - 6 pm  [facility]: [phone]     Use www.oncare.org for 24/7 diagnosis and treatment of dozens of conditions.    Patient Education       Pneumonia (Adult)  Pneumonia is an infection deep in the lungs. It is in the small air sacs (alveoli). It may be caused by a virus, fungus, or bacteria. Pneumonia caused by bacteria is often treated with an antibiotic. Severe cases may need to be treated in the hospital. Milder cases can be treated at home. Pneumonia symptoms are a lot like flu symptoms. They include fever, cough (dry or with phlegm), headache, muscle weakness, and pain. These symptoms often get worse in the first 2 days. But they often start to get better in the first week of treatment.    Home care  Follow these guidelines when caring for yourself at home:    Get  plenty of rest. Don t let yourself get overly tired when you go back to your activities. Participate in activities as directed by your healthcare provider.    Stop smoking. This is the most important step you can take to help treat pneumonia. If you need help stopping smoking, talk with your healthcare provider.    Stay away from smoke and other irritants. Stay away from secondhand smoke. Don t let anyone smoke in your home.    Prevent lung infections. Ask your healthcare provider about the flu and pneumonia vaccines. Take steps to prevent colds and other lung infections.    Practice correct handwashing. Wash your hands often with soap and water. Use hand  when you can t wash your hands. Stay away from crowds during cold and flu season.    Use pain medicine as directed. You may use acetaminophen or ibuprofen to control fever or pain, unless another medicine was prescribed. If you have chronic liver or kidney disease, talk with your healthcare provider before using these medicines. Also talk with your provider if you ve had a stomach ulcer or GI (gastrointestinal) bleeding. Don t give aspirin to a child younger than age 19 unless directed by the provider. Taking aspirin can put a child at risk for Reye syndrome. This is a rare but very serious disorder. It most often affects the brain and the liver.    Eat a light diet as needed. You may not feel like eating, so a light diet is fine. Follow the treatment plan as advised by your healthcare provider.    Drink plenty of water and fluids. This can make mucus thinner and easier to cough up. Ask your healthcare provider how much water you should drink. For many people, 6 to 8 glasses (8 ounces each) a day is a good goal. Other fluids include sport drinks, sodas without caffeine, juices, tea, or soup. If you also have heart or kidney disease, check with your provider before you drink extra fluids.    Finish all prescription medicine. Take antibiotic or antiviral  medicine as prescribed by your healthcare provider, even if you are feeling better after a few days. Take the medicine until it is all gone.    Try to stay away from air pollution. If you live in an area with air pollution, track the Air Quality Index (AQI) reports and plan your outdoor activities with the AQI recommendations in mind  Follow-up care  Follow up with your healthcare provider in the next 2 to 3 days, or as advised. This is to be sure the medicine is helping you get better.  If you are 65 or older, you should get a pneumococcal vaccine and a yearly flu (influenza) shot. You should also get these vaccines if you have chronic lung disease such as asthma, emphysema, or COPD. A second type of pneumonia vaccine is also available for people over age 65 and those younger than 65 with certain health conditions. Talk with your healthcare provider about which pneumococcal vaccine is best for you.  Call 911  Call 911 if any of these occur:    Unable to speak or swallow    Lips or skin looks blue, purple, or gray    Feeling dizzy or faint    Unable to wake up or loss of consciousness    Feeling of doom    Trouble breathing or wheezing    Shortness of breath gets worse or doesn't get better with treatment    Rapid breathing (more than 25 breaths per minute)    Coughing up blood    Chest pain gets worse with breathing or doesn't get better with treatment  When to get medical advice  Call your healthcare provider right away if any of these occur:    You don t get better in the first 2 days of treatment    Fever of 100.4 F (38 C) or higher, or as directed by your healthcare provider    Shaking chills    Cough with phlegm that doesn't get better, or get worse    Shortness of breath with activities    Weakness, dizziness, or fainting that gets worse    Thirst or dry mouth that gets worse    Sinus pain, headache, or a stiff neck    Chest pain with breathing or coughing    Symptoms that get worse or not improving  [employer]  last reviewed this educational content on 11/1/2019 2000-2021 [employer]. All rights reserved. This information is not intended as a substitute for professional medical care. Always follow your healthcare professional's instructions.

## 2021-11-12 ENCOUNTER — OFFICE VISIT (OUTPATIENT)
Dept: FAMILY MEDICINE | Facility: CLINIC | Age: 71
End: 2021-11-12
Payer: COMMERCIAL

## 2021-11-12 VITALS
HEART RATE: 77 BPM | WEIGHT: 147 LBS | DIASTOLIC BLOOD PRESSURE: 80 MMHG | OXYGEN SATURATION: 98 % | BODY MASS INDEX: 25.63 KG/M2 | TEMPERATURE: 98.6 F | SYSTOLIC BLOOD PRESSURE: 140 MMHG

## 2021-11-12 DIAGNOSIS — J18.9 PNEUMONIA OF LEFT UPPER LOBE DUE TO INFECTIOUS ORGANISM: Primary | ICD-10-CM

## 2021-11-12 DIAGNOSIS — N18.30 STAGE 3 CHRONIC KIDNEY DISEASE, UNSPECIFIED WHETHER STAGE 3A OR 3B CKD (H): ICD-10-CM

## 2021-11-12 DIAGNOSIS — I10 BENIGN ESSENTIAL HTN: ICD-10-CM

## 2021-11-12 PROCEDURE — 99214 OFFICE O/P EST MOD 30 MIN: CPT | Performed by: INTERNAL MEDICINE

## 2021-11-12 RX ORDER — CEFDINIR 300 MG/1
300 CAPSULE ORAL
COMMUNITY
Start: 2021-11-04 | End: 2021-11-14

## 2021-11-12 RX ORDER — DOXYCYCLINE HYCLATE 100 MG
100 TABLET ORAL
COMMUNITY
Start: 2021-11-04 | End: 2021-11-14

## 2021-11-19 ENCOUNTER — NURSE TRIAGE (OUTPATIENT)
Dept: NURSING | Facility: CLINIC | Age: 71
End: 2021-11-19
Payer: COMMERCIAL

## 2021-11-19 NOTE — TELEPHONE ENCOUNTER
Pt has already been scheduled for office visit on 11/24 with Ray.     Would you like a virtual visit scheduled in addition to the upcoming visit in person?    Please advise.    Adelaida Schmitt,   [facility]

## 2021-11-19 NOTE — TELEPHONE ENCOUNTER
Please schedule a Telephone visit to discuss concerns. I also want to be sure that Wolf is scheduled for a CT Scan of the chest as ordered by Dr. Mendieta previously.  Thank you,  Kimberyl Bell MD MPH

## 2021-11-19 NOTE — TELEPHONE ENCOUNTER
Telephone Call:     Pt's daughter calling to confirm timing and medications doses and instructions. Writer went thru medications with daughter, but she has questions that writer can not confirm. Writer asked her to call patient's pharmacy to discuss any contraindications and timing questions. Daughter is also asking for a call back from pt's PCP. Pt is with her in [place] right now and she is concerned that he isn't taking his medications appropriately.     Routing message to PCP. Please call daughter to discuss her questions.               Reason for Disposition    Caller has NON-URGENT medication question about med that PCP prescribed and triager unable to answer question    Additional Information    Negative: Drug overdose and triager unable to answer question    Negative: Caller requesting information unrelated to medicine    Negative: Caller requesting a prescription for Strep throat and has a positive culture result    Negative: Rash while taking a medication or within 3 days of stopping it    Negative: Immunization reaction suspected    Negative: Asthma and having symptoms of asthma (cough, wheezing, etc.)    Negative: Breastfeeding questions about mother's medicines and diet    Negative: MORE THAN A DOUBLE DOSE of a prescription or over-the-counter (OTC) drug    Negative: DOUBLE DOSE (an extra dose or lesser amount) of over-the-counter (OTC) drug and any symptoms (e.g., dizziness, nausea, pain, sleepiness)    Negative: DOUBLE DOSE (an extra dose or lesser amount) of prescription drug and any symptoms (e.g., dizziness, nausea, pain, sleepiness)    Negative: Took another person's prescription drug    Negative: DOUBLE DOSE (an extra dose or lesser amount) of prescription drug and NO symptoms (Exception: a double dose of antibiotics)    Negative: Diabetes drug error or overdose (e.g., took wrong type of insulin or took extra dose)    Negative: Caller has medication question about med not prescribed by PCP  and triager unable to answer question (e.g., compatibility with other med, storage)    Negative: Request for URGENT new prescription or refill of 'essential' medication (i.e., likelihood of harm to patient if not taken) and triager unable to fill per department policy    Negative: Prescription not at pharmacy and was prescribed today by PCP    Negative: Pharmacy calling with prescription questions and triager unable to answer question    Negative: Caller has urgent medication question about med that PCP prescribed and triager unable to answer question    Protocols used: MEDICATION QUESTION CALL-A-OH

## 2021-11-22 NOTE — PROGRESS NOTES
Assessment & Plan     Uncontrolled type 2 diabetes mellitus with stage 3 chronic kidney disease, with long-term current use of insulin (H)  -reviewed medications with the patient in detail  -he has brought his medication bottles with him and we went over each one   - Hemoglobin A1c  - Lipid panel reflex to direct LDL Non-fasting  -A1c is at goal today  -renal function is pending, ,may need to renally dose the Metformin       Lab Results   Component Value Date    A1C 7.0 11/24/2021    A1C 6.7 11/27/2020    A1C 8.3 12/18/2019    A1C 11.5 09/20/2019    A1C 10.6 05/09/2019    A1C 8.0 01/11/2019         Stage 3 chronic kidney disease, unspecified whether stage 3a or 3b CKD (H)  -GFR pending from today   - Lipid panel reflex to direct LDL Non-fasting    Hyperlipidemia LDL goal <70  -on high dose statin     The 10-year ASCVD risk score (Whit JIMENEZ Jr., et al., 2013) is: 45.5%    Values used to calculate the score:      Age: 71 years      Sex: Male      Is Non- : No      Diabetic: Yes      Tobacco smoker: No      Systolic Blood Pressure: 135 mmHg      Is BP treated: Yes      HDL Cholesterol: 45 mg/dL      Total Cholesterol: 174 mg/dL    Pneumonia of left upper lobe due to infectious organism  -persistent changes on Chest X ray  -had not scheduled the CT chest, was concerned about cost and insurance coverage as he is still paying bills from Cardiac testing. Hence, his reluctance to do any further testing. Explained in detail that he may have a malignancy and we need to do the CT scan, my staff had scheduled him for a CT scan this afternoon, but we have been unable to reach the patient despite calling several times.   - XR Chest 2 Views  - CBC with Platelets & Differential  - Comprehensive metabolic panel  - CT Chest w Contrast    Unintentional weight loss  -worsened anemia, was 159 pounds 3/2021   - CT Chest w Contrast    Wt Readings from Last 2 Encounters:   11/24/21 67.1 kg (148 lb)   11/12/21 66.7  "kg (147 lb)         Ordering of each unique test  40 minutes spent on the date of the encounter doing chart review, history and exam, documentation and further activities per the note       BMI:   Estimated body mass index is 25.81 kg/m  as calculated from the following:    Height as of 9/15/21: 1.613 m (5' 3.5\").    Weight as of this encounter: 67.1 kg (148 lb).       Return in about 1 week (around 12/1/2021) if symptoms worsen or fail to improve.    Kimberly Bell MD MPH    [facility] 71 year [facility] who presents for the following health issues:    HPI     Visit done with Stan interpretor by phone      Follow up on Pneumonia:  Still with cough  Took antibiotics+ and has completed course as prescribed 2 types completed   Initial treatment with Azithromycin and Augmentin, then treated with Omnicef and Doxycycline   No fever  More stronger  Has not been able to schedule appointment for CT scan Chest  No problems with swallowing  Not as tired  No phlegm  No shortness of breath   No chest pain  Patient was in [place] with his daughter and she had some concerns about how the medication was being taken, that he may not have been taking correctly     Metformin may need to be renally dosed     Has lost 10 pounds in 6 months     Crackles throughout left lung and right base       [phone] number to call     Diabetes:  -FBG this am was 140 mg/dl    Review of Systems   Constitutional, HEENT, cardiovascular, pulmonary, gi and gu systems are negative, except as otherwise noted.      Objective    /78   Pulse 79   Temp 96.9  F (36.1  C) (Tympanic)   Resp 20   Wt 67.1 kg (148 lb)   SpO2 95%   BMI 25.81 kg/m    Body mass index is 25.81 kg/m .  Physical Exam   GENERAL APPEARANCE: healthy, alert and no distress  EYES: Eyes grossly normal to inspection and conjunctivae and sclerae normal  NECK: no adenopathy and trachea midline and normal to palpation  RESP: " Crackles over the entire left lung field and crackles on the right lung base   CV: regular rates and rhythm, normal S1 S2, Murmur++  ABDOMEN: soft, non-tender and no rebound or guarding   MS: extremities normal- no gross deformities noted  SKIN: no suspicious lesions or rashes  NEURO: Normal strength and tone, mentation intact and speech normal  PSYCH: mentation appears normal      XR Chest 2 Views    Result Date: 11/24/2021  XR CHEST 2 VW   11/24/2021 10:19 AM HISTORY: Pneumonia of left upper lobe due to infectious organism COMPARISON: 9/16/2021.     IMPRESSION: Persistent left upper lobe consolidation minimally increased since 9/16/2021. Malignant etiology should be considered given persistence for 2 months. Chest CT with intravenous contrast recommended for further evaluation. New mild patchy infiltrates in the lingula/left lower lobe suspicious for pneumonia. Minimal atelectasis right costophrenic angle. Normal heart size and pulmonary vascularity. Aortic calcifications. GLENYS WATKINS MD   SYSTEM ID:  [id number]

## 2021-11-22 NOTE — TELEPHONE ENCOUNTER
The current appointment works fine unless the family would like something sooner.    Thank you,    Kimberly Bell MD MPH

## 2021-11-22 NOTE — PATIENT INSTRUCTIONS
At [facility], we strive to deliver an exceptional experience to you, every time we see you. If you receive a survey, please complete it as we do value your feedback.  If you have MyChart, you can expect to receive results automatically within 24 hours of their completion.  Your provider will send a note interpreting your results as well.   If you do not have MyChart, you should receive your results in about a week by mail.    Your care team:                            Family Medicine Internal Medicine   MD Iisdro Ventura MD Shantel Branch-Fleming, MD Srinivasa Vaka, MD Katya Belousova, PARUBEN Vernon, APRN CNP    Carlos Tomlinson, MD Pediatrics   Jamison Aguilar, PARUBEN Mackey, CNP MD Jennifer Bates APRN CNP   MD Apoorva Jack MD Deborah Mielke, MD Dominique Wallace, APRN [facility]      Clinic hours: Monday - Thursday 7 am-6 pm; Fridays 7 am-5 pm.   Urgent care: Monday - Friday 10 am- 8 pm; Saturday and Sunday 9 am-5 pm.    Clinic: [phone]       [facility]: Monday - Thursday 8 am - 7 pm; Friday 8 am - 6 pm  [facility]: [phone]     Use www.[email or web address].org for 24/7 diagnosis and treatment of dozens of conditions.

## 2021-11-24 ENCOUNTER — TELEPHONE (OUTPATIENT)
Dept: FAMILY MEDICINE | Facility: CLINIC | Age: 71
End: 2021-11-24

## 2021-11-24 ENCOUNTER — OFFICE VISIT (OUTPATIENT)
Dept: FAMILY MEDICINE | Facility: CLINIC | Age: 71
End: 2021-11-24
Payer: COMMERCIAL

## 2021-11-24 ENCOUNTER — ANCILLARY PROCEDURE (OUTPATIENT)
Dept: GENERAL RADIOLOGY | Facility: CLINIC | Age: 71
End: 2021-11-24
Attending: PREVENTIVE MEDICINE
Payer: COMMERCIAL

## 2021-11-24 VITALS
DIASTOLIC BLOOD PRESSURE: 78 MMHG | SYSTOLIC BLOOD PRESSURE: 135 MMHG | TEMPERATURE: 96.9 F | OXYGEN SATURATION: 95 % | HEART RATE: 79 BPM | WEIGHT: 148 LBS | BODY MASS INDEX: 25.81 KG/M2 | RESPIRATION RATE: 20 BRPM

## 2021-11-24 DIAGNOSIS — E78.5 HYPERLIPIDEMIA LDL GOAL <70: ICD-10-CM

## 2021-11-24 DIAGNOSIS — R63.4 UNINTENTIONAL WEIGHT LOSS: ICD-10-CM

## 2021-11-24 DIAGNOSIS — J18.9 PNEUMONIA OF LEFT UPPER LOBE DUE TO INFECTIOUS ORGANISM: ICD-10-CM

## 2021-11-24 DIAGNOSIS — N18.30 STAGE 3 CHRONIC KIDNEY DISEASE, UNSPECIFIED WHETHER STAGE 3A OR 3B CKD (H): ICD-10-CM

## 2021-11-24 LAB
ALBUMIN SERPL-MCNC: 3.2 G/DL (ref 3.4–5)
ALP SERPL-CCNC: 67 U/L (ref 40–150)
ALT SERPL W P-5'-P-CCNC: 26 U/L (ref 0–70)
ANION GAP SERPL CALCULATED.3IONS-SCNC: 5 MMOL/L (ref 3–14)
AST SERPL W P-5'-P-CCNC: 22 U/L (ref 0–45)
BASOPHILS # BLD AUTO: 0.1 10E3/UL (ref 0–0.2)
BASOPHILS NFR BLD AUTO: 1 %
BILIRUB SERPL-MCNC: 0.6 MG/DL (ref 0.2–1.3)
BUN SERPL-MCNC: 13 MG/DL (ref 7–30)
CALCIUM SERPL-MCNC: 9.3 MG/DL (ref 8.5–10.1)
CHLORIDE BLD-SCNC: 104 MMOL/L (ref 94–109)
CHOLEST SERPL-MCNC: 144 MG/DL
CO2 SERPL-SCNC: 29 MMOL/L (ref 20–32)
CREAT SERPL-MCNC: 1.17 MG/DL (ref 0.66–1.25)
EOSINOPHIL # BLD AUTO: 0.9 10E3/UL (ref 0–0.7)
EOSINOPHIL NFR BLD AUTO: 9 %
ERYTHROCYTE [DISTWIDTH] IN BLOOD BY AUTOMATED COUNT: 14.4 % (ref 10–15)
FASTING STATUS PATIENT QL REPORTED: NO
GFR SERPL CREATININE-BSD FRML MDRD: 61 ML/MIN/1.73M2
GLUCOSE BLD-MCNC: 117 MG/DL (ref 70–99)
HBA1C MFR BLD: 7 % (ref 0–5.6)
HCT VFR BLD AUTO: 36.8 % (ref 40–53)
HDLC SERPL-MCNC: 53 MG/DL
HGB BLD-MCNC: 11.9 G/DL (ref 13.3–17.7)
IMM GRANULOCYTES # BLD: 0 10E3/UL
IMM GRANULOCYTES NFR BLD: 0 %
LDLC SERPL CALC-MCNC: 61 MG/DL
LYMPHOCYTES # BLD AUTO: 2.1 10E3/UL (ref 0.8–5.3)
LYMPHOCYTES NFR BLD AUTO: 23 %
MCH RBC QN AUTO: 25.3 PG (ref 26.5–33)
MCHC RBC AUTO-ENTMCNC: 32.3 G/DL (ref 31.5–36.5)
MCV RBC AUTO: 78 FL (ref 78–100)
MONOCYTES # BLD AUTO: 0.7 10E3/UL (ref 0–1.3)
MONOCYTES NFR BLD AUTO: 8 %
NEUTROPHILS # BLD AUTO: 5.6 10E3/UL (ref 1.6–8.3)
NEUTROPHILS NFR BLD AUTO: 60 %
NONHDLC SERPL-MCNC: 91 MG/DL
PLATELET # BLD AUTO: 324 10E3/UL (ref 150–450)
POTASSIUM BLD-SCNC: 3.5 MMOL/L (ref 3.4–5.3)
PROT SERPL-MCNC: 8.3 G/DL (ref 6.8–8.8)
RBC # BLD AUTO: 4.7 10E6/UL (ref 4.4–5.9)
SODIUM SERPL-SCNC: 138 MMOL/L (ref 133–144)
TRIGL SERPL-MCNC: 148 MG/DL
WBC # BLD AUTO: 9.3 10E3/UL (ref 4–11)

## 2021-11-24 PROCEDURE — 83036 HEMOGLOBIN GLYCOSYLATED A1C: CPT | Performed by: PREVENTIVE MEDICINE

## 2021-11-24 PROCEDURE — 85025 COMPLETE CBC W/AUTO DIFF WBC: CPT | Performed by: PREVENTIVE MEDICINE

## 2021-11-24 PROCEDURE — 71046 X-RAY EXAM CHEST 2 VIEWS: CPT | Performed by: RADIOLOGY

## 2021-11-24 PROCEDURE — 80053 COMPREHEN METABOLIC PANEL: CPT | Performed by: PREVENTIVE MEDICINE

## 2021-11-24 PROCEDURE — 80061 LIPID PANEL: CPT | Performed by: PREVENTIVE MEDICINE

## 2021-11-24 PROCEDURE — 36415 COLL VENOUS BLD VENIPUNCTURE: CPT | Performed by: PREVENTIVE MEDICINE

## 2021-11-24 PROCEDURE — 99214 OFFICE O/P EST MOD 30 MIN: CPT | Performed by: PREVENTIVE MEDICINE

## 2021-11-24 NOTE — TELEPHONE ENCOUNTER
Discussed chest X ray findings with the patient. Needs for CT Chest with contrast reviewed, this needs to be done as soon as possible.  Than you,  Kimberly Bell MD MPH

## 2021-11-24 NOTE — RESULT ENCOUNTER NOTE
Please send a letter:    Dear Wolf Sen,    Electrolytes, non fasting glucose, kidney function and liver function tests are normal. Continue current dose of Metformin.  Cholesterol is at goal for you.  Infection cell count is normal.   Hemoglobin is low, if the CT scan of the chest is normal, then we would need to do a further evaluation to see why your blood count is low.   Three month glucose number Hemoglobin A1c is at goal for you at 7.     Regards,    Kimberly Bell MD MPH

## 2021-11-24 NOTE — Clinical Note
Please assist in getting a CT chest with contrast scheduled ASAP. Patient has been delaying getting this scheduled possibly due to language barrier. The number patient can be reached at is [phone]. Thank you! Kimberly Bell MD MPH

## 2021-11-24 NOTE — TELEPHONE ENCOUNTER
After getting verbal consent from the patient to speak with his daughter Gwen, I contacted Gwen at [phone]. I left a voice message requesting that the CT scan of the Chest please be scheduled.  Thank you.  Kimberly Bell MD MPH

## 2021-11-24 NOTE — PROGRESS NOTES
I called Deangelo Vogt and scheduled patient for an appt for today at 12:25 pm CT. Called all the #s in the chart and unable to get a hold of the patient but will keep trying.  Caterina Leary [facility]  2nd Floor  Primary Care

## 2021-11-24 NOTE — LETTER
November 24, 2021      Wolf Sen  [address]  [place] [address]        Dear Wolf Sen,     Electrolytes, non fasting glucose, kidney function and liver function tests are normal. Continue current dose of Metformin.   Cholesterol is at goal for you.   Infection cell count is normal.   Hemoglobin is low, if the CT scan of the chest is normal, then we would need to do a further evaluation to see why your blood count is low.   Three month glucose number Hemoglobin A1c is at goal for you at 7.     Regards,     Kimberly Bell MD MPH       Resulted Orders   Comprehensive metabolic panel   Result Value Ref Range    Sodium 138 133 - 144 mmol/L    Potassium 3.5 3.4 - 5.3 mmol/L    Chloride 104 94 - 109 mmol/L    Carbon Dioxide (CO2) 29 20 - 32 mmol/L    Anion Gap 5 3 - 14 mmol/L    Urea Nitrogen 13 7 - 30 mg/dL    Creatinine 1.17 0.66 - 1.25 mg/dL    Calcium 9.3 8.5 - 10.1 mg/dL    Glucose 117 (H) 70 - 99 mg/dL    Alkaline Phosphatase 67 40 - 150 U/L    AST 22 0 - 45 U/L    ALT 26 0 - 70 U/L    Protein Total 8.3 6.8 - 8.8 g/dL    Albumin 3.2 (L) 3.4 - 5.0 g/dL    Bilirubin Total 0.6 0.2 - 1.3 mg/dL    GFR Estimate 61 >60 mL/min/1.73m2      Comment:      As of July 11, 2021, eGFR is calculated by the CKD-EPI creatinine equation, without race adjustment. eGFR can be influenced by muscle mass, exercise, and diet. The reported eGFR is an estimation only and is only applicable if the renal function is stable.   Hemoglobin A1c   Result Value Ref Range    Hemoglobin A1C 7.0 (H) 0.0 - 5.6 %      Comment:      Normal <5.7%   Prediabetes 5.7-6.4%    Diabetes 6.5% or higher     Note: Adopted from ADA consensus guidelines.   Lipid panel reflex to direct LDL Non-fasting   Result Value Ref Range    Cholesterol 144 <200 mg/dL    Triglycerides 148 <150 mg/dL    Direct Measure HDL 53 >=40 mg/dL    LDL Cholesterol Calculated 61 <=100 mg/dL    Non HDL Cholesterol 91 <130 mg/dL    Patient Fasting > 8hrs? No     Narrative     Cholesterol  Desirable:  <200 mg/dL    Triglycerides  Normal:  Less than 150 mg/dL  Borderline High:  150-199 mg/dL  High:  200-499 mg/dL  Very High:  Greater than or equal to 500 mg/dL    Direct Measure HDL  Female:  Greater than or equal to 50 mg/dL   Male:  Greater than or equal to 40 mg/dL    LDL Cholesterol  Desirable:  <100mg/dL  Above Desirable:  100-129 mg/dL   Borderline High:  130-159 mg/dL   High:  160-189 mg/dL   Very High:  >= 190 mg/dL    Non HDL Cholesterol  Desirable:  130 mg/dL  Above Desirable:  130-159 mg/dL  Borderline High:  160-189 mg/dL  High:  190-219 mg/dL  Very High:  Greater than or equal to 220 mg/dL   CBC with platelets and differential   Result Value Ref Range    WBC Count 9.3 4.0 - 11.0 10e3/uL    RBC Count 4.70 4.40 - 5.90 10e6/uL    Hemoglobin 11.9 (L) 13.3 - 17.7 g/dL    Hematocrit 36.8 (L) 40.0 - 53.0 %    MCV 78 78 - 100 fL    MCH 25.3 (L) 26.5 - 33.0 pg    MCHC 32.3 31.5 - 36.5 g/dL    RDW 14.4 10.0 - 15.0 %    Platelet Count 324 150 - 450 10e3/uL    % Neutrophils 60 %    % Lymphocytes 23 %    % Monocytes 8 %    % Eosinophils 9 %    % Basophils 1 %    % Immature Granulocytes 0 %    Absolute Neutrophils 5.6 1.6 - 8.3 10e3/uL    Absolute Lymphocytes 2.1 0.8 - 5.3 10e3/uL    Absolute Monocytes 0.7 0.0 - 1.3 10e3/uL    Absolute Eosinophils 0.9 (H) 0.0 - 0.7 10e3/uL    Absolute Basophils 0.1 0.0 - 0.2 10e3/uL    Absolute Immature Granulocytes 0.0 <=0.0 10e3/uL

## 2021-11-24 NOTE — TELEPHONE ENCOUNTER
"Daughter Gwen was returning a call from the [facility] in regards to patient.    There is no consent to communicate on file. Writer advised the daughter that writer can only take messages and cannot relay any information to the daughter.     Daughter was very upset and stated \"well why did you guys call me then\". Writer apologized and stated that writer could get verbal consent over the phone from patient to communicate or there has to be a consent to communicate on file. Daughter verbalized understanding.     Dr. Bell was updated that message was not relayed to the daughter regarding office visit encounter on 11/24/21 due to their being no consent to communicate on file.    Dr. Bell verbalized understanding and stated she will call the patient and speak with them.     Ginny Stanley RN, BSN         "

## 2021-11-24 NOTE — PROGRESS NOTES
"Called and spoke to [facility], [phone], and scheduled the CT for today, 11/24/2021 with a check in time of 12:25 pm at [place].  Called patient's # per Dr Bell unable to reach patient and there is not a voicemail box to leave a message. Called mobile #, message states \"Rhonda\", did not leave a message. Called son's # no voicemail set up. Called Daughter and left a message that we are trying to reach Wolf and to have him return our call.  Spoke to Dr Bell and updated her, she and I will keep trying to reach the patient.  Caterina Leary [facility]  2nd Floor  Primary Care    "

## 2021-11-26 ENCOUNTER — ANCILLARY PROCEDURE (OUTPATIENT)
Dept: CT IMAGING | Facility: CLINIC | Age: 71
End: 2021-11-26
Attending: PREVENTIVE MEDICINE
Payer: COMMERCIAL

## 2021-11-26 ENCOUNTER — TELEPHONE (OUTPATIENT)
Dept: FAMILY MEDICINE | Facility: CLINIC | Age: 71
End: 2021-11-26

## 2021-11-26 DIAGNOSIS — R63.4 UNINTENTIONAL WEIGHT LOSS: ICD-10-CM

## 2021-11-26 DIAGNOSIS — J18.9 PNEUMONIA OF LEFT UPPER LOBE DUE TO INFECTIOUS ORGANISM: ICD-10-CM

## 2021-11-26 PROCEDURE — 71260 CT THORAX DX C+: CPT | Performed by: RADIOLOGY

## 2021-11-26 RX ORDER — IOPAMIDOL 755 MG/ML
72 INJECTION, SOLUTION INTRAVASCULAR ONCE
Status: COMPLETED | OUTPATIENT
Start: 2021-11-26 | End: 2021-11-26

## 2021-11-26 RX ADMIN — IOPAMIDOL 72 ML: 755 INJECTION, SOLUTION INTRAVASCULAR at 10:23

## 2021-11-26 NOTE — RESULT ENCOUNTER NOTE
Please CALL patient:    Dear Wolf Sen,    CT scan of the chest is showing persistent PNEUMONIA on the left side and also developing on the right side. Since, he has failed 2 outpatient courses of antibiotics, he will most likely need IV antibiotics for treatment.   I would recommend he go in to the emergency room, he may need to be admitted to the hospital for a couple of days of IV antibiotics.    Thank you,    Kimberly Bell MD MPH

## 2021-11-26 NOTE — TELEPHONE ENCOUNTER
"Writer was able to contact patient.  service line was used. Patient verbalized two patient identifiers for safety.    Writer relayed provider message below from CT result note on 11/26/21.     ----- Message from Kimberly Bell MD sent at 11/26/2021 11:06 AM CST -----  Please CALL patient:    Dear Wolf Sen,    CT scan of the chest is showing persistent PNEUMONIA on the left side and also developing on the right side. Since, he has failed 2 outpatient courses of antibiotics, he will most likely need IV antibiotics for treatment.   I would recommend he go in to the emergency room, he may need to be admitted to the hospital for a couple of days of IV antibiotics.    Thank you,    Kimberly Bell MD MPH    Patient verbalized understanding of provider message. Patient stated does not have a ride right now the the kids are all at work. Patient stated \"I will try calling my daughter\".    Patient verbalized understanding and stated he will go to the ER in [place]. Writer stated that if patient is having chest pain or increased shortness of breath to call 911 and go to the ER. Patient verbalized understanding. No further questions at this time.     Ginny Stanley RN, BSN  "

## 2021-11-29 ENCOUNTER — PATIENT OUTREACH (OUTPATIENT)
Dept: GERIATRIC MEDICINE | Facility: CLINIC | Age: 71
End: 2021-11-29
Payer: COMMERCIAL

## 2021-11-29 NOTE — PROGRESS NOTES
TRANSITIONS OF CARE (PHILLY) LOG   PHILLY tasks should be completed by the CC within one (1) business day of notification of each transition. Follow up contact with member is required after return to their usual care setting.  Note:  If CC finds out about the transitions fifteen (15) days or more after the member has returned to their usual care setting, no PHILLY log is needed. However, the CC should check in with the member to discuss the transition process, any changes needed to the care plan and document it in a case note.    [place]:  [place] [facility] Member [facility] [id number]    Product: [facility] Care Coordinator Contact:  Viki Avalos RN, N Agency/County/Care System: [facility]   Transition Communication Actions from Care Management Contact   Transition #1   Notification Date: 11/29/21 Transition Date:   11/26/21 Transition From: Home     Is this the member s usual care setting?               yes Transition To: Hospital, [facility]   Transition Type:  Unplanned  Reason for Admission/Comments:  Admitted 11/26/21 for multifocal pneumonia at [facility] - Care Coordinator left voice message for return call to SW dept at [facility].      Shared CC contact info, care plan/services with receiving setting--Date completed: 11/29/21    Notified PCP of transition--Date completed:  11/29/21     via  EMR   Transition #2   Transition #3  (if applicable)   Notification Date: 12/2/21         Transition To:  Home  Transition Date: 11/30/21     Transition Type:    Planned  Notified PCP -- Date completed: 12/2/21              Shared CC contact info, care plan/services with receiving setting or, if applicable, home care agency--Date completed:  12/2/21  *Complete additional tasks below, if this transition is a return to usual care setting.      Comments:     12/2/21: Care Coordinator spoke with member with [facility]  - he states that he is feeling much better.  He had bronchoscope yesterday.     Member is due for annual visit - scheduled for 12/6/21.   He has follow up with PCP scheduled.    Notification Date:          Transition To:     Transition Date:              Transition Type:    Planned  Notified PCP--Date completed:          Shared CC contact info, care plan/services with receiving setting or, if applicable, home care agency--Date completed:       *Complete additional tasks below, if this transition is a return to usual care setting.      Comments:       *Complete tasks below when the member is discharging TO their usual care setting within one (1) business day of notification.  For situations where the Care Coordinator is notified of the discharge prior to the date of discharge, the Care Coordinator must follow up with the member or designated representative to confirm that discharge actually occurred and discuss required PHILLY tasks as outlined in the PHILLY Instructions.  (This includes situations where it may be a  new  usual care setting for the member. (i.e., a community member who decides upon permanent nursing home placement following hospitalization and rehab).    Date completed: 11/30/21  Communicated with member or their designated representative about the following:  care transition process; about changes to the member s health status; plan of care updates; education about transitions and how to prevent unplanned transitions/readmissions  Four Pillars for Optimal Transition:    Check  Yes  - if the member, family member and/or SNF/facility staff manages the following:    If  No  provide explanation in the comments section.          [x]  Yes     []  No     Does the member have a follow-up appointment scheduled with primary care or specialist? (Mental health hospitalizations--the appt. should be w/in 7 days)   [x]  Yes     []  No     Can the member manage their medications or is there a system in place to manage medications (e.g. home care set-up)?         [x]  Yes     []  No     Can the  member verbalize warning signs and symptoms to watch for and how to respond?         []  Yes     [x]  No     Does the member use a Personal Health Care Record?  Check  Yes  if visit summary, discharge summary, and/or healthcare summary are being used as a PHR.                                                                                                                                                                                    [x] Yes      [] No      Have you updated the member s care plan?  If  No  provide explanation in comments.   Comments:

## 2021-12-02 ENCOUNTER — NURSE TRIAGE (OUTPATIENT)
Dept: NURSING | Facility: CLINIC | Age: 71
End: 2021-12-02

## 2021-12-02 NOTE — TELEPHONE ENCOUNTER
Pt daughter called in   Verbal consent given by Pt.  bronchoscopy   Pt daughter ask if the Pt can get appointment sooner than 12/10/21.  The call is transferred to the .  Care advice given per protocol.  Patient agrees with care advice given.   Agreed to call back if he has additional symptoms or questions.          Nabeel Landa Nurse Advisor 12/2/2021 5:52 PM

## 2021-12-06 ENCOUNTER — PATIENT OUTREACH (OUTPATIENT)
Dept: GERIATRIC MEDICINE | Facility: CLINIC | Age: 71
End: 2021-12-06
Payer: COMMERCIAL

## 2021-12-09 NOTE — PROGRESS NOTES
Case D/W   He is going to have repeat Biopsy  Daughter number is [phone]  Assessment & Plan     Multifocal pneumonia  He had this ongoing since October  Initially a couple of courses of oral antibiotics were tried  There was no response for these antibiotics  Chest x-ray was getting worse  He then had a CT scan which showed extensive pneumonia  He was transferred to the hospital  He was at [facility] from 26 November to 30TH  He was treated with IV antibiotics  He had a EBUS with bronchoscopy  Cultures were negative  He only had positive coronavirus but this is not Covid  I saw that even TB cultures were negative  Only positive for FABRICIO the significance of which remains unclear  Pathology was negative  I just spoke to his daughter who told me that they are contemplating a repeat biopsy  I had left a message with the office of Dr. Cai to discuss this further  She is his pulmonologist  Apparently a course of steroids were tried but I am not sure how long he had them  BOOP remains on the differential diagnosis   Not clear if he will need to have open lung biopsy if the diagnosis is not achieved by current work-up  Currently he is not decompensated  His blood pressure pulse and saturations are normal he is not tachypneic      Stage 3 chronic kidney disease, unspecified whether stage 3a or 3b CKD (H)  Creatinine in the hospital was around 1.4  We will repeat it today  - Basic metabolic panel  (Ca, Cl, CO2, Creat, Gluc, K, Na, BUN); Future  - Basic metabolic panel  (Ca, Cl, CO2, Creat, Gluc, K, Na, BUN)    Anemia, unspecified type  He had normal hemoglobin before all this started  Now he is anemic  I think this is anemia of chronic disease  - CBC with platelets and differential; Future  - CBC with platelets and differential    I spoke to his daughter who wants to be communicated  Today we are getting consent to communicate with her signed by the patient      30 minutes spent on the date of the  "encounter doing chart review, history and exam, documentation and further activities per the note           Return in about 2 weeks (around 12/24/2021).    Madhu Mendieta MD  [facility] ATA Bloom is a 71 year old who presents for the following health issues     HPI   Follow-up for recent admission to the hospital  He was at [facility] from the 26th to 30 November  He went back next day on 1 December for EBUS/bronchoscopy  He tells me his cough is getting better  Energy levels are getting better  Appetite is getting better  Denies any shortness of breath  He was not discharged on any antibiotics or steroids  Was just discharged on some cough medicine          Review of Systems   Constitutional, HEENT, cardiovascular, pulmonary, gi and gu systems are negative, except as otherwise noted.      Objective    BP (!) 150/80 (BP Location: Left arm, Patient Position: Chair, Cuff Size: Adult Regular)   Pulse 70   Temp 97.2  F (36.2  C) (Tympanic)   Ht 1.632 m (5' 4.25\")   Wt 63.5 kg (140 lb)   SpO2 94%   BMI 23.84 kg/m    Body mass index is 23.84 kg/m .  Physical Exam   GENERAL: healthy, alert and no distress  EYES: Eyes grossly normal to inspection, PERRL and conjunctivae and sclerae normal  RESP: Extensive bronchial breathing on the  left side particularly at the  left apex  Also extensive crackles on the whole of the left lung  These are coarse to fine  Diminished air entry at the base of left lung  CV: regular rate and rhythm, normal S1 S2, no S3 or S4, no murmur, click or rub, no peripheral edema and peripheral pulses strong  ABDOMEN: soft, nontender, no hepatosplenomegaly, no masses and bowel sounds normal  MS: no gross musculoskeletal defects noted, no edema  SKIN: no suspicious lesions or rashes  NEURO: Normal strength and tone, mentation intact and speech normal  PSYCH: mentation appears normal, affect normal/bright                "

## 2021-12-09 NOTE — PATIENT INSTRUCTIONS
At [facility], we strive to deliver an exceptional experience to you, every time we see you. If you receive a survey, please complete it as we do value your feedback.  If you have MyChart, you can expect to receive results automatically within 24 hours of their completion.  Your provider will send a note interpreting your results as well.   If you do not have MyChart, you should receive your results in about a week by mail.    Your care team:                            Family Medicine Internal Medicine   MD Isidro Ventura MD Shantel Branch-Fleming, MD Srinivasa Vaka, MD Katya Belousova, PARUBEN Vernon, APRN CNP    Carlos Tomlinson, MD Pediatrics   Jamison Aguilar, PARUBEN Mackey, CNP MD Jennifer Bates APRN CNP   MD Apoorva Jack MD Deborah Mielke, MD Dominique Wallace, APRN [facility]      Clinic hours: Monday - Thursday 7 am-6 pm; Fridays 7 am-5 pm.   Urgent care: Monday - Friday 10 am- 8 pm; Saturday and Sunday 9 am-5 pm.    Clinic: [phone]       [facility]: Monday - Thursday 8 am - 7 pm; Friday 8 am - 6 pm  [facility]: [phone]     Use www.[email or web address].org for 24/7 diagnosis and treatment of dozens of conditions.  Patient Education       Pneumonia (Adult)  Pneumonia is an infection deep in the lungs. It is in the small air sacs (alveoli). It may be caused by a virus, fungus, or bacteria. Pneumonia caused by bacteria is often treated with an antibiotic. Severe cases may need to be treated in the hospital. Milder cases can be treated at home. Pneumonia symptoms are a lot like flu symptoms. They include fever, cough (dry or with phlegm), headache, muscle weakness, and pain. These symptoms often get worse in the first 2 days. But they often start to get better in the first week of treatment.    Home care  Follow these guidelines when caring for yourself at home:    Get  plenty of rest. Don t let yourself get overly tired when you go back to your activities. Participate in activities as directed by your healthcare provider.    Stop smoking. This is the most important step you can take to help treat pneumonia. If you need help stopping smoking, talk with your healthcare provider.    Stay away from smoke and other irritants. Stay away from secondhand smoke. Don t let anyone smoke in your home.    Prevent lung infections. Ask your healthcare provider about the flu and pneumonia vaccines. Take steps to prevent colds and other lung infections.    Practice correct handwashing. Wash your hands often with soap and water. Use hand  when you can t wash your hands. Stay away from crowds during cold and flu season.    Use pain medicine as directed. You may use acetaminophen or ibuprofen to control fever or pain, unless another medicine was prescribed. If you have chronic liver or kidney disease, talk with your healthcare provider before using these medicines. Also talk with your provider if you ve had a stomach ulcer or GI (gastrointestinal) bleeding. Don t give aspirin to a child younger than age 19 unless directed by the provider. Taking aspirin can put a child at risk for Reye syndrome. This is a rare but very serious disorder. It most often affects the brain and the liver.    Eat a light diet as needed. You may not feel like eating, so a light diet is fine. Follow the treatment plan as advised by your healthcare provider.    Drink plenty of water and fluids. This can make mucus thinner and easier to cough up. Ask your healthcare provider how much water you should drink. For many people, 6 to 8 glasses (8 ounces each) a day is a good goal. Other fluids include sport drinks, sodas without caffeine, juices, tea, or soup. If you also have heart or kidney disease, check with your provider before you drink extra fluids.    Finish all prescription medicine. Take antibiotic or antiviral  medicine as prescribed by your healthcare provider, even if you are feeling better after a few days. Take the medicine until it is all gone.    Try to stay away from air pollution. If you live in an area with air pollution, track the Air Quality Index (AQI) reports and plan your outdoor activities with the AQI recommendations in mind  Follow-up care  Follow up with your healthcare provider in the next 2 to 3 days, or as advised. This is to be sure the medicine is helping you get better.  If you are 65 or older, you should get a pneumococcal vaccine and a yearly flu (influenza) shot. You should also get these vaccines if you have chronic lung disease such as asthma, emphysema, or COPD. A second type of pneumonia vaccine is also available for people over age 65 and those younger than 65 with certain health conditions. Talk with your healthcare provider about which pneumococcal vaccine is best for you.  Call 911  Call 911 if any of these occur:    Unable to speak or swallow    Lips or skin looks blue, purple, or gray    Feeling dizzy or faint    Unable to wake up or loss of consciousness    Feeling of doom    Trouble breathing or wheezing    Shortness of breath gets worse or doesn't get better with treatment    Rapid breathing (more than 25 breaths per minute)    Coughing up blood    Chest pain gets worse with breathing or doesn't get better with treatment  When to get medical advice  Call your healthcare provider right away if any of these occur:    You don t get better in the first 2 days of treatment    Fever of 100.4 F (38 C) or higher, or as directed by your healthcare provider    Shaking chills    Cough with phlegm that doesn't get better, or get worse    Shortness of breath with activities    Weakness, dizziness, or fainting that gets worse    Thirst or dry mouth that gets worse    Sinus pain, headache, or a stiff neck    Chest pain with breathing or coughing    Symptoms that get worse or not improving  StayWell  last reviewed this educational content on 11/1/2019 2000-2021 [employer]. All rights reserved. This information is not intended as a substitute for professional medical care. Always follow your healthcare professional's instructions.

## 2021-12-10 ENCOUNTER — OFFICE VISIT (OUTPATIENT)
Dept: FAMILY MEDICINE | Facility: CLINIC | Age: 71
End: 2021-12-10
Payer: COMMERCIAL

## 2021-12-10 VITALS
HEIGHT: 64 IN | SYSTOLIC BLOOD PRESSURE: 150 MMHG | HEART RATE: 70 BPM | DIASTOLIC BLOOD PRESSURE: 80 MMHG | BODY MASS INDEX: 23.9 KG/M2 | WEIGHT: 140 LBS | TEMPERATURE: 97.2 F | OXYGEN SATURATION: 94 %

## 2021-12-10 DIAGNOSIS — J18.9 MULTIFOCAL PNEUMONIA: Primary | ICD-10-CM

## 2021-12-10 DIAGNOSIS — N18.30 STAGE 3 CHRONIC KIDNEY DISEASE, UNSPECIFIED WHETHER STAGE 3A OR 3B CKD (H): ICD-10-CM

## 2021-12-10 DIAGNOSIS — D64.9 ANEMIA, UNSPECIFIED TYPE: ICD-10-CM

## 2021-12-10 LAB
ANION GAP SERPL CALCULATED.3IONS-SCNC: 3 MMOL/L (ref 3–14)
BASOPHILS # BLD AUTO: 0.1 10E3/UL (ref 0–0.2)
BASOPHILS NFR BLD AUTO: 1 %
BUN SERPL-MCNC: 12 MG/DL (ref 7–30)
CALCIUM SERPL-MCNC: 9.6 MG/DL (ref 8.5–10.1)
CHLORIDE BLD-SCNC: 103 MMOL/L (ref 94–109)
CO2 SERPL-SCNC: 32 MMOL/L (ref 20–32)
CREAT SERPL-MCNC: 1.09 MG/DL (ref 0.66–1.25)
EOSINOPHIL # BLD AUTO: 0.9 10E3/UL (ref 0–0.7)
EOSINOPHIL NFR BLD AUTO: 8 %
ERYTHROCYTE [DISTWIDTH] IN BLOOD BY AUTOMATED COUNT: 14.6 % (ref 10–15)
GFR SERPL CREATININE-BSD FRML MDRD: 67 ML/MIN/1.73M2
GLUCOSE BLD-MCNC: 134 MG/DL (ref 70–99)
HCT VFR BLD AUTO: 37.4 % (ref 40–53)
HGB BLD-MCNC: 12.1 G/DL (ref 13.3–17.7)
IMM GRANULOCYTES # BLD: 0 10E3/UL
IMM GRANULOCYTES NFR BLD: 0 %
LYMPHOCYTES # BLD AUTO: 2.2 10E3/UL (ref 0.8–5.3)
LYMPHOCYTES NFR BLD AUTO: 20 %
MCH RBC QN AUTO: 25.3 PG (ref 26.5–33)
MCHC RBC AUTO-ENTMCNC: 32.4 G/DL (ref 31.5–36.5)
MCV RBC AUTO: 78 FL (ref 78–100)
MONOCYTES # BLD AUTO: 0.6 10E3/UL (ref 0–1.3)
MONOCYTES NFR BLD AUTO: 6 %
NEUTROPHILS # BLD AUTO: 7.4 10E3/UL (ref 1.6–8.3)
NEUTROPHILS NFR BLD AUTO: 66 %
PLATELET # BLD AUTO: 484 10E3/UL (ref 150–450)
POTASSIUM BLD-SCNC: 4.2 MMOL/L (ref 3.4–5.3)
RBC # BLD AUTO: 4.78 10E6/UL (ref 4.4–5.9)
SODIUM SERPL-SCNC: 138 MMOL/L (ref 133–144)
WBC # BLD AUTO: 11.1 10E3/UL (ref 4–11)

## 2021-12-10 PROCEDURE — 99214 OFFICE O/P EST MOD 30 MIN: CPT | Performed by: INTERNAL MEDICINE

## 2021-12-10 PROCEDURE — 36415 COLL VENOUS BLD VENIPUNCTURE: CPT | Performed by: INTERNAL MEDICINE

## 2021-12-10 PROCEDURE — 85025 COMPLETE CBC W/AUTO DIFF WBC: CPT | Performed by: INTERNAL MEDICINE

## 2021-12-10 PROCEDURE — 80048 BASIC METABOLIC PNL TOTAL CA: CPT | Performed by: INTERNAL MEDICINE

## 2021-12-10 ASSESSMENT — PAIN SCALES - GENERAL: PAINLEVEL: NO PAIN (0)

## 2021-12-10 ASSESSMENT — MIFFLIN-ST. JEOR: SCORE: 1295.01

## 2021-12-15 ENCOUNTER — TELEPHONE (OUTPATIENT)
Dept: FAMILY MEDICINE | Facility: CLINIC | Age: 71
End: 2021-12-15
Payer: COMMERCIAL

## 2021-12-15 ENCOUNTER — PATIENT OUTREACH (OUTPATIENT)
Dept: GERIATRIC MEDICINE | Facility: CLINIC | Age: 71
End: 2021-12-15
Payer: COMMERCIAL

## 2021-12-15 NOTE — PROGRESS NOTES
[facility] Care Coordination Contact    [facility] Home Visit Assessment     Home visit for Health Risk Assessment with Wolf Sen completed on December 6, 2021    Type of residence:: Town home  Current living arrangement:: I live in a private home with family     Assessment completed with:: Patient,     Current Care Plan  Member currently receiving the following home care services:     Member currently receiving the following community resources: previously going to ADULT DAY CARE but not longer going due to covid - would like to DTR.       Medication Review  Medication reconciliation completed in Epic: Yes  Medication set-up & administration: Independent and sets up on own daily.  Self-administers medications.  Medication Risk Assessment Medication (1 or more, place referral to MTM): N/A: No risk factors identified  MTM Referral Placed: No: No risk factors idenified    Mental/Behavioral Health   Depression Screening:   PHQ-2 Total Score (Adult) - Positive if 3 or more points; Administer PHQ-9 if positive: 0       Mental health DX:: No        Falls Assessment:   Fallen 2 or more times in the past year?: No   Any fall with injury in the past year?: No    ADL/IADL Dependencies:   Dependent ADLs:: Independent  Dependent IADLs:: Cleaning,Cooking,Laundry,Shopping,Meal Preparation,Medication Management,Money Management,Transportation    [facility] sponsored benefits: Shared information re: Silver Sneakers/gym memberships, ASA, Calcium +D.    PCA Assessment completed at visit: Yes Initial PCA Assessment indicated 5 units per day of PCA.     Elderly Waiver Eligibility: Yes-will continue on EW    Care Plan & Recommendations: 1. DTR ADULT DAY CARE, 2. PCA 1.25 hrs/day - member/family needs to choose agency, 3. Homemaking 4 hrs/week - member/family needs to choose agency, 4. [facility].     Care Coordinator left voice message for daughter, Eloise, to discuss PCA and Hmking as she will be  caregiver.    See LTCC for detailed assessment information.    Follow-Up Plan: Member informed of future contact, plan to f/u with member with a 6 month telephone assessment.  Contact information shared with member and family, encouraged member to call with any questions or concerns at any time.    [place] care continuum providers: Please refer to Health Care Home on the Epic Problem List to view this patient's [facility] Care Plan Summary.

## 2021-12-15 NOTE — PROGRESS NOTES
[facility] Care Coordination Contact    Received after visit chart from care coordinator.  Completed following tasks: Mailed copy of care plan to client and Updated services in access  , Provider Signature - No POC Shared:  Member indicates that they do not want their POC shared with any EW providers.  [facility]:  Emailed completed PCA assessment to [facility].  Faxed copy of PCA assessment to PCA Agency and mailed copy to member.  Faxed MD Communication to PCP.   No agency chosen yet.    POC and PCA sig pages mailed to member to sign and return in SASE.    Eloise Johnson  Care Management Specialist   [facility]   [phone]

## 2021-12-15 NOTE — PROGRESS NOTES
12/15/21: Care Coordinator has not received call back re: agency chosen for PCA and hmking - will submit and wait to hear back from family.     Spoke with [facility] - unit currently on back order - they will contact Care Coordinator and member once in and what unit.     Confirmed member wants DTR for ADULT DAY CARE.  DTR complete and sent to Kim.     Viki Avalos RN, PHN  [facility]

## 2021-12-15 NOTE — PROGRESS NOTES
12/8/21: Care Coordinator spoke with daughter, Eloise, regarding PCA and homemaking.   She states that she will be caregier.  She used to live with member but no longer does but is close by and can assist with needs.  She is willing to get hired by agency.  She states that member has agency in mind - she will follow up with Care Coordinator on which agency they choose - Care Coordinator informed her it is time sensitive with PCA assess.     Viki Avalos RN, PHN  [facility]

## 2021-12-15 NOTE — LETTER
December 15, 2021       WOLF MEKAMERRY  [address]  [place] [address]      Dear Wolf:    At [facility], we are dedicated to improving your health and well-being. Enclosed is the Comprehensive Care Plan that we developed with you on 12/6/21. Please review the Care Plan carefully.    As a reminder, some of the things we discussed at your visit include:    Your physical and mental health    Ways to reduce falls    Health care needs you may have    Don t forget to contact your care coordinator if you:    Have been hospitalized or plan to be hospitalized     Have had a fall     Have experienced a change in physical health    Are experiencing emotional problems     If you do not agree with your Care Plan, have questions about it, or have experienced a change in your needs, please call me at [phone]. If you are hearing impaired, please call the [place] Relay at 788 or [phone] ([email or web address] relay service).    Sincerely,    Viki Avalos RN, PHN    E-mail: [email or web address]  Phone: [phone]      [facility] ([facility]) is a health plan that contracts with both Medicare and the [place] Medical Assistance (Medicaid) program to provide benefits of both programs to enrollees. Enrollment in [facility] depends on contract renewal.    [id number])     [id number] (11/18)

## 2021-12-15 NOTE — TELEPHONE ENCOUNTER
Consent to communicate is identified to speak with daughter Gwen, 12/10/21.   Call transferred from a .    Daughter calling on patient behalf, stated spoke to several people.  Reassured we will help make a plan.    Patient has a future appointment, wanted to make appointment also a Pre Op, split billing etc.  Stated if not, the RN could find an appointment for a Pre op for patient.  Discussed will need to speak to , to help determine appointment times/availability.  The patient was warm transferred to Caterina , to assist in scheduling.    Anna Lozano RN

## 2021-12-15 NOTE — TELEPHONE ENCOUNTER
Pt daughter calling for father.  Pt has recently been in the hospital for pneumonia.  He had follow up on 12/10/21 and was advised to have another follow up with a x ray in one month and scheduled a follow up on 12/24/21.       Pt also had an area on his lung that needs CT guided needle biopsy.  Pt has a preo op for this scheduled on 12/17/21.  Procedure is scheduled 12/22/21.    Pt daughter asking if this order of appointment's is ok.      Sushma ORTIZN, RN

## 2021-12-16 NOTE — TELEPHONE ENCOUNTER
We can discuss this in more detail during the pre op tomorrow. Please keep pre op appointment.  Thank you,  Kimberly Bell MD MPH    Information: Selecting Yes will display possible errors in your note based on the variables you have selected. This validation is only offered as a suggestion for you. PLEASE NOTE THAT THE VALIDATION TEXT WILL BE REMOVED WHEN YOU FINALIZE YOUR NOTE. IF YOU WANT TO FAX A PRELIMINARY NOTE YOU WILL NEED TO TOGGLE THIS TO 'NO' IF YOU DO NOT WANT IT IN YOUR FAXED NOTE.

## 2021-12-17 ENCOUNTER — PATIENT OUTREACH (OUTPATIENT)
Dept: GERIATRIC MEDICINE | Facility: CLINIC | Age: 71
End: 2021-12-17

## 2021-12-17 ENCOUNTER — OFFICE VISIT (OUTPATIENT)
Dept: FAMILY MEDICINE | Facility: CLINIC | Age: 71
End: 2021-12-17
Payer: COMMERCIAL

## 2021-12-17 VITALS
OXYGEN SATURATION: 93 % | DIASTOLIC BLOOD PRESSURE: 72 MMHG | BODY MASS INDEX: 23.56 KG/M2 | SYSTOLIC BLOOD PRESSURE: 120 MMHG | HEART RATE: 82 BPM | HEIGHT: 64 IN | WEIGHT: 138 LBS | TEMPERATURE: 98.2 F

## 2021-12-17 DIAGNOSIS — J18.9 MULTIFOCAL PNEUMONIA: Primary | ICD-10-CM

## 2021-12-17 DIAGNOSIS — I63.9 CEREBRAL INFARCTION, UNSPECIFIED MECHANISM (H): ICD-10-CM

## 2021-12-17 DIAGNOSIS — D64.9 ANEMIA, UNSPECIFIED TYPE: ICD-10-CM

## 2021-12-17 DIAGNOSIS — I35.1 AORTIC VALVE INSUFFICIENCY, ETIOLOGY OF CARDIAC VALVE DISEASE UNSPECIFIED: ICD-10-CM

## 2021-12-17 DIAGNOSIS — I10 BENIGN ESSENTIAL HTN: ICD-10-CM

## 2021-12-17 DIAGNOSIS — N18.30 STAGE 3 CHRONIC KIDNEY DISEASE, UNSPECIFIED WHETHER STAGE 3A OR 3B CKD (H): ICD-10-CM

## 2021-12-17 PROCEDURE — 99215 OFFICE O/P EST HI 40 MIN: CPT | Performed by: PREVENTIVE MEDICINE

## 2021-12-17 RX ORDER — INSULIN GLARGINE 100 [IU]/ML
INJECTION, SOLUTION SUBCUTANEOUS
Qty: 30 ML | Refills: 0 | Status: SHIPPED | OUTPATIENT
Start: 2021-12-17 | End: 2022-04-22

## 2021-12-17 RX ORDER — HYDROCHLOROTHIAZIDE 25 MG/1
25 TABLET ORAL DAILY
Qty: 90 TABLET | Refills: 1 | Status: SHIPPED | OUTPATIENT
Start: 2021-12-17 | End: 2022-09-30

## 2021-12-17 RX ORDER — HYDRALAZINE HYDROCHLORIDE 25 MG/1
TABLET, FILM COATED ORAL
Qty: 180 TABLET | Refills: 1 | Status: SHIPPED | OUTPATIENT
Start: 2021-12-17 | End: 2022-09-30

## 2021-12-17 ASSESSMENT — MIFFLIN-ST. JEOR: SCORE: 1285.93

## 2021-12-17 NOTE — PROGRESS NOTES
Care Coordinator received voice message from daughter, Eloise, that they have chosen a PCA agency  - [facility].    She is in process of fililing out paperwork.  Care Coordinator updated POC and PCA agency and sent to CMS to update [facility].        Viki Avalos RN, N  [facility]

## 2021-12-17 NOTE — Clinical Note
Please fax pre op clearance to the surgeon, procedure is on 12/22/21. Thank you. Kimberly Bell MD MPH

## 2021-12-17 NOTE — PROGRESS NOTES
[address] [address]  Phone: [phone]  Primary Provider: Teagan Thakkar  Pre-op Performing Provider:    TEAGAN THAKKAR  VIDEO,       PREOPERATIVE EVALUATION:  Today's date: 12/17/2021    Wolf Sen is a 71 year old male who presents for a preoperative evaluation.    Surgical Information:  Surgery/Procedure: CT Guided needle biopsy   Surgery [facility]   Surgeon: Interventional Radiology   Surgery Date: 12/22/21  Time of Surgery: Not provided   Where patient plans to recover: At home with family  Fax number for surgical facility: [phone]    Interventional Radiology is doing the procedure   Type of Anesthesia Anticipated: to be determined    Subjective     HPI related to upcoming procedure: 71 year old male with multi focal pneumonia and Hilar adenopathy. Recent hospital admission and EBUS with bronchoscopy.     Did not fill out pre op   1. YES - Have you ever had a heart attack or stroke? Had stroke 2011, affected the right side   2. YES - Have you ever had surgery on your heart or blood vessels, such as a stent, coronary (heart) bypass, or surgery on an artery in the head, neck, heart, or legs? Bilateral carotid endarterectomy   3. No - Do you have chest pain when you are physically active?  4. No - Do you have a history of heart failure?  5. YES - Do you currently have a cold, bronchitis, or symptoms of other respiratory (head and chest) infections? Multi focal pneumonia   6. YES - Do you have a cough, shortness of breath, or wheezing? Cough due to pneumonia   7. No - Do you or anyone in your family have a history of blood clots?  8. No - Do you or anyone in your family have a serious bleeding problem, such as long-lasting bleeding after surgeries or cuts?  9. No - Have you ever had anemia or been told to take iron pills?  10. No - Have you had any abnormal blood loss such as black, tarry or  bloody stools, or abnormal vaginal bleeding?  11. No - Have you ever had a blood transfusion?  12. Yes - Are you willing to have a blood transfusion if it is medically needed before, during, or after your surgery?  13. No - Have you or anyone in your family ever had problems with anesthesia (sedation for surgery)?  14. No - Do you have sleep apnea, excessive snoring, or daytime drowsiness?   15. No - Do you have any artifical heart valves or other implanted medical devices, such as a pacemaker, defibrillator, or continuous glucose monitor?  16. No - Do you have any artifical joints?  17. No - Are you allergic to latex?  18. No - Is there any chance that you may be pregnant? N/A        Health Care Directive:  Patient has a Health Care Directive on file      Preoperative Review of :   reviewed - controlled substances reflected in medication list.      Status of Chronic Conditions:  ANEMIA - Patient has a recent history of mild anemia, which has not been symptomatic. Work up to date has revealed that this is likely due to chronic disease.    DIABETES - Patient has a longstanding history of DiabetesType Type II . Patient is being treated with diet, oral agents and insulin injections and denies significant side effects. Control has been fair. Complicating factors include but are not limited to: hypertension, hyperlipidemia, neuropathy, chronic kidney disease and CVA.     HYPERLIPIDEMIA - Patient has a long history of significant Hyperlipidemia requiring medication for treatment with recent good control. Patient reports no problems or side effects with the medication.     HYPERTENSION - Patient has longstanding history of HTN , currently denies any symptoms referable to elevated blood pressure. Specifically denies chest pain, palpitations, dyspnea, orthopnea, PND or peripheral edema. Blood pressure readings have been in normal range. Current medication regimen is as listed below. Patient denies any side effects of  medication.     RENAL INSUFFICIENCY - Patient has a longstanding history of moderate chronic renal insufficiency. Last Cr was 1.09 on 12/10/21       Review of Systems  INTEGUMENTARY/SKIN: NEGATIVE for worrisome rashes, moles or lesions  EYES: NEGATIVE for vision changes or irritation  ENT/MOUTH: NEGATIVE for ear, mouth and throat problems  CV: NEGATIVE for chest pain, palpitations or peripheral edema  GI: NEGATIVE for nausea, abdominal pain, heartburn, or change in bowel habits  : NEGATIVE for frequency, dysuria, or hematuria  MUSCULOSKELETAL: NEGATIVE for significant arthralgias or myalgia  NEURO: NEGATIVE for weakness, dizziness or paresthesias  ENDOCRINE: NEGATIVE for temperature intolerance, skin/hair changes  HEME: NEGATIVE for bleeding problems  PSYCHIATRIC: NEGATIVE for changes in mood or affect    Patient Active Problem List    Diagnosis Date Noted     CKD (chronic kidney disease) stage 3, GFR 30-59 ml/min (H) 12/18/2019     Priority: Medium     Vitamin D deficiency 01/21/2018     Priority: Medium     Pseudophakia of left eye 02/12/2016     Priority: Medium     Type 2 diabetes mellitus with neurological manifestations, controlled (H) 10/15/2015     Priority: Medium     Advance care planning 06/15/2015     Priority: Medium     Advance Care Planning 6/30/2017: ACP Review of Chart / Resources Provided:  Reviewed chart for advance care plan.  Wolf Sen has an up to date advance care plan on file.  Added by Viki Avalos  7/1/16: Advance Care Planning 7/1/16: ACP Review of Chart / Resources Provided:  Reviewed chart for advance care plan.  Wolf Sen has an up to date advance care plan on file.  Added by Viki Avalos  Advance Care Planning 1/14/2016: Receipt of ACP document:  Received: Health Care Directive which was witnessed or notarized on 7/28/15.  Document not previously scanned.  Validation form completed and sent with document to be scanned.  Code Status reflect choices in most recent ACP  document.   Confirmed/documented designated decision maker(s).  Added by Bobbi Licona  Advance Care Planning 6/17/2015: ACP Review and Resources Provided:  Reviewed chart for advance care plan.  Wolf Sen has no plan or code status on file. Discussed available resources and provided with information on 06/15/2015. Confirmed code status reflects current choices pending further ACP discussions.  Confirmed/documented legally designated decision maker(s). Added by Carmina Arboleda  Advance Care Planning 6-15-15 Gave patient advance care planning info.  Ama De La Torre, JM       Cataracts, both eyes 05/20/2014     Priority: Medium     DM type 2, goal A1C below 8.0 05/05/2014     Priority: Medium     HTN, goal below 150/90 05/05/2014     Priority: Medium     Hyperlipidemia LDL goal <70 05/05/2014     Priority: Medium     Overweight (BMI 25.0-29.9) 05/05/2014     Priority: Medium     Problem list name updated by automated process. Provider to review       History of carotid endarterectomy 01/25/2013     Priority: Medium     Aortic valve insufficiency 01/25/2013     Priority: Medium     Cerebral infarction (H) 10/01/2012     Priority: Medium     Overview:   Right sided numbness and weakness. 1/2011  Overview:   Right sided numbness and weakness. 1/2011        Past Medical History:   Diagnosis Date     Cataracts, both eyes 5/20/2014     Cerebral artery occlusion with cerebral infarction (H)      DM type 2, goal A1c below 7 5/5/2014     Hypertension      Past Surgical History:   Procedure Laterality Date     CATARACT IOL, RT/LT       COMBINED REPAIR PTOSIS WITH BLEPHAROPLASTY Bilateral 10/9/2017    Procedure: COMBINED REPAIR PTOSIS WITH BLEPHAROPLASTY;  Bilateral upper eyelid blepharoplasty and ptosis repair;  Surgeon: Bibiana Melara MD;  Location: [facility] OR     PHACOEMULSIFICATION WITH STANDARD INTRAOCULAR LENS IMPLANT Right 2/25/2016    Procedure: PHACOEMULSIFICATION WITH STANDARD INTRAOCULAR LENS IMPLANT;  Surgeon:  Carlton Don MD;  Location: [facility] OR     PHACOEMULSIFICATION WITH STANDARD INTRAOCULAR LENS IMPLANT Left 2/11/2016    Procedure: PHACOEMULSIFICATION WITH STANDARD INTRAOCULAR LENS IMPLANT;  Surgeon: Carlton Don MD;  Location: [facility] OR     REPAIR PTOSIS Bilateral     10/17     Current Outpatient Medications   Medication Sig Dispense Refill     hydrALAZINE (APRESOLINE) 25 MG tablet TAKE 1 TABLET(25 MG) BY MOUTH TWICE DAILY 180 tablet 1     hydrochlorothiazide (HYDRODIURIL) 25 MG tablet Take 1 tablet (25 mg) by mouth daily 90 tablet 1     insulin glargine (LANTUS SOLOSTAR) 100 UNIT/ML pen INJECT 22 UNITS UNDER THE SKIN AT BEDTIME 30 mL 0     insulin pen needle (32G X 4 MM) 32G X 4 MM miscellaneous Use 4 pen needles daily or as directed. 200 each 11     acetaminophen (MAPAP) 500 MG tablet TAKE 2 TABLETS(1000 MG) BY MOUTH THREE TIMES DAILY AS NEEDED FOR PAIN 100 tablet 0     Alcohol Swabs (B-D SINGLE USE SWABS REGULAR) PADS USE TO SWAB AREA OF INJECTION/RONEY AS DIRECTED 100 each 4     amLODIPine (NORVASC) 10 MG tablet TAKE 1 TABLET(10 MG) BY MOUTH EVERY EVENING FOR BLOOD PRESSURE 90 tablet 1     ASPIRIN PO Take 81 mg by mouth.         atorvastatin (LIPITOR) 80 MG tablet TAKE 1 TABLET(80 MG) BY MOUTH DAILY FOR CHOLESTEROL 90 tablet 1     insulin aspart (NOVOLOG FLEXPEN) 100 UNIT/ML pen INJECT 15 UNITS UNDER THE SKIN THREE TIMES DAILY WITH MEALS 30 mL 1     ketoconazole (NIZORAL) 2 % external cream Apply topically daily 60 g 1     losartan (COZAAR) 100 MG tablet Take 1 tablet (100 mg) by mouth daily 90 tablet 1     metFORMIN (GLUCOPHAGE) 1000 MG tablet Take 1 tablet (1,000 mg) by mouth 2 times daily (with meals) 180 tablet 1     metoprolol succinate ER (TOPROL-XL) 25 MG 24 hr tablet TAKE 1 TABLET(25 MG) BY MOUTH EVERY EVENING FOR BLOOD PRESSURE 90 tablet 1     multivitamin, therapeutic with minerals (MULTI-VITAMIN) TABS Take 1 tablet by mouth daily 100 tablet 3     ORDER FOR DME Equipment being ordered:  "Rolling walker with seat 1 Units 0     SYSTANE ULTRA 0.4-0.3 % SOLN ophthalmic solution INSTILL 1 DROP INTO BOTH EYES QID       thin (NO BRAND SPECIFIED) lancets Use with lanceting device. To accompany: Blood Glucose Monitor Brands: per insurance. 300 each 11       No Known Allergies     Social History     Tobacco Use     Smoking status: Never Smoker     Smokeless tobacco: Never Used   Substance Use Topics     Alcohol use: No     Family History   Problem Relation Age of Onset     Hypertension Father      Cancer No family hx of      Diabetes No family hx of      Cerebrovascular Disease No family hx of      Thyroid Disease No family hx of      Glaucoma No family hx of      Macular Degeneration No family hx of      History   Drug Use No         Objective     /72 (BP Location: Left arm, Patient Position: Sitting, Cuff Size: Adult Regular)   Pulse 82   Temp 98.2  F (36.8  C) (Tympanic)   Ht 1.632 m (5' 4.25\")   Wt 62.6 kg (138 lb)   SpO2 93%   BMI 23.50 kg/m      Physical Exam  GENERAL APPEARANCE: healthy, alert and no distress  EYES: Eyes grossly normal to inspection and conjunctivae and sclerae normal  NECK: no adenopathy and trachea midline and normal to palpation, no carotid bruits bilaterally   RESP:scattered rhonchi in both lung fields   CV: regular rates and rhythm, normal S1 S2, Grade 3-4 Systolic murmur+   ABDOMEN: soft, non-tender and no rebound or guarding   MS: extremities normal- no gross deformities noted  SKIN: no suspicious lesions or rashes  NEURO: Normal strength and tone, mentation intact and speech normal  PSYCH: mentation appears normal      Recent Labs   Lab Test 12/10/21  0826 11/24/21  1007 03/17/21  1432 11/27/20  1104   HGB 12.1* 11.9*   < >  --    * 324   < >  --     138   < > 140   POTASSIUM 4.2 3.5   < > 4.7   CR 1.09 1.17   < > 1.37*   A1C  --  7.0*  --  6.7*    < > = values in this interval not displayed.        Diagnostics:  No labs were ordered during this " visit.  Recent Results (from the past 720 hour(s))   Comprehensive metabolic panel    Collection Time: 11/24/21 10:07 AM   Result Value Ref Range    Sodium 138 133 - 144 mmol/L    Potassium 3.5 3.4 - 5.3 mmol/L    Chloride 104 94 - 109 mmol/L    Carbon Dioxide (CO2) 29 20 - 32 mmol/L    Anion Gap 5 3 - 14 mmol/L    Urea Nitrogen 13 7 - 30 mg/dL    Creatinine 1.17 0.66 - 1.25 mg/dL    Calcium 9.3 8.5 - 10.1 mg/dL    Glucose 117 (H) 70 - 99 mg/dL    Alkaline Phosphatase 67 40 - 150 U/L    AST 22 0 - 45 U/L    ALT 26 0 - 70 U/L    Protein Total 8.3 6.8 - 8.8 g/dL    Albumin 3.2 (L) 3.4 - 5.0 g/dL    Bilirubin Total 0.6 0.2 - 1.3 mg/dL    GFR Estimate 61 >60 mL/min/1.73m2   Hemoglobin A1c    Collection Time: 11/24/21 10:07 AM   Result Value Ref Range    Hemoglobin A1C 7.0 (H) 0.0 - 5.6 %   Lipid panel reflex to direct LDL Non-fasting    Collection Time: 11/24/21 10:07 AM   Result Value Ref Range    Cholesterol 144 <200 mg/dL    Triglycerides 148 <150 mg/dL    Direct Measure HDL 53 >=40 mg/dL    LDL Cholesterol Calculated 61 <=100 mg/dL    Non HDL Cholesterol 91 <130 mg/dL    Patient Fasting > 8hrs? No    CBC with platelets and differential    Collection Time: 11/24/21 10:07 AM   Result Value Ref Range    WBC Count 9.3 4.0 - 11.0 10e3/uL    RBC Count 4.70 4.40 - 5.90 10e6/uL    Hemoglobin 11.9 (L) 13.3 - 17.7 g/dL    Hematocrit 36.8 (L) 40.0 - 53.0 %    MCV 78 78 - 100 fL    MCH 25.3 (L) 26.5 - 33.0 pg    MCHC 32.3 31.5 - 36.5 g/dL    RDW 14.4 10.0 - 15.0 %    Platelet Count 324 150 - 450 10e3/uL    % Neutrophils 60 %    % Lymphocytes 23 %    % Monocytes 8 %    % Eosinophils 9 %    % Basophils 1 %    % Immature Granulocytes 0 %    Absolute Neutrophils 5.6 1.6 - 8.3 10e3/uL    Absolute Lymphocytes 2.1 0.8 - 5.3 10e3/uL    Absolute Monocytes 0.7 0.0 - 1.3 10e3/uL    Absolute Eosinophils 0.9 (H) 0.0 - 0.7 10e3/uL    Absolute Basophils 0.1 0.0 - 0.2 10e3/uL    Absolute Immature Granulocytes 0.0 <=0.0 10e3/uL   Basic  metabolic panel  (Ca, Cl, CO2, Creat, Gluc, K, Na, BUN)    Collection Time: 12/10/21  8:26 AM   Result Value Ref Range    Sodium 138 133 - 144 mmol/L    Potassium 4.2 3.4 - 5.3 mmol/L    Chloride 103 94 - 109 mmol/L    Carbon Dioxide (CO2) 32 20 - 32 mmol/L    Anion Gap 3 3 - 14 mmol/L    Urea Nitrogen 12 7 - 30 mg/dL    Creatinine 1.09 0.66 - 1.25 mg/dL    Calcium 9.6 8.5 - 10.1 mg/dL    Glucose 134 (H) 70 - 99 mg/dL    GFR Estimate 67 >60 mL/min/1.73m2   CBC with platelets and differential    Collection Time: 12/10/21  8:26 AM   Result Value Ref Range    WBC Count 11.1 (H) 4.0 - 11.0 10e3/uL    RBC Count 4.78 4.40 - 5.90 10e6/uL    Hemoglobin 12.1 (L) 13.3 - 17.7 g/dL    Hematocrit 37.4 (L) 40.0 - 53.0 %    MCV 78 78 - 100 fL    MCH 25.3 (L) 26.5 - 33.0 pg    MCHC 32.4 31.5 - 36.5 g/dL    RDW 14.6 10.0 - 15.0 %    Platelet Count 484 (H) 150 - 450 10e3/uL    % Neutrophils 66 %    % Lymphocytes 20 %    % Monocytes 6 %    % Eosinophils 8 %    % Basophils 1 %    % Immature Granulocytes 0 %    Absolute Neutrophils 7.4 1.6 - 8.3 10e3/uL    Absolute Lymphocytes 2.2 0.8 - 5.3 10e3/uL    Absolute Monocytes 0.6 0.0 - 1.3 10e3/uL    Absolute Eosinophils 0.9 (H) 0.0 - 0.7 10e3/uL    Absolute Basophils 0.1 0.0 - 0.2 10e3/uL    Absolute Immature Granulocytes 0.0 <=0.4 10e3/uL      No EKG this visit, completed in the last 90 days. Done 11/26/21 with [facility]     ECHO stress test 4/19/21  Interpretation Summary     Equivocal dobutamine echocardiogram. There were no wall motion abnormalities with low-dose and high-dose dobutamine infusion but there was EKG evidence of  ischemia.     86% of the maximum predicted heart rate was achieved (target is 85%).  There was mild-moderate aortic regurgitation on the screening examination (VCW  0.4 cm,  ms).  Normal biventricular size and global systolic function at baseline. There is  mild-moderate concentric hypertrophy.  With low-dose dobutamine, LVEF augmented and LV cavity  size decreased  appropriately.  With peak dobutamine, LVEF increased further to >70% and LV cavity size  decreased appropriately.  There was 2 mm horizontal ST depression in II, III and AVF at the peak  dobutamine dose (50 micrograms/kg/minute). Occasional PVCs during stress.  No regional wall motion abnormalities at rest or with dobutamine.  The test was terminated due to the achievement of target heart rate.  No angina was elicited.  Normal heart rate and blood pressure response to dobutamine.  The aortic root and visualized ascending aorta are normal.  No prior stress test was available for comparison.    Revised Cardiac Risk Index (RCRI):  The patient has the following serious cardiovascular risks for perioperative complications:   - Coronary Artery Disease (MI, positive stress test, angina, Qs on EKG) = 1 point   - Cerebrovascular Disease (TIA or CVA) = 1 point   - Diabetes Mellitus (on Insulin) = 1 point     RCRI Interpretation: 3 points: Class IV (high risk - >11% complication rate)    Estimated Functional Capacity: Performs 4 METS exercise without symptoms (e.g., light housework, stairs, 4 mph walk, 7 mph bike, slow step dance)             Assessment & Plan     The proposed surgical procedure is considered INTERMEDIATE risk.    Multifocal pneumonia  -Recent hospital stay and endobronchial Ultrasound     Uncontrolled type 2 diabetes mellitus with stage 3 chronic kidney disease, with long-term current use of insulin (H)  -readings have been in a normal range   - insulin glargine (LANTUS SOLOSTAR) 100 UNIT/ML pen  Dispense: 30 mL; Refill: 0    Lab Results   Component Value Date    A1C 7.0 11/24/2021    A1C 6.7 11/27/2020    A1C 8.3 12/18/2019    A1C 11.5 09/20/2019    A1C 10.6 05/09/2019    A1C 8.0 01/11/2019     Cerebral infarction, unspecified mechanism (H)  -history of carotid endarterectomy bilaterally     Stage 3 chronic kidney disease, unspecified whether stage 3a or 3b CKD (H)  -recent GFR  stable    Benign essential HTN  -at goal  -continue current medication   - hydrALAZINE (APRESOLINE) 25 MG tablet  Dispense: 180 tablet; Refill: 1  - hydrochlorothiazide (HYDRODIURIL) 25 MG tablet  Dispense: 90 tablet; Refill: 1    Anemia, unspecified type  .last hemoglobin was 12.1 (12/10/21)     Aortic valve insufficiency, etiology of cardiac valve disease unspecified  -Murmur on exam  -ECHO 4/21 showed mild to moderate aortic regurgitation       Risks and Recommendations:  The patient has the following additional risks and recommendations for perioperative complications:   - Consult Hospitalist / IM to assist with post-op medical management  Cardiovascular:   - ECHO stress test was equivocal 4/21  Diabetes:  - Patient is on insulin therapy; diabetic NPO guidelines provided and discussed.  Pulmonary:    - Incentive spirometry post-op  Anemia/Bleeding/Clotting:    - Anemia and does not require treatment prior to surgery. Monitor hemoglobin postoperatively    Medication Instructions:  Patient is to take all scheduled medications on the day of surgery EXCEPT for modifications listed below:   - aspirin: Discontinue aspirin 7-10 days prior to procedure to reduce bleeding risk. It should be resumed postoperatively.    - ACE/ARB: May be continued on the day of surgery.    - Beta Blockers: Continue taking on the day of surgery.   - Calcium Channel Blockers: May be continued on the day of surgery.   - Diuretics: May continue.   - Statins: Continue taking on the day of surgery.    - Long acting insulin (e.g. glargine, detemir): Take 80% of the usual evening or morning dose before surgery.   - metformin: HOLD day of surgery.    RECOMMENDATION:  APPROVAL GIVEN to proceed with proposed procedure, without further diagnostic evaluation.    Review of external notes as documented above     40 minutes spent on the date of the encounter doing chart review, history and exam, documentation and further activities per the  note      Signed Electronically by: Kimberly Bell MD MPH    Copy of this evaluation report is provided to requesting physician.

## 2021-12-20 NOTE — PROGRESS NOTES
Faxed pre op to [facility], [phone], right fax confirmed at 2:48 pm today, 12/20/2021 and placed in TC copy basket.  Caterina Leary MA  [facility]  2nd Floor  Primary Care

## 2021-12-22 ENCOUNTER — TRANSFERRED RECORDS (OUTPATIENT)
Dept: HEALTH INFORMATION MANAGEMENT | Facility: CLINIC | Age: 71
End: 2021-12-22
Payer: COMMERCIAL

## 2021-12-22 NOTE — PROGRESS NOTES
12/20/21: Care Coordinator spoke with Pa at new PCA agency.  She has information that she needs and is aware to send [employer] a change in PCA agency form to update the auth for them as [employer] already processed with no agency in place.   Pa will update Care Coordinator when they are ready to start.    Viki Avalos RN, PHN  [facility]

## 2021-12-23 ENCOUNTER — PATIENT OUTREACH (OUTPATIENT)
Dept: GERIATRIC MEDICINE | Facility: CLINIC | Age: 71
End: 2021-12-23
Payer: COMMERCIAL

## 2021-12-23 NOTE — PROGRESS NOTES
[facility] Care Coordination Contact    Received a request to submit a DTR for the terminated of Adult Day Care with transportation. Documentation completed and faxed to the health plan. Care Coordinator aware.    Kim Multani RN  Utilization   [facility]  [phone]

## 2021-12-24 ENCOUNTER — OFFICE VISIT (OUTPATIENT)
Dept: FAMILY MEDICINE | Facility: CLINIC | Age: 71
End: 2021-12-24
Payer: COMMERCIAL

## 2021-12-24 ENCOUNTER — PATIENT OUTREACH (OUTPATIENT)
Dept: ONCOLOGY | Facility: CLINIC | Age: 71
End: 2021-12-24

## 2021-12-24 VITALS
HEART RATE: 81 BPM | TEMPERATURE: 97.2 F | WEIGHT: 140.2 LBS | SYSTOLIC BLOOD PRESSURE: 118 MMHG | DIASTOLIC BLOOD PRESSURE: 68 MMHG | OXYGEN SATURATION: 97 % | BODY MASS INDEX: 23.88 KG/M2

## 2021-12-24 DIAGNOSIS — C34.92 ADENOCARCINOMA, LUNG, LEFT (H): Primary | ICD-10-CM

## 2021-12-24 DIAGNOSIS — I10 BENIGN ESSENTIAL HTN: ICD-10-CM

## 2021-12-24 DIAGNOSIS — R05.9 COUGH: ICD-10-CM

## 2021-12-24 PROCEDURE — 99214 OFFICE O/P EST MOD 30 MIN: CPT | Performed by: INTERNAL MEDICINE

## 2021-12-24 RX ORDER — BENZONATATE 100 MG/1
100 CAPSULE ORAL 3 TIMES DAILY PRN
Qty: 30 CAPSULE | Refills: 0 | Status: SHIPPED | OUTPATIENT
Start: 2021-12-24 | End: 2022-09-30

## 2021-12-24 RX ORDER — CODEINE PHOSPHATE AND GUAIFENESIN 10; 100 MG/5ML; MG/5ML
1-2 SOLUTION ORAL EVERY 4 HOURS PRN
Qty: 236 ML | Refills: 0 | Status: SHIPPED | OUTPATIENT
Start: 2021-12-24 | End: 2022-09-30

## 2021-12-24 NOTE — PATIENT INSTRUCTIONS
Patient Education     Cancer Overview  What is cancer?  Cancer is an abnormal growth of cells. The whole body is made of cells that act and grow in controlled ways as the body needs them. The cells are controlled by genes. The genes in any 1 of these cells can become damaged. Then the cell can grow out of control and become cancer.   Cancer cells quickly grow and divide. This happens even when there's not enough space and nutrients. They also grow despite signals sent from the body telling them to stop.   Cancer cells don't look the same as healthy cells. They don't work the way they should. They can spread to other parts of the body. Tumors, masses, or lesions are names for abnormal growths of cells that can become cancer.   Oncology is the branch of medicine that studies and treats cancer.  What do benign and malignant mean?   Tumors can be benign or malignant.   Benign tumors are not cancer. They tend to grow slowly. They do not spread to other parts of the body.   Malignant tumors are cancer.  They can grow quickly. They grow into and destroy nearby normal tissues. And they can spread throughout the body.   What do locally invasive and metastatic mean?   Cancer can be locally invasive and metastatic.  Locally invasive cancer has spread to nearby tissues. It can cause problems by pressing on nearby tissues and organs. This can make them unable to work the way they should.   Metastatic cancer has spread from where it first started.  It is growing in and affecting another part of the body. Cancer cells spread by getting into your blood stream or lymph system. The lymph system is a series of small vessels that carry a fluid called lymph. Lymph collects waste from cells. It carries it into lymph nodes where the waste is filtered out. Lymph then drains into your bloodstream.   Cancer cells can travel through 1 of these pathways to any part of the body. Then they grow and form a tumor there.   What are primary  tumors?   The original tumor is called the primary tumor.  This is the place where the cancer first started. These cells can break off and travel through the body. Then they can start to form new tumors in other organs.   These new tumors are called secondary tumors. The cancer cells travel through your blood or lymph system to form these tumors.   How is each cancer type named?  Cancer is named after the part of the body where it first started. This is the place the original tumor formed.   When cancer spreads, it keeps this same name. For example, if kidney cancer spreads to your lungs, it s still called kidney cancer. The cancer in the lung is a secondary tumor. The cancer cells in the lung look like the cancer cells in the kidney. They don't look like lung cancer cells. This may be called kidney cancer with lung metastasis or metastatic kidney cancer to the lung. It's not called lung cancer.   What are the different types of cancer?  Cancer is not just 1 disease. It s a group of many diseases, all of which cause cells in the body to change and grow out of control. Cancers are defined by the kind of cell they start in. Or they can be defined by the place in the body where they first started. Some cancers are of mixed types. These are the most common categories of cancer:     Carcinoma. This is cancer found in cells that make up epithelial tissue. This tissue covers or lines the surfaces of organs, glands, and body structures. Carcinoma usually forms a solid tumor. Carcinomas are 80% to 90% of all cancers.    Sarcoma. This is cancer that starts in connective tissue cells. These can include blood and lymph vessel, cartilage, fat, muscle, tendon, and bone cells. For example, osteosarcoma is the most common type of cancer that starts in the bone. Chondrosarcoma is cancer that starts in cartilage cells.    Lymphoma. This is cancer that starts in a type of white blood cell called a lymphocyte. These cells are part of  the immune system, Lymphoma cells can build up in lymph nodes and other lymph tissues. Lymphomas are grouped into 2 categories: Hodgkin lymphoma and non-Hodgkin lymphoma.    Leukemia. This is called a blood cancer. It starts in the cells in the bone marrow that make blood cells. This type of cancer keeps the marrow from making normal red and white blood cells and platelets. White blood cells are needed to fight infections. Red blood cells are needed to carry oxygen and carbon dioxide through the body. Platelets keep the body from bruising and bleeding easily. There are 4 main types of leukemia. They are acute myelogenous leukemia (AML), chronic myelogenous leukemia (CML), acute lymphocytic leukemia (ALL), and chronic lymphocytic leukemia (CLL). The terms myelogenous and lymphocytic mean the type of cells that are involved. Acute and chronic tell how fast the cells are growing.    Myeloma. This type of cancer starts in the plasma cells of bone marrow. In some cases, the myeloma cells collect in 1 bone and form a single tumor. This is called a plasmacytoma. In other cases, the myeloma cells collect in many bones. It forms many tumors. This is called multiple myeloma.  What causes cancer?  Cancer has no single cause. Experts think that it s the interaction of many factors that leads to cancer. The factors may be genetic, environmental, or lifestyle. These factors cause a change in the genes of a cell so that it divides and grows in an uncontrolled way.   Who is at risk for cancer?   Some cancers have been linked with risk factors. A risk factor is anything that may increase your chance of getting a disease. A risk factor doesn't necessarily cause the disease. But it may mean you re more likely to get it.   People with an increased risk of some cancers can help reduce their risk by getting regular screening tests. This allows pre-cancer cells to be found and treated before they turn into cancer cells. Some colon,  rectal, and cervical cancers can be prevented with screening.   Reducing certain risk factors can also help. Cancer treatment tends to work better when the cancer is found early. This means when it's small and hasn't spread. Some known risk factors for cancer in adults include:     Lifestyle factors. These include smoking, a high-fat diet, and exposure to ultraviolet light (UV) from the sun or UV lamps. These are only risk factors for adult cancers. Most children with cancer are too young to have been exposed to lifestyle factors long enough to have them cause cancer.    Genetic factors. Family health history and genes play a role in some cancers. Some cancers run in families. Some gene changes are inherited. This means people in these families have a higher risk for some types of cancers. But not every family will get cancer. In many cases, it isn t known if the disease is caused by a gene change, other factors, or a coincidence.    Virus exposure. Contact with certain viruses have been linked to cancer. These include the human papillomavirus (HPV) and HIV, the virus that causes AIDS. These viruses can cause cells changes that may lead to cancer over time. But cancer isn t contagious. You can't get it from contact with another person.    Environmental factors. People with certain jobs seem to have a higher risk of some cancers. This includes painters, farmers, construction workers, and people in the chemical industry. This is likely due to contact with certain chemicals. Some environmental factors in your home may be linked to cancer. These can include a natural radioactive gas called radon, and arsenic in well water.  How do genes affect cancer growth?  Certain genes contribute to cancer. All cancers have some type of genetic change (mutation). A small amount of these mutations are inherited. But the rest happen by chance.   There are 3 main types of genes that can affect cell growth. They are changed (mutated) in  "certain types of cancers. These include:     Oncogenes. These genes control the normal growth of cells. Scientists say oncogenes are like a cancer \"switch\" that most people have in their bodies. It isn t known what \"flips the switch\" to take away control and allow abnormal cancer cells grow.     Tumor suppressor genes. These genes are able to spot abnormal growth and reproduction of damaged cells, like cancer cells. They can stop them from reproducing. But if the tumor suppressor genes are mutated and don t work well, cancer cells can grow.    Mismatch-repair genes. These genes help find errors when the DNA in genes is copied to make a new cell. If the DNA does not \"match\" perfectly, these genes repair the mismatch and correct the error. But if these genes aren t working well, errors in DNA can transferred to the new cells. This causes them to be damaged.  In most cases, the number of cells in our body tissues is tightly controlled. New cells are made for normal growth and development. They're also made to replace damaged or dead cells. Cancer is a loss of this balance. It starts when genetic changes \"tip the balance\" in favor of excessive cell growth.   How do childhood and adult cancers differ?  Diagnosis, treatment, and prognosis for childhood cancers are different than for adult cancers. The main differences are the survival rate and the cause of the cancer.   The 5-year survival rate for childhood cancer is about 83%. This means that 83 out of 100 children with cancer will survive 5 years. The 5-year survival rate for adult cancers is about 67%. This may be because childhood cancer responds better to certain treatments. Unlike adults, children don't usually have other health problems that can affect cancer treatment.   A cell change that occurs by chance is the most common cause of most childhood cancers. In adults, lifestyle and environmental risk factors are strongly linked to cancer. Adult cancers are " sometimes called acquired  for this reason.   Another key difference is clinical trials. Most children are treated at special childhood cancer centers and most are treated in clinical trials. This means they're treated by experts. They get the very best available, cutting-edge treatments that help improve childhood cancer treatment at a rapid rate. Fewer than 1 in 20 adults takes part in cancer clinical trials. This affects the development of new procedures and treatments.   [employer] last reviewed this educational content on 12/1/2018 2000-2021 [employer]. All rights reserved. This information is not intended as a substitute for professional medical care. Always follow your healthcare professional's instructions.

## 2021-12-24 NOTE — PROGRESS NOTES
Review of Oncology referral from  PCP for patient with new lung cancer.    12/22/21 RUL lung bx at [facility] confirms adenocarcinoma    11/26/21 CT chest     Pt needs PET-CT & MRI Brain to complete staging & Med Onc to guide further care. [facility] [place] has availability week of 1/3/22, will call to offer appts.

## 2021-12-24 NOTE — PROGRESS NOTES
Assessment & Plan     Adenocarcinoma, lung, left (H)  This is a new diagnosis  He had a biopsy yesterday at [facility] for unresolving pneumonia and was found to have adenocarcinoma of the lung  I got a message from his pulmonologist  I placed urgent referral for the oncology  Information given to family about how to schedule that in case they do not hear from them the next couple of days  Contact numbers also updated  - Adult Oncology/Hematology Referral; Future    Benign essential HTN  He used to have very high blood pressures and is on multiple antihypertensives including metoprolol/amlodipine/hydrochlorothiazide/hydralazine  He is losing weight and his blood pressure is slowly coming down  I have stopped amlodipine permanently     Cough  From the underlying malignancy  - benzonatate (TESSALON) 100 MG capsule; Take 1 capsule (100 mg) by mouth 3 times daily as needed for cough  - guaiFENesin-codeine (ROBITUSSIN AC) 100-10 MG/5ML solution; Take 5-10 mLs by mouth every 4 hours as needed for cough      25 minutes spent on the date of the encounter doing chart review, history and exam, documentation and further activities per the note           Return in about 4 weeks (around 1/21/2022).    Madhu Mendieta MD  [facility] MORENO Bloom is a 71 year old who presents for the following health issues     HPI   Here today to discuss the results of his biopsy of the lung  Continues to have cough  Accompanied by emil  Losing weight  Appetite not good          Review of Systems   Constitutional, HEENT, cardiovascular, pulmonary, gi and gu systems are negative, except as otherwise noted.      Objective    There were no vitals taken for this visit.  There is no height or weight on file to calculate BMI.  Physical Exam   GENERAL: healthy, alert and no distress  RESP: Diffuse crackles on left side of the lung till mid zone  Bronchial breathing at the apex  CV: regular rate and rhythm, normal  S1 S2, no S3 or S4, no murmur, click or rub, no peripheral edema and peripheral pulses strong  ABDOMEN: soft, nontender, no hepatosplenomegaly, no masses and bowel sounds normal  MS: no gross musculoskeletal defects noted, no edema

## 2022-01-05 ENCOUNTER — ONCOLOGY VISIT (OUTPATIENT)
Dept: ONCOLOGY | Facility: CLINIC | Age: 72
End: 2022-01-05
Attending: INTERNAL MEDICINE
Payer: COMMERCIAL

## 2022-01-05 ENCOUNTER — PRE VISIT (OUTPATIENT)
Dept: ONCOLOGY | Facility: CLINIC | Age: 72
End: 2022-01-05

## 2022-01-05 VITALS
BODY MASS INDEX: 24.5 KG/M2 | OXYGEN SATURATION: 89 % | HEART RATE: 83 BPM | WEIGHT: 143.5 LBS | HEIGHT: 64 IN | SYSTOLIC BLOOD PRESSURE: 129 MMHG | DIASTOLIC BLOOD PRESSURE: 77 MMHG

## 2022-01-05 DIAGNOSIS — C34.12 PRIMARY ADENOCARCINOMA OF UPPER LOBE OF LEFT LUNG (H): ICD-10-CM

## 2022-01-05 DIAGNOSIS — R06.09 DYSPNEA ON EXERTION: ICD-10-CM

## 2022-01-05 PROCEDURE — 99204 OFFICE O/P NEW MOD 45 MIN: CPT | Performed by: INTERNAL MEDICINE

## 2022-01-05 ASSESSMENT — MIFFLIN-ST. JEOR: SCORE: 1310.88

## 2022-01-05 ASSESSMENT — PAIN SCALES - GENERAL: PAINLEVEL: NO PAIN (0)

## 2022-01-05 NOTE — LETTER
2022         RE: Wolf Sen  [address]  [facility] [address]      [facility] Hematology / Oncology  Initial Visit / Consultation Note  Name: Wolf Sen  :  1950  MRN:  [id number]    --------------------    Assessment / Plan:  Unresectable left lung adenocarcinoma with what appears to be upper lobe and lower lobe involvement and local adenopathy.    Over the course of our visit, Wolf, his daughter and I to the use of a  reviewed the natural history of what appears to be clear unresectable left lung adenocarcinoma.  I do suspect that his disease started in the left upper lobe but is likely extended into the left lower lobe.  There appears to be adenopathy on exam to as well.  A PET scan as well as a brain MRI are both pending.  I do not see him as a good surgical candidate or radiation candidate.    Given his non-smoking history, we would be hopeful to identify a  mutation that we could target with selective chemotherapy.  We reviewed that we will send his pathology for next generation sequencing as well as PD-L1.  Right now, we are holding on final decisions for therapy pending the studies.  I do suspect that his shortness of breath relates directly to the amount of visceral involvement from his lung cancer and potentially some postobstructive component.  I provided him with a prescription for oxygen given his oxygen sats were 90% on room air and dropped below 88 with exertion.    Thank you kindly for this consultation.  Miles Estrada MD    --------------------    Interval History:  Wolf present for evaluation of lung cancer.  He presents with multiple rounds of presumed pneumonia and failed antibiotics course.  Chest x-ray and 2021 revealed airspace opacities present in the left upper and lower .  Given that these airspace opacities failed to resolve with antibiotics course and the symptoms continued, I he subsequently underwent bronchoscopy  with cytology being negative and lymph node aspirations being negative.  Ultimately he had a CT-guided biopsy of the left lower lobe (although pathology describes it as right upper lobe patient describes left upper lobe biopsy) that showed adenocarcinoma.  Wolf is a lifelong non-smoker and no history of secondhand smoke.  No occupational exposures.  Beyond dyspnea he otherwise feels well.    --------------------    Review of Systems:  10 point ROS negative except for that above.    Past Medical / Surgical History:  Past Medical History:   Diagnosis Date     Cataracts, both eyes 5/20/2014     Cerebral artery occlusion with cerebral infarction (H)      DM type 2, goal A1c below 7 5/5/2014     Hypertension      Past Surgical History:   Procedure Laterality Date     CATARACT IOL, RT/LT       COMBINED REPAIR PTOSIS WITH BLEPHAROPLASTY Bilateral 10/9/2017    Procedure: COMBINED REPAIR PTOSIS WITH BLEPHAROPLASTY;  Bilateral upper eyelid blepharoplasty and ptosis repair;  Surgeon: Bibiana Melara MD;  Location: [facility] OR     PHACOEMULSIFICATION WITH STANDARD INTRAOCULAR LENS IMPLANT Right 2/25/2016    Procedure: PHACOEMULSIFICATION WITH STANDARD INTRAOCULAR LENS IMPLANT;  Surgeon: Carlton Don MD;  Location: [facility] OR     PHACOEMULSIFICATION WITH STANDARD INTRAOCULAR LENS IMPLANT Left 2/11/2016    Procedure: PHACOEMULSIFICATION WITH STANDARD INTRAOCULAR LENS IMPLANT;  Surgeon: Carlton Don MD;  Location: [facility] OR     REPAIR PTOSIS Bilateral     10/17       Family History:  Family History   Problem Relation Age of Onset     Hypertension Father      Cancer No family hx of      Diabetes No family hx of      Cerebrovascular Disease No family hx of      Thyroid Disease No family hx of      Glaucoma No family hx of      Macular Degeneration No family hx of        Social History:  Social History     Socioeconomic History     Marital status:      Spouse name: Not on file     Number of children: 6     Years of  "education: Not on file     Highest education level: Not on file   Occupational History     Not on file   Tobacco Use     Smoking status: Never Smoker     Smokeless tobacco: Never Used   Substance and Sexual Activity     Alcohol use: No     Drug use: No     Sexual activity: Not Currently   Other Topics Concern     Parent/sibling w/ CABG, MI or angioplasty before 65F 55M? Not Asked   Social History Narrative     Not on file     Social Determinants of Health     Financial Resource Strain: Not on file   Food Insecurity: Not on file   Transportation Needs: Not on file   Physical Activity: Not on file   Stress: Not on file   Social Connections: Not on file   Intimate Partner Violence: Not on file   Housing Stability: Not on file       Medications / Allergies:  Reviewed in EMR.    --------------------    Physical Exam:  VS: /77 (BP Location: Left arm, Patient Position: Chair, Cuff Size: Adult Regular)   Pulse 83   Ht 1.632 m (5' 4.25\")   Wt 65.1 kg (143 lb 8 oz)   SpO2 (!) 89%   BMI 24.44 kg/m    GEN: Well appearing.  HEENT: PERRL, EOMI, no scleral icterus or injection; OP clear, moist mucous membranes, no oral lesions.  NECK: Supple.  LISHA: No cervical, supraclavicular, axillary or inguinal LISHA.  CHEST: Breathing comfortably, good airflow bilaterally, no crackles, no wheezing.  CV: RRR, s1/s2, no murmurs; strong distal pulses.  ABD: Non-tender, non-distended, soft, positive bowel sounds.  EXT: Warm and well perfused; no edema; no clubbing.  SKIN: Warm and dry, no visible rashes.  NEURO: Alert, engaged; CN 2-12 grossly intact; no gross sensorimotor deficits; gait and coordination intact; speech clear and fluent.    Labs / Imaging / Path:  We reviewed labs, personally reviewed imaging and reviewed pathology reports        Miles Estrada MD  "

## 2022-01-05 NOTE — PROGRESS NOTES
[facility] Hematology / Oncology  Initial Visit / Consultation Note  Name: Wolf Sen  :  1950  MRN:  [id number]    --------------------    Assessment / Plan:  Unresectable left lung adenocarcinoma with what appears to be upper lobe and lower lobe involvement and local adenopathy.    Over the course of our visit, Wolf, his daughter and I to the use of a  reviewed the natural history of what appears to be clear unresectable left lung adenocarcinoma.  I do suspect that his disease started in the left upper lobe but is likely extended into the left lower lobe.  There appears to be adenopathy on exam to as well.  A PET scan as well as a brain MRI are both pending.  I do not see him as a good surgical candidate or radiation candidate.    Given his non-smoking history, we would be hopeful to identify a  mutation that we could target with selective chemotherapy.  We reviewed that we will send his pathology for next generation sequencing as well as PD-L1.  Right now, we are holding on final decisions for therapy pending the studies.  I do suspect that his shortness of breath relates directly to the amount of visceral involvement from his lung cancer and potentially some postobstructive component.  I provided him with a prescription for oxygen given his oxygen sats were 90% on room air and dropped below 88 with exertion.    Thank you kindly for this consultation.  Miles Estrada MD    --------------------    Interval History:  Wolf present for evaluation of lung cancer.  He presents with multiple rounds of presumed pneumonia and failed antibiotics course.  Chest x-ray and 2021 revealed airspace opacities present in the left upper and lower .  Given that these airspace opacities failed to resolve with antibiotics course and the symptoms continued, I he subsequently underwent bronchoscopy with cytology being negative and lymph node aspirations being negative.  Ultimately he  had a CT-guided biopsy of the left lower lobe (although pathology describes it as right upper lobe patient describes left upper lobe biopsy) that showed adenocarcinoma.  Wolf is a lifelong non-smoker and no history of secondhand smoke.  No occupational exposures.  Beyond dyspnea he otherwise feels well.    --------------------    Review of Systems:  10 point ROS negative except for that above.    Past Medical / Surgical History:  Past Medical History:   Diagnosis Date     Cataracts, both eyes 5/20/2014     Cerebral artery occlusion with cerebral infarction (H)      DM type 2, goal A1c below 7 5/5/2014     Hypertension      Past Surgical History:   Procedure Laterality Date     CATARACT IOL, RT/LT       COMBINED REPAIR PTOSIS WITH BLEPHAROPLASTY Bilateral 10/9/2017    Procedure: COMBINED REPAIR PTOSIS WITH BLEPHAROPLASTY;  Bilateral upper eyelid blepharoplasty and ptosis repair;  Surgeon: Bibiana Melara MD;  Location: [facility] OR     PHACOEMULSIFICATION WITH STANDARD INTRAOCULAR LENS IMPLANT Right 2/25/2016    Procedure: PHACOEMULSIFICATION WITH STANDARD INTRAOCULAR LENS IMPLANT;  Surgeon: Carlton Don MD;  Location: [facility] OR     PHACOEMULSIFICATION WITH STANDARD INTRAOCULAR LENS IMPLANT Left 2/11/2016    Procedure: PHACOEMULSIFICATION WITH STANDARD INTRAOCULAR LENS IMPLANT;  Surgeon: Carlton Don MD;  Location: [facility] OR     REPAIR PTOSIS Bilateral     10/17       Family History:  Family History   Problem Relation Age of Onset     Hypertension Father      Cancer No family hx of      Diabetes No family hx of      Cerebrovascular Disease No family hx of      Thyroid Disease No family hx of      Glaucoma No family hx of      Macular Degeneration No family hx of        Social History:  Social History     Socioeconomic History     Marital status:      Spouse name: Not on file     Number of children: 6     Years of education: Not on file     Highest education level: Not on file   Occupational History  "    Not on file   Tobacco Use     Smoking status: Never Smoker     Smokeless tobacco: Never Used   Substance and Sexual Activity     Alcohol use: No     Drug use: No     Sexual activity: Not Currently   Other Topics Concern     Parent/sibling w/ CABG, MI or angioplasty before 65F 55M? Not Asked   Social History Narrative     Not on file     Social Determinants of Health     Financial Resource Strain: Not on file   Food Insecurity: Not on file   Transportation Needs: Not on file   Physical Activity: Not on file   Stress: Not on file   Social Connections: Not on file   Intimate Partner Violence: Not on file   Housing Stability: Not on file       Medications / Allergies:  Reviewed in EMR.    --------------------    Physical Exam:  VS: /77 (BP Location: Left arm, Patient Position: Chair, Cuff Size: Adult Regular)   Pulse 83   Ht 1.632 m (5' 4.25\")   Wt 65.1 kg (143 lb 8 oz)   SpO2 (!) 89%   BMI 24.44 kg/m    GEN: Well appearing.  HEENT: PERRL, EOMI, no scleral icterus or injection; OP clear, moist mucous membranes, no oral lesions.  NECK: Supple.  LISHA: No cervical, supraclavicular, axillary or inguinal LISHA.  CHEST: Breathing comfortably, good airflow bilaterally, no crackles, no wheezing.  CV: RRR, s1/s2, no murmurs; strong distal pulses.  ABD: Non-tender, non-distended, soft, positive bowel sounds.  EXT: Warm and well perfused; no edema; no clubbing.  SKIN: Warm and dry, no visible rashes.  NEURO: Alert, engaged; CN 2-12 grossly intact; no gross sensorimotor deficits; gait and coordination intact; speech clear and fluent.    Labs / Imaging / Path:  We reviewed labs, personally reviewed imaging and reviewed pathology reports  "

## 2022-01-05 NOTE — NURSING NOTE
"Oncology Rooming Note    January 5, 2022 2:48 PM   Wolf Sen is a 71 year old male who presents for:    Chief Complaint   Patient presents with     Oncology Clinic Visit     New Adenocarcinoma     Initial Vitals: Ht 1.632 m (5' 4.25\")   Wt 65.1 kg (143 lb 8 oz)   BMI 24.44 kg/m   Estimated body mass index is 24.44 kg/m  as calculated from the following:    Height as of this encounter: 1.632 m (5' 4.25\").    Weight as of this encounter: 65.1 kg (143 lb 8 oz). Body surface area is 1.72 meters squared.  No Pain (0) Comment: Data Unavailable   No LMP for male patient.  Allergies reviewed: No  Medications reviewed: Yes    Medications: Medication refills not needed today.  Pharmacy name entered into EPIC:    WRITTEN PRESCRIPTION REQUESTED  [id number] [place] - [address] [id number] - [place], [place]  [address] [facility] [address]    Clinical concerns: NO Dr. Estrada was notified.      Korina Sherman CMA              "

## 2022-01-05 NOTE — PATIENT INSTRUCTIONS
1) PET scan and brain MRI.  2) Send outside path 12/22 for Foundation One.  3) [facility] 18-21 days 30 mins (no imaging or labs)

## 2022-01-06 ENCOUNTER — PATIENT OUTREACH (OUTPATIENT)
Dept: ONCOLOGY | Facility: CLINIC | Age: 72
End: 2022-01-06
Payer: COMMERCIAL

## 2022-01-06 NOTE — PROGRESS NOTES
[facility]:  Care Coordination Note    Order for [facility] testing was submitted via the [facility] online portal today, as requested by Dr. Estrada.  Testing was requested for outside surgical pathology case number [id number], collected on 12/22/2021 at [facility] (specimen site: lung biopsy).  A copy of patient's pathology results report, face sheet, and copies of insurance cards were faxed to [facility] at [phone].      [facility] Order Number: [id number].     Khris Bush RN, BSN, OCN  Oncology Care Coordinator  [facility]

## 2022-01-10 ENCOUNTER — LAB (OUTPATIENT)
Dept: LAB | Facility: CLINIC | Age: 72
End: 2022-01-10
Payer: COMMERCIAL

## 2022-01-10 DIAGNOSIS — C34.12 PANCOASTS SYNDROME, LEFT (H): Primary | ICD-10-CM

## 2022-01-10 PROCEDURE — 88342 IMHCHEM/IMCYTCHM 1ST ANTB: CPT | Mod: TC | Performed by: INTERNAL MEDICINE

## 2022-01-10 PROCEDURE — 88341 IMHCHEM/IMCYTCHM EA ADD ANTB: CPT | Mod: 26 | Performed by: PATHOLOGY

## 2022-01-10 PROCEDURE — 88321 CONSLTJ&REPRT SLD PREP ELSWR: CPT | Performed by: PATHOLOGY

## 2022-01-10 PROCEDURE — 88342 IMHCHEM/IMCYTCHM 1ST ANTB: CPT | Mod: 26 | Performed by: PATHOLOGY

## 2022-01-10 PROCEDURE — 88323 CONSLTJ&REPRT MATRL PREP SLD: CPT | Mod: TC | Performed by: INTERNAL MEDICINE

## 2022-01-16 LAB
PATH REPORT.COMMENTS IMP SPEC: ABNORMAL
PATH REPORT.COMMENTS IMP SPEC: YES
PATH REPORT.FINAL DX SPEC: ABNORMAL
PATH REPORT.GROSS SPEC: ABNORMAL
PATH REPORT.MICROSCOPIC SPEC OTHER STN: ABNORMAL
PATH REPORT.RELEVANT HX SPEC: ABNORMAL
PATH REPORT.RELEVANT HX SPEC: ABNORMAL
PATH REPORT.SITE OF ORIGIN SPEC: ABNORMAL

## 2022-01-24 ENCOUNTER — HOSPITAL ENCOUNTER (OUTPATIENT)
Dept: MRI IMAGING | Facility: CLINIC | Age: 72
End: 2022-01-24
Attending: INTERNAL MEDICINE
Payer: COMMERCIAL

## 2022-01-24 ENCOUNTER — PATIENT OUTREACH (OUTPATIENT)
Dept: ONCOLOGY | Facility: CLINIC | Age: 72
End: 2022-01-24
Payer: COMMERCIAL

## 2022-01-24 DIAGNOSIS — R06.09 DYSPNEA ON EXERTION: ICD-10-CM

## 2022-01-24 PROCEDURE — 255N000002 HC RX 255 OP 636: Performed by: INTERNAL MEDICINE

## 2022-01-24 PROCEDURE — 70553 MRI BRAIN STEM W/O & W/DYE: CPT | Mod: 26 | Performed by: RADIOLOGY

## 2022-01-24 PROCEDURE — A9585 GADOBUTROL INJECTION: HCPCS | Performed by: INTERNAL MEDICINE

## 2022-01-24 PROCEDURE — 70553 MRI BRAIN STEM W/O & W/DYE: CPT

## 2022-01-24 RX ORDER — GADOBUTROL 604.72 MG/ML
6.5 INJECTION INTRAVENOUS ONCE
Status: COMPLETED | OUTPATIENT
Start: 2022-01-24 | End: 2022-01-24

## 2022-01-24 RX ADMIN — GADOBUTROL 6.5 ML: 604.72 INJECTION INTRAVENOUS at 14:01

## 2022-01-24 NOTE — PROGRESS NOTES
Received via fax Foundation one results. Results given to Dr Estrada. Copy sent for scanning.  Kelsey Sauceda RN  Care Coordinator  [facility]

## 2022-01-25 ENCOUNTER — DOCUMENTATION ONLY (OUTPATIENT)
Dept: HOME HEALTH SERVICES | Facility: CLINIC | Age: 72
End: 2022-01-25
Payer: COMMERCIAL

## 2022-01-25 NOTE — PROGRESS NOTES
Oxygen Provider:  Arranged through [employer], contact number [phone].  If you have any questions or concerns please call the oxygen company directly.

## 2022-01-28 ENCOUNTER — PATIENT OUTREACH (OUTPATIENT)
Dept: GERIATRIC MEDICINE | Facility: CLINIC | Age: 72
End: 2022-01-28
Payer: COMMERCIAL

## 2022-01-28 NOTE — PROGRESS NOTES
Care Coordinator placed call to [facility] [employer] regarding install of lifeline unit.  Spoke with Lashon - she states that units were backordered but they are in stock and he is at beginning of list.  She will reach out to team and find out an update.     Viki Avalos RN, PHN  [employer]

## 2022-01-28 NOTE — PROGRESS NOTES
Per [facility] Secure Site:     Viki BHARATI MOLINA 1950 [facility] PCA: Assess Same PCA:    1/17/2022 6/30/2022 Change of Agency 825     Viki BHARATI MOLINA 1950 [facility] PCA: Assess Same PCA:    7/1/2022 12/31/2022 Change of Agency 920         Viki Avalos RN, PHN  [facility]

## 2022-01-29 ENCOUNTER — HOSPITAL ENCOUNTER (OUTPATIENT)
Dept: PET IMAGING | Facility: CLINIC | Age: 72
Discharge: HOME | End: 2022-01-29
Attending: INTERNAL MEDICINE | Admitting: INTERNAL MEDICINE
Payer: COMMERCIAL

## 2022-01-29 DIAGNOSIS — R06.09 DYSPNEA ON EXERTION: ICD-10-CM

## 2022-01-29 DIAGNOSIS — C34.12 PRIMARY ADENOCARCINOMA OF UPPER LOBE OF LEFT LUNG (H): ICD-10-CM

## 2022-01-29 PROCEDURE — 78816 PET IMAGE W/CT FULL BODY: CPT | Mod: 26 | Performed by: RADIOLOGY

## 2022-01-29 PROCEDURE — A9552 F18 FDG: HCPCS | Performed by: INTERNAL MEDICINE

## 2022-01-29 PROCEDURE — 343N000001 HC RX 343: Performed by: INTERNAL MEDICINE

## 2022-01-29 PROCEDURE — 71260 CT THORAX DX C+: CPT | Mod: 26 | Performed by: RADIOLOGY

## 2022-01-29 PROCEDURE — 74177 CT ABD & PELVIS W/CONTRAST: CPT | Mod: 26 | Performed by: RADIOLOGY

## 2022-01-29 PROCEDURE — 250N000011 HC RX IP 250 OP 636: Performed by: INTERNAL MEDICINE

## 2022-01-29 PROCEDURE — 74177 CT ABD & PELVIS W/CONTRAST: CPT | Mod: 59,PI

## 2022-01-29 RX ORDER — IOPAMIDOL 755 MG/ML
50-135 INJECTION, SOLUTION INTRAVASCULAR ONCE
Status: COMPLETED | OUTPATIENT
Start: 2022-01-29 | End: 2022-01-29

## 2022-01-29 RX ADMIN — IOPAMIDOL 88 ML: 755 INJECTION, SOLUTION INTRAVENOUS at 09:24

## 2022-01-29 RX ADMIN — FLUDEOXYGLUCOSE F-18 10 MCI.: 500 INJECTION, SOLUTION INTRAVENOUS at 09:23

## 2022-01-31 ENCOUNTER — ONCOLOGY VISIT (OUTPATIENT)
Dept: ONCOLOGY | Facility: CLINIC | Age: 72
End: 2022-01-31
Attending: INTERNAL MEDICINE
Payer: COMMERCIAL

## 2022-01-31 VITALS
BODY MASS INDEX: 24.33 KG/M2 | HEART RATE: 83 BPM | SYSTOLIC BLOOD PRESSURE: 164 MMHG | OXYGEN SATURATION: 94 % | WEIGHT: 142.5 LBS | DIASTOLIC BLOOD PRESSURE: 79 MMHG | HEIGHT: 64 IN

## 2022-01-31 DIAGNOSIS — R05.9 COUGH: ICD-10-CM

## 2022-01-31 DIAGNOSIS — R06.09 DYSPNEA ON EXERTION: ICD-10-CM

## 2022-01-31 DIAGNOSIS — C34.12 PRIMARY ADENOCARCINOMA OF UPPER LOBE OF LEFT LUNG (H): ICD-10-CM

## 2022-01-31 DIAGNOSIS — C34.92 ADENOCARCINOMA, LUNG, LEFT (H): Primary | ICD-10-CM

## 2022-01-31 DIAGNOSIS — J91.0 MALIGNANT PLEURAL EFFUSION (H): ICD-10-CM

## 2022-01-31 DIAGNOSIS — I42.7 CARDIOMYOPATHY SECONDARY TO DRUG (H): ICD-10-CM

## 2022-01-31 PROCEDURE — 99214 OFFICE O/P EST MOD 30 MIN: CPT | Performed by: INTERNAL MEDICINE

## 2022-01-31 ASSESSMENT — PAIN SCALES - GENERAL: PAINLEVEL: NO PAIN (0)

## 2022-01-31 ASSESSMENT — MIFFLIN-ST. JEOR: SCORE: 1306.35

## 2022-01-31 NOTE — PATIENT INSTRUCTIONS
1) Checking PA on osimertinib ASAP.  2) Once started:  A) labs/EKG and provider visit 4 weeks into treatment (BEACON).  B) PET scan, ECHO and labs, EKG and provider visit 8 weeks into treatment (BEACON).    Miles Estrada MD.

## 2022-01-31 NOTE — LETTER
2022         RE: Wolf Sen  [address]  [place] [address]        Dear Colleague,    Thank you for referring your patient, Wolf Sen, to the [facility] [facility]. Please see a copy of my visit note below.    [facility] Hematology / Oncology  Progress Note  Name: Wolf Sen  :  1950    MRN:  [id number]    --------------------    Assessment / Plan:  Locally advanced, unresectable, left-sided lung adenocarcinoma:  # 2021 Presented w/ dyspnea and cough and multiple failed rounds of antibiotics for presumed pneumonia.  # Dec 2021 CT-guided lung biopsy adenocarcinoma.  # 2021 Staging brain MRI and PET scan with bilateral lung involvement (CRISTO primary) and med/hilar adenopathy.  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    Wolf remains clinically stable with dyspnea on exertion and cough being his only complaints.  Radiographically, we reviewed his brain MRI and PET scan which shows presumed bilateral lung involvement and locally advanced adenopathy.  Molecularly, we reviewed his foundation 1 testing which revealed an EGFR mutation fortunately and expectedly given his  background and non-smoking history.  As a result, we plan to start osimertinib full dose immediately to try to get control of his cancer; he would not represent a surgical or chemoradiation candidate at this time in my opinion and his disease would be best treated with systemic therapy.  We reviewed logistics of administration as well as potential side effects.  Wolf is agreeable to proceed.  We will plan on provider visit 4 weeks after starting accompanied by labs and EKG.  We will plan on provider visit 8 weeks after starting accompanied by labs and PET scan.  He has baseline EKG and echo in the past year.    Miles Estrada MD    --------------------    Interval History:  Wolf returns for follow up of lung cancer.  No new complaints.  Dyspnea on exertion and cough remain his  "biggest complaints.  Stable weight, appetite and energy.  No new bone pains.  No neurologic symptoms.    --------------------    Physical Exam:  VS: BP (!) 164/79 (BP Location: Left arm, Patient Position: Chair, Cuff Size: Adult Regular)   Pulse 83   Ht 1.632 m (5' 4.25\")   Wt 64.6 kg (142 lb 8 oz)   SpO2 94%   BMI 24.27 kg/m    GEN: Well appearing.    Labs / Imaging / Path:  We reviewed labs, personally reviewed imaging and reviewed pathology reports      Again, thank you for allowing me to participate in the care of your patient.        Sincerely,        Miles Estrada MD    "

## 2022-01-31 NOTE — NURSING NOTE
"Oncology Rooming Note    January 31, 2022 3:13 PM   Wolf Sen is a 71 year old male who presents for:    Chief Complaint   Patient presents with     Oncology Clinic Visit     follow up     Initial Vitals: BP (!) 164/79 (BP Location: Left arm, Patient Position: Chair, Cuff Size: Adult Regular)   Pulse 83   Ht 1.632 m (5' 4.25\")   Wt 64.6 kg (142 lb 8 oz)   SpO2 94%   BMI 24.27 kg/m   Estimated body mass index is 24.27 kg/m  as calculated from the following:    Height as of this encounter: 1.632 m (5' 4.25\").    Weight as of this encounter: 64.6 kg (142 lb 8 oz). Body surface area is 1.71 meters squared.  No Pain (0) Comment: Data Unavailable   No LMP for male patient.  Allergies reviewed: Yes  Medications reviewed: Yes    Medications: Medication refills not needed today.  Pharmacy name entered into EPIC:    WRITTEN PRESCRIPTION REQUESTED  [id number] [place], [place] - [address] [facility] [id number] - [facility], [place] - [address] [facility] [address]    Clinical concerns: NO Dr. Estrada was notified.      Korina Sherman CMA              "

## 2022-01-31 NOTE — PROGRESS NOTES
[facility] Hematology / Oncology  Progress Note  Name: Wolf Sen  :  1950    MRN:  [id number]    --------------------    Assessment / Plan:  Locally advanced, unresectable, left-sided lung adenocarcinoma:  # 2021 Presented w/ dyspnea and cough and multiple failed rounds of antibiotics for presumed pneumonia.  # Dec 2021 CT-guided lung biopsy adenocarcinoma.  # 2021 Staging brain MRI and PET scan with bilateral lung involvement (CRISTO primary) and med/hilar adenopathy.  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    Wolf remains clinically stable with dyspnea on exertion and cough being his only complaints.  Radiographically, we reviewed his brain MRI and PET scan which shows presumed bilateral lung involvement and locally advanced adenopathy.  Molecularly, we reviewed his foundation 1 testing which revealed an EGFR mutation fortunately and expectedly given his  background and non-smoking history.  As a result, we plan to start osimertinib full dose immediately to try to get control of his cancer; he would not represent a surgical or chemoradiation candidate at this time in my opinion and his disease would be best treated with systemic therapy.  We reviewed logistics of administration as well as potential side effects.  Wolf is agreeable to proceed.  We will plan on provider visit 4 weeks after starting accompanied by labs and EKG.  We will plan on provider visit 8 weeks after starting accompanied by labs and PET scan.  He has baseline EKG and echo in the past year.    Miles Estrada MD    --------------------    Interval History:  Wolf returns for follow up of lung cancer.  No new complaints.  Dyspnea on exertion and cough remain his biggest complaints.  Stable weight, appetite and energy.  No new bone pains.  No neurologic symptoms.    --------------------    Physical Exam:  VS: BP (!) 164/79 (BP Location: Left arm, Patient Position: Chair, Cuff Size: Adult Regular)   Pulse 83   Ht  "1.632 m (5' 4.25\")   Wt 64.6 kg (142 lb 8 oz)   SpO2 94%   BMI 24.27 kg/m    GEN: Well appearing.    Labs / Imaging / Path:  We reviewed labs, personally reviewed imaging and reviewed pathology reports  "

## 2022-02-01 ENCOUNTER — TELEPHONE (OUTPATIENT)
Dept: ONCOLOGY | Facility: CLINIC | Age: 72
End: 2022-02-01
Payer: COMMERCIAL

## 2022-02-01 ENCOUNTER — TELEPHONE (OUTPATIENT)
Dept: PHARMACY | Facility: CLINIC | Age: 72
End: 2022-02-01
Payer: COMMERCIAL

## 2022-02-01 DIAGNOSIS — C34.92 ADENOCARCINOMA, LUNG, LEFT (H): Primary | ICD-10-CM

## 2022-02-01 NOTE — TELEPHONE ENCOUNTER
"Oral Chemotherapy Monitoring Program    Primary Oncologist: Dr. Miles Estrada   Primary Oncology Clinic: [facility]  Cancer Diagnosis: NSCLC    Drug: Osimertinib (Tagrisso)  Start Date: ASAP  Expected duration of therapy: Until disease progression or unacceptable toxicity    Drug Interaction Assessment: There were no significant drug interactions identified upon review of medication list.    Lab Monitoring Plan: With each cycle     Subjective/Objective:  Wolf Sen is a 71 year old male contacted by phone via the assistance of a [place]  for an initial visit for oral chemotherapy education. Wolf is set to start Tagrisso 80 mg once daily as soon as possible.     ORAL CHEMOTHERAPY 2/1/2022   Assessment Type Initial Work up;New Teach   Diagnosis Code Non-Small Cell Lung Cancer   Providers Dr. Estrada   Clinic Name/Location [place]   Drug Name Tagrisso (osimertinib)   Dose 80 mg   Current Schedule Daily   Cycle Details Continuous       Vitals:  BP:   BP Readings from Last 1 Encounters:   01/31/22 (!) 164/79     Wt Readings from Last 1 Encounters:   01/31/22 64.6 kg (142 lb 8 oz)     Estimated body surface area is 1.71 meters squared as calculated from the following:    Height as of 1/31/22: 1.632 m (5' 4.25\").    Weight as of 1/31/22: 64.6 kg (142 lb 8 oz).      Labs:  No results found for NA within last 30 days.     No results found for K within last 30 days.     No results found for CA within last 30 days.     No results found for Mag within last 30 days.     No results found for Phos within last 30 days.     No results found for ALBUMIN within last 30 days.     No results found for BUN within last 30 days.     No results found for CR within last 30 days.       No results found for AST within last 30 days.     No results found for ALT within last 30 days.     No results found for BILITOTAL within last 30 days.       No results found for WBC within last 30 days.     No " results found for HGB within last 30 days.     No results found for PLT within last 30 days.     No results found for ANC within last 30 days.     Assessment:  Patient is appropriate to start therapy.    Plan:  Basic chemotherapy teaching was reviewed with the patient with assistance of [place]  including indication, start date of therapy, dose, administration, adverse effects, missed doses, food and drug interactions, monitoring, side effect management, office contact information, and safe handling. Written materials will be sent to his daughter per his request and all questions answered.    Follow-Up:  1-week f/u phone call pending start date      Celso Fowler, PharmD  PGY-2 Pharmacy Oncology Resident

## 2022-02-01 NOTE — TELEPHONE ENCOUNTER
Prior Authorization Not Needed per Insurance    Medication: Tagrisso 80mg, pa not needed  Insurance Company: [employer] - Phone [phone] Fax [phone]  Expected CoPay: $0    Pharmacy Filling the Rx: [facility] - [place], [place] - [address]   Pharmacy Notified:    Patient Notified:

## 2022-02-02 ENCOUNTER — PATIENT OUTREACH (OUTPATIENT)
Dept: ONCOLOGY | Facility: CLINIC | Age: 72
End: 2022-02-02
Payer: COMMERCIAL

## 2022-02-02 NOTE — PROGRESS NOTES
Foundation One- [id number] sent to scanning for patient chart.  Kelsey Sauceda RN  Care Coordinator- Cancer Clinic

## 2022-02-10 ENCOUNTER — DOCUMENTATION ONLY (OUTPATIENT)
Dept: PHARMACY | Facility: CLINIC | Age: 72
End: 2022-02-10
Payer: COMMERCIAL

## 2022-02-10 NOTE — PROGRESS NOTES
Oral Chemotherapy Monitoring Program  Patient Follow Up    Primary Oncologist: Dr. Estrada  Primary Oncology Clinic: [place]  Cancer Diagnosis: NSCLC    Therapy History:  Tagrisso 80mg daily started 2/4/22    Drug Interaction Assessment: None    Subjective/Objective:  Wolf Sen is a 71 year old male contacted by phone for a follow-up visit for oral chemotherapy.    ORAL CHEMOTHERAPY 2/1/2022 2/10/2022   Assessment Type Initial Work up;New Teach Initial Follow up   Diagnosis Code Non-Small Cell Lung Cancer Non-Small Cell Lung Cancer   Providers Dr. Natalie Estrada   Clinic Name/Location [facility]   Drug Name Tagrisso (osimertinib) Tagrisso (osimertinib)   Dose 80 mg 80 mg   Current Schedule Daily Daily   Cycle Details Continuous Continuous   Start Date of Last Cycle - 2/4/2022   Planned next cycle start date - 3/6/2022   Doses missed in last 2 weeks - 0   Adherence Assessment - Adherent   Adverse Effects - No AE identified during assessment   Home BPs - all BPs<140/90   Any new drug interactions? - No   Is the dose as ordered appropriate for the patient? - Yes   Is the patient currently in pain? - No   Has the patient been assessed within the past 6 months for depression? - Yes   Has the patient missed any days of school, work, or other routine activity? - No   Since the last time we talked, have you been hospitalized or used the emergency room? - No       Last PHQ-2 Score on record:   PHQ-2 ( 1999 [employer]) 12/6/2021 11/12/2021   Q1: Little interest or pleasure in doing things 0 0   Q2: Feeling down, depressed or hopeless 0 0   PHQ-2 Score 0 0   PHQ-2 Total Score (12-17 Years)- Positive if 3 or more points; Administer PHQ-A if positive - -       Patient does not report depression symptoms.      Vitals:  BP:   BP Readings from Last 1 Encounters:   01/31/22 (!) 164/79     Wt Readings from Last 1 Encounters:   01/31/22 64.6 kg (142 lb 8 oz)     Estimated body surface area is 1.71 meters squared as  "calculated from the following:    Height as of 1/31/22: 1.632 m (5' 4.25\").    Weight as of 1/31/22: 64.6 kg (142 lb 8 oz).    Labs:  Lab Results   Component Value Date     12/10/2021     03/17/2021      Lab Results   Component Value Date    POTASSIUM 4.2 12/10/2021    POTASSIUM 3.7 03/17/2021     Lab Results   Component Value Date    CHANG 9.6 12/10/2021    CHANG 9.6 03/17/2021     Lab Results   Component Value Date    ALBUMIN 3.2 11/24/2021    ALBUMIN 3.9 03/17/2021     No results found for: MAG  No results found for: PHOS  Lab Results   Component Value Date    BUN 12 12/10/2021    BUN 22 03/17/2021     Lab Results   Component Value Date    CR 1.09 12/10/2021    CR 1.44 03/17/2021       Lab Results   Component Value Date    AST 22 11/24/2021    AST 37 03/17/2021     Lab Results   Component Value Date    ALT 26 11/24/2021    ALT 52 03/17/2021     Lab Results   Component Value Date    BILITOTAL 0.6 11/24/2021    BILITOTAL 0.7 03/17/2021       Lab Results   Component Value Date    WBC 11.1 12/10/2021    WBC 6.7 03/17/2021     Lab Results   Component Value Date    HGB 12.1 12/10/2021    HGB 12.4 03/17/2021     Lab Results   Component Value Date     12/10/2021     03/17/2021     Lab Results   Component Value Date    ANEU 3.6 03/17/2021         Assessment & Plan:  I spoke with patient through a [employer] . He reports doing well on therapy with no issues or concerns. He reports starting therapy on 2/4. He has not missed any doses and is compliant. We will continue to follow. Start date provided to CC for future appts    Follow-Up:  In 1 month    Refill Due:  By 3/6    Angelo Medina, PharmD, BCPS, BCOP      "

## 2022-02-16 ENCOUNTER — PATIENT OUTREACH (OUTPATIENT)
Dept: GERIATRIC MEDICINE | Facility: CLINIC | Age: 72
End: 2022-02-16
Payer: COMMERCIAL

## 2022-02-16 NOTE — PROGRESS NOTES
2/16/22: Care Coordinator received voice message from Evelia at / [employer] Alert stating that member's unit was installed yesterday.   $65 1x fee + $45/mo.   Care Coordinator updated POC and sent to CMS for auth.     Per review of epic - member new dx adenocarcinoma of lung.  Care Coordinator left voice message for daughter, Gwen, for call back to see if any needs.     Viki Avalos RN, PHN  [facility]

## 2022-02-22 ENCOUNTER — PATIENT OUTREACH (OUTPATIENT)
Dept: GERIATRIC MEDICINE | Facility: CLINIC | Age: 72
End: 2022-02-22
Payer: COMMERCIAL

## 2022-02-22 NOTE — PROGRESS NOTES
Care Coordinator received call back from daughter, Gwen.   She updated Care Coordinator and stated that member is doing better - sob has improved.  He started on oral chemo on 2/4 and while too soon to completely tell he so far is having no side effects and is managing well.   Gwen states no needs at this time.  She will keep Care Coordinator up to date with anything further.     Viki Avalos RN, PHN  [facility]

## 2022-02-28 DIAGNOSIS — C34.92 ADENOCARCINOMA, LUNG, LEFT (H): Primary | ICD-10-CM

## 2022-03-02 ENCOUNTER — LAB (OUTPATIENT)
Dept: LAB | Facility: CLINIC | Age: 72
End: 2022-03-02
Attending: INTERNAL MEDICINE
Payer: COMMERCIAL

## 2022-03-02 ENCOUNTER — ONCOLOGY VISIT (OUTPATIENT)
Dept: ONCOLOGY | Facility: CLINIC | Age: 72
End: 2022-03-02
Attending: INTERNAL MEDICINE
Payer: COMMERCIAL

## 2022-03-02 VITALS
SYSTOLIC BLOOD PRESSURE: 185 MMHG | DIASTOLIC BLOOD PRESSURE: 77 MMHG | WEIGHT: 144.2 LBS | HEART RATE: 62 BPM | OXYGEN SATURATION: 97 % | BODY MASS INDEX: 24.62 KG/M2 | HEIGHT: 64 IN | RESPIRATION RATE: 16 BRPM

## 2022-03-02 DIAGNOSIS — D63.8 ANEMIA IN OTHER CHRONIC DISEASES CLASSIFIED ELSEWHERE: ICD-10-CM

## 2022-03-02 DIAGNOSIS — C34.92 ADENOCARCINOMA, LUNG, LEFT (H): Primary | ICD-10-CM

## 2022-03-02 DIAGNOSIS — T50.905A DRUG-INDUCED PRURITUS: ICD-10-CM

## 2022-03-02 DIAGNOSIS — C34.92 ADENOCARCINOMA, LUNG, LEFT (H): ICD-10-CM

## 2022-03-02 DIAGNOSIS — L29.89 DRUG-INDUCED PRURITUS: ICD-10-CM

## 2022-03-02 LAB
ALBUMIN SERPL-MCNC: 3.2 G/DL (ref 3.4–5)
ALP SERPL-CCNC: 84 U/L (ref 40–150)
ALT SERPL W P-5'-P-CCNC: 38 U/L (ref 0–70)
ANION GAP SERPL CALCULATED.3IONS-SCNC: 3 MMOL/L (ref 3–14)
AST SERPL W P-5'-P-CCNC: 24 U/L (ref 0–45)
BASOPHILS # BLD AUTO: 0 10E3/UL (ref 0–0.2)
BASOPHILS NFR BLD AUTO: 1 %
BILIRUB SERPL-MCNC: 0.4 MG/DL (ref 0.2–1.3)
BUN SERPL-MCNC: 21 MG/DL (ref 7–30)
CALCIUM SERPL-MCNC: 9 MG/DL (ref 8.5–10.1)
CHLORIDE BLD-SCNC: 104 MMOL/L (ref 94–109)
CO2 SERPL-SCNC: 32 MMOL/L (ref 20–32)
CREAT SERPL-MCNC: 1.34 MG/DL (ref 0.66–1.25)
EOSINOPHIL # BLD AUTO: 0.3 10E3/UL (ref 0–0.7)
EOSINOPHIL NFR BLD AUTO: 6 %
ERYTHROCYTE [DISTWIDTH] IN BLOOD BY AUTOMATED COUNT: 14.9 % (ref 10–15)
GFR SERPL CREATININE-BSD FRML MDRD: 56 ML/MIN/1.73M2
GLUCOSE BLD-MCNC: 121 MG/DL (ref 70–99)
HCT VFR BLD AUTO: 36.3 % (ref 40–53)
HGB BLD-MCNC: 11.5 G/DL (ref 13.3–17.7)
IMM GRANULOCYTES # BLD: 0 10E3/UL
IMM GRANULOCYTES NFR BLD: 0 %
LYMPHOCYTES # BLD AUTO: 2.3 10E3/UL (ref 0.8–5.3)
LYMPHOCYTES NFR BLD AUTO: 37 %
MCH RBC QN AUTO: 24.8 PG (ref 26.5–33)
MCHC RBC AUTO-ENTMCNC: 31.7 G/DL (ref 31.5–36.5)
MCV RBC AUTO: 78 FL (ref 78–100)
MONOCYTES # BLD AUTO: 0.8 10E3/UL (ref 0–1.3)
MONOCYTES NFR BLD AUTO: 13 %
NEUTROPHILS # BLD AUTO: 2.7 10E3/UL (ref 1.6–8.3)
NEUTROPHILS NFR BLD AUTO: 43 %
NRBC # BLD AUTO: 0 10E3/UL
NRBC BLD AUTO-RTO: 0 /100
PLATELET # BLD AUTO: 218 10E3/UL (ref 150–450)
POTASSIUM BLD-SCNC: 4.4 MMOL/L (ref 3.4–5.3)
PROT SERPL-MCNC: 8.4 G/DL (ref 6.8–8.8)
RBC # BLD AUTO: 4.64 10E6/UL (ref 4.4–5.9)
SODIUM SERPL-SCNC: 139 MMOL/L (ref 133–144)
WBC # BLD AUTO: 6.2 10E3/UL (ref 4–11)

## 2022-03-02 PROCEDURE — 36415 COLL VENOUS BLD VENIPUNCTURE: CPT

## 2022-03-02 PROCEDURE — 80053 COMPREHEN METABOLIC PANEL: CPT

## 2022-03-02 PROCEDURE — 93005 ELECTROCARDIOGRAM TRACING: CPT | Performed by: INTERNAL MEDICINE

## 2022-03-02 PROCEDURE — 85025 COMPLETE CBC W/AUTO DIFF WBC: CPT

## 2022-03-02 PROCEDURE — 99214 OFFICE O/P EST MOD 30 MIN: CPT | Performed by: INTERNAL MEDICINE

## 2022-03-02 PROCEDURE — 99207 PR NO CHARGE LOS: CPT

## 2022-03-02 ASSESSMENT — PAIN SCALES - GENERAL: PAINLEVEL: NO PAIN (0)

## 2022-03-02 NOTE — PROGRESS NOTES
12 lead EKG performed without incident- results reviewed by Dr. Estrada. Results available for review in Epic.

## 2022-03-02 NOTE — LETTER
"    3/2/2022         RE: Wolf Sen  [address]  [facility] [address]        Dear Colleague,    Thank you for referring your patient, Wolf Sen, to the [facility] [place]. Please see a copy of my visit note below.    Oncology Rooming Note    2022 3:25 PM   Wolf Sen is a 71 year old male who presents for:    Chief Complaint   Patient presents with     Oncology Clinic Visit     Follow up     Initial Vitals: BP (!) 185/77 (BP Location: Left arm)   Pulse 62   Resp 16   Ht 1.632 m (5' 4.25\")   Wt 65.4 kg (144 lb 3.2 oz)   SpO2 97%   BMI 24.56 kg/m   Estimated body mass index is 24.56 kg/m  as calculated from the following:    Height as of this encounter: 1.632 m (5' 4.25\").    Weight as of this encounter: 65.4 kg (144 lb 3.2 oz). Body surface area is 1.72 meters squared.  No Pain (0) Comment: Data Unavailable   No LMP for male patient.  Allergies reviewed: Yes  Medications reviewed: Yes    Medications: MEDICATION REFILLS NEEDED TODAY. Provider was notified.  Pharmacy name entered into EPIC:    WRITTEN PRESCRIPTION REQUESTED  [id number] - [place], [place] - [address] [id number] - [place], [place] -  [address] AT [address] [facility] - [place], [place] - [address]     Clinical concerns: no new concerns       Gisselle Pardo LPN                [facility] Hematology / Oncology  Progress Note  Name: Wolf Sen  :  1950    MRN:  [id number]    --------------------    Assessment / Plan:  Locally advanced, unresectable, left-sided lung adenocarcinoma:  # 2021 Presented w/ dyspnea and cough and multiple failed rounds of antibiotics for presumed pneumonia.  # Dec 2021 CT-guided lung biopsy adenocarcinoma.  # 2022 Staging brain MRI and PET scan with bilateral lung involvement (CRISTO primary) and med/hilar adenopathy.  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  # 2022 " "Osimertinib - ongoing.    Wolf remains clinically well.  He is tolerating osimertinib well and without major toxicity.  The burden of his cancer-related symptoms have nearly resolved.  Encouraged topical creams as well as some antihistamines for his drug-related itching, but no dose modification/reduction at this time.  We have plans for a PET scan at the end of the month and we will plan to see him after that.  Given the okay to travel as well as the okay to get his third Covid booster.  I suspect his anemia may improve with therapy or he may continue with a trace anemia secondary to osimertinib.    Miles Estrada MD    --------------------    Interval History:  Wolf returns for follow up of lung cancer.  Since her last visit, he is doing quite well.  His cough is completely abated and he is no longer needing cough suppressants.  His breathing has also improved and his oxygen levels have nearly normalized.  His stamina is mild.  His appetite and weight are both about at baseline.      --------------------    Physical Exam:  VS: BP (!) 185/77 (BP Location: Left arm)   Pulse 62   Resp 16   Ht 1.632 m (5' 4.25\")   Wt 65.4 kg (144 lb 3.2 oz)   SpO2 97%   BMI 24.56 kg/m    GEN: Well appearing.    Labs / Imaging / Path:  We reviewed labs and EKG      Again, thank you for allowing me to participate in the care of your patient.        Sincerely,        Miles Estrada MD    "

## 2022-03-02 NOTE — PROGRESS NOTES
"Oncology Rooming Note    March 2, 2022 3:25 PM   Wolf Sen is a 71 year old male who presents for:    Chief Complaint   Patient presents with     Oncology Clinic Visit     Follow up     Initial Vitals: BP (!) 185/77 (BP Location: Left arm)   Pulse 62   Resp 16   Ht 1.632 m (5' 4.25\")   Wt 65.4 kg (144 lb 3.2 oz)   SpO2 97%   BMI 24.56 kg/m   Estimated body mass index is 24.56 kg/m  as calculated from the following:    Height as of this encounter: 1.632 m (5' 4.25\").    Weight as of this encounter: 65.4 kg (144 lb 3.2 oz). Body surface area is 1.72 meters squared.  No Pain (0) Comment: Data Unavailable   No LMP for male patient.  Allergies reviewed: Yes  Medications reviewed: Yes    Medications: MEDICATION REFILLS NEEDED TODAY. Provider was notified.  Pharmacy name entered into EPIC:    WRITTEN PRESCRIPTION REQUESTED  [id number] - [place], [place] - [address] [facility][id number] - [place], [place] - [address] AT [facility] & [address] [place] [address] [facility] - [place], [place] - [address]    Clinical concerns: no new concerns       Gisselle Pardo LPN              "

## 2022-03-03 NOTE — PROGRESS NOTES
"[facility] Hematology / Oncology  Progress Note  Name: Wolf Sen  :  1950    MRN:  [id number]    --------------------    Assessment / Plan:  Locally advanced, unresectable, left-sided lung adenocarcinoma:  # 2021 Presented w/ dyspnea and cough and multiple failed rounds of antibiotics for presumed pneumonia.  # Dec 2021 CT-guided lung biopsy adenocarcinoma.  # 2022 Staging brain MRI and PET scan with bilateral lung involvement (CRISTO primary) and med/hilar adenopathy.  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  # 2022 Osimertinib - ongoing.    Wolf remains clinically well.  He is tolerating osimertinib well and without major toxicity.  The burden of his cancer-related symptoms have nearly resolved.  Encouraged topical creams as well as some antihistamines for his drug-related itching, but no dose modification/reduction at this time.  We have plans for a PET scan at the end of the month and we will plan to see him after that.  Given the okay to travel as well as the okay to get his third Covid booster.  I suspect his anemia may improve with therapy or he may continue with a trace anemia secondary to osimertinib.    Miles Estrada MD    --------------------    Interval History:  Wolf returns for follow up of lung cancer.  Since her last visit, he is doing quite well.  His cough is completely abated and he is no longer needing cough suppressants.  His breathing has also improved and his oxygen levels have nearly normalized.  His stamina is mild.  His appetite and weight are both about at baseline.      --------------------    Physical Exam:  VS: BP (!) 185/77 (BP Location: Left arm)   Pulse 62   Resp 16   Ht 1.632 m (5' 4.25\")   Wt 65.4 kg (144 lb 3.2 oz)   SpO2 97%   BMI 24.56 kg/m    GEN: Well appearing.    Labs / Imaging / Path:  We reviewed labs and EKG  "

## 2022-03-21 ENCOUNTER — TELEPHONE (OUTPATIENT)
Dept: ONCOLOGY | Facility: CLINIC | Age: 72
End: 2022-03-21
Payer: COMMERCIAL

## 2022-03-21 ENCOUNTER — ONCOLOGY VISIT (OUTPATIENT)
Dept: ONCOLOGY | Facility: CLINIC | Age: 72
End: 2022-03-21
Payer: COMMERCIAL

## 2022-03-21 VITALS
SYSTOLIC BLOOD PRESSURE: 114 MMHG | OXYGEN SATURATION: 99 % | DIASTOLIC BLOOD PRESSURE: 64 MMHG | HEIGHT: 64 IN | BODY MASS INDEX: 24.41 KG/M2 | WEIGHT: 143 LBS | HEART RATE: 75 BPM

## 2022-03-21 DIAGNOSIS — C34.92 ADENOCARCINOMA, LUNG, LEFT (H): ICD-10-CM

## 2022-03-21 DIAGNOSIS — K13.79 MOUTH SORE: Primary | ICD-10-CM

## 2022-03-21 PROCEDURE — 99213 OFFICE O/P EST LOW 20 MIN: CPT | Performed by: NURSE PRACTITIONER

## 2022-03-21 RX ORDER — LIDOCAINE HYDROCHLORIDE 20 MG/ML
5 SOLUTION OROPHARYNGEAL
Qty: 100 ML | Refills: 0 | Status: SHIPPED | OUTPATIENT
Start: 2022-03-21

## 2022-03-21 ASSESSMENT — PAIN SCALES - GENERAL: PAINLEVEL: EXTREME PAIN (8)

## 2022-03-21 NOTE — TELEPHONE ENCOUNTER
Patient and his daughter called to report a mouth sore that has been present for about a week. He is on oral chemo. It is on the inside of his mouth on his gums. There is one sore. Reports some white patches in his mouth as well. Rates the pain 4/10 and reports that it hurts to eat. The sore has not been bleeding. Denies fever or any other symptoms.    Will route to provider for recommendations.    Ok per Raine Sherman to add on to her schedule today at 2 PM. Patient informed of appointment via [employer] .    Gaby Lopez, RN  Triage Nurse Advisor  [facility]

## 2022-03-21 NOTE — LETTER
"    3/21/2022         RE: Wolf Sen  [address]  [facility] [address]        Dear Colleague,    Thank you for referring your patient, Wolf Sen, to the [facility]. Please see a copy of my visit note below.    Hematology/Oncology Visit  Care Team:  - Oncologist: Dr. Estrada  - PCP: Kimberly Bell MD    Diagnosis: locally advanced lung adenocarcinoma on osimertinib, last on 3/2/21  Reason for visit: painful mouth sore    Interval History:  Wolf was interviewed with the assistance of a [employer]  via phone. He noticed a mouth sore under his tongue about 1 week ago. He did not experience this with cycle 1. He has not noticed any other sores and denies his entire mouth being tender. It is painful when he chews food but he has still been able to eat food carefully. He has not had any fevers. He denies any other symptoms. We discussed salt/soda rinses and use of viscous lidocaine but that healing can take time. He has no questions or other concerns today.    Medications:  Discussed pertinent medications.    Physical Exam:   GEN: pleasantly conversant male no acute distress  ENT: single oral sore approximately 0.5cm ulceration with slough covering under tongue, no other sores noted, several teeth missing  /64 (BP Location: Left arm, Patient Position: Chair, Cuff Size: Adult Regular)   Pulse 75   Ht 1.632 m (5' 4.25\")   Wt 64.9 kg (143 lb)   SpO2 99%   BMI 24.36 kg/m       Labs:  Reviewed last set of labs from 3/2    Assessment and Plan:  Oral sore under tongue/stomatitis  Locally advanced lung adenocarcinoma  - Likely secondary to osimertinib (32% incidence noted on UpToDate)  - Prescribed viscous lidocaine given single sore and provided applicator sponges  - Discussed spot treatment with viscous lido before meals and salt/soda rinses post meals/PRN  - Patient will reach out if pain or sores worsen  - Follow up with imaging and Oncology as previously " scheduled        The total time of this encounter amounted to 20 minutes today. This time includes face-to-face time spent with the patient, prep work, ordering tests, and performing post-visit documentation.  - Raine Sherman CNP        Again, thank you for allowing me to participate in the care of your patient.        Sincerely,        BERE Grayson CNP

## 2022-03-21 NOTE — PROGRESS NOTES
"Hematology/Oncology Visit  Care Team:  - Oncologist: Dr. Estrada  - PCP: Kimberly Bell MD    Diagnosis: locally advanced lung adenocarcinoma on osimertinib  Reason for visit: painful mouth sore    Interval History:  Wolf was interviewed with the assistance of a Gabonese  via phone. He noticed a mouth sore under his tongue about 1 week ago. He did not experience this with cycle 1. He has not noticed any other sores and denies his entire mouth being tender. It is painful when he chews food but he has still been able to eat food carefully. He has not had any fevers. He denies any other symptoms. We discussed salt/soda rinses and use of viscous lidocaine but that healing can take time. He has no questions or other concerns today.    Medications:  Discussed pertinent medications.    Physical Exam:   GEN: pleasantly conversant male no acute distress  ENT: single oral sore approximately 0.5cm ulceration with slough covering under tongue, no other sores noted, several teeth missing  /64 (BP Location: Left arm, Patient Position: Chair, Cuff Size: Adult Regular)   Pulse 75   Ht 1.632 m (5' 4.25\")   Wt 64.9 kg (143 lb)   SpO2 99%   BMI 24.36 kg/m       Labs:  Reviewed last set of labs from 3/2    Assessment and Plan:  Oral sore under tongue/stomatitis  Locally advanced lung adenocarcinoma  - Likely secondary to osimertinib (32% incidence noted on UpToDate), continue treatment  - Prescribed viscous lidocaine given single sore and provided applicator sponges  - Discussed spot treatment with viscous lido before meals and salt/soda rinses post meals/PRN  - Patient will reach out if pain or sores worsen  - Follow up with imaging and Oncology as previously scheduled        The total time of this encounter amounted to 20 minutes today. This time includes face-to-face time spent with the patient, prep work, ordering tests, and performing post-visit documentation.  - Raine Sherman CNP    "

## 2022-03-25 ENCOUNTER — LAB (OUTPATIENT)
Dept: LAB | Facility: CLINIC | Age: 72
End: 2022-03-25
Payer: COMMERCIAL

## 2022-03-25 ENCOUNTER — ANCILLARY PROCEDURE (OUTPATIENT)
Dept: PET IMAGING | Facility: CLINIC | Age: 72
End: 2022-03-25
Attending: INTERNAL MEDICINE
Payer: COMMERCIAL

## 2022-03-25 ENCOUNTER — ANCILLARY PROCEDURE (OUTPATIENT)
Dept: CARDIOLOGY | Facility: CLINIC | Age: 72
End: 2022-03-25
Attending: INTERNAL MEDICINE
Payer: COMMERCIAL

## 2022-03-25 DIAGNOSIS — C34.92 ADENOCARCINOMA, LUNG, LEFT (H): ICD-10-CM

## 2022-03-25 DIAGNOSIS — I42.7 CARDIOMYOPATHY SECONDARY TO DRUG (H): ICD-10-CM

## 2022-03-25 DIAGNOSIS — C34.12 PRIMARY ADENOCARCINOMA OF UPPER LOBE OF LEFT LUNG (H): ICD-10-CM

## 2022-03-25 LAB
ALBUMIN SERPL-MCNC: 3.4 G/DL (ref 3.4–5)
ALP SERPL-CCNC: 89 U/L (ref 40–150)
ALT SERPL W P-5'-P-CCNC: 49 U/L (ref 0–70)
ANION GAP SERPL CALCULATED.3IONS-SCNC: 6 MMOL/L (ref 3–14)
AST SERPL W P-5'-P-CCNC: 39 U/L (ref 0–45)
BASOPHILS # BLD AUTO: 0 10E3/UL (ref 0–0.2)
BASOPHILS NFR BLD AUTO: 1 %
BILIRUB SERPL-MCNC: 0.4 MG/DL (ref 0.2–1.3)
BUN SERPL-MCNC: 34 MG/DL (ref 7–30)
CALCIUM SERPL-MCNC: 9.1 MG/DL (ref 8.5–10.1)
CHLORIDE BLD-SCNC: 107 MMOL/L (ref 94–109)
CO2 SERPL-SCNC: 27 MMOL/L (ref 20–32)
CREAT SERPL-MCNC: 2.23 MG/DL (ref 0.66–1.25)
EOSINOPHIL # BLD AUTO: 0.5 10E3/UL (ref 0–0.7)
EOSINOPHIL NFR BLD AUTO: 6 %
ERYTHROCYTE [DISTWIDTH] IN BLOOD BY AUTOMATED COUNT: 14.9 % (ref 10–15)
GFR SERPL CREATININE-BSD FRML MDRD: 30 ML/MIN/1.73M2
GLUCOSE BLD-MCNC: 96 MG/DL (ref 70–99)
HCT VFR BLD AUTO: 36.5 % (ref 40–53)
HGB BLD-MCNC: 11.6 G/DL (ref 13.3–17.7)
IMM GRANULOCYTES # BLD: 0 10E3/UL
IMM GRANULOCYTES NFR BLD: 0 %
LVEF ECHO: NORMAL
LYMPHOCYTES # BLD AUTO: 2 10E3/UL (ref 0.8–5.3)
LYMPHOCYTES NFR BLD AUTO: 26 %
MCH RBC QN AUTO: 24.4 PG (ref 26.5–33)
MCHC RBC AUTO-ENTMCNC: 31.8 G/DL (ref 31.5–36.5)
MCV RBC AUTO: 77 FL (ref 78–100)
MONOCYTES # BLD AUTO: 0.9 10E3/UL (ref 0–1.3)
MONOCYTES NFR BLD AUTO: 11 %
NEUTROPHILS # BLD AUTO: 4.6 10E3/UL (ref 1.6–8.3)
NEUTROPHILS NFR BLD AUTO: 56 %
NRBC # BLD AUTO: 0 10E3/UL
NRBC BLD AUTO-RTO: 0 /100
PLATELET # BLD AUTO: 203 10E3/UL (ref 150–450)
POTASSIUM BLD-SCNC: 3.8 MMOL/L (ref 3.4–5.3)
PROT SERPL-MCNC: 8.4 G/DL (ref 6.8–8.8)
RBC # BLD AUTO: 4.76 10E6/UL (ref 4.4–5.9)
SODIUM SERPL-SCNC: 140 MMOL/L (ref 133–144)
WBC # BLD AUTO: 8 10E3/UL (ref 4–11)

## 2022-03-25 PROCEDURE — 93306 TTE W/DOPPLER COMPLETE: CPT | Performed by: INTERNAL MEDICINE

## 2022-03-25 PROCEDURE — 78816 PET IMAGE W/CT FULL BODY: CPT | Mod: GC | Performed by: RADIOLOGY

## 2022-03-25 PROCEDURE — 74177 CT ABD & PELVIS W/CONTRAST: CPT | Mod: 59 | Performed by: RADIOLOGY

## 2022-03-25 PROCEDURE — 71260 CT THORAX DX C+: CPT | Mod: 59 | Performed by: RADIOLOGY

## 2022-03-25 PROCEDURE — A9552 F18 FDG: HCPCS | Performed by: RADIOLOGY

## 2022-03-25 PROCEDURE — 85025 COMPLETE CBC W/AUTO DIFF WBC: CPT

## 2022-03-25 PROCEDURE — 80053 COMPREHEN METABOLIC PANEL: CPT

## 2022-03-25 PROCEDURE — 36415 COLL VENOUS BLD VENIPUNCTURE: CPT

## 2022-03-25 RX ORDER — IOPAMIDOL 755 MG/ML
10-135 INJECTION, SOLUTION INTRAVASCULAR ONCE
Status: COMPLETED | OUTPATIENT
Start: 2022-03-25 | End: 2022-03-25

## 2022-03-25 RX ADMIN — IOPAMIDOL 88 ML: 755 INJECTION, SOLUTION INTRAVASCULAR at 10:32

## 2022-03-30 ENCOUNTER — ONCOLOGY VISIT (OUTPATIENT)
Dept: ONCOLOGY | Facility: CLINIC | Age: 72
End: 2022-03-30
Payer: COMMERCIAL

## 2022-03-30 VITALS
HEIGHT: 64 IN | WEIGHT: 147.2 LBS | BODY MASS INDEX: 25.13 KG/M2 | HEART RATE: 85 BPM | OXYGEN SATURATION: 95 % | RESPIRATION RATE: 16 BRPM | SYSTOLIC BLOOD PRESSURE: 167 MMHG | DIASTOLIC BLOOD PRESSURE: 80 MMHG

## 2022-03-30 DIAGNOSIS — C34.12 PRIMARY ADENOCARCINOMA OF UPPER LOBE OF LEFT LUNG (H): Primary | ICD-10-CM

## 2022-03-30 DIAGNOSIS — C34.92 ADENOCARCINOMA, LUNG, LEFT (H): Primary | ICD-10-CM

## 2022-03-30 DIAGNOSIS — K13.79 MOUTH SORE: ICD-10-CM

## 2022-03-30 DIAGNOSIS — N17.9 ACUTE KIDNEY INJURY (H): ICD-10-CM

## 2022-03-30 PROCEDURE — 99214 OFFICE O/P EST MOD 30 MIN: CPT | Performed by: INTERNAL MEDICINE

## 2022-03-30 ASSESSMENT — PAIN SCALES - GENERAL: PAINLEVEL: NO PAIN (0)

## 2022-03-30 NOTE — PATIENT INSTRUCTIONS
1) Recheck creatinine upon return from [place] mid-next week.  2) Labs w/ start of each cycle of Tagrisso ([facility] and / or  Onc pharmacy).  3) BJT MG 3 months w/ PET scan.    Miles Estrada MD.

## 2022-03-30 NOTE — LETTER
"    3/30/2022         RE: Wolf Sen  [address]  [facility] [address]        Dear Colleague,    Thank you for referring your patient, Wolf Sen, to the [facility] [place]. Please see a copy of my visit note below.    [facility] Hematology / Oncology  Progress Note  Name: Wolf Sen  :  1950    MRN:  [id number]    --------------------    Assessment / Plan:  Locally advanced, unresectable, left-sided lung adenocarcinoma:  # 2021 Presented w/ dyspnea, cough and multiple failed rounds of antibiotics for presumed pneumonia.  # Dec 2021 CT-guided lung biopsy w/ adenocarcinoma.  # 2022 Staging brain MRI and PET scan with bilateral lung involvement (CRISTO primary) and med/hilar adenopathy.  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  # 2022 Osimertinib - ongoing; [place].    Clinically well and back to baseline; essentially, symptoms free.  Tolerating treatment well and w/o major toxicity outside mouth sores.  Mouth sore supportive cares reviewed and handouts provided (salt/soda, diet, prevention mouthwash).  Reviewed PET scan which shows MARKED improvement early on.  Kidney function worsened since last check; will recheck one week after hydration.  RTC 3 months w/ PET scan; labs w/ chemo.    Miles Estrada MD    --------------------    Interval History:  Wolf returns for follow up of lung cancer.  All in all, back to normal.  No rash, nausea, vomiting, diarrhea, arthralgias.  One mouth sore right sublingual mucosa.    --------------------    Physical Exam:  VS: BP (!) 167/80 (BP Location: Right arm)   Pulse 85   Resp 16   Ht 1.632 m (5' 4.25\")   Wt 66.8 kg (147 lb 3.2 oz)   SpO2 95%   BMI 25.07 kg/m    GEN: Well appearing.    Labs / Imaging / Path:  We reviewed labs and personally reviewed imaging.      Again, thank you for allowing me to participate in the care of your patient.        Sincerely,        Miles Estrada MD    "

## 2022-03-30 NOTE — NURSING NOTE
"Oncology Rooming Note    March 30, 2022 2:53 PM   Wolf Sen is a 71 year old male who presents for:    Chief Complaint   Patient presents with     Oncology Clinic Visit     8 week follow up     Initial Vitals: BP (!) 167/80 (BP Location: Right arm)   Pulse 85   Resp 16   Ht 1.632 m (5' 4.25\")   Wt 66.8 kg (147 lb 3.2 oz)   SpO2 95%   BMI 25.07 kg/m   Estimated body mass index is 25.07 kg/m  as calculated from the following:    Height as of this encounter: 1.632 m (5' 4.25\").    Weight as of this encounter: 66.8 kg (147 lb 3.2 oz). Body surface area is 1.74 meters squared.  No Pain (0) Comment: Data Unavailable   No LMP for male patient.  Allergies reviewed: Yes  Medications reviewed: Yes    Medications: Medication refills not needed today.  Pharmacy name entered into EPIC:    WRITTEN PRESCRIPTION REQUESTED  [id number] [place] - [address] [facility] #[id number] - [facility] [place] - [address] AT [facility] & [address] [place] [address] [facility] - [place], [place] - [address] [facility] [address]    Clinical concerns: Oral sore is still present- using Lidocaine solution as previously directed. Has noticed some improvement but is still present.        Gisselle Pardo LPN              "

## 2022-03-31 NOTE — PROGRESS NOTES
"[facility] Hematology / Oncology  Progress Note  Name: Wolf Sen  :  1950    MRN:  [id number]    --------------------    Assessment / Plan:  Locally advanced, unresectable, left-sided lung adenocarcinoma:  # 2021 Presented w/ dyspnea, cough and multiple failed rounds of antibiotics for presumed pneumonia.  # Dec 2021 CT-guided lung biopsy w/ adenocarcinoma.  # 2022 Staging brain MRI and PET scan with bilateral lung involvement (CRISTO primary) and med/hilar adenopathy.  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  # 2022 Osimertinib - ongoing; [place].    Clinically well and back to baseline; essentially, symptoms free.  Tolerating treatment well and w/o major toxicity outside mouth sores.  Mouth sore supportive cares reviewed and handouts provided (salt/soda, diet, prevention mouthwash).  Reviewed PET scan which shows MARKED improvement early on.  Kidney function worsened since last check; will recheck one week after hydration.  RTC 3 months w/ PET scan; labs w/ chemo.    Miles Estrada MD    --------------------    Interval History:  Wolf returns for follow up of lung cancer.  All in all, back to normal.  No rash, nausea, vomiting, diarrhea, arthralgias.  One mouth sore right sublingual mucosa.    --------------------    Physical Exam:  VS: BP (!) 167/80 (BP Location: Right arm)   Pulse 85   Resp 16   Ht 1.632 m (5' 4.25\")   Wt 66.8 kg (147 lb 3.2 oz)   SpO2 95%   BMI 25.07 kg/m    GEN: Well appearing.    Labs / Imaging / Path:  We reviewed labs and personally reviewed imaging.  "

## 2022-04-07 ENCOUNTER — LAB (OUTPATIENT)
Dept: LAB | Facility: CLINIC | Age: 72
End: 2022-04-07
Payer: COMMERCIAL

## 2022-04-07 DIAGNOSIS — C34.92 ADENOCARCINOMA, LUNG, LEFT (H): ICD-10-CM

## 2022-04-07 LAB
ALBUMIN SERPL-MCNC: 3.6 G/DL (ref 3.4–5)
ALP SERPL-CCNC: 66 U/L (ref 40–150)
ALT SERPL W P-5'-P-CCNC: 34 U/L (ref 0–70)
ANION GAP SERPL CALCULATED.3IONS-SCNC: 4 MMOL/L (ref 3–14)
AST SERPL W P-5'-P-CCNC: 31 U/L (ref 0–45)
BILIRUB SERPL-MCNC: 0.5 MG/DL (ref 0.2–1.3)
BUN SERPL-MCNC: 30 MG/DL (ref 7–30)
CALCIUM SERPL-MCNC: 9.1 MG/DL (ref 8.5–10.1)
CHLORIDE BLD-SCNC: 103 MMOL/L (ref 94–109)
CO2 SERPL-SCNC: 33 MMOL/L (ref 20–32)
CREAT SERPL-MCNC: 1.54 MG/DL (ref 0.66–1.25)
GFR SERPL CREATININE-BSD FRML MDRD: 47 ML/MIN/1.73M2
GLUCOSE BLD-MCNC: 87 MG/DL (ref 70–99)
HOLD SPECIMEN: NORMAL
HOLD SPECIMEN: NORMAL
POTASSIUM BLD-SCNC: 3.9 MMOL/L (ref 3.4–5.3)
PROT SERPL-MCNC: 8.6 G/DL (ref 6.8–8.8)
SODIUM SERPL-SCNC: 140 MMOL/L (ref 133–144)

## 2022-04-07 PROCEDURE — 80053 COMPREHEN METABOLIC PANEL: CPT

## 2022-04-07 PROCEDURE — 36415 COLL VENOUS BLD VENIPUNCTURE: CPT

## 2022-04-21 ENCOUNTER — TELEPHONE (OUTPATIENT)
Dept: FAMILY MEDICINE | Facility: CLINIC | Age: 72
End: 2022-04-21
Payer: COMMERCIAL

## 2022-04-21 NOTE — TELEPHONE ENCOUNTER
"Per Pharmacy, for Insulin glargine (LANTUS SOLOSTAR) 100 UNIT/ML pen, \"patient said taking 27 units daily - need new rx please.\"   "

## 2022-04-22 RX ORDER — INSULIN GLARGINE 100 [IU]/ML
INJECTION, SOLUTION SUBCUTANEOUS
Qty: 30 ML | Refills: 0 | Status: SHIPPED | OUTPATIENT
Start: 2022-04-22 | End: 2022-06-28

## 2022-04-22 NOTE — TELEPHONE ENCOUNTER
Routing to provider, please see message from pharmacy below.  Patient reporting taking more units of Lantus, current med list reflects 22 units per day. Patient telling pharmacy taking 27 units per day. Pharmacy needs new prescription with increase dosage.      Trinidad Ornelas RN  [facility]

## 2022-04-29 DIAGNOSIS — C34.92 ADENOCARCINOMA, LUNG, LEFT (H): Primary | ICD-10-CM

## 2022-05-03 ENCOUNTER — LAB (OUTPATIENT)
Dept: LAB | Facility: CLINIC | Age: 72
End: 2022-05-03
Payer: COMMERCIAL

## 2022-05-03 ENCOUNTER — DOCUMENTATION ONLY (OUTPATIENT)
Dept: ONCOLOGY | Facility: CLINIC | Age: 72
End: 2022-05-03

## 2022-05-03 DIAGNOSIS — C34.92 ADENOCARCINOMA, LUNG, LEFT (H): ICD-10-CM

## 2022-05-03 LAB
ALBUMIN SERPL-MCNC: 3.4 G/DL (ref 3.4–5)
ALP SERPL-CCNC: 73 U/L (ref 40–150)
ALT SERPL W P-5'-P-CCNC: 38 U/L (ref 0–70)
ANION GAP SERPL CALCULATED.3IONS-SCNC: 4 MMOL/L (ref 3–14)
AST SERPL W P-5'-P-CCNC: 28 U/L (ref 0–45)
BASOPHILS # BLD AUTO: 0 10E3/UL (ref 0–0.2)
BASOPHILS NFR BLD AUTO: 1 %
BILIRUB SERPL-MCNC: 0.4 MG/DL (ref 0.2–1.3)
BUN SERPL-MCNC: 18 MG/DL (ref 7–30)
CALCIUM SERPL-MCNC: 9 MG/DL (ref 8.5–10.1)
CHLORIDE BLD-SCNC: 104 MMOL/L (ref 94–109)
CO2 SERPL-SCNC: 31 MMOL/L (ref 20–32)
CREAT SERPL-MCNC: 1.43 MG/DL (ref 0.66–1.25)
EOSINOPHIL # BLD AUTO: 0.2 10E3/UL (ref 0–0.7)
EOSINOPHIL NFR BLD AUTO: 3 %
ERYTHROCYTE [DISTWIDTH] IN BLOOD BY AUTOMATED COUNT: 15.7 % (ref 10–15)
GFR SERPL CREATININE-BSD FRML MDRD: 51 ML/MIN/1.73M2
GLUCOSE BLD-MCNC: 87 MG/DL (ref 70–99)
HCT VFR BLD AUTO: 37.3 % (ref 40–53)
HGB BLD-MCNC: 11.9 G/DL (ref 13.3–17.7)
IMM GRANULOCYTES # BLD: 0 10E3/UL
IMM GRANULOCYTES NFR BLD: 0 %
LYMPHOCYTES # BLD AUTO: 1.9 10E3/UL (ref 0.8–5.3)
LYMPHOCYTES NFR BLD AUTO: 32 %
MCH RBC QN AUTO: 24.7 PG (ref 26.5–33)
MCHC RBC AUTO-ENTMCNC: 31.9 G/DL (ref 31.5–36.5)
MCV RBC AUTO: 77 FL (ref 78–100)
MONOCYTES # BLD AUTO: 0.9 10E3/UL (ref 0–1.3)
MONOCYTES NFR BLD AUTO: 15 %
NEUTROPHILS # BLD AUTO: 3 10E3/UL (ref 1.6–8.3)
NEUTROPHILS NFR BLD AUTO: 49 %
NRBC # BLD AUTO: 0 10E3/UL
NRBC BLD AUTO-RTO: 0 /100
PLATELET # BLD AUTO: 197 10E3/UL (ref 150–450)
POTASSIUM BLD-SCNC: 4.2 MMOL/L (ref 3.4–5.3)
PROT SERPL-MCNC: 7.9 G/DL (ref 6.8–8.8)
RBC # BLD AUTO: 4.82 10E6/UL (ref 4.4–5.9)
SODIUM SERPL-SCNC: 139 MMOL/L (ref 133–144)
WBC # BLD AUTO: 5.9 10E3/UL (ref 4–11)

## 2022-05-03 PROCEDURE — 85025 COMPLETE CBC W/AUTO DIFF WBC: CPT

## 2022-05-03 PROCEDURE — 36415 COLL VENOUS BLD VENIPUNCTURE: CPT

## 2022-05-03 PROCEDURE — 80053 COMPREHEN METABOLIC PANEL: CPT

## 2022-05-09 ENCOUNTER — PATIENT OUTREACH (OUTPATIENT)
Dept: ONCOLOGY | Facility: CLINIC | Age: 72
End: 2022-05-09
Payer: COMMERCIAL

## 2022-05-09 NOTE — PROGRESS NOTES
Patient son left  stating that patient has a rash, he presumes is from oral drug.  Returned call, unable to reach [facility].  Requested a call back to discuss symptoms in more detail  Mireya Pride RN      appt made to see NP for assessment of rash.  Left voicemail requesting a call back    Mireya Pride RN

## 2022-05-10 ENCOUNTER — ONCOLOGY VISIT (OUTPATIENT)
Dept: ONCOLOGY | Facility: CLINIC | Age: 72
End: 2022-05-10
Payer: COMMERCIAL

## 2022-05-10 VITALS
SYSTOLIC BLOOD PRESSURE: 166 MMHG | BODY MASS INDEX: 25.94 KG/M2 | HEART RATE: 84 BPM | OXYGEN SATURATION: 95 % | WEIGHT: 152.3 LBS | TEMPERATURE: 98.7 F | DIASTOLIC BLOOD PRESSURE: 84 MMHG

## 2022-05-10 DIAGNOSIS — L27.0 DRUG RASH: Primary | ICD-10-CM

## 2022-05-10 DIAGNOSIS — C34.92 ADENOCARCINOMA, LUNG, LEFT (H): ICD-10-CM

## 2022-05-10 PROCEDURE — 99213 OFFICE O/P EST LOW 20 MIN: CPT | Performed by: NURSE PRACTITIONER

## 2022-05-10 RX ORDER — HYDROCORTISONE 25 MG/G
CREAM TOPICAL
Qty: 30 G | Refills: 1 | Status: SHIPPED | OUTPATIENT
Start: 2022-05-10 | End: 2022-09-30

## 2022-05-10 ASSESSMENT — PAIN SCALES - GENERAL: PAINLEVEL: NO PAIN (0)

## 2022-05-10 NOTE — PROGRESS NOTES
Oncology Follow Up Visit: May 10, 2022    Oncologist: Dr Miles Estrada  PCP: Kimberly Bell    Diagnosis: Locally advanced, unresectable, left-sided lung adenocarcinoma  Wolf Sen is a 71 yo male who presented in Nov 2021 with dyspnea, cough and multiple failed rounds of antibiotics for presumed pneumonia.  Dec 2021 CT-guided lung biopsy w/ adenocarcinoma.  Jan 2022 Staging brain MRI and PET scan with bilateral lung involvement (CRISTO primary) and med/hilar adenopathy.  Jan 2022 Foundation One w/ EGFR mutation.  Treatment:   Feb 2022- ongoing;  Osimertinib    Interval History: Mr Sen comes to clinic with daughter for review of new rash with interpreters assistance. Started about 2 weeks previous to around lower face and under chin but also noted to upper arms and legs. Pruritic. Has tried moisturizing lotion but was not helpful. Believes it is related to his osimertinib- no other new products or foods recently. No history of a rash like this before.   Rest of comprehensive and complete ROS is reviewed and is negative.   Past Medical History:   Diagnosis Date     Cataracts, both eyes 5/20/2014     Cerebral artery occlusion with cerebral infarction (H)      DM type 2, goal A1c below 7 5/5/2014     Hypertension      Current Outpatient Medications   Medication     acetaminophen (MAPAP) 500 MG tablet     Alcohol Swabs (B-D SINGLE USE SWABS REGULAR) PADS     ASPIRIN PO     atorvastatin (LIPITOR) 80 MG tablet     benzonatate (TESSALON) 100 MG capsule     blood glucose (ONETOUCH VERIO IQ) test strip     guaiFENesin-codeine (ROBITUSSIN AC) 100-10 MG/5ML solution     hydrALAZINE (APRESOLINE) 25 MG tablet     hydrochlorothiazide (HYDRODIURIL) 25 MG tablet     hydrocortisone, Perianal, (HYDROCORTISONE) 2.5 % cream     insulin aspart (NOVOLOG FLEXPEN) 100 UNIT/ML pen     insulin glargine (LANTUS SOLOSTAR) 100 UNIT/ML pen     insulin pen needle (32G X 4 MM) 32G X 4 MM miscellaneous     ketoconazole (NIZORAL) 2 % external  cream     lidocaine, viscous, (XYLOCAINE) 2 % solution     losartan (COZAAR) 100 MG tablet     metFORMIN (GLUCOPHAGE) 1000 MG tablet     metoprolol succinate ER (TOPROL-XL) 25 MG 24 hr tablet     multivitamin, therapeutic with minerals (MULTI-VITAMIN) TABS     ORDER FOR DME     osimertinib (TAGRISSO) 80 MG tablet     SYSTANE ULTRA 0.4-0.3 % SOLN ophthalmic solution     thin (NO BRAND SPECIFIED) lancets     No current facility-administered medications for this visit.     Facility-Administered Medications Ordered in Other Visits   Medication     DOBUTamine 500 mg in dextrose 5% 250 mL (adult std conc) premix     metoprolol (LOPRESSOR) injection 5 mg     No Known Allergies    Physical Exam:BP (!) 166/84 (BP Location: Right arm, Patient Position: Sitting)   Pulse 84   Temp 98.7  F (37.1  C) (Oral)   Wt 69.1 kg (152 lb 4.8 oz)   SpO2 95%   BMI 25.94 kg/m     Constitutional: Alert, healthy, and in no distress.   ENT: Eyes bright no cold symptoms  Respiratory: Breathing easy. No cough.  MS: Muscle tone normal- moving easily on own  Skin: has red patchiness to around mouth and down under chin- more to right than left side. Upper arms and legs show a papular presentation.   Neuro: Sensory grossly WNL, gait normal.   Lymph: Normal ant/post cervical, axillary, supraclavicular nodes  Psych: Mentation appears normal and affect normal/bright and smiling    Laboratory Results: None taken today.    Assessment and Plan:   New rash on pt taking Osimertinib for his lung cancer-patient has been on this medication since March and has now developed a new rash consistent with use of the EGFR inhibitor.   Discussed that we will start conservatively with use of the hydrocortisone cream-we will use low-dose to face twice daily and was given a 2.5% cream to use on the rest of his body twice a day as well up to 2-week time..  Discussed if it is not going away at this time that we will need to add antibiotic like minocycline or  doxycycline.  They will call if not improved at that point.    The total time of this encounter amounted to  20 minutes. This time included face to face time spent with the patient, prep work, ordering tests, and performing post visit documentation.  Jesusita Goodrich,Cnp

## 2022-05-10 NOTE — NURSING NOTE
"Oncology Rooming Note    May 10, 2022 4:38 PM   Wolf Sen is a 72 year old male who presents for:    Chief Complaint   Patient presents with     Oncology Clinic Visit     Initial Vitals: BP (!) 166/84 (BP Location: Right arm, Patient Position: Sitting)   Pulse 84   Temp 98.7  F (37.1  C) (Oral)   Wt 69.1 kg (152 lb 4.8 oz)   SpO2 95%   BMI 25.94 kg/m   Estimated body mass index is 25.94 kg/m  as calculated from the following:    Height as of 3/30/22: 1.632 m (5' 4.25\").    Weight as of this encounter: 69.1 kg (152 lb 4.8 oz). Body surface area is 1.77 meters squared.  No Pain (0) Comment: Data Unavailable   No LMP for male patient.  Allergies reviewed: Yes  Medications reviewed: Yes    Medications: Medication refills not needed today.  Pharmacy name entered into EPIC:    WRITTEN PRESCRIPTION REQUESTED  [id number] [place] - [address] [id number] - [facility] [place] - [address] AT [facility] & [address] [place] [address] MAIL/SPECIALTY PHARMACY - [place], [place] - [address]              "

## 2022-05-16 ENCOUNTER — PATIENT OUTREACH (OUTPATIENT)
Dept: GERIATRIC MEDICINE | Facility: CLINIC | Age: 72
End: 2022-05-16
Payer: COMMERCIAL

## 2022-05-16 NOTE — PROGRESS NOTES
[facility] Care Coordination Contact     CHW, stefan w/ hiren Hidalgo to remind to schedule mbr for diabetic eye exam. Left contact info if questions.       COOPER Bliss  [facility]  [phone]

## 2022-06-01 ENCOUNTER — LAB (OUTPATIENT)
Dept: LAB | Facility: CLINIC | Age: 72
End: 2022-06-01
Payer: COMMERCIAL

## 2022-06-01 DIAGNOSIS — C34.92 ADENOCARCINOMA, LUNG, LEFT (H): ICD-10-CM

## 2022-06-01 DIAGNOSIS — E11.49 TYPE 2 DIABETES MELLITUS WITH NEUROLOGICAL MANIFESTATIONS, CONTROLLED (H): Primary | ICD-10-CM

## 2022-06-01 LAB
ALBUMIN SERPL-MCNC: 3.8 G/DL (ref 3.4–5)
ALP SERPL-CCNC: 97 U/L (ref 40–150)
ALT SERPL W P-5'-P-CCNC: 156 U/L (ref 0–70)
ANION GAP SERPL CALCULATED.3IONS-SCNC: 3 MMOL/L (ref 3–14)
AST SERPL W P-5'-P-CCNC: 114 U/L (ref 0–45)
BASOPHILS # BLD AUTO: 0 10E3/UL (ref 0–0.2)
BASOPHILS NFR BLD AUTO: 0 %
BILIRUB SERPL-MCNC: 0.4 MG/DL (ref 0.2–1.3)
BUN SERPL-MCNC: 26 MG/DL (ref 7–30)
CALCIUM SERPL-MCNC: 9.4 MG/DL (ref 8.5–10.1)
CHLORIDE BLD-SCNC: 106 MMOL/L (ref 94–109)
CO2 SERPL-SCNC: 32 MMOL/L (ref 20–32)
CREAT SERPL-MCNC: 1.76 MG/DL (ref 0.66–1.25)
EOSINOPHIL # BLD AUTO: 0.1 10E3/UL (ref 0–0.7)
EOSINOPHIL NFR BLD AUTO: 2 %
ERYTHROCYTE [DISTWIDTH] IN BLOOD BY AUTOMATED COUNT: 15.5 % (ref 10–15)
GFR SERPL CREATININE-BSD FRML MDRD: 40 ML/MIN/1.73M2
GLUCOSE BLD-MCNC: 108 MG/DL (ref 70–99)
HCT VFR BLD AUTO: 38.8 % (ref 40–53)
HGB BLD-MCNC: 12.2 G/DL (ref 13.3–17.7)
IMM GRANULOCYTES # BLD: 0 10E3/UL
IMM GRANULOCYTES NFR BLD: 0 %
LYMPHOCYTES # BLD AUTO: 1.5 10E3/UL (ref 0.8–5.3)
LYMPHOCYTES NFR BLD AUTO: 22 %
MCH RBC QN AUTO: 24.5 PG (ref 26.5–33)
MCHC RBC AUTO-ENTMCNC: 31.4 G/DL (ref 31.5–36.5)
MCV RBC AUTO: 78 FL (ref 78–100)
MONOCYTES # BLD AUTO: 0.8 10E3/UL (ref 0–1.3)
MONOCYTES NFR BLD AUTO: 11 %
NEUTROPHILS # BLD AUTO: 4.5 10E3/UL (ref 1.6–8.3)
NEUTROPHILS NFR BLD AUTO: 65 %
NRBC # BLD AUTO: 0 10E3/UL
NRBC BLD AUTO-RTO: 0 /100
PLATELET # BLD AUTO: 198 10E3/UL (ref 150–450)
POTASSIUM BLD-SCNC: 4.4 MMOL/L (ref 3.4–5.3)
PROT SERPL-MCNC: 8.6 G/DL (ref 6.8–8.8)
RBC # BLD AUTO: 4.98 10E6/UL (ref 4.4–5.9)
SODIUM SERPL-SCNC: 141 MMOL/L (ref 133–144)
WBC # BLD AUTO: 6.9 10E3/UL (ref 4–11)

## 2022-06-01 PROCEDURE — 80053 COMPREHEN METABOLIC PANEL: CPT

## 2022-06-01 PROCEDURE — 36415 COLL VENOUS BLD VENIPUNCTURE: CPT

## 2022-06-01 PROCEDURE — 85025 COMPLETE CBC W/AUTO DIFF WBC: CPT

## 2022-06-02 DIAGNOSIS — C34.92 ADENOCARCINOMA, LUNG, LEFT (H): Primary | ICD-10-CM

## 2022-06-11 DIAGNOSIS — I10 BENIGN ESSENTIAL HTN: ICD-10-CM

## 2022-06-11 DIAGNOSIS — E78.5 HYPERLIPIDEMIA LDL GOAL <70: ICD-10-CM

## 2022-06-13 NOTE — TELEPHONE ENCOUNTER
"Routing refill request to provider for review/approval because:  Drug interaction warning (atorvastatin)- very high with ketoconazole    Requested Prescriptions   Pending Prescriptions Disp Refills    metoprolol succinate ER (TOPROL XL) 25 MG 24 hr tablet [Pharmacy Med Name: METOPROLOL ER SUCCINATE 25MG TABS] 90 tablet 1     Sig: TAKE 1 TABLET(25 MG) BY MOUTH EVERY EVENING FOR BLOOD PRESSURE        Beta-Blockers Protocol Failed - 6/11/2022  9:20 AM        Failed - Blood pressure under 140/90 in past 12 months       BP Readings from Last 3 Encounters:   05/10/22 (!) 166/84   03/30/22 (!) 167/80   03/21/22 114/64                 Passed - Patient is age 6 or older        Passed - Recent (12 mo) or future (30 days) visit within the authorizing provider's specialty     Patient has had an office visit with the authorizing provider or a provider within the authorizing providers department within the previous 12 mos or has a future within next 30 days. See \"Patient Info\" tab in inbasket, or \"Choose Columns\" in Meds & Orders section of the refill encounter.              Passed - Medication is active on med list           atorvastatin (LIPITOR) 80 MG tablet [Pharmacy Med Name: ATORVASTATIN 80MG TABLETS] 90 tablet 0     Sig: TAKE 1 TABLET(80 MG) BY MOUTH DAILY FOR CHOLESTEROL        Statins Protocol Passed - 6/11/2022  9:20 AM        Passed - LDL on file in past 12 months     Recent Labs   Lab Test 11/24/21  1007   LDL 61               Passed - No abnormal creatine kinase in past 12 months     Recent Labs   Lab Test 09/15/21  1226                   Passed - Recent (12 mo) or future (30 days) visit within the authorizing provider's specialty     Patient has had an office visit with the authorizing provider or a provider within the authorizing providers department within the previous 12 mos or has a future within next 30 days. See \"Patient Info\" tab in inbasket, or \"Choose Columns\" in Meds & Orders section of the refill " "encounter.              Passed - Medication is active on med list        Passed - Patient is age 18 or older           metFORMIN (GLUCOPHAGE) 1000 MG tablet [Pharmacy Med Name: METFORMIN 1000MG TABLETS] 180 tablet 1     Sig: TAKE 1 TABLET(1000 MG) BY MOUTH TWICE DAILY WITH MEALS        Biguanide Agents Failed - 6/11/2022  9:20 AM        Failed - Patient has documented A1c within the specified period of time.     If HgbA1C is 8 or greater, it needs to be on file within the past 3 months.  If less than 8, must be on file within the past 6 months.     Recent Labs   Lab Test 11/24/21  1007   A1C 7.0*             Failed - Patient's CR is NOT>1.4 OR Patient's EGFR is NOT<45 within past 12 mos.       Recent Labs   Lab Test 06/01/22  1537 09/15/21  1226 03/17/21  1432   GFRESTIMATED 40*   < > 48*   GFRESTBLACK  --   --  55*    < > = values in this interval not displayed.       Recent Labs   Lab Test 06/01/22  1537   CR 1.76*             Passed - Patient is age 10 or older        Passed - Patient does NOT have a diagnosis of CHF.        Passed - Medication is active on med list        Passed - Recent (6 mo) or future (30 days) visit within the authorizing provider's specialty     Patient had office visit in the last 6 months or has a visit in the next 30 days with authorizing provider or within the authorizing provider's specialty.  See \"Patient Info\" tab in inbasket, or \"Choose Columns\" in Meds & Orders section of the refill encounter.               losartan (COZAAR) 100 MG tablet [Pharmacy Med Name: LOSARTAN 100MG TABLETS] 90 tablet 1     Sig: TAKE 1 TABLET(100 MG) BY MOUTH DAILY        Angiotensin-II Receptors Failed - 6/11/2022  9:20 AM        Failed - Last blood pressure under 140/90 in past 12 months       BP Readings from Last 3 Encounters:   05/10/22 (!) 166/84   03/30/22 (!) 167/80   03/21/22 114/64                 Failed - Normal serum creatinine on file in past 12 months     Recent Labs   Lab Test 06/01/22  1537 " "  CR 1.76*       Ok to refill medication if creatinine is low          Passed - Recent (12 mo) or future (30 days) visit within the authorizing provider's specialty     Patient has had an office visit with the authorizing provider or a provider within the authorizing providers department within the previous 12 mos or has a future within next 30 days. See \"Patient Info\" tab in inbasket, or \"Choose Columns\" in Meds & Orders section of the refill encounter.              Passed - Medication is active on med list        Passed - Patient is age 18 or older        Passed - Normal serum potassium on file in past 12 months       Recent Labs   Lab Test 06/01/22  1537   POTASSIUM 4.4                        Ginny Stanley RN, BSN  [facility]        " PRE-OP DIAGNOSIS:  Gangrene of left foot 21-Dec-2020 16:27:01  Madisyn Polo

## 2022-06-14 RX ORDER — ATORVASTATIN CALCIUM 80 MG/1
TABLET, FILM COATED ORAL
Qty: 90 TABLET | Refills: 0 | Status: SHIPPED | OUTPATIENT
Start: 2022-06-14 | End: 2022-09-20

## 2022-06-14 RX ORDER — METOPROLOL SUCCINATE 25 MG/1
TABLET, EXTENDED RELEASE ORAL
Qty: 90 TABLET | Refills: 0 | Status: SHIPPED | OUTPATIENT
Start: 2022-06-14 | End: 2022-09-20

## 2022-06-14 RX ORDER — LOSARTAN POTASSIUM 100 MG/1
TABLET ORAL
Qty: 90 TABLET | Refills: 0 | Status: SHIPPED | OUTPATIENT
Start: 2022-06-14 | End: 2022-09-20

## 2022-06-16 NOTE — NURSING NOTE
"Oncology Rooming Note    March 21, 2022 2:13 PM   Wolf Sen is a 71 year old male who presents for:    Chief Complaint   Patient presents with     Oncology Clinic Visit     follow up     Initial Vitals: /64 (BP Location: Left arm, Patient Position: Chair, Cuff Size: Adult Regular)   Pulse 75   Ht 1.632 m (5' 4.25\")   Wt 64.9 kg (143 lb)   SpO2 99%   BMI 24.36 kg/m   Estimated body mass index is 24.36 kg/m  as calculated from the following:    Height as of this encounter: 1.632 m (5' 4.25\").    Weight as of this encounter: 64.9 kg (143 lb). Body surface area is 1.72 meters squared.  Extreme Pain (8) Comment: Data Unavailable   No LMP for male patient.  Allergies reviewed: Yes  Medications reviewed: Yes    Medications: Medication refills not needed today.  Pharmacy name entered into EPIC:    WRITTEN PRESCRIPTION REQUESTED  [id number] - [place], [place] - [address] [id number] - [place], [place] - [address] AT [facility] & [address] [place] [address] [facility] - [place], [place] - [address]    Clinical concerns: Mouth pain     Raine was notified.      Korina Sehrman CMA              " Face to face report given with opportunity to observe patient.    Report given to Piedad Sinclair RN   6/16/2022  7:08 AM

## 2022-06-17 ENCOUNTER — ANCILLARY PROCEDURE (OUTPATIENT)
Dept: PET IMAGING | Facility: CLINIC | Age: 72
End: 2022-06-17
Attending: INTERNAL MEDICINE
Payer: COMMERCIAL

## 2022-06-17 DIAGNOSIS — C34.12 PRIMARY ADENOCARCINOMA OF UPPER LOBE OF LEFT LUNG (H): ICD-10-CM

## 2022-06-17 PROCEDURE — A9552 F18 FDG: HCPCS | Performed by: STUDENT IN AN ORGANIZED HEALTH CARE EDUCATION/TRAINING PROGRAM

## 2022-06-17 PROCEDURE — 78816 PET IMAGE W/CT FULL BODY: CPT | Mod: GC | Performed by: STUDENT IN AN ORGANIZED HEALTH CARE EDUCATION/TRAINING PROGRAM

## 2022-06-27 ENCOUNTER — TELEPHONE (OUTPATIENT)
Dept: ONCOLOGY | Facility: CLINIC | Age: 72
End: 2022-06-27

## 2022-06-27 NOTE — TELEPHONE ENCOUNTER
Left voicemail for his daughter, Gwen. Notified her that we can change his appointment with Dr. Estrada 6/28 to a video on 7/1 - this has been approved by [facility]. Preferably video appointment on 7/1 between 9am - 12pm.      phone number [phone]     Srikanth Flores

## 2022-06-28 ENCOUNTER — ONCOLOGY VISIT (OUTPATIENT)
Dept: ONCOLOGY | Facility: CLINIC | Age: 72
End: 2022-06-28
Attending: INTERNAL MEDICINE
Payer: COMMERCIAL

## 2022-06-28 ENCOUNTER — TELEPHONE (OUTPATIENT)
Dept: PHARMACY | Facility: CLINIC | Age: 72
End: 2022-06-28

## 2022-06-28 VITALS
HEIGHT: 64 IN | SYSTOLIC BLOOD PRESSURE: 159 MMHG | OXYGEN SATURATION: 100 % | HEART RATE: 66 BPM | BODY MASS INDEX: 25.1 KG/M2 | WEIGHT: 147 LBS | DIASTOLIC BLOOD PRESSURE: 70 MMHG

## 2022-06-28 DIAGNOSIS — C34.12 PRIMARY ADENOCARCINOMA OF UPPER LOBE OF LEFT LUNG (H): ICD-10-CM

## 2022-06-28 DIAGNOSIS — R74.01 TRANSAMINITIS: ICD-10-CM

## 2022-06-28 DIAGNOSIS — C34.92 ADENOCARCINOMA, LUNG, LEFT (H): Primary | ICD-10-CM

## 2022-06-28 DIAGNOSIS — N18.32 STAGE 3B CHRONIC KIDNEY DISEASE (H): ICD-10-CM

## 2022-06-28 PROCEDURE — 99214 OFFICE O/P EST MOD 30 MIN: CPT | Performed by: INTERNAL MEDICINE

## 2022-06-28 ASSESSMENT — PAIN SCALES - GENERAL: PAINLEVEL: NO PAIN (0)

## 2022-06-28 NOTE — LETTER
June 30, 2022          Wolf Sen  [address] [place] [id number]            Dear Wolf Sen,      At [facility] we care about your health and are committed to providing quality patient care. Regular appointments are a vital part of the care and management of your health and can help prevent many of the complications that can occur.      It has come to our attention that you are due for Diabetic check.  Please call [facility] at [phone] soon to schedule your follow up appointment.      Sincerely,      [facility] Care Team

## 2022-06-28 NOTE — NURSING NOTE
"Oncology Rooming Note    June 28, 2022 3:16 PM   Wolf Sen is a 72 year old male who presents for:    Chief Complaint   Patient presents with     Oncology Clinic Visit     Follow up     Initial Vitals: BP (!) 159/70 (BP Location: Left arm, Patient Position: Chair, Cuff Size: Adult Regular)   Pulse 66   Ht 1.632 m (5' 4.25\")   Wt 66.7 kg (147 lb)   SpO2 100%   BMI 25.04 kg/m   Estimated body mass index is 25.04 kg/m  as calculated from the following:    Height as of this encounter: 1.632 m (5' 4.25\").    Weight as of this encounter: 66.7 kg (147 lb). Body surface area is 1.74 meters squared.  No Pain (0) Comment: Data Unavailable   No LMP for male patient.  Allergies reviewed: Yes  Medications reviewed: Yes    Medications: Medication refills not needed today.  Pharmacy name entered into EPIC:    WRITTEN PRESCRIPTION REQUESTED  [id number] - [place], [place] - [address] [id number] - [place], [place] [address] AT [facility] & [address] [place] [address] [facility] - [place], [place] [facility] [address] [facility] [address]    Clinical concerns: NO Dr. Estrada was notified.      Korina Shermna CMA              "

## 2022-06-28 NOTE — LETTER
"    2022         RE: Wolf Sen  [address]  [facility] [address]        Dear Colleague,    Thank you for referring your patient, Wolf Sen, to the [facility] [place]. Please see a copy of my visit note below.    [facility] Hematology / Oncology  Progress Note  Name: Wolf Sen  :  1950    MRN:  [id number]    --------------------    Assessment / Plan:  Locally advanced, unresectable, left-sided lung adenocarcinoma:  # 2021 Presented w/ dyspnea, cough and multiple failed rounds of antibiotics for presumed pneumonia.  # Dec 2021 CT-guided lung biopsy w/ adenocarcinoma.  # 2022 Staging brain MRI and PET scan with bilateral lung involvement (CRISTO primary) and med/hilar adenopathy.  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  # 2022 Osimertinib - ongoing; [place].    Given remains clinically well and back to baseline.  Tolerating treatment well and without major toxicity.  Reviewed PET scan which shows continued very good partial response.  Some waxing and waning adenopathy that bears close monitoring.  Bulk of disease well controlled and no new spots.  No great cause for the mild ALT/AST elevation; back to normal now.  Kidney function remained stable; reviewed importance of hydration.  RTC 3 months w/ PET scan and CBC, CMP.    Miles Estrada MD    --------------------    Interval History:  Wolf returns for follow up of lung cancer.  All in all, he remains well.  No new symptoms to report.  No rash, nausea, vomiting, diarrhea, arthralgias.  No additional mouth sores.  No financial toxicity.  Regarding transaminitis, no recent heavy alcohol or Tylenol use.  No new medication changes    --------------------    Physical Exam:  VS: BP (!) 159/70 (BP Location: Left arm, Patient Position: Chair, Cuff Size: Adult Regular)   Pulse 66   Ht 1.632 m (5' 4.25\")   Wt 66.7 kg (147 lb)   SpO2 100%   BMI 25.04 kg/m    GEN: Well appearing.    Labs / Imaging / " Path:  We reviewed 6 month trend in CBC, CMP and personally reviewed / compared PET scan June, March, January.      Again, thank you for allowing me to participate in the care of your patient.        Sincerely,        Miles Estrada MD

## 2022-06-28 NOTE — PROGRESS NOTES
"[facility] Hematology / Oncology  Progress Note  Name: Wolf Sen  :  1950    MRN:  [id number]    --------------------    Assessment / Plan:  Locally advanced, unresectable, left-sided lung adenocarcinoma:  # 2021 Presented w/ dyspnea, cough and multiple failed rounds of antibiotics for presumed pneumonia.  # Dec 2021 CT-guided lung biopsy w/ adenocarcinoma.  # 2022 Staging brain MRI and PET scan with bilateral lung involvement (CRISTO primary) and med/hilar adenopathy.  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  # 2022 Osimertinib - ongoing; [place].    Given remains clinically well and back to baseline.  Tolerating treatment well and without major toxicity.  Reviewed PET scan which shows continued very good partial response.  Some waxing and waning adenopathy that bears close monitoring.  Bulk of disease well controlled and no new spots.  No great cause for the mild ALT/AST elevation; back to normal now.  Kidney function remained stable; reviewed importance of hydration.  RTC 3 months w/ PET scan and CBC, CMP.    Miles Estrada MD    --------------------    Interval History:  Wolf returns for follow up of lung cancer.  All in all, he remains well.  No new symptoms to report.  No rash, nausea, vomiting, diarrhea, arthralgias.  No additional mouth sores.  No financial toxicity.  Regarding transaminitis, no recent heavy alcohol or Tylenol use.  No new medication changes    --------------------    Physical Exam:  VS: BP (!) 159/70 (BP Location: Left arm, Patient Position: Chair, Cuff Size: Adult Regular)   Pulse 66   Ht 1.632 m (5' 4.25\")   Wt 66.7 kg (147 lb)   SpO2 100%   BMI 25.04 kg/m    GEN: Well appearing.    Labs / Imaging / Path:  We reviewed 6 month trend in CBC, CMP and personally reviewed / compared PET scan , March, January.  "

## 2022-06-29 RX ORDER — INSULIN GLARGINE 100 [IU]/ML
INJECTION, SOLUTION SUBCUTANEOUS
Qty: 30 ML | Refills: 0 | Status: SHIPPED | OUTPATIENT
Start: 2022-06-29 | End: 2022-09-30

## 2022-06-30 NOTE — TELEPHONE ENCOUNTER
LVM for patient to schedule appointment. Also sent letter - If / when patient calls back please assist with scheduling appointment. Thank you

## 2022-07-09 ENCOUNTER — HEALTH MAINTENANCE LETTER (OUTPATIENT)
Age: 72
End: 2022-07-09

## 2022-07-18 DIAGNOSIS — C34.92 ADENOCARCINOMA, LUNG, LEFT (H): Primary | ICD-10-CM

## 2022-07-19 ENCOUNTER — PATIENT OUTREACH (OUTPATIENT)
Dept: GERIATRIC MEDICINE | Facility: CLINIC | Age: 72
End: 2022-07-19

## 2022-07-19 NOTE — PROGRESS NOTES
[facility] Care Coordination Contact      [facility] Six-Month Telephone Assessment    6 month telephone assessment completed on 7/19/22 with member and Stan , Oscar.    ER visits: No  Hospitalizations: No  TCU stays: No  Significant health status changes: dx lung cancer  - managing well with treatment  Falls/Injuries: No  ADL/IADL changes: No  Changes in services: No    Caregiver Assessment follow up:  N/A    Goals: See POC in chart for goal progress documentation.  Member states he is doing well with his lung cancer treatment    Will see member in 6 months for an annual health risk assessment.   Encouraged member to call CC with any questions or concerns in the meantime.     Viki Avalos RN, PHN  [facility]

## 2022-08-17 ENCOUNTER — PATIENT OUTREACH (OUTPATIENT)
Dept: GERIATRIC MEDICINE | Facility: CLINIC | Age: 72
End: 2022-08-17

## 2022-08-17 DIAGNOSIS — C34.92 ADENOCARCINOMA, LUNG, LEFT (H): Primary | ICD-10-CM

## 2022-08-18 NOTE — PROGRESS NOTES
CC updated program tasks and targets for [employer] launch.     Viki Avalos RN, N  [facility] [facility]

## 2022-09-03 ENCOUNTER — HEALTH MAINTENANCE LETTER (OUTPATIENT)
Age: 72
End: 2022-09-03

## 2022-09-13 ENCOUNTER — TELEPHONE (OUTPATIENT)
Dept: FAMILY MEDICINE | Facility: CLINIC | Age: 72
End: 2022-09-13

## 2022-09-13 NOTE — TELEPHONE ENCOUNTER
Patient Quality Outreach    Patient is due for the following:   Hypertension -  BP check    Next Steps:   Patient has upcoming appointment, these items will be addressed at that time.    Type of outreach:    no call      Questions for provider review:    None     Diane L. Schoenherr, RN

## 2022-09-16 ENCOUNTER — TELEPHONE (OUTPATIENT)
Dept: ONCOLOGY | Facility: CLINIC | Age: 72
End: 2022-09-16

## 2022-09-16 DIAGNOSIS — C34.92 ADENOCARCINOMA, LUNG, LEFT (H): Primary | ICD-10-CM

## 2022-09-16 NOTE — TELEPHONE ENCOUNTER
"Oncology Nurse Triage    Situation:   Daughter and patient are calling reporting the following symptoms: hemopytsis, blood in stool, decreased appetite and fatigue. Daughter is interpreting for patient.    Background:   Treating Provider:   Dr. Estrada, 6/28/22, lung cancer. Next appointment is scheduled for 10/4/22.    Date of last office visit: 6/28/22    Recent Treatments:Tagrisso    Assessment:     Onset: 2 days ago. Patient developed cough two days ago, notices a small amount of red blood every time he coughs. No breathing difficulties or chest pain. States he felt feverish yesterday, temp not taken. Took tylenol today. Does not feel hot to touch.    In addition, he has noticed a \"good amount\" of BRB in his stool. No clots, constipation, or diarrhea. Patient denies abdominal pain, nausea or vomiting. No dizziness, lightheaded or feeling weak.    He does endorse increased fatigue, food aversion, and lack of appetite. He has not been able to take in solid foods. He is tolerating fluids and supplemental shakes.     Recommendations:     Advised patient to proceed to the ED for further evaluation. Understanding verbalized. They agree with plan.    Routing to care team for review.    Uma Izquierdo, RN, BSN, PHN  [facility]    "

## 2022-09-20 DIAGNOSIS — E78.5 HYPERLIPIDEMIA LDL GOAL <70: ICD-10-CM

## 2022-09-20 DIAGNOSIS — I10 BENIGN ESSENTIAL HTN: ICD-10-CM

## 2022-09-20 RX ORDER — METOPROLOL SUCCINATE 25 MG/1
TABLET, EXTENDED RELEASE ORAL
Qty: 90 TABLET | Refills: 0 | Status: SHIPPED | OUTPATIENT
Start: 2022-09-20 | End: 2022-09-30

## 2022-09-20 RX ORDER — LOSARTAN POTASSIUM 100 MG/1
TABLET ORAL
Qty: 90 TABLET | Refills: 0 | Status: SHIPPED | OUTPATIENT
Start: 2022-09-20 | End: 2022-09-30

## 2022-09-20 RX ORDER — ATORVASTATIN CALCIUM 80 MG/1
TABLET, FILM COATED ORAL
Qty: 90 TABLET | Refills: 0 | Status: SHIPPED | OUTPATIENT
Start: 2022-09-20 | End: 2022-09-30

## 2022-09-23 ENCOUNTER — LAB (OUTPATIENT)
Dept: LAB | Facility: CLINIC | Age: 72
End: 2022-09-23
Payer: COMMERCIAL

## 2022-09-23 DIAGNOSIS — C34.92 ADENOCARCINOMA, LUNG, LEFT (H): ICD-10-CM

## 2022-09-23 LAB
ALBUMIN SERPL-MCNC: 3.4 G/DL (ref 3.4–5)
ALP SERPL-CCNC: 76 U/L (ref 40–150)
ALT SERPL W P-5'-P-CCNC: 114 U/L (ref 0–70)
ANION GAP SERPL CALCULATED.3IONS-SCNC: 4 MMOL/L (ref 3–14)
AST SERPL W P-5'-P-CCNC: 83 U/L (ref 0–45)
BASOPHILS # BLD AUTO: 0 10E3/UL (ref 0–0.2)
BASOPHILS NFR BLD AUTO: 0 %
BILIRUB SERPL-MCNC: 0.4 MG/DL (ref 0.2–1.3)
BUN SERPL-MCNC: 28 MG/DL (ref 7–30)
CALCIUM SERPL-MCNC: 8.7 MG/DL (ref 8.5–10.1)
CHLORIDE BLD-SCNC: 107 MMOL/L (ref 94–109)
CO2 SERPL-SCNC: 29 MMOL/L (ref 20–32)
CREAT SERPL-MCNC: 1.2 MG/DL (ref 0.66–1.25)
EOSINOPHIL # BLD AUTO: 0.2 10E3/UL (ref 0–0.7)
EOSINOPHIL NFR BLD AUTO: 3 %
ERYTHROCYTE [DISTWIDTH] IN BLOOD BY AUTOMATED COUNT: 15.1 % (ref 10–15)
GFR SERPL CREATININE-BSD FRML MDRD: 63 ML/MIN/1.73M2
GLUCOSE BLD-MCNC: 90 MG/DL (ref 70–99)
HCT VFR BLD AUTO: 27.7 % (ref 40–53)
HGB BLD-MCNC: 8.7 G/DL (ref 13.3–17.7)
IMM GRANULOCYTES # BLD: 0 10E3/UL
IMM GRANULOCYTES NFR BLD: 0 %
LYMPHOCYTES # BLD AUTO: 2.3 10E3/UL (ref 0.8–5.3)
LYMPHOCYTES NFR BLD AUTO: 34 %
MCH RBC QN AUTO: 25.2 PG (ref 26.5–33)
MCHC RBC AUTO-ENTMCNC: 31.4 G/DL (ref 31.5–36.5)
MCV RBC AUTO: 80 FL (ref 78–100)
MONOCYTES # BLD AUTO: 0.7 10E3/UL (ref 0–1.3)
MONOCYTES NFR BLD AUTO: 10 %
NEUTROPHILS # BLD AUTO: 3.6 10E3/UL (ref 1.6–8.3)
NEUTROPHILS NFR BLD AUTO: 53 %
NRBC # BLD AUTO: 0 10E3/UL
NRBC BLD AUTO-RTO: 0 /100
PLATELET # BLD AUTO: 224 10E3/UL (ref 150–450)
POTASSIUM BLD-SCNC: 3.8 MMOL/L (ref 3.4–5.3)
PROT SERPL-MCNC: 8.1 G/DL (ref 6.8–8.8)
RBC # BLD AUTO: 3.45 10E6/UL (ref 4.4–5.9)
SODIUM SERPL-SCNC: 140 MMOL/L (ref 133–144)
WBC # BLD AUTO: 6.8 10E3/UL (ref 4–11)

## 2022-09-23 PROCEDURE — 85025 COMPLETE CBC W/AUTO DIFF WBC: CPT

## 2022-09-23 PROCEDURE — 36415 COLL VENOUS BLD VENIPUNCTURE: CPT

## 2022-09-23 PROCEDURE — 80053 COMPREHEN METABOLIC PANEL: CPT

## 2022-09-26 ENCOUNTER — PATIENT OUTREACH (OUTPATIENT)
Dept: GERIATRIC MEDICINE | Facility: CLINIC | Age: 72
End: 2022-09-26

## 2022-09-27 ENCOUNTER — TELEPHONE (OUTPATIENT)
Dept: FAMILY MEDICINE | Facility: CLINIC | Age: 72
End: 2022-09-27

## 2022-09-27 NOTE — TELEPHONE ENCOUNTER
Patient Quality Outreach    Patient is due for the following:   Diabetes -  A1C, Eye Exam, Microalbumin, BP Check, Diabetic Follow-Up Visit and Foot Exam    Next Steps:   Patient has upcoming appointment, these items will be addressed at that time.    Type of outreach:    Chart review performed, no outreach needed.    Questions for provider review:    None     Hieu Juan

## 2022-09-27 NOTE — PROGRESS NOTES
[facility] Care Coordination Contact  CC received notification of Emergency Room visit.  ER visit occurred on 9/16/22 at [facility]  with Dx of blood in stool - hemmorhoid.    CC contacted member and reviewed discharge summary.  Member has a follow-up appointment with PCP: Yes: scheduled on 9/30/22  Member has had a change in condition: No  New referrals placed: No  Home Visit Needed: No  Care plan reviewed and updated.  PCP notified of ED visit via EMR.    Viki Avalos RN, PHN  [facility]

## 2022-09-30 ENCOUNTER — OFFICE VISIT (OUTPATIENT)
Dept: FAMILY MEDICINE | Facility: CLINIC | Age: 72
End: 2022-09-30
Payer: COMMERCIAL

## 2022-09-30 VITALS
OXYGEN SATURATION: 99 % | HEART RATE: 81 BPM | RESPIRATION RATE: 20 BRPM | DIASTOLIC BLOOD PRESSURE: 74 MMHG | BODY MASS INDEX: 25 KG/M2 | HEIGHT: 64 IN | SYSTOLIC BLOOD PRESSURE: 183 MMHG | TEMPERATURE: 98.2 F | WEIGHT: 146.4 LBS

## 2022-09-30 DIAGNOSIS — Z00.00 ENCOUNTER FOR ANNUAL WELLNESS EXAM IN MEDICARE PATIENT: Primary | ICD-10-CM

## 2022-09-30 DIAGNOSIS — I10 BENIGN ESSENTIAL HTN: ICD-10-CM

## 2022-09-30 DIAGNOSIS — Z23 NEED FOR PROPHYLACTIC VACCINATION AND INOCULATION AGAINST INFLUENZA: ICD-10-CM

## 2022-09-30 DIAGNOSIS — Z23 HIGH PRIORITY FOR 2019-NCOV VACCINE: ICD-10-CM

## 2022-09-30 DIAGNOSIS — N18.30 STAGE 3 CHRONIC KIDNEY DISEASE, UNSPECIFIED WHETHER STAGE 3A OR 3B CKD (H): ICD-10-CM

## 2022-09-30 DIAGNOSIS — K64.4 EXTERNAL HEMORRHOIDS: ICD-10-CM

## 2022-09-30 DIAGNOSIS — E78.5 HYPERLIPIDEMIA LDL GOAL <70: ICD-10-CM

## 2022-09-30 DIAGNOSIS — E11.49 TYPE 2 DIABETES MELLITUS WITH NEUROLOGICAL MANIFESTATIONS, CONTROLLED (H): ICD-10-CM

## 2022-09-30 DIAGNOSIS — R79.89 ABNORMAL LFTS: ICD-10-CM

## 2022-09-30 DIAGNOSIS — C34.92 ADENOCARCINOMA, LUNG, LEFT (H): ICD-10-CM

## 2022-09-30 DIAGNOSIS — Z12.11 SCREEN FOR COLON CANCER: ICD-10-CM

## 2022-09-30 DIAGNOSIS — I63.9 CEREBRAL INFARCTION, UNSPECIFIED MECHANISM (H): ICD-10-CM

## 2022-09-30 DIAGNOSIS — I42.7 CARDIOMYOPATHY SECONDARY TO DRUG (H): ICD-10-CM

## 2022-09-30 LAB
CHOLEST SERPL-MCNC: 114 MG/DL
CREAT UR-MCNC: 147 MG/DL
FASTING STATUS PATIENT QL REPORTED: NO
HBA1C MFR BLD: 6.1 % (ref 0–5.6)
HDLC SERPL-MCNC: 51 MG/DL
LDLC SERPL CALC-MCNC: 40 MG/DL
MICROALBUMIN UR-MCNC: 40 MG/L
MICROALBUMIN/CREAT UR: 27.21 MG/G CR (ref 0–17)
NONHDLC SERPL-MCNC: 63 MG/DL
TRIGL SERPL-MCNC: 117 MG/DL

## 2022-09-30 PROCEDURE — 82088 ASSAY OF ALDOSTERONE: CPT | Performed by: PREVENTIVE MEDICINE

## 2022-09-30 PROCEDURE — 99000 SPECIMEN HANDLING OFFICE-LAB: CPT | Performed by: PREVENTIVE MEDICINE

## 2022-09-30 PROCEDURE — 0124A COVID-19,PF,PFIZER BOOSTER BIVALENT: CPT | Performed by: PREVENTIVE MEDICINE

## 2022-09-30 PROCEDURE — G0439 PPPS, SUBSEQ VISIT: HCPCS | Performed by: PREVENTIVE MEDICINE

## 2022-09-30 PROCEDURE — 83036 HEMOGLOBIN GLYCOSYLATED A1C: CPT | Performed by: PREVENTIVE MEDICINE

## 2022-09-30 PROCEDURE — 91312 COVID-19,PF,PFIZER BOOSTER BIVALENT: CPT | Performed by: PREVENTIVE MEDICINE

## 2022-09-30 PROCEDURE — 82043 UR ALBUMIN QUANTITATIVE: CPT | Performed by: PREVENTIVE MEDICINE

## 2022-09-30 PROCEDURE — 99214 OFFICE O/P EST MOD 30 MIN: CPT | Mod: 25 | Performed by: PREVENTIVE MEDICINE

## 2022-09-30 PROCEDURE — 80061 LIPID PANEL: CPT | Performed by: PREVENTIVE MEDICINE

## 2022-09-30 PROCEDURE — 84244 ASSAY OF RENIN: CPT | Mod: 90 | Performed by: PREVENTIVE MEDICINE

## 2022-09-30 PROCEDURE — 36415 COLL VENOUS BLD VENIPUNCTURE: CPT | Performed by: PREVENTIVE MEDICINE

## 2022-09-30 PROCEDURE — 90662 IIV NO PRSV INCREASED AG IM: CPT | Performed by: PREVENTIVE MEDICINE

## 2022-09-30 PROCEDURE — G0008 ADMIN INFLUENZA VIRUS VAC: HCPCS | Mod: 59 | Performed by: PREVENTIVE MEDICINE

## 2022-09-30 RX ORDER — INSULIN GLARGINE 100 [IU]/ML
INJECTION, SOLUTION SUBCUTANEOUS
Qty: 30 ML | Refills: 1 | Status: SHIPPED | OUTPATIENT
Start: 2022-09-30 | End: 2023-03-07

## 2022-09-30 RX ORDER — METOPROLOL SUCCINATE 25 MG/1
TABLET, EXTENDED RELEASE ORAL
Qty: 90 TABLET | Refills: 1 | Status: SHIPPED | OUTPATIENT
Start: 2022-09-30 | End: 2023-03-15

## 2022-09-30 RX ORDER — ATORVASTATIN CALCIUM 80 MG/1
40 TABLET, FILM COATED ORAL DAILY
Qty: 90 TABLET | Refills: 0 | Status: SHIPPED | OUTPATIENT
Start: 2022-09-30 | End: 2023-03-15

## 2022-09-30 RX ORDER — LOSARTAN POTASSIUM 100 MG/1
100 TABLET ORAL DAILY
Qty: 90 TABLET | Refills: 1 | Status: SHIPPED | OUTPATIENT
Start: 2022-09-30 | End: 2023-03-15

## 2022-09-30 RX ORDER — HYDRALAZINE HYDROCHLORIDE 25 MG/1
TABLET, FILM COATED ORAL
Qty: 180 TABLET | Refills: 1 | Status: SHIPPED | OUTPATIENT
Start: 2022-09-30 | End: 2023-03-15

## 2022-09-30 RX ORDER — HYDROCORTISONE 25 MG/G
CREAM TOPICAL 2 TIMES DAILY PRN
Qty: 30 G | Refills: 0 | Status: SHIPPED | OUTPATIENT
Start: 2022-09-30

## 2022-09-30 ASSESSMENT — ENCOUNTER SYMPTOMS
FEVER: 1
CONSTIPATION: 1
PARESTHESIAS: 0
MYALGIAS: 0
PALPITATIONS: 0
FREQUENCY: 1
NAUSEA: 0
HEARTBURN: 0
EYE PAIN: 0
DIZZINESS: 0
HEADACHES: 0
WEAKNESS: 1
SORE THROAT: 0
COUGH: 1
DIARRHEA: 0
ABDOMINAL PAIN: 0
NERVOUS/ANXIOUS: 1
CHILLS: 0
DYSURIA: 0
HEMATOCHEZIA: 1
ARTHRALGIAS: 1
HEMATURIA: 0
SHORTNESS OF BREATH: 0
JOINT SWELLING: 1

## 2022-09-30 ASSESSMENT — PATIENT HEALTH QUESTIONNAIRE - PHQ9
10. IF YOU CHECKED OFF ANY PROBLEMS, HOW DIFFICULT HAVE THESE PROBLEMS MADE IT FOR YOU TO DO YOUR WORK, TAKE CARE OF THINGS AT HOME, OR GET ALONG WITH OTHER PEOPLE: VERY DIFFICULT
SUM OF ALL RESPONSES TO PHQ QUESTIONS 1-9: 6
SUM OF ALL RESPONSES TO PHQ QUESTIONS 1-9: 6

## 2022-09-30 ASSESSMENT — PAIN SCALES - GENERAL: PAINLEVEL: NO PAIN (0)

## 2022-09-30 ASSESSMENT — ACTIVITIES OF DAILY LIVING (ADL): CURRENT_FUNCTION: NO ASSISTANCE NEEDED

## 2022-09-30 NOTE — RESULT ENCOUNTER NOTE
Wolf,     Three month glucose number is at goal at 6.1. Other labs are pending.     Please do not hesitate to call us at [phone] if you have any questions or concerns.    Thank you,    Kimberly Bell MD MPH

## 2022-09-30 NOTE — PROGRESS NOTES
Assessment & Plan     Encounter for annual wellness exam in Medicare patient  -preventive guidelines reviewed     Adenocarcinoma, lung, left (H)  -followed by Oncology   -has follow up scheduled for 10/4/22    Stage 3 chronic kidney disease, unspecified whether stage 3a or 3b CKD (H)  -no use of NSAIDs   -continue ARB Losartan   -GFR was 63 (9/23/22)     Type 2 diabetes mellitus with neurological manifestations, controlled (H)  - HEMOGLOBIN A1C    Uncontrolled type 2 diabetes mellitus with stage 3 chronic kidney disease, with long-term current use of insulin  - insulin glargine (LANTUS SOLOSTAR) 100 UNIT/ML pen  Dispense: 30 mL; Refill: 1  - metFORMIN (GLUCOPHAGE) 1000 MG tablet  Dispense: 180 tablet; Refill: 1  -HbA1C is at goal at 6.1     Continue medications the same.    Periodic blood sugar testing.  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  Global peak LV longitudinal strain is averaged at -19.6%. This is within  reported normal limits (normal <-18%).  Global right ventricular function is normal.  Mild aortic insufficiency is present.    Hyperlipidemia LDL goal <70  -refills on statin provided, however reduced dose from 80 mg to 40 mg due to abnormal liver function tests   - Lipid panel reflex to direct LDL Non-fasting  - atorvastatin (LIPITOR) 80 MG tablet  Dispense: 90 tablet; Refill: 0  - await labs     LDL Cholesterol Calculated   Date Value Ref Range Status   11/24/2021 61 <=100 mg/dL Final   11/27/2020 93 <100 mg/dL Final     Comment:     Desirable:       <100 mg/dl       Benign essential HTN  -elevated readings   -used to be on  "Hydralazine, restarted this   - Aldosterone  - Renin activity  - Aldosterone Renin Ratio  - Albumin Random Urine Quantitative with Creat Ratio  - hydrALAZINE (APRESOLINE) 25 MG tablet  Dispense: 180 tablet; Refill: 1  - losartan (COZAAR) 100 MG tablet  Dispense: 90 tablet; Refill: 1  - metoprolol succinate ER (TOPROL XL) 25 MG 24 hr tablet  Dispense: 90 tablet; Refill: 1    External hemorrhoids  -high fiber diet   - hydrocortisone, Perianal, (HYDROCORTISONE) 2.5 % cream  Dispense: 30 g; Refill: 0    Abnormal LFTs  -labs done on 9/23/22:  -AST was 83 and ALT was 114     Screen for colon cancer  -defer today    Need for prophylactic vaccination and inoculation against influenza  - INFLUENZA, QUAD, HIGH DOSE, PF, 65YR + (FLUZONE HD)    High priority for 2019-nCoV vaccine  - COVID-19,PF,PFIZER BOOSTER BIVALENT 12+Yrs      Ordering of each unique test  Prescription drug management  35 minutes spent on the date of the encounter doing chart review, history and exam, documentation and further activities per the note         Return in about 6 months (around 3/30/2023) for Follow up, with me, in person.    Kimberly Bell MD MPH    [facility]    Klaus Bloom is a 72 year old presenting for the following health issues:  Diabetes, Recheck Medication, Physical, Imm/Inj (Flu Shot), and Imm/Inj (COVID-19 VACCINE)    Visit done with Telephone interpretor    Healthy Habits:     In general, how would you rate your overall health?  Fair    Frequency of exercise:  2-3 days/week    Duration of exercise:  30-45 minutes    Do you usually eat at least 4 servings of fruit and vegetables a day, include whole grains    & fiber and avoid regularly eating high fat or \"junk\" foods?  Yes    Taking medications regularly:  Yes    Medication side effects:  Not applicable    Ability to successfully perform activities of daily living:  No assistance needed    Home Safety:  Throw rugs in the hallway, lack of grab bars in " the bathroom and lack of handrails on stairs    Hearing Impairment:  No hearing concerns    In the past 6 months, have you been bothered by leaking of urine?  No    In general, how would you rate your overall mental or emotional health?  Fair      PHQ-2 Total Score: 1    Additional concerns today:  No     Lung cancer:  -followed by Oncology, seen 6/28/22, follow up scheduled 10.4.22       Hemorrhoid:   Had a hemorrhoid that was bleeding, seen in the ED on 9/16/22. Notes reviewed. No further bleeding, patient is requesting medication for the hemorrhoid, has been having regular BMs.     Diabetes Follow-up    How often are you checking your blood sugar? One time daily  What time of day are you checking your blood sugars (select all that apply)?  Before meals  Have you had any blood sugars above 200?  No  Have you had any blood sugars below 70?  No    What symptoms do you notice when your blood sugar is low?  Blurred vision    What concerns do you have today about your diabetes? Low blood sugar     Do you have any of these symptoms? (Select all that apply)  Blurry vision, Weight loss and Weight gain    Have you had a diabetic eye exam in the last 12 months? No        BP Readings from Last 2 Encounters:   09/30/22 (!) 183/74   06/28/22 (!) 159/70     Hemoglobin A1C (%)   Date Value   09/30/2022 6.1 (H)   11/24/2021 7.0 (H)   11/27/2020 6.7 (H)   12/18/2019 8.3 (H)     LDL Cholesterol Calculated (mg/dL)   Date Value   11/24/2021 61   11/27/2020 93   05/09/2019 144 (H)     LDL Cholesterol Direct (mg/dL)   Date Value   12/18/2019 123 (H)   10/15/2019 129 (H)         Hypertension Follow-up      Do you check your blood pressure regularly outside of the clinic?  No    Are you following a low salt diet? Yes    Are your blood pressures ever more than 140 on the top number (systolic) OR more   than 90 on the bottom number (diastolic), for example 140/90? Yes      How many servings of fruits and vegetables do you eat daily?   "2-3    On average, how many sweetened beverages do you drink each day (Examples: soda, juice, sweet tea, etc.  Do NOT count diet or artificially sweetened beverages)?   2    How many days per week do you exercise enough to make your heart beat faster? 7    How many minutes a day do you exercise enough to make your heart beat faster? 30 - 60    How many days per week do you miss taking your medication? 0    Annual Wellness Visit    Patient has been advised of split billing requirements and indicates understanding: Yes     Are you in the first 12 months of your Medicare Part B coverage?  No    Physical Health:    In general, how would you rate your overall physical health? fair    Outside of work, how many days during the week do you exercise?2-3 days/week    Outside of work, approximately how many minutes a day do you exercise?45-60 minutes    If you drink alcohol do you typically have >3 drinks per day or >7 drinks per week? Not Applicable    Do you usually eat at least 4 servings of fruit and vegetables a day, include whole grains & fiber and avoid regularly eating high fat or \"junk\" foods? Yes    Do you have any problems taking medications regularly? YES    Do you have any side effects from medications? none    Needs assistance for the following daily activities: no assistance needed    Which of the following safety concerns are present in your home?  throw rugs in the hallway, lack of grab bars in the bathroom and lack of handrails on stairs     Hearing impairment: No    In the past 6 months, have you been bothered by leaking of urine? no    Mental Health:    In general, how would you rate your overall mental or emotional health? fair  PHQ-2 Score: 1    Do you feel safe in your environment? Yes    Have you ever done Advance Care Planning? (For example, a Health Directive, POLST, or a discussion with a medical provider or your loved ones about your wishes)? No, advance care planning information given to patient to " review.  Patient declined advance care planning discussion at this time.    Fall risk:  Fallen 2 or more times in the past year?: No  Any fall with injury in the past year?: No  click delete button to remove this line now  Cognitive Screenin) Repeat 3 items (Leader, Season, Table)    2) Clock draw: ABNORMAL couldn't do 10 past 11  3) 3 item recall: Recalls 1 object   Results: ABNORMAL clock, 1-2 items recalled: PROBABLE COGNITIVE IMPAIRMENT, **INFORM PROVIDER**    Mini-CogTM Copyright SRUTHI Chambers. Licensed by the author for use in [facility]; reprinted with permission ([email or web address]@[email or web address]). All rights reserved.      Do you have sleep apnea, excessive snoring or daytime drowsiness?: no    Current providers sharing in care for this patient include:   Patient Care Team:  Kimberly Bell MD as PCP - General (Family Practice)  Viki Avalos RN as Lead Care Coordinator  Kimberly Bell MD as Assigned PCP  Miles Estrada MD as Assigned Cancer Care Provider  Kelsey Sauceda, RN as Specialty Care Coordinator (Hematology & Oncology)    Patient has been advised of split billing requirements and indicates understanding: Yes      Review of Systems   Constitutional: Positive for fever. Negative for chills.   HENT: Positive for congestion and ear pain. Negative for hearing loss and sore throat.    Eyes: Positive for visual disturbance. Negative for pain.   Respiratory: Positive for cough. Negative for shortness of breath.    Cardiovascular: Positive for chest pain and peripheral edema. Negative for palpitations.   Gastrointestinal: Positive for constipation and hematochezia. Negative for abdominal pain, diarrhea, heartburn and nausea.   Genitourinary: Positive for frequency and urgency. Negative for dysuria, genital sores, hematuria, impotence and penile discharge.   Musculoskeletal: Positive for arthralgias and joint swelling. Negative for myalgias.   Skin: Negative for rash.   Neurological: Positive for  "weakness. Negative for dizziness, headaches and paresthesias.   Psychiatric/Behavioral: Positive for mood changes. The patient is nervous/anxious.           Objective    BP (!) 183/74 (BP Location: Left arm, Patient Position: Sitting, Cuff Size: Adult Regular)   Pulse 81   Temp 98.2  F (36.8  C) (Tympanic)   Resp 20   Ht 1.632 m (5' 4.25\")   Wt 66.4 kg (146 lb 6.4 oz)   SpO2 99%   BMI 24.93 kg/m    Body mass index is 24.93 kg/m .  Physical Exam   GENERAL APPEARANCE: pale+ and no distress  EYES: Eyes grossly normal to inspection and conjunctivae and sclerae normal  HENT: nose and mouth without ulcers or lesions  NECK:  trachea midline and normal to palpation  RESP: basilar crackles and reduced sounds on the left side   CV: regular rates and rhythm, normal S1 S2, Systolic murmur+   ABDOMEN: soft, non-tender and no rebound or guarding   MS: extremities normal- no gross deformities noted   SKIN: no suspicious lesions or rashes  NEURO: Normal strength and tone, mentation intact and speech normal  PSYCH: mentation appears normal      Results for orders placed or performed in visit on 09/30/22 (from the past 24 hour(s))   HEMOGLOBIN A1C   Result Value Ref Range    Hemoglobin A1C 6.1 (H) 0.0 - 5.6 %   Aldosterone Renin Ratio    Narrative    The following orders were created for panel order Aldosterone Renin Ratio.  Procedure                               Abnormality         Status                     ---------                               -----------         ------                     Aldosterone[[id number]]                                      In process                 Renin activity[[id number]]                                   In process                 Aldosterone Renin Ratio[[id number]]                          In process                   Please view results for these tests on the individual orders.               Answers for HPI/ROS submitted by the patient on 9/30/2022  If you checked off any problems, how " difficult have these problems made it for you to do your work, take care of things at home, or get along with other people?: Very difficult  PHQ9 TOTAL SCORE: 6

## 2022-10-04 ENCOUNTER — ONCOLOGY VISIT (OUTPATIENT)
Dept: ONCOLOGY | Facility: CLINIC | Age: 72
End: 2022-10-04
Attending: INTERNAL MEDICINE
Payer: COMMERCIAL

## 2022-10-04 VITALS
BODY MASS INDEX: 25.04 KG/M2 | HEART RATE: 75 BPM | OXYGEN SATURATION: 97 % | DIASTOLIC BLOOD PRESSURE: 76 MMHG | HEIGHT: 64 IN | WEIGHT: 146.7 LBS | TEMPERATURE: 98 F | SYSTOLIC BLOOD PRESSURE: 143 MMHG | RESPIRATION RATE: 16 BRPM

## 2022-10-04 DIAGNOSIS — C34.32 PRIMARY CANCER OF LEFT LOWER LOBE OF LUNG (H): ICD-10-CM

## 2022-10-04 DIAGNOSIS — D50.0 IRON DEFICIENCY ANEMIA DUE TO CHRONIC BLOOD LOSS: Primary | ICD-10-CM

## 2022-10-04 DIAGNOSIS — R74.01 TRANSAMINITIS: ICD-10-CM

## 2022-10-04 DIAGNOSIS — C34.92 ADENOCARCINOMA, LUNG, LEFT (H): ICD-10-CM

## 2022-10-04 DIAGNOSIS — C34.92 STAGE IV ADENOCARCINOMA OF LUNG, LEFT (H): ICD-10-CM

## 2022-10-04 LAB — ALDOST SERPL-MCNC: 9.9 NG/DL (ref 0–31)

## 2022-10-04 PROCEDURE — 99214 OFFICE O/P EST MOD 30 MIN: CPT | Performed by: INTERNAL MEDICINE

## 2022-10-04 RX ORDER — FERROUS SULFATE 325(65) MG
325 TABLET ORAL
Qty: 90 TABLET | Refills: 3 | Status: SHIPPED | OUTPATIENT
Start: 2022-10-04

## 2022-10-04 ASSESSMENT — PAIN SCALES - GENERAL: PAINLEVEL: NO PAIN (0)

## 2022-10-04 NOTE — RESULT ENCOUNTER NOTE
Wolf,     Aldosterone levels are normal.  Urine sample is showing a small amount of abnormal protein, this is a little better than last check, will recheck in 6 months.   Cholesterol is at goal for you.    Please do not hesitate to call us at [phone] if you have any questions or concerns.    Thank you,    Kimberly Bell MD MPH

## 2022-10-04 NOTE — LETTER
10/4/2022         RE: Wolf Sen  [address]  [place] [address]        Dear Colleague,    Thank you for referring your patient, Wolf Sen, to the [facility] [place]. Please see a copy of my visit note below.    [facility] Hematology / Oncology  Progress Note  Name: Wolf Sen  :  1950    MRN:  [id number]    --------------------    Assessment / Plan:  Locally advanced, unresectable, left-sided lung adenocarcinoma:  # 2021 Presented w/ dyspnea, cough and multiple failed rounds of antibiotics for presumed pneumonia.  # Dec 2021 CT-guided lung biopsy w/ adenocarcinoma.  # 2022 Staging brain MRI and PET scan with bilateral lung involvement (CRISTO primary) and med/hilar adenopathy.  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  # 2022 Osimertinib - ongoing; [place].    Wolf remains stable clinically from a lung cancer standpoint and radiographically his disease appears to be stable as well.  He has has had some waxing and waning adenopathy that were closely monitoring.  All in all, he is tolerating treatment well and without major toxicity.    Regarding the new anemia, this is likely secondary to the recent GI bleeding episode he is experienced.  While this could be just diverticular or hemorrhoidal, I would like to see him get a colonoscopy in the coming weeks.  We also started him on some iron supplements and have plans to check a CBC in several weeks to ensure that it is resolving/improving.    Regarding the waxing and waning transaminitis, I do suspect this could be related to his treatment or other medications.  No recent alcohol or Tylenol use.    Assuming no further episodes of GI bleeding, return to clinic 3 months with a PET scan and labs.    Miles Estrada MD    --------------------    Interval History:  Wolf returns for follow-up of lung cancer accompanied by his daughter.  Esteban  used throughout our visit.  All in all, he remains  "quite well.  No new health symptoms to report side effects from potentially his cancer or from his drug treatments.  No rash, nausea, vomiting, diarrhea, achy joints.  No mouth sores.  No major financial toxicity.  No recent heavy alcohol or Tylenol use.  He does report when questioned about his new anemia that he experienced several days of bright red blood coming from the bottom side and eventually transitioned into black tarry stools but they have now abated and normalized.  He was seen in the emergency room for this.    --------------------    Physical Exam:  VS: BP (!) 143/76 (BP Location: Left arm)   Pulse 75   Temp 98  F (36.7  C) (Oral)   Resp 16   Ht 1.632 m (5' 4.25\")   Wt 66.5 kg (146 lb 11.2 oz)   SpO2 97%   BMI 24.98 kg/m    GEN: Well appearing.    Labs / Imaging / Path:  We reviewed 6 month trend in CBC, CMP and personally reviewed / compared serial PET scans.      Again, thank you for allowing me to participate in the care of your patient.        Sincerely,        Miles Estrada MD    "

## 2022-10-04 NOTE — PROGRESS NOTES
[facility] Hematology / Oncology  Progress Note  Name: Wolf Sen  :  1950    MRN:  [id number]    --------------------    Assessment / Plan:  Locally advanced, unresectable, left-sided lung adenocarcinoma:  # 2021 Presented w/ dyspnea, cough and multiple failed rounds of antibiotics for presumed pneumonia.  # Dec 2021 CT-guided lung biopsy w/ adenocarcinoma.  # 2022 Staging brain MRI and PET scan with bilateral lung involvement (CRISTO primary) and med/hilar adenopathy.  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  # 2022 Osimertinib - ongoing; [place].    Wolf remains stable clinically from a lung cancer standpoint and radiographically his disease appears to be stable as well.  He has has had some waxing and waning adenopathy that were closely monitoring.  All in all, he is tolerating treatment well and without major toxicity.    Regarding the new anemia, this is likely secondary to the recent GI bleeding episode he is experienced.  While this could be just diverticular or hemorrhoidal, I would like to see him get a colonoscopy in the coming weeks.  We also started him on some iron supplements and have plans to check a CBC in several weeks to ensure that it is resolving/improving.    Regarding the waxing and waning transaminitis, I do suspect this could be related to his treatment or other medications.  No recent alcohol or Tylenol use.    Assuming no further episodes of GI bleeding, return to clinic 3 months with a PET scan and labs.    Miles Estrada MD    --------------------    Interval History:  Wolf returns for follow-up of lung cancer accompanied by his daughter.  Esteban  used throughout our visit.  All in all, he remains quite well.  No new health symptoms to report side effects from potentially his cancer or from his drug treatments.  No rash, nausea, vomiting, diarrhea, achy joints.  No mouth sores.  No major financial toxicity.  No recent heavy alcohol or Tylenol use.   "He does report when questioned about his new anemia that he experienced several days of bright red blood coming from the bottom side and eventually transitioned into black tarry stools but they have now abated and normalized.  He was seen in the emergency room for this.    --------------------    Physical Exam:  VS: BP (!) 143/76 (BP Location: Left arm)   Pulse 75   Temp 98  F (36.7  C) (Oral)   Resp 16   Ht 1.632 m (5' 4.25\")   Wt 66.5 kg (146 lb 11.2 oz)   SpO2 97%   BMI 24.98 kg/m    GEN: Well appearing.    Labs / Imaging / Path:  We reviewed 6 month trend in CBC, CMP and personally reviewed / compared serial PET scans.  "

## 2022-10-04 NOTE — NURSING NOTE
"Oncology Rooming Note    October 4, 2022 3:08 PM   Wolf Sen is a 72 year old male who presents for:    Chief Complaint   Patient presents with     Oncology Clinic Visit     3 month follow up- results     Initial Vitals: BP (!) 143/76 (BP Location: Left arm)   Pulse 75   Temp 98  F (36.7  C) (Oral)   Resp 16   Ht 1.632 m (5' 4.25\")   Wt 66.5 kg (146 lb 11.2 oz)   SpO2 97%   BMI 24.98 kg/m   Estimated body mass index is 24.98 kg/m  as calculated from the following:    Height as of this encounter: 1.632 m (5' 4.25\").    Weight as of this encounter: 66.5 kg (146 lb 11.2 oz). Body surface area is 1.74 meters squared.  No Pain (0) Comment: Data Unavailable   No LMP for male patient.  Allergies reviewed: Yes  Medications reviewed: Yes    Medications: Medication refills not needed today.  Pharmacy name entered into EPIC:    WRITTEN PRESCRIPTION REQUESTED  [id number] - [place], [place] - [address] [id number] - [place], [place] - [address] AT [facility] & [address] [place] [address] [facility] - [place], [place] - [address]    Clinical concerns: not feeling as strong as previous visits.       Gisselle Pardo LPN              "

## 2022-10-05 NOTE — PATIENT INSTRUCTIONS
1) Colonoscopy coming weeks.  2) CBC, ferritin 2 weeks.  3) BJT MG 3 months w/ labs (CBC, CMP, ferritin) and PET scan.    Miles Estrada MD.

## 2022-10-06 ENCOUNTER — TELEPHONE (OUTPATIENT)
Dept: ONCOLOGY | Facility: CLINIC | Age: 72
End: 2022-10-06

## 2022-10-06 ENCOUNTER — DOCUMENTATION ONLY (OUTPATIENT)
Dept: ONCOLOGY | Facility: CLINIC | Age: 72
End: 2022-10-06

## 2022-10-06 ENCOUNTER — TELEPHONE (OUTPATIENT)
Dept: GASTROENTEROLOGY | Facility: CLINIC | Age: 72
End: 2022-10-06

## 2022-10-06 DIAGNOSIS — C34.90 MALIGNANT NEOPLASM OF BRONCHUS AND LUNG (H): Primary | ICD-10-CM

## 2022-10-06 NOTE — TELEPHONE ENCOUNTER
I called colonoscopy scheduling and the referral is in for this patient  they will be calling the patient directly to schedule the appointment.-cap-10/06/2022

## 2022-10-06 NOTE — TELEPHONE ENCOUNTER
Screening Questions    BlueKIND OF PREP RedLOCATION [review exclusion criteria] GreenSEDATION TYPE      nHave you had a positive covid test in the last 90 days?   If yes, what date?        y Are you able to give consent for your medical care?  (Sedation review/consideration needed)      Y Are you active on mychart?       UCARE MEDICAREWhat insurance is in the chart?        JANIS HOWARD Ordering/Referring Provider:        24.9 BMI [BMI OVER 40-EXTENDED PREP]  BMI OVER 40 NEED PAC EVALUATION FOR UPU     Respiratory Screening:  [If yes to any of the following HOSPITAL setting only]     N      Do you use daily home oxygen?   N      Do you have mod to severe Obstructive Sleep Apnea? Hospital only - Ok at [place]   N      Do you have Pulmonary Hypertension? NEED PAC APPT AT [facility]     N      Do you have UNCONTROLLED asthma?         N Have you had a heart or lung transplant?          N Are you currently on dialysis? [If yes, G-PREP & HOSPITAL setting only]        Y Do you have chronic kidney disease? [If yes, G-PREP]       Y Do you have a diagnosis of diabetes?[If yes, G-PREP]       N Have you had a stroke or Transient ischemic attack (TIA - aka  mini stroke ) within 6 months? (If yes, please review exclusion criteria)         N  In the past 6 months, have you had any heart related issues including cardiomyopathy or heart attack?          N  If yes, did it require cardiac stenting or other implantable device?          N Do you have any implantable devices in your body (pacemaker, defib, LVAD)? (If yes, please review exclusion criteria)       N Do you take nitroglycerin?          N If yes, how often?  (If yes, HOSPITAL setting ONLY)       N Are you currently taking any blood thinners?           [IF YES, INFORM PATIENT TO FOLLOW UP W/ ORDERING PROVIDER FOR BRIDGING INSTRUCTIONS]        N  Do you take Phentermine?      Yes-> Hold for 7 days before procedure.  Please consult your prescribing provider if you have  questions about holding this medication.       N Are you taking any prescription pain medications on a routine schedule? [EXTENDED PREP AND MAC]            [FEMALES] are you currently pregnant?            If yes, how many weeks? [Greater than 12 weeks, OR NEEDED]       N Any chemical dependencies such as alcohol, street drugs, or methadone? [If yes, MAC]       N Any history of post-traumatic stress syndrome, severe anxiety or history of psychosis? [If yes, MAC]       Y Can you transfer from bed to chair? (If NO, please HOSPITAL setting  only)        N  On a regular basis do you go 3-5 days between bowel movements?[ If yes, EXTENDED PREP.]        Preferred LOCAL Pharmacy for Pre Prescription:       [id number] - [place], [place] - [address] AT [facility] [address]                              Scheduling Details      Gwen Caller:   (Please ask for phone number if not scheduled by patient)    Type of Procedure Scheduled: Lower Endoscopy [Colonoscopy]    GPREP Which Colonoscopy Prep was Sent:   NIKKY CF PATIENTS & GROEN'S PATIENTS NEEDS EXTENDED PREP    Date of Procedure: 12/6  Surgeon: STACI  Location:     Sedation Type: CS    Conscious Sedation- Needs  for 6 hours after the procedure  MAC/General-Needs  for 24 hours after procedure    N Pre-op Required at [facility] [place], [place] and OR for MAC sedation:        (Advise patient they will need a pre-op WITH IN 30 DAYS prior to procedure -)      Y Informed patient they will need an adult          Cannot take any type of public or medical transportation alone    Y Confirmed Nurse will call to complete assessment     HOME Pre-Procedure Covid test to be completed [[facility] PCR Testing Required]       Additional comments:

## 2022-10-08 LAB — RENIN PLAS-CCNC: 0.6 NG/ML/HR

## 2022-10-10 LAB — ALDOST/RENIN PLAS-RTO: 16.5 {RATIO} (ref 0–25)

## 2022-10-17 DIAGNOSIS — C34.92 ADENOCARCINOMA, LUNG, LEFT (H): Primary | ICD-10-CM

## 2022-10-20 ENCOUNTER — LAB (OUTPATIENT)
Dept: LAB | Facility: CLINIC | Age: 72
End: 2022-10-20
Payer: COMMERCIAL

## 2022-10-20 DIAGNOSIS — C34.92 ADENOCARCINOMA, LUNG, LEFT (H): ICD-10-CM

## 2022-10-20 LAB
BASOPHILS # BLD AUTO: 0 10E3/UL (ref 0–0.2)
BASOPHILS NFR BLD AUTO: 0 %
EOSINOPHIL # BLD AUTO: 0.2 10E3/UL (ref 0–0.7)
EOSINOPHIL NFR BLD AUTO: 3 %
ERYTHROCYTE [DISTWIDTH] IN BLOOD BY AUTOMATED COUNT: 15.9 % (ref 10–15)
HCT VFR BLD AUTO: 30.7 % (ref 40–53)
HGB BLD-MCNC: 9.6 G/DL (ref 13.3–17.7)
HOLD SPECIMEN: NORMAL
HOLD SPECIMEN: NORMAL
IMM GRANULOCYTES # BLD: 0 10E3/UL
IMM GRANULOCYTES NFR BLD: 0 %
LYMPHOCYTES # BLD AUTO: 1.9 10E3/UL (ref 0.8–5.3)
LYMPHOCYTES NFR BLD AUTO: 29 %
MCH RBC QN AUTO: 25.1 PG (ref 26.5–33)
MCHC RBC AUTO-ENTMCNC: 31.3 G/DL (ref 31.5–36.5)
MCV RBC AUTO: 80 FL (ref 78–100)
MONOCYTES # BLD AUTO: 0.7 10E3/UL (ref 0–1.3)
MONOCYTES NFR BLD AUTO: 10 %
NEUTROPHILS # BLD AUTO: 3.8 10E3/UL (ref 1.6–8.3)
NEUTROPHILS NFR BLD AUTO: 58 %
NRBC # BLD AUTO: 0 10E3/UL
NRBC BLD AUTO-RTO: 0 /100
PLATELET # BLD AUTO: 204 10E3/UL (ref 150–450)
RBC # BLD AUTO: 3.83 10E6/UL (ref 4.4–5.9)
WBC # BLD AUTO: 6.6 10E3/UL (ref 4–11)

## 2022-10-20 PROCEDURE — 36415 COLL VENOUS BLD VENIPUNCTURE: CPT

## 2022-10-20 PROCEDURE — 85025 COMPLETE CBC W/AUTO DIFF WBC: CPT

## 2022-10-31 ENCOUNTER — PATIENT OUTREACH (OUTPATIENT)
Dept: GERIATRIC MEDICINE | Facility: CLINIC | Age: 72
End: 2022-10-31

## 2022-10-31 NOTE — PROGRESS NOTES
Encounter opened due to Regulatory [employer] Update to close FVP Program.    Domi Pride  Care Management Specialist  [facility]  [phone]

## 2022-10-31 NOTE — PROGRESS NOTES
Encounter opened due to Regulatory [employer] Update to open FVP Program.    Domi Pride  Care Management Specialist  [facility]  [phone]

## 2022-11-02 ENCOUNTER — PATIENT OUTREACH (OUTPATIENT)
Dept: GERIATRIC MEDICINE | Facility: CLINIC | Age: 72
End: 2022-11-02

## 2022-11-02 DIAGNOSIS — C34.92 ADENOCARCINOMA, LUNG, LEFT (H): Primary | ICD-10-CM

## 2022-11-02 SDOH — ECONOMIC STABILITY: TRANSPORTATION INSECURITY: IN THE PAST 12 MONTHS, HAS LACK OF TRANSPORTATION KEPT YOU FROM MEDICAL APPOINTMENTS OR FROM GETTING MEDICATIONS?: NO

## 2022-11-02 SDOH — SOCIAL STABILITY: SOCIAL INSECURITY: WITHIN THE LAST YEAR, HAVE YOU BEEN HUMILIATED OR EMOTIONALLY ABUSED IN OTHER WAYS BY YOUR PARTNER OR EX-PARTNER?: NO

## 2022-11-02 SDOH — ECONOMIC STABILITY: FOOD INSECURITY: WITHIN THE PAST 12 MONTHS, THE FOOD YOU BOUGHT JUST DIDN'T LAST AND YOU DIDN'T HAVE MONEY TO GET MORE.: NEVER TRUE

## 2022-11-02 SDOH — HEALTH STABILITY: PHYSICAL HEALTH: ON AVERAGE, HOW MANY MINUTES DO YOU ENGAGE IN EXERCISE AT THIS LEVEL?: PATIENT DECLINED

## 2022-11-02 SDOH — SOCIAL STABILITY: SOCIAL INSECURITY
WITHIN THE LAST YEAR, HAVE YOU BEEN KICKED, HIT, SLAPPED, OR OTHERWISE PHYSICALLY HURT BY YOUR PARTNER OR EX-PARTNER?: NO

## 2022-11-02 SDOH — ECONOMIC STABILITY: HOUSING INSECURITY
IN THE LAST 12 MONTHS, WAS THERE A TIME WHEN YOU DID NOT HAVE A STEADY PLACE TO SLEEP OR SLEPT IN A SHELTER (INCLUDING NOW)?: NO

## 2022-11-02 SDOH — HEALTH STABILITY: PHYSICAL HEALTH
ON AVERAGE, HOW MANY DAYS PER WEEK DO YOU ENGAGE IN MODERATE TO STRENUOUS EXERCISE (LIKE A BRISK WALK)?: PATIENT DECLINED

## 2022-11-02 SDOH — HEALTH STABILITY: MENTAL HEALTH
DO YOU FEEL STRESS - TENSE, RESTLESS, NERVOUS, OR ANXIOUS, OR UNABLE TO SLEEP AT NIGHT BECAUSE YOUR MIND IS TROUBLED ALL THE TIME - THESE DAYS?: ONLY A LITTLE

## 2022-11-02 SDOH — SOCIAL STABILITY: SOCIAL NETWORK: HOW OFTEN DO YOU ATTEND MEETINGS OF THE CLUBS OR ORGANIZATIONS YOU BELONG TO?: PATIENT DECLINED

## 2022-11-02 SDOH — SOCIAL STABILITY: SOCIAL INSECURITY
WITHIN THE LAST YEAR, HAVE YOU BEEN RAPED OR FORCED TO HAVE ANY KIND OF SEXUAL ACTIVITY BY YOUR PARTNER OR EX-PARTNER?: NO

## 2022-11-02 SDOH — SOCIAL STABILITY: SOCIAL INSECURITY: ARE YOU MARRIED, WIDOWED, DIVORCED, SEPARATED, NEVER MARRIED, OR LIVING WITH A PARTNER?: PATIENT DECLINED

## 2022-11-02 SDOH — ECONOMIC STABILITY: FOOD INSECURITY: HOW HARD IS IT FOR YOU TO PAY FOR THE VERY BASICS LIKE FOOD, HOUSING, MEDICAL CARE, AND HEATING?: NOT HARD AT ALL

## 2022-11-02 SDOH — SOCIAL STABILITY: SOCIAL NETWORK
DO YOU BELONG TO ANY CLUBS OR ORGANIZATIONS SUCH AS CHURCH GROUPS, UNIONS, FRATERNAL OR ATHLETIC GROUPS, OR SCHOOL GROUPS?: PATIENT DECLINED

## 2022-11-02 SDOH — SOCIAL STABILITY: SOCIAL NETWORK: HOW OFTEN DO YOU ATTEND CHURCH OR RELIGIOUS SERVICES?: PATIENT DECLINED

## 2022-11-02 SDOH — ECONOMIC STABILITY: HOUSING INSECURITY: IN THE LAST 12 MONTHS, WAS THERE A TIME WHEN YOU WERE NOT ABLE TO PAY THE MORTGAGE OR RENT ON TIME?: YES

## 2022-11-02 SDOH — SOCIAL STABILITY: SOCIAL NETWORK: HOW OFTEN DO YOU GET TOGETHER WITH FRIENDS OR RELATIVES?: MORE THAN THREE TIMES A WEEK

## 2022-11-02 SDOH — ECONOMIC STABILITY: HOUSING INSECURITY: IN THE LAST 12 MONTHS, HOW MANY PLACES HAVE YOU LIVED?: 1

## 2022-11-02 SDOH — SOCIAL STABILITY: SOCIAL NETWORK
IN A TYPICAL WEEK, HOW MANY TIMES DO YOU TALK ON THE PHONE WITH FAMILY, FRIENDS, OR NEIGHBORS?: MORE THAN THREE TIMES A WEEK

## 2022-11-02 SDOH — SOCIAL STABILITY: SOCIAL INSECURITY: WITHIN THE LAST YEAR, HAVE YOU BEEN AFRAID OF YOUR PARTNER OR EX-PARTNER?: NO

## 2022-11-02 SDOH — ECONOMIC STABILITY: INCOME INSECURITY: IN THE LAST 12 MONTHS, WAS THERE A TIME WHEN YOU WERE NOT ABLE TO PAY THE MORTGAGE OR RENT ON TIME?: NO

## 2022-11-02 SDOH — ECONOMIC STABILITY: FOOD INSECURITY: WITHIN THE PAST 12 MONTHS, YOU WORRIED THAT YOUR FOOD WOULD RUN OUT BEFORE YOU GOT THE MONEY TO BUY MORE.: NEVER TRUE

## 2022-11-02 SDOH — HEALTH STABILITY: MENTAL HEALTH: HOW OFTEN DO YOU HAVE A DRINK CONTAINING ALCOHOL?: NEVER

## 2022-11-02 SDOH — HEALTH STABILITY: MENTAL HEALTH: HOW MANY DRINKS CONTAINING ALCOHOL DO YOU HAVE ON A TYPICAL DAY WHEN YOU ARE DRINKING?: PATIENT DOES NOT DRINK

## 2022-11-02 SDOH — HEALTH STABILITY: MENTAL HEALTH: HOW OFTEN DO YOU HAVE SIX OR MORE DRINKS ON ONE OCCASION?: NEVER

## 2022-11-02 ASSESSMENT — LIFESTYLE VARIABLES
AUDIT-C TOTAL SCORE: 0
SKIP TO QUESTIONS 9-10: 1

## 2022-11-02 ASSESSMENT — ACTIVITIES OF DAILY LIVING (ADL)
LACK_OF_TRANSPORTATION: NO
DEPENDENT_IADLS:: CLEANING;COOKING;LAUNDRY;SHOPPING;MEAL PREPARATION;MEDICATION MANAGEMENT;MONEY MANAGEMENT;TRANSPORTATION

## 2022-11-02 NOTE — PROGRESS NOTES
DME supply(s) have been requested by the patient. DME orders(s) have been queued up and pended for the provider to review. Patient reports the need for DME supplies due to nutritional support. Once completed, please route the encounter to care coordinator to fax completed documentation and order requisition to [facility].     Viki Avalos RN, N  [facility]

## 2022-11-02 NOTE — Clinical Note
Dr. Bell,  I am Wolf Sen community Care Coordinator with [facility].  I completed the annual home visit.   This is for FYI only.   Thank you,  Viki Avalos RN, PHN [facility]

## 2022-11-02 NOTE — PROGRESS NOTES
[facility] Care Coordination Contact    [facility] Home Visit Assessment     Home visit for Health Risk Assessment with Wolf Sen completed on November 2, 2022    Type of residence:: Town home  Current living arrangement:: I live in a private home with family     Assessment completed with:: Patient, [place]  via SnappCloud language line - [facility] ID#[id number]    Current Care Plan  Member currently receiving the following home care services:     Member currently receiving the following community resources: PCA, Housekeeping/Chore Agency, Lifeline      Medication Review  Medication reconciliation completed in Epic: Yes  Medication set-up & administration: Family/informal caregiver sets up daily.  Family caregiver administers medications.  Medication Risk Assessment Medication (1 or more, place referral to MTM): N/A: No risk factors identified  MTM Referral Placed: No: No risk factors idenified    Mental/Behavioral Health   Depression Screening:   PHQ-2 Total Score (Adult) - Positive if 3 or more points; Administer PHQ-9 if positive: 0       Mental health DX:: No        Falls Assessment:   Fallen 2 or more times in the past year?: No   Any fall with injury in the past year?: No    ADL/IADL Dependencies:   Dependent ADLs:: Independent  Dependent IADLs:: Cleaning, Cooking, Laundry, Shopping, Meal Preparation, Medication Management, Money Management, Transportation    [facility] sponsored benefits: Shared information re: Silver Sneakers/gym memberships, ASA, Calcium +D.    PCA Assessment completed at visit: Yes Annual PCA assessment indicated 5 units per day of PCA. This is the same as the previous assessment.     Elderly Waiver Eligibility: Yes-will continue on EW    Care Plan & Recommendations: 1. PCA, 2. Homemaking, 3. PERS, 4. Order for monthly Ensure.     See CC for detailed assessment information.    Follow-Up Plan: Member informed of future contact, plan to f/u with member with a 6 month telephone  assessment.  Contact information shared with member and family, encouraged member to call with any questions or concerns at any time.    [facility] care continuum providers: Please see Snapshot and Care Management Flowsheets for Specific details of care plan.    This CC note routed to PCP.    Viki Avalos RN, PHN  [facility]

## 2022-11-02 NOTE — PROGRESS NOTES
Care Coordinator spoke with member - scheduled visit for 11/2 at 2:30 p.m .    Viki Avalos RN, N  [facility]

## 2022-11-03 NOTE — PROGRESS NOTES
Care Coordinator received rx for Ensure back from PCP.  Referral made to Maddi along with rx.     Viki Avalos RN, PHN  [facility]

## 2022-11-08 ENCOUNTER — PATIENT OUTREACH (OUTPATIENT)
Dept: GERIATRIC MEDICINE | Facility: CLINIC | Age: 72
End: 2022-11-08

## 2022-11-08 NOTE — PROGRESS NOTES
[facility] Care Coordination Contact    Received after visit chart from care coordinator.  Completed following tasks: Mailed copy of care plan to client, Updated services in Database, Submitted referrals/auths for homemaking & lifeline, Mailed copy of POC signature sheet for member to sign and return in SASE  and Mailed [facility] Safe Medication Disposal   , Provider Signature - No POC Shared:  Member indicates that they do not want their POC shared with any EW providers.    [facility]:  Emailed completed PCA assessment to [facility].  Faxed copy of PCA assessment to PCA Agency and mailed copy to member.  Faxed MD Communication to PCP.     Domi Pride  Care Management Specialist  [facility]  [phone]

## 2022-11-08 NOTE — LETTER
November 8, 2022      WOLF MEKAMERRY  [address]  [place] [address]      Dear Wolf:    At [facility], we re dedicated to improving your health and wellness. Enclosed is the Care Plan developed with you on 11/2/2022. Please review the Care Plan carefully.    As a reminder, during your visit we talked about:    Ways to manage your physical and mental health    Using health care to maintain and improve your health     Your preventive care needs     Remember to contact your care coordinator if you:    Are hospitalized, or plan to be hospitalized     Have a fall      Have a change in your physical or mental health    Need help finding support or services    If you have questions, or don t agree with your Care Plan, call me at [phone]. You can also call me if your needs change. TTY users, call the [place] [employer] at (021) or 1-638.764.8092 (vvcaca-kj-eeqdna relay service).    Sincerely,    Viki Avalos RN, PHN    E-mail: [email or web address]  Phone: [phone]      [facility] ([facility]) is a health plan that contracts with both Medicare and the [place] Medical Assistance (Medicaid) program to provide benefits of both programs to enrollees. Enrollment in [facility] depends on contract renewal.    [id number] accepted    [id number] (07/2022)

## 2022-11-15 DIAGNOSIS — C34.92 ADENOCARCINOMA, LUNG, LEFT (H): Primary | ICD-10-CM

## 2022-11-29 RX ORDER — BISACODYL 5 MG
TABLET, DELAYED RELEASE (ENTERIC COATED) ORAL
Qty: 4 TABLET | Refills: 0 | Status: SHIPPED | OUTPATIENT
Start: 2022-11-29 | End: 2023-03-15

## 2022-12-05 ENCOUNTER — PATIENT OUTREACH (OUTPATIENT)
Dept: ONCOLOGY | Facility: CLINIC | Age: 72
End: 2022-12-05

## 2022-12-05 ENCOUNTER — TELEPHONE (OUTPATIENT)
Dept: FAMILY MEDICINE | Facility: CLINIC | Age: 72
End: 2022-12-05

## 2022-12-05 RX ORDER — LIDOCAINE 40 MG/G
CREAM TOPICAL
Status: CANCELLED | OUTPATIENT
Start: 2022-12-05

## 2022-12-05 RX ORDER — ONDANSETRON 2 MG/ML
4 INJECTION INTRAMUSCULAR; INTRAVENOUS
Status: CANCELLED | OUTPATIENT
Start: 2022-12-05

## 2022-12-05 NOTE — PROGRESS NOTES
[id number] VM received from daughter, Gwen.  Patient is scheduled for colonoscopy tomorrow.  They talked to the endoscopy nurse and was advised to check with Dr. Estrada regarding if it is ok for patient to take his oral chemotherapy tomorrow before the procedure.  Procedure is scheduled at 8:15 AM, he would normally take it at 8:00 AM.  Ok to leave detailed VM if no answer.    Per Dr. Estrada, ok to take oral chemotherapy after his procedure.  Attempted to reach daughter, detailed voice mail left with provider advice.      Evelia Marquez RN

## 2022-12-05 NOTE — TELEPHONE ENCOUNTER
This writer attempted to contact Gwen on 12/05/22      Reason for call Provider's response and left detailed message.      If daughter calls back:   Route to Dr. Bell with daughter's response        Ashly Parson RN

## 2022-12-05 NOTE — TELEPHONE ENCOUNTER
Gwen, patient's daughter is calling questioning which medications patient can and can not take before his colonoscopy tomorrow morning.    He was told that he can take his baby ASA.    Please advise on other medications including chemo tablets.    Gwen is at work till 5 PM.  Please leave a detailed message.    Krystle Jackson RN  [facility]

## 2022-12-05 NOTE — TELEPHONE ENCOUNTER
Did he have a pre op for this? These are things that are addressed during a pre op evaluation. Did the GI team request a pre op?    Thank you,  Kimberly Bell MD MPH

## 2022-12-06 ENCOUNTER — HOSPITAL ENCOUNTER (OUTPATIENT)
Facility: AMBULATORY SURGERY CENTER | Age: 72
Discharge: HOME | End: 2022-12-06
Attending: SPECIALIST | Admitting: SPECIALIST
Payer: COMMERCIAL

## 2022-12-06 DIAGNOSIS — Z12.11 SCREEN FOR COLON CANCER: Primary | ICD-10-CM

## 2022-12-06 RX ORDER — BISACODYL 5 MG
TABLET, DELAYED RELEASE (ENTERIC COATED) ORAL
Qty: 4 TABLET | Refills: 0 | Status: SHIPPED | OUTPATIENT
Start: 2022-12-06 | End: 2023-03-15

## 2022-12-06 NOTE — TELEPHONE ENCOUNTER
Reason for Call:  Other / Physical report    Detailed comments: Jasmin Henriquez with [facility] called and stated they need a copy of patient's latest physical faxed to them at [phone] and sent to her attention. Please notice that their phone number is same as fax.    Phone Number Patient can be reached at: [phone] (Jasmin Henriquez with [facility])    Best Time: ASAP    Can we leave a detailed message on this number? YES    Call taken on 3/4/2019 at 2:12 PM by Yisel Diaz       Writer notes that patient has colonoscopy procedure scheduled at 9:00 AM today (per daughter, procedure scheduled at 8:15 AM), left detailed message on daughter's VM yesterday. See patient outreach call from 12/5/22 with oncology department, patient was instructed OK to take oral chemotherapy before procedure and left VM for daughter. At this time, unable to reach patient or daughter. Unable to find documentation for pre-op appt from GI.    Will close encounter as patient is scheduled to get procedure this morning.      DIANA NicholsN, RN  [facility]

## 2022-12-07 ENCOUNTER — HOSPITAL ENCOUNTER (OUTPATIENT)
Facility: AMBULATORY SURGERY CENTER | Age: 72
Discharge: HOME | End: 2022-12-07
Attending: INTERNAL MEDICINE | Admitting: INTERNAL MEDICINE
Payer: COMMERCIAL

## 2022-12-07 ENCOUNTER — HOSPITAL ENCOUNTER (OUTPATIENT)
Facility: AMBULATORY SURGERY CENTER | Age: 72
End: 2022-12-07
Attending: INTERNAL MEDICINE
Payer: COMMERCIAL

## 2022-12-07 VITALS
HEART RATE: 75 BPM | OXYGEN SATURATION: 97 % | RESPIRATION RATE: 16 BRPM | SYSTOLIC BLOOD PRESSURE: 185 MMHG | TEMPERATURE: 97.5 F | DIASTOLIC BLOOD PRESSURE: 87 MMHG

## 2022-12-07 DIAGNOSIS — D64.9 ANEMIA, UNSPECIFIED TYPE: ICD-10-CM

## 2022-12-07 DIAGNOSIS — Z12.11 COLON CANCER SCREENING: Primary | ICD-10-CM

## 2022-12-07 LAB
COLONOSCOPY: NORMAL
GLUCOSE BLDC GLUCOMTR-MCNC: 77 MG/DL (ref 70–99)

## 2022-12-07 PROCEDURE — G8907 PT DOC NO EVENTS ON DISCHARG: HCPCS

## 2022-12-07 PROCEDURE — 88305 TISSUE EXAM BY PATHOLOGIST: CPT | Performed by: PATHOLOGY

## 2022-12-07 PROCEDURE — 45385 COLONOSCOPY W/LESION REMOVAL: CPT

## 2022-12-07 PROCEDURE — G8918 PT W/O PREOP ORDER IV AB PRO: HCPCS

## 2022-12-07 PROCEDURE — 45380 COLONOSCOPY AND BIOPSY: CPT | Mod: XS

## 2022-12-07 RX ORDER — NALOXONE HYDROCHLORIDE 0.4 MG/ML
0.2 INJECTION, SOLUTION INTRAMUSCULAR; INTRAVENOUS; SUBCUTANEOUS
Status: DISCONTINUED | OUTPATIENT
Start: 2022-12-07 | End: 2022-12-08 | Stop reason: HOSPADM

## 2022-12-07 RX ORDER — PROCHLORPERAZINE MALEATE 5 MG
5 TABLET ORAL EVERY 6 HOURS PRN
Status: DISCONTINUED | OUTPATIENT
Start: 2022-12-07 | End: 2022-12-08 | Stop reason: HOSPADM

## 2022-12-07 RX ORDER — FENTANYL CITRATE 50 UG/ML
INJECTION, SOLUTION INTRAMUSCULAR; INTRAVENOUS PRN
Status: DISCONTINUED | OUTPATIENT
Start: 2022-12-07 | End: 2022-12-07 | Stop reason: HOSPADM

## 2022-12-07 RX ORDER — ONDANSETRON 2 MG/ML
4 INJECTION INTRAMUSCULAR; INTRAVENOUS
Status: DISCONTINUED | OUTPATIENT
Start: 2022-12-07 | End: 2022-12-08 | Stop reason: HOSPADM

## 2022-12-07 RX ORDER — ONDANSETRON 2 MG/ML
4 INJECTION INTRAMUSCULAR; INTRAVENOUS EVERY 6 HOURS PRN
Status: DISCONTINUED | OUTPATIENT
Start: 2022-12-07 | End: 2022-12-08 | Stop reason: HOSPADM

## 2022-12-07 RX ORDER — LIDOCAINE 40 MG/G
CREAM TOPICAL
Status: DISCONTINUED | OUTPATIENT
Start: 2022-12-07 | End: 2022-12-08 | Stop reason: HOSPADM

## 2022-12-07 RX ORDER — NALOXONE HYDROCHLORIDE 0.4 MG/ML
0.4 INJECTION, SOLUTION INTRAMUSCULAR; INTRAVENOUS; SUBCUTANEOUS
Status: DISCONTINUED | OUTPATIENT
Start: 2022-12-07 | End: 2022-12-08 | Stop reason: HOSPADM

## 2022-12-07 RX ORDER — FLUMAZENIL 0.1 MG/ML
0.2 INJECTION, SOLUTION INTRAVENOUS
Status: ACTIVE | OUTPATIENT
Start: 2022-12-07 | End: 2022-12-07

## 2022-12-07 RX ORDER — ONDANSETRON 4 MG/1
4 TABLET, ORALLY DISINTEGRATING ORAL EVERY 6 HOURS PRN
Status: DISCONTINUED | OUTPATIENT
Start: 2022-12-07 | End: 2022-12-08 | Stop reason: HOSPADM

## 2022-12-07 NOTE — OR NURSING
Patient with elevated blood pressure in recovery.  Last blood pressure reading 185/87.  Patient blood pressure has been liable through out visit.  Per patient and family he did not take hypertensive medications today which he usually takes in the AM.  Patient denies light headedness, vision change or other symptoms.  Discussed with Dr. Escalante. Patient ok to discharge home and take blood pressure.  Patient requests to discharge.

## 2022-12-07 NOTE — H&P
ENDOSCOPY PRE-SEDATION H&P FOR OUTPATIENT PROCEDURES    Wolf Sen  [id number]  1950    Procedure: colonoscopy    Pre-procedure diagnosis: anemia, rectal bleeding    Past medical history:   Past Medical History:   Diagnosis Date     Cataracts, both eyes 5/20/2014     Cerebral artery occlusion with cerebral infarction (H)      DM type 2, goal A1c below 7 5/5/2014     Hypertension        Past surgical history:   Past Surgical History:   Procedure Laterality Date     CATARACT IOL, RT/LT       COMBINED REPAIR PTOSIS WITH BLEPHAROPLASTY Bilateral 10/9/2017    Procedure: COMBINED REPAIR PTOSIS WITH BLEPHAROPLASTY;  Bilateral upper eyelid blepharoplasty and ptosis repair;  Surgeon: Bibiana Melara MD;  Location: [facility] OR     PHACOEMULSIFICATION WITH STANDARD INTRAOCULAR LENS IMPLANT Right 2/25/2016    Procedure: PHACOEMULSIFICATION WITH STANDARD INTRAOCULAR LENS IMPLANT;  Surgeon: Carlton Don MD;  Location: [facility] OR     PHACOEMULSIFICATION WITH STANDARD INTRAOCULAR LENS IMPLANT Left 2/11/2016    Procedure: PHACOEMULSIFICATION WITH STANDARD INTRAOCULAR LENS IMPLANT;  Surgeon: Carlton Don MD;  Location: [facility] OR     REPAIR PTOSIS Bilateral     10/17       Current Outpatient Medications   Medication     ASPIRIN PO     bisacodyl (DULCOLAX) 5 MG EC tablet     insulin aspart (NOVOLOG FLEXPEN) 100 UNIT/ML pen     insulin glargine (LANTUS SOLOSTAR) 100 UNIT/ML pen     metoprolol succinate ER (TOPROL XL) 25 MG 24 hr tablet     polyethylene glycol (GOLYTELY) 236 g suspension     polyethylene glycol (GOLYTELY) 236 g suspension     acetaminophen (MAPAP) 500 MG tablet     Alcohol Swabs (B-D SINGLE USE SWABS REGULAR) PADS     atorvastatin (LIPITOR) 80 MG tablet     bisacodyl (DULCOLAX) 5 MG EC tablet     bisacodyl (DULCOLAX) 5 MG EC tablet     blood glucose (ONETOUCH VERIO IQ) test strip     ferrous sulfate (FEROSUL) 325 (65 Fe) MG tablet     hydrALAZINE (APRESOLINE) 25 MG tablet     hydrocortisone, Perianal,  (HYDROCORTISONE) 2.5 % cream     insulin pen needle (32G X 4 MM) 32G X 4 MM miscellaneous     ketoconazole (NIZORAL) 2 % external cream     lidocaine, viscous, (XYLOCAINE) 2 % solution     losartan (COZAAR) 100 MG tablet     metFORMIN (GLUCOPHAGE) 1000 MG tablet     multivitamin, therapeutic with minerals (MULTI-VITAMIN) TABS     ORDER FOR DME     osimertinib (TAGRISSO) 80 MG tablet     osimertinib (TAGRISSO) 80 MG tablet     osimertinib (TAGRISSO) 80 MG tablet     osimertinib (TAGRISSO) 80 MG tablet     osimertinib (TAGRISSO) 80 MG tablet     osimertinib (TAGRISSO) 80 MG tablet     osimertinib (TAGRISSO) 80 MG tablet     polyethylene glycol (GOLYTELY) 236 g suspension     SYSTANE ULTRA 0.4-0.3 % SOLN ophthalmic solution     thin (NO BRAND SPECIFIED) lancets     No current facility-administered medications for this encounter.     Facility-Administered Medications Ordered in Other Encounters   Medication     DOBUTamine 500 mg in dextrose 5% 250 mL (adult std conc) premix     metoprolol (LOPRESSOR) injection 5 mg       No Known Allergies    History of Anesthesia/Sedation Problems: no    Physical Exam:    Mental status: alert  Heart: Normal  Lung: Normal  Assessment of patient's airway: Normal  Other as pertinent for procedure: None     ASA Score: See Provation note    Mallampati score:  II - Faucial pillars and soft palate may be seen, but uvula is masked by the base of the tongue    Assessment/Plan:     The patient is an appropriate candidate to receive sedation.    Informed consent was discussed with the patient/family, including the risks, benefits, potential complications and any alternative options associated with sedation.    Patient assessment completed just prior to sedation and while under constant observation by the provider. Condition determined to be adequate for proceeding with sedation.    The specific risks for the procedure were discussed with the patient at the time of informed consent and include but  are not limited to perforation which could require surgery, missing significant neoplasm or lesion, hemorrhage and adverse sedative complication.      Rolf Escalante MD

## 2022-12-08 LAB
PATH REPORT.COMMENTS IMP SPEC: NORMAL
PATH REPORT.COMMENTS IMP SPEC: NORMAL
PATH REPORT.FINAL DX SPEC: NORMAL
PATH REPORT.GROSS SPEC: NORMAL
PATH REPORT.MICROSCOPIC SPEC OTHER STN: NORMAL
PATH REPORT.RELEVANT HX SPEC: NORMAL
PHOTO IMAGE: NORMAL

## 2022-12-08 NOTE — PROGRESS NOTES
Care Coordinator received voice message from Courtney at [facility] that she is not able to complete order for Ensure as they have tried to reach out several times to member and daughter, Gwen.    They are requesting Gwen to call back.   Care Coordinator left voice message for Gwen as well with information.     Viki Avalos RN, N  [facility]

## 2022-12-16 ENCOUNTER — MEDICAL CORRESPONDENCE (OUTPATIENT)
Dept: HEALTH INFORMATION MANAGEMENT | Facility: CLINIC | Age: 72
End: 2022-12-16

## 2022-12-16 DIAGNOSIS — C34.92 ADENOCARCINOMA, LUNG, LEFT (H): Primary | ICD-10-CM

## 2023-01-13 ENCOUNTER — DOCUMENTATION ONLY (OUTPATIENT)
Dept: ONCOLOGY | Facility: CLINIC | Age: 73
End: 2023-01-13

## 2023-01-13 ENCOUNTER — LAB (OUTPATIENT)
Dept: LAB | Facility: CLINIC | Age: 73
End: 2023-01-13
Payer: COMMERCIAL

## 2023-01-13 ENCOUNTER — ANCILLARY PROCEDURE (OUTPATIENT)
Dept: PET IMAGING | Facility: CLINIC | Age: 73
End: 2023-01-13
Attending: INTERNAL MEDICINE
Payer: COMMERCIAL

## 2023-01-13 DIAGNOSIS — C34.32 PRIMARY CANCER OF LEFT LOWER LOBE OF LUNG (H): ICD-10-CM

## 2023-01-13 DIAGNOSIS — C34.92 ADENOCARCINOMA, LUNG, LEFT (H): ICD-10-CM

## 2023-01-13 LAB
ALBUMIN SERPL-MCNC: 3.8 G/DL (ref 3.4–5)
ALP SERPL-CCNC: 76 U/L (ref 40–150)
ALT SERPL W P-5'-P-CCNC: 48 U/L (ref 0–70)
ANION GAP SERPL CALCULATED.3IONS-SCNC: 6 MMOL/L (ref 3–14)
AST SERPL W P-5'-P-CCNC: 36 U/L (ref 0–45)
BASOPHILS # BLD MANUAL: 0.1 10E3/UL (ref 0–0.2)
BASOPHILS NFR BLD MANUAL: 1 %
BILIRUB SERPL-MCNC: 0.4 MG/DL (ref 0.2–1.3)
BUN SERPL-MCNC: 19 MG/DL (ref 7–30)
CALCIUM SERPL-MCNC: 9 MG/DL (ref 8.5–10.1)
CHLORIDE BLD-SCNC: 108 MMOL/L (ref 94–109)
CO2 SERPL-SCNC: 27 MMOL/L (ref 20–32)
CREAT SERPL-MCNC: 1.27 MG/DL (ref 0.66–1.25)
EOSINOPHIL # BLD MANUAL: 0.2 10E3/UL (ref 0–0.7)
EOSINOPHIL NFR BLD MANUAL: 4 %
ERYTHROCYTE [DISTWIDTH] IN BLOOD BY AUTOMATED COUNT: 15.2 % (ref 10–15)
GFR SERPL CREATININE-BSD FRML MDRD: 59 ML/MIN/1.73M2
GLUCOSE BLD-MCNC: 73 MG/DL (ref 70–99)
HCT VFR BLD AUTO: 38.2 % (ref 40–53)
HGB BLD-MCNC: 12.2 G/DL (ref 13.3–17.7)
LYMPHOCYTES # BLD MANUAL: 2.2 10E3/UL (ref 0.8–5.3)
LYMPHOCYTES NFR BLD MANUAL: 37 %
MCH RBC QN AUTO: 24.7 PG (ref 26.5–33)
MCHC RBC AUTO-ENTMCNC: 31.9 G/DL (ref 31.5–36.5)
MCV RBC AUTO: 77 FL (ref 78–100)
MONOCYTES # BLD MANUAL: 0.5 10E3/UL (ref 0–1.3)
MONOCYTES NFR BLD MANUAL: 9 %
NEUTROPHILS # BLD MANUAL: 2.9 10E3/UL (ref 1.6–8.3)
NEUTROPHILS NFR BLD MANUAL: 49 %
PLAT MORPH BLD: ABNORMAL
PLATELET # BLD AUTO: 167 10E3/UL (ref 150–450)
POTASSIUM BLD-SCNC: 3.6 MMOL/L (ref 3.4–5.3)
PROT SERPL-MCNC: 8.4 G/DL (ref 6.8–8.8)
RBC # BLD AUTO: 4.94 10E6/UL (ref 4.4–5.9)
RBC MORPH BLD: ABNORMAL
SODIUM SERPL-SCNC: 141 MMOL/L (ref 133–144)
TARGETS BLD QL SMEAR: SLIGHT
WBC # BLD AUTO: 6 10E3/UL (ref 4–11)

## 2023-01-13 PROCEDURE — 36415 COLL VENOUS BLD VENIPUNCTURE: CPT

## 2023-01-13 PROCEDURE — A9552 F18 FDG: HCPCS | Performed by: RADIOLOGY

## 2023-01-13 PROCEDURE — 78816 PET IMAGE W/CT FULL BODY: CPT | Mod: GC | Performed by: RADIOLOGY

## 2023-01-13 PROCEDURE — 85007 BL SMEAR W/DIFF WBC COUNT: CPT

## 2023-01-13 PROCEDURE — 85027 COMPLETE CBC AUTOMATED: CPT

## 2023-01-13 PROCEDURE — 80053 COMPREHEN METABOLIC PANEL: CPT

## 2023-01-13 NOTE — PROGRESS NOTES
Oral Chemotherapy Program  Lab review     Reviewed labs from 1/13/23    Labs are unremarkable and do not require any dose adjustments at this time     Follow-up/plan  1/16/23: Appointment with Dr. Natalie Vallejo, PharmD, MPH, BCPS  January 13, 2023

## 2023-01-15 ENCOUNTER — HEALTH MAINTENANCE LETTER (OUTPATIENT)
Age: 73
End: 2023-01-15

## 2023-01-16 ENCOUNTER — ONCOLOGY VISIT (OUTPATIENT)
Dept: ONCOLOGY | Facility: CLINIC | Age: 73
End: 2023-01-16
Attending: INTERNAL MEDICINE
Payer: COMMERCIAL

## 2023-01-16 VITALS
TEMPERATURE: 98.2 F | HEART RATE: 68 BPM | DIASTOLIC BLOOD PRESSURE: 81 MMHG | SYSTOLIC BLOOD PRESSURE: 194 MMHG | BODY MASS INDEX: 24.68 KG/M2 | OXYGEN SATURATION: 98 % | WEIGHT: 144.9 LBS

## 2023-01-16 DIAGNOSIS — R74.01 TRANSAMINITIS: ICD-10-CM

## 2023-01-16 DIAGNOSIS — C34.82: ICD-10-CM

## 2023-01-16 DIAGNOSIS — D50.0 IRON DEFICIENCY ANEMIA DUE TO CHRONIC BLOOD LOSS: ICD-10-CM

## 2023-01-16 DIAGNOSIS — C34.92 ADENOCARCINOMA, LUNG, LEFT (H): Primary | ICD-10-CM

## 2023-01-16 PROCEDURE — 99215 OFFICE O/P EST HI 40 MIN: CPT | Performed by: INTERNAL MEDICINE

## 2023-01-16 ASSESSMENT — PAIN SCALES - GENERAL: PAINLEVEL: NO PAIN (0)

## 2023-01-16 NOTE — LETTER
2023         RE: Wolf Sen  [address]  [place] [facility] [address]        Dear Colleague,    Thank you for referring your patient, Wolf Sen, to the [facility] [place]. Please see a copy of my visit note below.    [facility] Hematology / Oncology  Progress Note  Name: Wolf Sen  :  1950    MRN:  [id number]    --------------------    Assessment / Plan:  Locally advanced, unresectable, left-sided lung adenocarcinoma:  # 2021 Presented w/ dyspnea, cough and multiple failed rounds of antibiotics for presumed pneumonia.  # Dec 2021 CT-guided lung biopsy w/ adenocarcinoma.  # 2022 Staging brain MRI and PET scan with bilateral lung involvement (CRISTO primary) and med/hilar adenopathy.  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  # 2022 Osimertinib - ongoing; CO (CR minus a stable thoracic node).    Clinically, Wolf remains stable from a lung cancer standpoint.  Radiographically, his disease appears to be stable as well minus some waxing and waning adenopathy.  Wolf is tolerating treatment well and without major toxicity.    His anemia is improving w/ iron supplementation; he may have a degree of mild thalassemia.  His clinical bleeding stopped; his colonoscopy was reassuring.  His waxing and waning transaminitis has resolved; this could be treatment effect or med related.    RTC 3-4 months w/ labs (CBC, CMP) and PET scan.    Miles Estrada MD    --------------------    Interval History:  Wolf returns for follow-up of lung cancer accompanied by his son.  Alysiabronson  used throughout our visit.  All in all, he remains quite well.  No new health symptoms to report.  No side effects from his treatments.  No rash, nausea, vomiting, diarrhea, achy joints or mouth sores.  No financial toxicity.  No further melena.  --------------------    Physical Exam:  VS: BP (!) 194/81 (BP Location: Left arm, Patient Position: Sitting, Cuff Size: Adult Regular)    Pulse 68   Temp 98.2  F (36.8  C) (Oral)   Wt 65.7 kg (144 lb 14.4 oz)   SpO2 98%   BMI 24.68 kg/m    GEN: Well appearing.    Labs / Imaging / Path:  We reviewed 6 month trend in CBC, CMP and personally reviewed / compared serial PET scans.      Again, thank you for allowing me to participate in the care of your patient.        Sincerely,        Miles Estrada MD

## 2023-01-16 NOTE — PROGRESS NOTES
[facility] Hematology / Oncology  Progress Note  Name: Wolf Sen  :  1950    MRN:  [id number]    --------------------    Assessment / Plan:  Locally advanced, unresectable, left-sided lung adenocarcinoma:  # 2021 Presented w/ dyspnea, cough and multiple failed rounds of antibiotics for presumed pneumonia.  # Dec 2021 CT-guided lung biopsy w/ adenocarcinoma.  # 2022 Staging brain MRI and PET scan with bilateral lung involvement (CRISTO primary) and med/hilar adenopathy.  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  # 2022 Osimertinib - ongoing; [place] (CR minus a stable thoracic node).    Clinically, Wolf remains stable from a lung cancer standpoint.  Radiographically, his disease appears to be stable as well minus some waxing and waning adenopathy.  Wolf is tolerating treatment well and without major toxicity.    His anemia is improving w/ iron supplementation; he may have a degree of mild thalassemia.  His clinical bleeding stopped; his colonoscopy was reassuring.  His waxing and waning transaminitis has resolved; this could be treatment effect or med related.    RTC 3-4 months w/ labs (CBC, CMP) and PET scan.    Miles Estrada MD    --------------------    Interval History:  Wolf returns for follow-up of lung cancer accompanied by his son.  Esteban  used throughout our visit.  All in all, he remains quite well.  No new health symptoms to report.  No side effects from his treatments.  No rash, nausea, vomiting, diarrhea, achy joints or mouth sores.  No financial toxicity.  No further melena.  --------------------    Physical Exam:  VS: BP (!) 194/81 (BP Location: Left arm, Patient Position: Sitting, Cuff Size: Adult Regular)   Pulse 68   Temp 98.2  F (36.8  C) (Oral)   Wt 65.7 kg (144 lb 14.4 oz)   SpO2 98%   BMI 24.68 kg/m    GEN: Well appearing.    Labs / Imaging / Path:  We reviewed 6 month trend in CBC, CMP and personally reviewed / compared serial PET scans.

## 2023-01-16 NOTE — NURSING NOTE
"Oncology Rooming Note    January 16, 2023 10:12 AM   Wolf Sen is a 72 year old male who presents for:    Chief Complaint   Patient presents with     RECHECK     Initial Vitals: BP (!) 194/81 (BP Location: Left arm, Patient Position: Sitting, Cuff Size: Adult Regular)   Pulse 68   Temp 98.2  F (36.8  C) (Oral)   Wt 65.7 kg (144 lb 14.4 oz)   SpO2 98%   BMI 24.68 kg/m   Estimated body mass index is 24.68 kg/m  as calculated from the following:    Height as of 10/4/22: 1.632 m (5' 4.25\").    Weight as of this encounter: 65.7 kg (144 lb 14.4 oz). Body surface area is 1.73 meters squared.  No Pain (0) Comment: Data Unavailable   No LMP for male patient.  Allergies reviewed: Yes  Medications reviewed: Yes    Medications: Medication refills not needed today.  Pharmacy name entered into EPIC:    WRITTEN PRESCRIPTION REQUESTED  [id number] [place] - [address] [id number] - [place], [place] - [address] AT [facility] & [address] [place] [address] [facility] - [place], [place] - [address]    Clinical concerns: none   Uma Izquierdo RN              "

## 2023-01-31 ENCOUNTER — TELEPHONE (OUTPATIENT)
Dept: GASTROENTEROLOGY | Facility: CLINIC | Age: 73
End: 2023-01-31

## 2023-01-31 NOTE — TELEPHONE ENCOUNTER
Stan  via phone.    Attempted to contact patient regarding upcoming Upper endoscopy (EGD) procedure on 2.9.2023 for pre assessment questions. No answer.     Left message to return call to [phone] #[phone]    Discuss Covid policy and designated  policy.    Pre op exam scheduled: N/A    Arrival time: 0755. Procedure time: 0840    Facility location: [facility]; [address], [place] [address]    Sedation type: Conscious sedation     Anticoagulants: No    Electronic implanted devices? No    Diabetic? Yes - Patient to hold oral diabetic medications day of procedure    Indication for procedure: anemia    Prep instructions sent via [employer].       Malathi Agudelo RN  Endoscopy Procedure Pre Assessment RN

## 2023-02-01 ENCOUNTER — PATIENT OUTREACH (OUTPATIENT)
Dept: GERIATRIC MEDICINE | Facility: CLINIC | Age: 73
End: 2023-02-01
Payer: COMMERCIAL

## 2023-02-01 NOTE — PROGRESS NOTES
Care Coordinator received forwared email from [facility] stating:         Care Coordinator placed call to member and daughter Gwen to follow up regarding compliance with medication and follow up with PCP.     Viki Avalos RN, N  [facility]

## 2023-02-02 NOTE — PROGRESS NOTES
Care Coordinator spoke with daughter, Eloise, she states she didn't realize member was on a HTN program but she knows he has a BP kit at home.  She states that the high reading that day could have been b/c he didn't take his meds and had PET scan that same day.   She states member is compliant with medications.   She is going to member home today and will review his medications as well as the BP monitor with him.   She will have him consistently take his BP readings and will follow up with PCP if BP continues to show high readings.      Viki Avalos RN, PHN  [facility]

## 2023-02-03 ENCOUNTER — CARE COORDINATION (OUTPATIENT)
Dept: ONCOLOGY | Facility: CLINIC | Age: 73
End: 2023-02-03
Payer: COMMERCIAL

## 2023-02-03 NOTE — PROGRESS NOTES
Daughter calls on behalf of patient inquiring if patient could cancel EGD scheduled for Thursday 2/9/23.    States order was originally placed in the fall of 2022 when patient was having blood stools.    Since then patient has had negative colonoscopy, bleeding has stopped and hgb has reached 12.2. Hgb had been at 8.7.    Informed daughter we will check with Dr Estrada and get back to her by Tuesday 2/7/23 with a response.  Daughter states okay to leaved detailed voicemail.    Nazanin Daniels RN on 2/3/2023 at 2:11 PM

## 2023-02-03 NOTE — TELEPHONE ENCOUNTER
Called the Pt via Ethiopian  (second attempt). No answer, Left message. Mychart sent.     Leora Partida RN   Endoscopy Procedure Pre Assessment RN

## 2023-02-03 NOTE — PROGRESS NOTES
Left voicemail for daughter Gwen, stating Dr Estrada states it is reasonable to hold off on EGD at this time.  Asked that Gewn contact the procedure dept to cancel the appointment.    Nazanin Daniels RN on 2/3/2023 at 3:34 PM

## 2023-02-06 ENCOUNTER — TELEPHONE (OUTPATIENT)
Dept: GASTROENTEROLOGY | Facility: CLINIC | Age: 73
End: 2023-02-06
Payer: COMMERCIAL

## 2023-02-06 NOTE — TELEPHONE ENCOUNTER
Third attempt for pre-assessment prior to upcoming upper endoscopy (EGD) via Stan  id [phone]     No answer.  Left message to return call [phone] #4    Eloise Howell RN  Endoscopy Procedure Pre Assessment RN

## 2023-02-06 NOTE — TELEPHONE ENCOUNTER
Caller: daughter  Reason for Reschedule/Cancellation (please be detailed, any staff messages or encounters to note?): per oncologist patient does not need to have now      Prior to reschedule please review:    Ordering Provider:Boris    Sedation per order:moderate    Does patient have any ASC Exclusions, please identify?: n      Notes on Cancelled Procedure:    Procedure:Upper Endoscopy [EGD]     Date: 2/9/23    Location:[facility] [place]; [address], [place] [address]    Surgeon: Boris        Rescheduled: No

## 2023-02-06 NOTE — TELEPHONE ENCOUNTER
"Received the following message from endoscopy scheduling team:    \"Patient's daughter just called and is cancelling his EGD for 2/9 and per his oncologist EGD is not needed.\"        Kortney Stanley RN  Endoscopy Procedure Pre-Assessment RN      "

## 2023-02-15 DIAGNOSIS — C34.92 ADENOCARCINOMA, LUNG, LEFT (H): Primary | ICD-10-CM

## 2023-02-23 ENCOUNTER — TELEPHONE (OUTPATIENT)
Dept: FAMILY MEDICINE | Facility: CLINIC | Age: 73
End: 2023-02-23
Payer: COMMERCIAL

## 2023-03-15 ENCOUNTER — OFFICE VISIT (OUTPATIENT)
Dept: FAMILY MEDICINE | Facility: CLINIC | Age: 73
End: 2023-03-15
Payer: COMMERCIAL

## 2023-03-15 VITALS
RESPIRATION RATE: 16 BRPM | BODY MASS INDEX: 24.04 KG/M2 | SYSTOLIC BLOOD PRESSURE: 170 MMHG | HEART RATE: 77 BPM | DIASTOLIC BLOOD PRESSURE: 80 MMHG | OXYGEN SATURATION: 96 % | TEMPERATURE: 97.7 F | HEIGHT: 64 IN | WEIGHT: 140.8 LBS

## 2023-03-15 DIAGNOSIS — M65.30 TRIGGER FINGER, ACQUIRED: ICD-10-CM

## 2023-03-15 DIAGNOSIS — Z23 ENCOUNTER FOR IMMUNIZATION: ICD-10-CM

## 2023-03-15 DIAGNOSIS — E11.49 TYPE 2 DIABETES MELLITUS WITH NEUROLOGICAL MANIFESTATIONS, CONTROLLED (H): ICD-10-CM

## 2023-03-15 DIAGNOSIS — Z00.00 ENCOUNTER FOR ANNUAL WELLNESS EXAM IN MEDICARE PATIENT: Primary | ICD-10-CM

## 2023-03-15 DIAGNOSIS — C34.92 ADENOCARCINOMA, LUNG, LEFT (H): ICD-10-CM

## 2023-03-15 DIAGNOSIS — I42.7 CARDIOMYOPATHY SECONDARY TO DRUG (H): ICD-10-CM

## 2023-03-15 DIAGNOSIS — E78.5 HYPERLIPIDEMIA LDL GOAL <70: ICD-10-CM

## 2023-03-15 DIAGNOSIS — N18.30 STAGE 3 CHRONIC KIDNEY DISEASE, UNSPECIFIED WHETHER STAGE 3A OR 3B CKD (H): ICD-10-CM

## 2023-03-15 DIAGNOSIS — I10 BENIGN ESSENTIAL HTN: ICD-10-CM

## 2023-03-15 LAB
BASOPHILS # BLD AUTO: 0 10E3/UL (ref 0–0.2)
BASOPHILS NFR BLD AUTO: 0 %
EOSINOPHIL # BLD AUTO: 0.1 10E3/UL (ref 0–0.7)
EOSINOPHIL NFR BLD AUTO: 3 %
ERYTHROCYTE [DISTWIDTH] IN BLOOD BY AUTOMATED COUNT: 15.5 % (ref 10–15)
HBA1C MFR BLD: 6.1 % (ref 0–5.6)
HCT VFR BLD AUTO: 34 % (ref 40–53)
HGB BLD-MCNC: 10.9 G/DL (ref 13.3–17.7)
IMM GRANULOCYTES # BLD: 0 10E3/UL
IMM GRANULOCYTES NFR BLD: 0 %
LYMPHOCYTES # BLD AUTO: 1.5 10E3/UL (ref 0.8–5.3)
LYMPHOCYTES NFR BLD AUTO: 28 %
MCH RBC QN AUTO: 26.1 PG (ref 26.5–33)
MCHC RBC AUTO-ENTMCNC: 32.1 G/DL (ref 31.5–36.5)
MCV RBC AUTO: 81 FL (ref 78–100)
MONOCYTES # BLD AUTO: 0.6 10E3/UL (ref 0–1.3)
MONOCYTES NFR BLD AUTO: 10 %
NEUTROPHILS # BLD AUTO: 3.2 10E3/UL (ref 1.6–8.3)
NEUTROPHILS NFR BLD AUTO: 59 %
PLATELET # BLD AUTO: 161 10E3/UL (ref 150–450)
RBC # BLD AUTO: 4.18 10E6/UL (ref 4.4–5.9)
WBC # BLD AUTO: 5.4 10E3/UL (ref 4–11)

## 2023-03-15 PROCEDURE — 83036 HEMOGLOBIN GLYCOSYLATED A1C: CPT | Performed by: PREVENTIVE MEDICINE

## 2023-03-15 PROCEDURE — 36415 COLL VENOUS BLD VENIPUNCTURE: CPT | Performed by: PREVENTIVE MEDICINE

## 2023-03-15 PROCEDURE — G0439 PPPS, SUBSEQ VISIT: HCPCS | Performed by: PREVENTIVE MEDICINE

## 2023-03-15 PROCEDURE — 90714 TD VACC NO PRESV 7 YRS+ IM: CPT | Performed by: PREVENTIVE MEDICINE

## 2023-03-15 PROCEDURE — 99214 OFFICE O/P EST MOD 30 MIN: CPT | Mod: 25 | Performed by: PREVENTIVE MEDICINE

## 2023-03-15 PROCEDURE — 82043 UR ALBUMIN QUANTITATIVE: CPT | Performed by: PREVENTIVE MEDICINE

## 2023-03-15 PROCEDURE — 80050 GENERAL HEALTH PANEL: CPT | Performed by: PREVENTIVE MEDICINE

## 2023-03-15 PROCEDURE — 90471 IMMUNIZATION ADMIN: CPT | Performed by: PREVENTIVE MEDICINE

## 2023-03-15 PROCEDURE — 82570 ASSAY OF URINE CREATININE: CPT | Performed by: PREVENTIVE MEDICINE

## 2023-03-15 RX ORDER — LOSARTAN POTASSIUM 100 MG/1
100 TABLET ORAL DAILY
Qty: 90 TABLET | Refills: 1 | Status: SHIPPED | OUTPATIENT
Start: 2023-03-15 | End: 2023-11-03

## 2023-03-15 RX ORDER — HYDRALAZINE HYDROCHLORIDE 25 MG/1
TABLET, FILM COATED ORAL
Qty: 180 TABLET | Refills: 1 | Status: SHIPPED | OUTPATIENT
Start: 2023-03-15 | End: 2023-09-19

## 2023-03-15 RX ORDER — METOPROLOL SUCCINATE 25 MG/1
TABLET, EXTENDED RELEASE ORAL
Qty: 90 TABLET | Refills: 1 | Status: SHIPPED | OUTPATIENT
Start: 2023-03-15 | End: 2023-11-03

## 2023-03-15 RX ORDER — INSULIN GLARGINE 100 [IU]/ML
INJECTION, SOLUTION SUBCUTANEOUS
Qty: 30 ML | Refills: 0
Start: 2023-03-15 | End: 2023-03-17

## 2023-03-15 RX ORDER — ATORVASTATIN CALCIUM 80 MG/1
40 TABLET, FILM COATED ORAL DAILY
Qty: 90 TABLET | Refills: 0 | Status: SHIPPED | OUTPATIENT
Start: 2023-03-15 | End: 2023-10-17

## 2023-03-15 RX ORDER — POLYETHYLENE GLYCOL 400 AND PROPYLENE GLYCOL 4; 3 MG/ML; MG/ML
SOLUTION/ DROPS OPHTHALMIC
Qty: 10 ML | Refills: 3 | Status: SHIPPED | OUTPATIENT
Start: 2023-03-15

## 2023-03-15 ASSESSMENT — ENCOUNTER SYMPTOMS
PALPITATIONS: 0
JOINT SWELLING: 0
HEMATOCHEZIA: 0
NAUSEA: 0
MYALGIAS: 0
EYE PAIN: 0
SHORTNESS OF BREATH: 0
FREQUENCY: 0
DIARRHEA: 0
FEVER: 0
DIZZINESS: 0
CHILLS: 0
HEADACHES: 0
ARTHRALGIAS: 1
PARESTHESIAS: 0
COUGH: 0
ABDOMINAL PAIN: 0
HEMATURIA: 0
DYSURIA: 0
CONSTIPATION: 0
WEAKNESS: 0
SORE THROAT: 0

## 2023-03-15 ASSESSMENT — ACTIVITIES OF DAILY LIVING (ADL)
CURRENT_FUNCTION: MEDICATION ADMINISTRATION REQUIRES ASSISTANCE
CURRENT_FUNCTION: HOUSEWORK REQUIRES ASSISTANCE
CURRENT_FUNCTION: BATHING REQUIRES ASSISTANCE

## 2023-03-15 NOTE — PROGRESS NOTES
"SUBJECTIVE:   Wolf is a 72 year old who presents for Preventive Visit.  Patient has been advised of split billing requirements and indicates understanding: Yes  Are you in the first 12 months of your Medicare coverage?  No      Here with daughter  Telephone interpretor also used    Healthy Habits:   PHQ-2 Total Score: 0    Physical:  In general, how would you rate your overall physical health?: Good  Frequency of exercise:: 6-7 days/week  Do you usually eat at least 4 servings of fruit and vegetables a day, include whole grains & fiber, and avoid regularly eating high fat or \"junk\" foods?: Yes  Taking medications regularly:: No  Medication side effects:: None  Activities of Daily Living: Housework, Bathing, Medication administration  Home safety: None of the above  Hearing Impairment:: No concerns  In the past 6 months, have you been bothered by leaking of urine?: No  Associated symptoms:   Positive: arthralgias, visual disturbance  Negative: abdominal pain, Blood in stool, Blood in urine, chest pain, chills, congestion, constipation, cough, diarrhea, dizziness, ear pain, eye pain, fever, frequency, genital sores, headaches, joint swelling, peripheral edema, mood changes, myalgias, nausea, dysuria, palpitations, Skin sensation changes, sore throat, urgency, rash, shortness of breath, weakness  impotence: No  penile discharge: No  In general, how would you rate your overall mental or emotional health?: Good  Additional concerns today:: No  Duration of exercise:: Less than 15 minutes    Have you ever done Advance Care Planning? (For example, a Health Directive, POLST, or a discussion with a medical provider or your loved ones about your wishes): yes, 07/2015 scanned in chart     Fall risk  Fallen 2 or more times in the past year?: No  Any fall with injury in the past year?: No    Cognitive Screening   1) Repeat 3 items (Leader, Season, Table)    2) Clock draw:   3) 3 item recall: Recalls 3 objects  Results: 3 items " recalled: COGNITIVE IMPAIRMENT LESS LIKELY    Mini-CogTM Copyright SRUTHI Chambers. Licensed by the author for use in [facility]; reprinted with permission (antonio@[facility]). All rights reserved.      Do you have sleep apnea, excessive snoring or daytime drowsiness?: no    Reviewed and updated as needed this visit by clinical staff   Tobacco  Allergies  Meds  Problems  Med Hx  Surg Hx  Fam Hx          Reviewed and updated as needed this visit by Provider   Tobacco  Allergies  Meds  Problems  Med Hx  Surg Hx  Fam Hx         Social History     Tobacco Use     Smoking status: Never     Smokeless tobacco: Never   Substance Use Topics     Alcohol use: No         Alcohol Use 3/15/2023   Prescreen: >3 drinks/day or >7 drinks/week? No     Lung cancer:  Locally advanced, unresectable, left-sided lung adenocarcinoma  His anemia is improving w/ iron supplementation; he may have a degree of mild thalassemia.  His clinical bleeding stopped; Colonoscopy done per Oncology recommendations   His waxing and waning transaminitis has resolved; this could be treatment effect or med related.  Colonoscopy done 12/7/22    Diabetes:  No hypoglycemia  No readings over 200 mg/dl    Hypertension Follow-up       Do you check your blood pressure regularly outside of the clinic?  No    Are you following a low salt diet? Yes    Are your blood pressures ever more than 140 on the top number (systolic) OR more       than 90 on the bottom number (diastolic), for example 140/90? Yes  130 systolic at home   No low blood pressure readings     Current providers sharing in care for this patient include:   Patient Care Team:  Kimberly Bell MD as PCP - General (Family Practice)  Viki Avalos RN as Lead Care Coordinator  Kimberly Bell MD as Assigned PCP  Miles Estrada MD as Assigned Cancer Care Provider  Evelia Marquez RN as Specialty Care Coordinator (Hematology & Oncology)    The following health maintenance items are  reviewed in Epic and correct as of today:  Health Maintenance   Topic Date Due     ZOSTER IMMUNIZATION (2 of 3) 04/27/2016     EYE EXAM  11/04/2020     DIABETIC FOOT EXAM  09/15/2022     A1C  09/15/2023     LIPID  09/30/2023     MICROALBUMIN  09/30/2023     ANNUAL REVIEW OF HM ORDERS  09/30/2023     BMP  01/13/2024     MEDICARE ANNUAL WELLNESS VISIT  03/15/2024     FALL RISK ASSESSMENT  03/15/2024     HEMOGLOBIN  03/15/2024     ADVANCE CARE PLANNING  09/30/2027     DTAP/TDAP/TD IMMUNIZATION (3 - Td or Tdap) 03/15/2033     HEPATITIS C SCREENING  Completed     PHQ-2 (once per calendar year)  Completed     INFLUENZA VACCINE  Completed     Pneumococcal Vaccine: 65+ Years  Completed     URINALYSIS  Completed     AORTIC ANEURYSM SCREENING (SYSTEM ASSIGNED)  Completed     COVID-19 Vaccine  Completed     IPV IMMUNIZATION  Aged Out     MENINGITIS IMMUNIZATION  Aged Out     COLORECTAL CANCER SCREENING  Discontinued     Lab work is in process  Labs reviewed in EPIC  BP Readings from Last 3 Encounters:   03/15/23 (!) 170/80   01/16/23 (!) 194/81   12/07/22 (!) 185/87    Wt Readings from Last 3 Encounters:   03/15/23 63.9 kg (140 lb 12.8 oz)   01/16/23 65.7 kg (144 lb 14.4 oz)   10/04/22 66.5 kg (146 lb 11.2 oz)              Patient Active Problem List   Diagnosis     DM type 2, goal A1C below 8.0     HTN, goal below 150/90     Hyperlipidemia LDL goal <70     Overweight (BMI 25.0-29.9)     Cataracts, both eyes     Advance care planning     Type 2 diabetes mellitus with neurological manifestations, controlled (H)     Pseudophakia of left eye     History of carotid endarterectomy     Cerebral infarction (H)     Aortic valve insufficiency     Vitamin D deficiency     CKD (chronic kidney disease) stage 3, GFR 30-59 ml/min (H)     Adenocarcinoma, lung, left (H)     Mouth sore     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)     Past Surgical History:   Procedure Laterality Date     CATARACT IOL, RT/LT       COLONOSCOPY N/A 12/7/2022     Procedure: COLONOSCOPY, WITH POLYPECTOMY AND BIOPSY;  Surgeon: Rolf Escalante MD;  Location: [facility]     COLONOSCOPY N/A 12/7/2022    Procedure: COLONOSCOPY, FLEXIBLE, WITH LESION REMOVAL USING SNARE;  Surgeon: Rolf Escalante MD;  Location: [facility]     COLONOSCOPY WITH CO2 INSUFFLATION N/A 12/7/2022    Procedure: COLONOSCOPY, WITH CO2 INSUFFLATION;  Surgeon: Rolf Escalante MD;  Location: [facility]     COMBINED REPAIR PTOSIS WITH BLEPHAROPLASTY Bilateral 10/9/2017    Procedure: COMBINED REPAIR PTOSIS WITH BLEPHAROPLASTY;  Bilateral upper eyelid blepharoplasty and ptosis repair;  Surgeon: Bibiana Melara MD;  Location: [facility]     PHACOEMULSIFICATION WITH STANDARD INTRAOCULAR LENS IMPLANT Right 2/25/2016    Procedure: PHACOEMULSIFICATION WITH STANDARD INTRAOCULAR LENS IMPLANT;  Surgeon: Carlton Don MD;  Location: [facility]     PHACOEMULSIFICATION WITH STANDARD INTRAOCULAR LENS IMPLANT Left 2/11/2016    Procedure: PHACOEMULSIFICATION WITH STANDARD INTRAOCULAR LENS IMPLANT;  Surgeon: Carlton Don MD;  Location: [facility]     REPAIR PTOSIS Bilateral     10/17       Social History     Tobacco Use     Smoking status: Never     Smokeless tobacco: Never   Substance Use Topics     Alcohol use: No     Family History   Problem Relation Age of Onset     Hypertension Father      Cancer No family hx of      Diabetes No family hx of      Cerebrovascular Disease No family hx of      Thyroid Disease No family hx of      Glaucoma No family hx of      Macular Degeneration No family hx of          Current Outpatient Medications   Medication Sig Dispense Refill     acetaminophen (MAPAP) 500 MG tablet TAKE 2 TABLETS(1000 MG) BY MOUTH THREE TIMES DAILY AS NEEDED FOR PAIN 100 tablet 0     Alcohol Swabs (B-D SINGLE USE SWABS REGULAR) PADS USE TO SWAB AREA OF INJECTION/RONEY AS DIRECTED 100 each 4     atorvastatin (LIPITOR) 80 MG tablet Take 0.5 tablets (40 mg) by mouth daily 90 tablet 0      blood glucose (ONETOUCH VERIO IQ) test strip USE TO TEST BLOOD SUGAR 1 TIMES DAILY OR AS DIRECTED 300 strip 11     ferrous sulfate (FEROSUL) 325 (65 Fe) MG tablet Take 1 tablet (325 mg) by mouth daily (with breakfast) 90 tablet 3     hydrALAZINE (APRESOLINE) 25 MG tablet TAKE 1 TABLET(25 MG) BY MOUTH TWICE DAILY 180 tablet 1     hydrocortisone, Perianal, (HYDROCORTISONE) 2.5 % cream Place rectally 2 times daily as needed for hemorrhoids 30 g 0     insulin aspart (NOVOLOG FLEXPEN) 100 UNIT/ML pen INJECT 15 UNITS UNDER THE SKIN THREE TIMES DAILY WITH MEALS 30 mL 1     insulin glargine (LANTUS SOLOSTAR) 100 UNIT/ML pen INJECT 28 UNITS UNDER THE SKIN AT BEDTIME 30 mL 0     insulin pen needle (32G X 4 MM) 32G X 4 MM miscellaneous Use 4 pen needles daily or as directed. 200 each 11     ketoconazole (NIZORAL) 2 % external cream Apply topically daily 60 g 1     lidocaine, viscous, (XYLOCAINE) 2 % solution Apply 5 mLs topically every 3 hours as needed for other (oral sore) Apply to oral sore with sponge on a stick 100 mL 0     losartan (COZAAR) 100 MG tablet Take 1 tablet (100 mg) by mouth daily 90 tablet 1     metFORMIN (GLUCOPHAGE) 1000 MG tablet Take 1 tablet (1,000 mg) by mouth 2 times daily (with meals) 180 tablet 1     metoprolol succinate ER (TOPROL XL) 25 MG 24 hr tablet TAKE 1 TABLET(25 MG) BY MOUTH EVERY EVENING FOR BLOOD PRESSURE 90 tablet 1     osimertinib (TAGRISSO) 80 MG tablet Take 1 tablet (80 mg) by mouth daily 30 tablet 0     osimertinib (TAGRISSO) 80 MG tablet Take 1 tablet (80 mg) by mouth daily 30 tablet 0     osimertinib (TAGRISSO) 80 MG tablet Take 1 tablet (80 mg) by mouth daily 30 tablet 0     osimertinib (TAGRISSO) 80 MG tablet Take 1 tablet (80 mg) by mouth daily 30 tablet 0     osimertinib (TAGRISSO) 80 MG tablet Take 1 tablet (80 mg) by mouth daily 30 tablet 0     osimertinib (TAGRISSO) 80 MG tablet Take 1 tablet (80 mg) by mouth daily 30 tablet 0     osimertinib (TAGRISSO) 80 MG tablet Take 1  "tablet (80 mg) by mouth daily 30 tablet 0     osimertinib (TAGRISSO) 80 MG tablet Take 1 tablet (80 mg) by mouth daily 30 tablet 0     osimertinib (TAGRISSO) 80 MG tablet Take 1 tablet (80 mg) by mouth daily 30 tablet 0     osimertinib (TAGRISSO) 80 MG tablet Take 1 tablet (80 mg) by mouth daily 30 tablet 0     SYSTANE ULTRA 0.4-0.3 % SOLN ophthalmic solution INSTILL 1 DROP INTO BOTH EYES QID 10 mL 3     thin (NO BRAND SPECIFIED) lancets Use with lanceting device. To accompany: Blood Glucose Monitor Brands: per insurance. 300 each 11     ASPIRIN PO Take 81 mg by mouth (Patient not taking: Reported on 1/16/2023)       No Known Allergies  Recent Labs   Lab Test 03/15/23  1637 01/13/23  1211 09/30/22  1521 09/23/22  1341 06/17/22  1110 12/10/21  0826 11/24/21  1007 09/15/21  1226 09/15/21  1226 03/17/21  1432 11/27/20  1104   A1C 6.1*  --  6.1*  --   --   --  7.0*  --   --   --  6.7*   LDL  --   --  40  --   --   --  61  --   --   --  93   HDL  --   --  51  --   --   --  53  --   --   --  45   TRIG  --   --  117  --   --   --  148  --   --   --  178*   ALT  --  48  --  114* 46   < > 26   < > 40 52  --    CR  --  1.27*  --  1.20 1.66*   < > 1.17   < > 1.56* 1.44* 1.37*   GFRESTIMATED  --  59*  --  63 43*   < > 61   < > 43* 48* 51*   GFRESTBLACK  --   --   --   --   --   --   --   --   --  55* 59*   POTASSIUM  --  3.6  --  3.8 4.5   < > 3.5   < > 3.8 3.7 4.7   TSH  --   --   --   --   --   --   --   --  1.21 0.64  --     < > = values in this interval not displayed.      Review of Systems  Constitutional, HEENT, cardiovascular, pulmonary, gi and gu systems are negative, except as otherwise noted.    OBJECTIVE:   BP (!) 170/80 (BP Location: Right arm, Patient Position: Sitting, Cuff Size: Adult Regular)   Pulse 77   Temp 97.7  F (36.5  C) (Oral)   Resp 16   Ht 1.619 m (5' 3.75\")   Wt 63.9 kg (140 lb 12.8 oz)   SpO2 96%   BMI 24.36 kg/m   Estimated body mass index is 24.36 kg/m  as calculated from the following:    " "Height as of this encounter: 1.619 m (5' 3.75\").    Weight as of this encounter: 63.9 kg (140 lb 12.8 oz).  Physical Exam  GENERAL APPEARANCE: healthy, alert and no distress  EYES: Eyes grossly normal to inspection and conjunctivae and sclerae normal  HENT: Intact TMs  NECK: trachea midline and normal to palpation  RESP: crackles bilaterally+   CV: regular rates and rhythm, normal S1 S2  ABDOMEN: soft, non-tender and no rebound or guarding   MS: extremities normal- no gross deformities noted and peripheral pulses normal  SKIN: no suspicious lesions or rashes  NEURO: Normal strength and tone, mentation intact and speech normal  PSYCH: mentation appears normal      Diagnostic Test Results:  Labs reviewed in Epic  Results for orders placed or performed in visit on 03/15/23 (from the past 24 hour(s))   CBC with Platelets & Differential    Narrative    The following orders were created for panel order CBC with Platelets & Differential.  Procedure                               Abnormality         Status                     ---------                               -----------         ------                     CBC with platelets and d...[[id number]]  Abnormal            Final result                 Please view results for these tests on the individual orders.   Comprehensive metabolic panel   Result Value Ref Range    Sodium 142 133 - 144 mmol/L    Potassium 4.2 3.4 - 5.3 mmol/L    Chloride 110 (H) 94 - 109 mmol/L    Carbon Dioxide (CO2) 27 20 - 32 mmol/L    Anion Gap 5 3 - 14 mmol/L    Urea Nitrogen 21 7 - 30 mg/dL    Creatinine 1.53 (H) 0.66 - 1.25 mg/dL    Calcium 9.2 8.5 - 10.1 mg/dL    Glucose 117 (H) 70 - 99 mg/dL    Alkaline Phosphatase 80 40 - 150 U/L    AST 34 0 - 45 U/L    ALT 42 0 - 70 U/L    Protein Total 8.0 6.8 - 8.8 g/dL    Albumin 3.9 3.4 - 5.0 g/dL    Bilirubin Total 0.6 0.2 - 1.3 mg/dL    GFR Estimate 47 (L) >60 mL/min/1.73m2   TSH with free T4 reflex   Result Value Ref Range    TSH 1.32 0.40 - 4.00 mU/L "   Hemoglobin A1c   Result Value Ref Range    Hemoglobin A1C 6.1 (H) 0.0 - 5.6 %   CBC with platelets and differential   Result Value Ref Range    WBC Count 5.4 4.0 - 11.0 10e3/uL    RBC Count 4.18 (L) 4.40 - 5.90 10e6/uL    Hemoglobin 10.9 (L) 13.3 - 17.7 g/dL    Hematocrit 34.0 (L) 40.0 - 53.0 %    MCV 81 78 - 100 fL    MCH 26.1 (L) 26.5 - 33.0 pg    MCHC 32.1 31.5 - 36.5 g/dL    RDW 15.5 (H) 10.0 - 15.0 %    Platelet Count 161 150 - 450 10e3/uL    % Neutrophils 59 %    % Lymphocytes 28 %    % Monocytes 10 %    % Eosinophils 3 %    % Basophils 0 %    % Immature Granulocytes 0 %    Absolute Neutrophils 3.2 1.6 - 8.3 10e3/uL    Absolute Lymphocytes 1.5 0.8 - 5.3 10e3/uL    Absolute Monocytes 0.6 0.0 - 1.3 10e3/uL    Absolute Eosinophils 0.1 0.0 - 0.7 10e3/uL    Absolute Basophils 0.0 0.0 - 0.2 10e3/uL    Absolute Immature Granulocytes 0.0 <=0.4 10e3/uL       ASSESSMENT / PLAN:   Wolf was seen today for physical.    Diagnoses and all orders for this visit:    Encounter for annual wellness exam in Medicare patient  -Preventive guidelines reviewed    Type 2 diabetes mellitus with neurological manifestations, controlled (H)  -     metFORMIN (GLUCOPHAGE) 1000 MG tablet; Take 1 tablet (1,000 mg) by mouth 2 times daily (with meals)  -     insulin pen needle (32G X 4 MM) 32G X 4 MM miscellaneous; Use 4 pen needles daily or as directed.  -     insulin glargine (LANTUS SOLOSTAR) 100 UNIT/ML pen; INJECT 28 UNITS UNDER THE SKIN AT BEDTIME  -     SYSTANE ULTRA 0.4-0.3 % SOLN ophthalmic solution; INSTILL 1 DROP INTO BOTH EYES QID  -     Albumin Random Urine Quantitative with Creat Ratio  -     TSH with free T4 reflex  -     Hemoglobin A1c is 6.1 today, at goal      Continue medications the same.    Periodic blood sugar testing.  Regular foot checks  Limit carbohydrates and sweets in diet  Update eye exam annually   Regular exercise, 150 minutes per week  Hypoglycemia precautions       Lab Results   Component Value Date    A1C 6.1  03/15/2023    A1C 6.1 09/30/2022    A1C 7.0 11/24/2021    A1C 6.7 11/27/2020    A1C 8.3 12/18/2019    A1C 11.5 09/20/2019    A1C 10.6 05/09/2019    A1C 8.0 01/11/2019         Stage 3 chronic kidney disease, unspecified whether stage 3a or 3b CKD (H)  -   Not using over the counter NSAIDs  -latest GFR is 47, stable   -     CBC with Platelets & Differential  -     Comprehensive metabolic panel  -     Albumin Random Urine Quantitative with Creat Ratio    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  Global peak LV longitudinal strain is averaged at -19.6%. This is within  reported normal limits (normal <-18%).  Global right ventricular function is normal.  Mild aortic insufficiency is present.     This study was compared with the study from 4/2021 .  No significant changes compared to baseline images from stress echo.  ______________________________________________________________________________  Left Ventricle  Left ventricular size is normal. Global and regional left ventricular function  is normal with an EF of 60-65%. Relative wall thickness is increased  consistent with concentric remodeling. Global peak LV longitudinal strain is  averaged at -19.6%. This is within reported normal limits (normal <-18%). Left  ventricular diastolic function is indeterminate.    Adenocarcinoma, lung, left (H)  -    Followed closely by Oncology   -Locally advanced, unresectable, left-sided lung adenocarcinoma  -     CBC with Platelets & Differential  -     Comprehensive metabolic panel    Benign essential HTN  -     Elevated readings in clinic, however, readings are normal at home per patient  -scheduled on Ancillary for a blood pressure check and Wolf with bring his home blood pressure monitor so we can confirm accuracy  -     losartan  (COZAAR) 100 MG tablet; Take 1 tablet (100 mg) by mouth daily  -     hydrALAZINE (APRESOLINE) 25 MG tablet; TAKE 1 TABLET(25 MG) BY MOUTH TWICE DAILY  -     metoprolol succinate ER (TOPROL XL) 25 MG 24 hr tablet; TAKE 1 TABLET(25 MG) BY MOUTH EVERY EVENING FOR BLOOD PRESSURE  -     Albumin Random Urine Quantitative with Creat Ratio    Hyperlipidemia LDL goal <70  -     atorvastatin (LIPITOR) 80 MG tablet; Take 0.5 tablets (40 mg) by mouth daily    Encounter for immunization  -     TD PRESERV FREE, IM (7+ YRS) (DECAVAC/TENIVAC)    Trigger finger, acquired  -we discussed using Voltaren gel over the counter  -if worsens then refer to Orthopedics     COUNSELING:  Reviewed preventive health counseling, as reflected in patient instructions       Regular exercise       Healthy diet/nutrition       Vision screening       Fall risk prevention    He reports that he has never smoked. He has never used smokeless tobacco.      Appropriate preventive services were discussed with this patient, including applicable screening as appropriate for cardiovascular disease, diabetes, osteopenia/osteoporosis, and glaucoma.  As appropriate for age/gender, discussed screening for colorectal cancer, prostate cancer, breast cancer, and cervical cancer. Checklist reviewing preventive services available has been given to the patient.    Reviewed patients plan of care and provided an AVS. The Basic Care Plan (routine screening as documented in Health Maintenance) for Wolf meets the Care Plan requirement. This Care Plan has been established and reviewed with the Patient.          Kimberly Bell MD MPH    [facility]    Identified Health Risks:

## 2023-03-15 NOTE — NURSING NOTE
Prior to immunization administration, verified patients identity using patient s name and date of birth. Please see Immunization Activity for additional information.     Screening Questionnaire for Adult Immunization    Are you sick today?   No   Do you have allergies to medications, food, a vaccine component or latex?   Yes   Have you ever had a serious reaction after receiving a vaccination?   No   Do you have a long-term health problem with heart, lung, kidney, or metabolic disease (e.g., diabetes), asthma, a blood disorder, no spleen, complement component deficiency, a cochlear implant, or a spinal fluid leak?  Are you on long-term aspirin therapy?   Yes   Do you have cancer, leukemia, HIV/AIDS, or any other immune system problem?   Yes   Do you have a parent, brother, or sister with an immune system problem?   No   In the past 3 months, have you taken medications that affect  your immune system, such as prednisone, other steroids, or anticancer drugs; drugs for the treatment of rheumatoid arthritis, Crohn s disease, or psoriasis; or have you had radiation treatments?   No   Have you had a seizure, or a brain or other nervous system problem?   No   During the past year, have you received a transfusion of blood or blood    products, or been given immune (gamma) globulin or antiviral drug?   No   For women: Are you pregnant or is there a chance you could become       pregnant during the next month?   No   Have you received any vaccinations in the past 4 weeks?   No     Immunization questionnaire was positive for at least one answer.  Notified Ray.        Per orders of Dr. eBll, injection of TD given by Zo Shelley MA. Patient instructed to remain in clinic for 15 minutes afterwards, and to report any adverse reaction to me immediately.       Screening performed by Zo Shelley MA on 3/15/2023 at 4:26 PM.

## 2023-03-16 ENCOUNTER — TELEPHONE (OUTPATIENT)
Dept: FAMILY MEDICINE | Facility: CLINIC | Age: 73
End: 2023-03-16
Payer: COMMERCIAL

## 2023-03-16 DIAGNOSIS — E11.49 TYPE 2 DIABETES MELLITUS WITH NEUROLOGICAL MANIFESTATIONS, CONTROLLED (H): ICD-10-CM

## 2023-03-16 LAB
ALBUMIN SERPL-MCNC: 3.9 G/DL (ref 3.4–5)
ALP SERPL-CCNC: 80 U/L (ref 40–150)
ALT SERPL W P-5'-P-CCNC: 42 U/L (ref 0–70)
ANION GAP SERPL CALCULATED.3IONS-SCNC: 5 MMOL/L (ref 3–14)
AST SERPL W P-5'-P-CCNC: 34 U/L (ref 0–45)
BILIRUB SERPL-MCNC: 0.6 MG/DL (ref 0.2–1.3)
BUN SERPL-MCNC: 21 MG/DL (ref 7–30)
CALCIUM SERPL-MCNC: 9.2 MG/DL (ref 8.5–10.1)
CHLORIDE BLD-SCNC: 110 MMOL/L (ref 94–109)
CO2 SERPL-SCNC: 27 MMOL/L (ref 20–32)
CREAT SERPL-MCNC: 1.53 MG/DL (ref 0.66–1.25)
CREAT UR-MCNC: 315 MG/DL
GFR SERPL CREATININE-BSD FRML MDRD: 47 ML/MIN/1.73M2
GLUCOSE BLD-MCNC: 117 MG/DL (ref 70–99)
MICROALBUMIN UR-MCNC: 131 MG/L
MICROALBUMIN/CREAT UR: 41.59 MG/G CR (ref 0–17)
POTASSIUM BLD-SCNC: 4.2 MMOL/L (ref 3.4–5.3)
PROT SERPL-MCNC: 8 G/DL (ref 6.8–8.8)
SODIUM SERPL-SCNC: 142 MMOL/L (ref 133–144)
TSH SERPL DL<=0.005 MIU/L-ACNC: 1.32 MU/L (ref 0.4–4)

## 2023-03-16 NOTE — RESULT ENCOUNTER NOTE
Wolf,     Electrolytes, glucose and liver function tests are within normal limits.  Kidney function is low but stable for you. Avoid over the counter medications like Advil or Ibuprofen.  Three month glucose number is at goal at 6.1.  Blood counts are low but stable.  Thyroid function is normal.     Please do not hesitate to call us at [phone] if you have any questions or concerns.    Thank you,    Kimberly Bell MD MPH

## 2023-03-16 NOTE — TELEPHONE ENCOUNTER
Patient's daughter calling to say that the pharmacy did not receive the Lantus Rx. Please resend the Rx to [facility] [address] [facility]  2nd Floor  Primary Care

## 2023-03-17 DIAGNOSIS — C34.92 ADENOCARCINOMA, LUNG, LEFT (H): Primary | ICD-10-CM

## 2023-03-17 RX ORDER — INSULIN GLARGINE 100 [IU]/ML
INJECTION, SOLUTION SUBCUTANEOUS
Qty: 30 ML | Refills: 0 | Status: SHIPPED | OUTPATIENT
Start: 2023-03-17 | End: 2023-06-23

## 2023-03-17 NOTE — RESULT ENCOUNTER NOTE
Wolf,     Urine sample is showing some abnormal protein indicating strain on the kidneys from diabetes and hypertension. However, this is stable for you.     Please do not hesitate to call us at [phone] if you have any questions or concerns.    Thank you,    Kimberly Bell MD MPH

## 2023-03-22 ENCOUNTER — OFFICE VISIT (OUTPATIENT)
Dept: OPTOMETRY | Facility: CLINIC | Age: 73
End: 2023-03-22
Attending: PREVENTIVE MEDICINE
Payer: COMMERCIAL

## 2023-03-22 DIAGNOSIS — H52.4 PRESBYOPIA: Primary | ICD-10-CM

## 2023-03-22 DIAGNOSIS — H40.003 GLAUCOMA SUSPECT, BILATERAL: ICD-10-CM

## 2023-03-22 DIAGNOSIS — H04.123 DRY EYES: ICD-10-CM

## 2023-03-22 DIAGNOSIS — E11.49 TYPE 2 DIABETES MELLITUS WITH NEUROLOGICAL MANIFESTATIONS, CONTROLLED (H): ICD-10-CM

## 2023-03-22 PROCEDURE — 92015 DETERMINE REFRACTIVE STATE: CPT

## 2023-03-22 PROCEDURE — 92014 COMPRE OPH EXAM EST PT 1/>: CPT

## 2023-03-22 ASSESSMENT — SLIT LAMP EXAM - LIDS
COMMENTS: 1+ MGD
COMMENTS: 1+ MGD

## 2023-03-22 ASSESSMENT — VISUAL ACUITY
OS_PH_SC+: -1
OS_SC+: -2
OD_SC+: -1
OS_SC: 20/50
OD_SC: 20/25
METHOD: SNELLEN - LINEAR
OS_PH_SC: 20/40
OD_SC: 20/50
OS_SC: 20/50

## 2023-03-22 ASSESSMENT — KERATOMETRY
OD_AXISANGLE2_DEGREES: 180
OD_K2POWER_DIOPTERS: 46.25
OS_AXISANGLE_DEGREES: 57
OS_K2POWER_DIOPTERS: 46.00
OS_AXISANGLE2_DEGREES: 147
OS_K1POWER_DIOPTERS: 45.50
OD_AXISANGLE_DEGREES: 90
OD_K1POWER_DIOPTERS: 45.50

## 2023-03-22 ASSESSMENT — REFRACTION_MANIFEST
METHOD_AUTOREFRACTION: 1
OD_AXIS: 030
OD_SPHERE: -0.50
OS_CYLINDER: +1.00
OS_SPHERE: -0.50
OD_SPHERE: -0.50
OD_ADD: +2.50
OD_CYLINDER: +0.50
OS_CYLINDER: +1.00
OS_AXIS: 040
OD_CYLINDER: +0.50
OS_ADD: +2.50
OS_AXIS: 024
OS_SPHERE: -0.75
OD_AXIS: 023

## 2023-03-22 ASSESSMENT — PACHYMETRY
OD_CT(UM): 552
OS_CT(UM): 549

## 2023-03-22 ASSESSMENT — CONF VISUAL FIELD
OS_NORMAL: 1
OD_INFERIOR_TEMPORAL_RESTRICTION: 0
OD_NORMAL: 1
OD_SUPERIOR_NASAL_RESTRICTION: 0
OS_SUPERIOR_TEMPORAL_RESTRICTION: 0
OS_INFERIOR_TEMPORAL_RESTRICTION: 0
OS_INFERIOR_NASAL_RESTRICTION: 0
OD_INFERIOR_NASAL_RESTRICTION: 0
OD_SUPERIOR_TEMPORAL_RESTRICTION: 0
OS_SUPERIOR_NASAL_RESTRICTION: 0

## 2023-03-22 ASSESSMENT — CUP TO DISC RATIO
OD_RATIO: 0.55
OS_RATIO: 0.45

## 2023-03-22 ASSESSMENT — TONOMETRY
OD_IOP_MMHG: 17
OS_IOP_MMHG: 15
IOP_METHOD: TONOPEN

## 2023-03-22 ASSESSMENT — EXTERNAL EXAM - RIGHT EYE: OD_EXAM: NORMAL

## 2023-03-22 ASSESSMENT — REFRACTION_WEARINGRX
OS_CYLINDER: +0.25
SPECS_TYPE: DID NOT BRING WITH
OD_SPHERE: PLANO
OD_CYLINDER: +0.25
OS_SPHERE: -0.50
OD_ADD: +2.50
OS_AXIS: 040
OD_AXIS: 035
OS_ADD: +2.50

## 2023-03-22 ASSESSMENT — EXTERNAL EXAM - LEFT EYE: OS_EXAM: NORMAL

## 2023-03-22 NOTE — PROGRESS NOTES
Chief Complaint   Patient presents with     Diabetic Eye Exam     Accompanied by   Chief Complaint(s) and History of Present Illness(es)     Diabetic Eye Exam            Diabetes Type: Type 2               Lab Results   Component Value Date    A1C 6.1 03/15/2023    A1C 6.1 09/30/2022    A1C 7.0 11/24/2021    A1C 6.7 11/27/2020    A1C 8.3 12/18/2019    A1C 11.5 09/20/2019    A1C 10.6 05/09/2019    A1C 8.0 01/11/2019            Last Eye Exam: long time, does not remember  Dilated Previously: Yes, side effects of dilation explained today    What are you currently using to see?  Glasses - did not bring with     Distance Vision Acuity: Satisfied with vision    Near Vision Acuity: Not satisfied     Eye Comfort: teary when outside  Do you use eye drops? : Yes: Camden Wen - Optometric Assistant     Medical, surgical and family histories reviewed and updated 3/22/2023.       OBJECTIVE: See Ophthalmology exam    ASSESSMENT:    ICD-10-CM    1. Presbyopia  H52.4 REFRACTION     EYE EXAM (SIMPLE-NONBILLABLE)      2. Type 2 diabetes mellitus with neurological manifestations, controlled (H)  E11.49 Adult Eye  Referral     EYE EXAM (SIMPLE-NONBILLABLE)     Adult Eye  Referral      3. Glaucoma suspect, bilateral  H40.003 EYE EXAM (SIMPLE-NONBILLABLE)     Adult Eye  Referral      4. Dry eyes  H04.123 EYE EXAM (SIMPLE-NONBILLABLE)          PLAN:    Wolf Sen aware  eye exam results will be sent to Kimberly Bell.  Patient Instructions   1. Presbyopia: Updated bifocal glasses prescription for full-time wear.  Monitor annually.  2. T2DM: Scattered exudates temporal to macula, q/o CSME left eye BCVA 20/30.  Patient educated on condition. Stressed importance of close BS/BP monitoring, diet/exercise, medication compliance, and regular visits with PCP. Refer for OCT.  3. Glaucoma suspect: Mild C/D asymmetry right eye>left eye with possible superior rim thinning right eye.  Patient  educated on condition.  Stressed importance of further evaluation as glaucoma is a potentially sight threatening condition.  Refer for work-up.  4. Dry eye, both eyes: Patient educated on condition.  Initiate ATs 3-4x/daily (Systane, Refresh, Theratears, Blink) and daily warm compresses with Sohail mask for 10 minutes.  Monitor annually.       Lexii Hong, OD

## 2023-03-22 NOTE — PATIENT INSTRUCTIONS
Presbyopia: Updated bifocal glasses prescription for full-time wear.  Monitor annually.  T2DM: Scattered exudates temporal to macula, q/o CSME left eye BCVA 20/30.  Patient educated on condition. Stressed importance of close BS/BP monitoring, diet/exercise, medication compliance, and regular visits with PCP. Refer for OCT.  Glaucoma suspect: Mild C/D asymmetry right eye>left eye with possible superior rim thinning right eye.  Patient educated on condition.  Stressed importance of further evaluation as glaucoma is a potentially sight threatening condition.  Refer for work-up.  Dry eye, both eyes: Patient educated on condition.  Initiate ATs 3-4x/daily (Systane, Refresh, Theratears, Blink) and daily warm compresses with Sohail mask for 10 minutes.  Monitor annually.

## 2023-03-22 NOTE — LETTER
3/22/2023         RE: Wolf Sen  [address]  [facility] [address]        Dear Colleague,    Thank you for referring your patient, Wolf Sen, to the [facility]. Please see a copy of my visit note below.    Chief Complaint   Patient presents with     Diabetic Eye Exam     Accompanied by   Chief Complaint(s) and History of Present Illness(es)     Diabetic Eye Exam            Diabetes Type: Type 2               Lab Results   Component Value Date    A1C 6.1 03/15/2023    A1C 6.1 09/30/2022    A1C 7.0 11/24/2021    A1C 6.7 11/27/2020    A1C 8.3 12/18/2019    A1C 11.5 09/20/2019    A1C 10.6 05/09/2019    A1C 8.0 01/11/2019            Last Eye Exam: long time, does not remember  Dilated Previously: Yes, side effects of dilation explained today    What are you currently using to see?  Glasses - did not bring with     Distance Vision Acuity: Satisfied with vision    Near Vision Acuity: Not satisfied     Eye Comfort: teary when outside  Do you use eye drops? : Yes: Camden Wen - Optometric Assistant     Medical, surgical and family histories reviewed and updated 3/22/2023.       OBJECTIVE: See Ophthalmology exam    ASSESSMENT:    ICD-10-CM    1. Presbyopia  H52.4 REFRACTION     EYE EXAM (SIMPLE-NONBILLABLE)      2. Type 2 diabetes mellitus with neurological manifestations, controlled (H)  E11.49 Adult Eye  Referral     EYE EXAM (SIMPLE-NONBILLABLE)     Adult Eye  Referral      3. Glaucoma suspect, bilateral  H40.003 EYE EXAM (SIMPLE-NONBILLABLE)     Adult Eye  Referral      4. Dry eyes  H04.123 EYE EXAM (SIMPLE-NONBILLABLE)          PLAN:    Wolf Sen aware  eye exam results will be sent to Kimberly Bell.  Patient Instructions   1. Presbyopia: Updated bifocal glasses prescription for full-time wear.  Monitor annually.  2. T2DM: Scattered exudates temporal to macula, q/o CSME left eye BCVA 20/30.  Patient educated on condition.  Stressed importance of close BS/BP monitoring, diet/exercise, medication compliance, and regular visits with PCP. Refer for OCT.  3. Glaucoma suspect: Mild C/D asymmetry right eye>left eye with possible superior rim thinning right eye.  Patient educated on condition.  Stressed importance of further evaluation as glaucoma is a potentially sight threatening condition.  Refer for work-up.  4. Dry eye, both eyes: Patient educated on condition.  Initiate ATs 3-4x/daily (Systane, Refresh, Theratears, Blink) and daily warm compresses with Sohail mask for 10 minutes.  Monitor annually.       Lexii Hong, OD        Again, thank you for allowing me to participate in the care of your patient.        Sincerely,        Lexii Hong, OD     3

## 2023-04-06 ENCOUNTER — APPOINTMENT (OUTPATIENT)
Dept: OPTOMETRY | Facility: CLINIC | Age: 73
End: 2023-04-06
Payer: COMMERCIAL

## 2023-04-06 PROCEDURE — 92340 FIT SPECTACLES MONOFOCAL: CPT

## 2023-04-17 DIAGNOSIS — C34.92 ADENOCARCINOMA, LUNG, LEFT (H): Primary | ICD-10-CM

## 2023-05-04 ENCOUNTER — PATIENT OUTREACH (OUTPATIENT)
Dept: ONCOLOGY | Facility: CLINIC | Age: 73
End: 2023-05-04
Payer: COMMERCIAL

## 2023-05-04 NOTE — PROGRESS NOTES
Received call from daughter with questions about patient's PET scan tomorrow, she feels the time has changed.  She thought the appt was at 8 AM, but she received a notification that the appointment has changed and it is now scheduled in the 10 AM hour.  Per appt review, time has not changed, but  was added to visit.  Daughter has some concerns, as patient is diabetic.  Patient takes insulin and he is not supposed to eat.      Reviewed the following with daughter:    Patients should eat a low carb, high protein meal 7 hours (or the last meal you eat) before the scan. Low carb, high protein meals consist of lean meats, seafood, beans, soy, low-fat dairy, eggs, nuts & seeds.     6 hours before your scan: Stop all food and liquids (except water). For inpatients, this includes both oral and IV (or intravenous) fluids containing dextrose.  Do not chew gum or suck on mints. If you need to take medicine with food, you may take it with a few crackers.     How do I prepare for my exam?: (Other instructions)  If you have diabetes: Have your exam early in the morning. Your blood glucose will go up as the day goes by. Your glucose level must be 180 or less at the start of the exam. Please take any oral diabetic medication you need to ensure this blood glucose level is below 180, but no insulin 4 hours prior to the exam.     * If you are taking insulin in the morning take with breakfast by 6 am and schedule procedure between 12 and 2:15 pm.  * If you are taking insulin at night take nightly dose, fast overnight, schedule procedure before 10 am.  * If you take insulin both morning and night take morning dose by 6am and schedule procedure between 12 and 2:15 pm.      Per review of chart, patient takes long acting insulin at night and metformin twice daily with meals.  Daughter was under the impression that patient takes fast acting insulin.  Appointment time does not correlate well with above instructions.  Daughter will  confirm with patient what his current diabetes medications are.  Call transferred to imaging scheduling to inquire if there is any wiggle room for earlier or later appointments and to further discuss prep instructions.  Reviewed that patient may take metformin in AM with some crackers if needed.  Also suggested that she contact PCP or endocrinologist for recommendations regarding diabetes medications for scan.    Evelia Marquez RN

## 2023-05-05 ENCOUNTER — ANCILLARY PROCEDURE (OUTPATIENT)
Dept: PET IMAGING | Facility: CLINIC | Age: 73
End: 2023-05-05
Attending: INTERNAL MEDICINE
Payer: COMMERCIAL

## 2023-05-05 DIAGNOSIS — C34.82: ICD-10-CM

## 2023-05-05 DIAGNOSIS — C34.92 ADENOCARCINOMA, LUNG, LEFT (H): ICD-10-CM

## 2023-05-05 PROCEDURE — 78816 PET IMAGE W/CT FULL BODY: CPT | Mod: PS | Performed by: RADIOLOGY

## 2023-05-05 PROCEDURE — A9552 F18 FDG: HCPCS | Performed by: RADIOLOGY

## 2023-05-09 ENCOUNTER — ONCOLOGY VISIT (OUTPATIENT)
Dept: ONCOLOGY | Facility: CLINIC | Age: 73
End: 2023-05-09
Attending: INTERNAL MEDICINE
Payer: COMMERCIAL

## 2023-05-09 ENCOUNTER — LAB (OUTPATIENT)
Dept: LAB | Facility: CLINIC | Age: 73
End: 2023-05-09
Payer: COMMERCIAL

## 2023-05-09 VITALS
BODY MASS INDEX: 26.62 KG/M2 | DIASTOLIC BLOOD PRESSURE: 83 MMHG | HEIGHT: 61 IN | OXYGEN SATURATION: 99 % | SYSTOLIC BLOOD PRESSURE: 166 MMHG | WEIGHT: 141 LBS | HEART RATE: 80 BPM

## 2023-05-09 DIAGNOSIS — T45.1X5A ANTINEOPLASTIC CHEMOTHERAPY INDUCED ANEMIA: ICD-10-CM

## 2023-05-09 DIAGNOSIS — E06.9 THYROIDITIS: ICD-10-CM

## 2023-05-09 DIAGNOSIS — C34.92 STAGE IV ADENOCARCINOMA OF LUNG, LEFT (H): ICD-10-CM

## 2023-05-09 DIAGNOSIS — C34.92 ADENOCARCINOMA, LUNG, LEFT (H): ICD-10-CM

## 2023-05-09 DIAGNOSIS — C34.32 PRIMARY CANCER OF LEFT LOWER LOBE OF LUNG (H): Primary | ICD-10-CM

## 2023-05-09 DIAGNOSIS — N18.32 STAGE 3B CHRONIC KIDNEY DISEASE (H): ICD-10-CM

## 2023-05-09 DIAGNOSIS — D64.81 ANTINEOPLASTIC CHEMOTHERAPY INDUCED ANEMIA: ICD-10-CM

## 2023-05-09 LAB
ALBUMIN SERPL-MCNC: 3.7 G/DL (ref 3.4–5)
ALP SERPL-CCNC: 85 U/L (ref 40–150)
ALT SERPL W P-5'-P-CCNC: 35 U/L (ref 0–70)
ANION GAP SERPL CALCULATED.3IONS-SCNC: 4 MMOL/L (ref 3–14)
AST SERPL W P-5'-P-CCNC: 27 U/L (ref 0–45)
BASOPHILS # BLD AUTO: 0 10E3/UL (ref 0–0.2)
BASOPHILS NFR BLD AUTO: 0 %
BILIRUB SERPL-MCNC: 0.5 MG/DL (ref 0.2–1.3)
BUN SERPL-MCNC: 23 MG/DL (ref 7–30)
CALCIUM SERPL-MCNC: 9.1 MG/DL (ref 8.5–10.1)
CHLORIDE BLD-SCNC: 104 MMOL/L (ref 94–109)
CO2 SERPL-SCNC: 29 MMOL/L (ref 20–32)
CREAT SERPL-MCNC: 1.48 MG/DL (ref 0.66–1.25)
EOSINOPHIL # BLD AUTO: 0.2 10E3/UL (ref 0–0.7)
EOSINOPHIL NFR BLD AUTO: 3 %
ERYTHROCYTE [DISTWIDTH] IN BLOOD BY AUTOMATED COUNT: 14.2 % (ref 10–15)
GFR SERPL CREATININE-BSD FRML MDRD: 49 ML/MIN/1.73M2
GLUCOSE BLD-MCNC: 143 MG/DL (ref 70–99)
HCT VFR BLD AUTO: 35.9 % (ref 40–53)
HGB BLD-MCNC: 11.6 G/DL (ref 13.3–17.7)
HOLD SPECIMEN: NORMAL
IMM GRANULOCYTES # BLD: 0 10E3/UL
IMM GRANULOCYTES NFR BLD: 0 %
LYMPHOCYTES # BLD AUTO: 1.7 10E3/UL (ref 0.8–5.3)
LYMPHOCYTES NFR BLD AUTO: 23 %
MCH RBC QN AUTO: 25.6 PG (ref 26.5–33)
MCHC RBC AUTO-ENTMCNC: 32.3 G/DL (ref 31.5–36.5)
MCV RBC AUTO: 79 FL (ref 78–100)
MONOCYTES # BLD AUTO: 0.6 10E3/UL (ref 0–1.3)
MONOCYTES NFR BLD AUTO: 9 %
NEUTROPHILS # BLD AUTO: 4.6 10E3/UL (ref 1.6–8.3)
NEUTROPHILS NFR BLD AUTO: 65 %
NRBC # BLD AUTO: 0 10E3/UL
NRBC BLD AUTO-RTO: 0 /100
PLATELET # BLD AUTO: 155 10E3/UL (ref 150–450)
POTASSIUM BLD-SCNC: 3.9 MMOL/L (ref 3.4–5.3)
PROT SERPL-MCNC: 7.9 G/DL (ref 6.8–8.8)
RBC # BLD AUTO: 4.53 10E6/UL (ref 4.4–5.9)
SODIUM SERPL-SCNC: 137 MMOL/L (ref 133–144)
WBC # BLD AUTO: 7.1 10E3/UL (ref 4–11)

## 2023-05-09 PROCEDURE — 99215 OFFICE O/P EST HI 40 MIN: CPT | Performed by: INTERNAL MEDICINE

## 2023-05-09 PROCEDURE — 36415 COLL VENOUS BLD VENIPUNCTURE: CPT

## 2023-05-09 PROCEDURE — 85025 COMPLETE CBC W/AUTO DIFF WBC: CPT

## 2023-05-09 PROCEDURE — 80053 COMPREHEN METABOLIC PANEL: CPT

## 2023-05-09 ASSESSMENT — PAIN SCALES - GENERAL: PAINLEVEL: NO PAIN (0)

## 2023-05-09 NOTE — PROGRESS NOTES
"[facility] Hematology / Oncology  Progress Note  Name: Wolf Sen  :  1950    MRN:  [id number]    --------------------    Assessment / Plan:  Locally advanced, unresectable, left-sided lung adenocarcinoma:  # 2021 Presented w/ dyspnea, cough and multiple failed rounds of antibiotics for presumed pneumonia.  # Dec 2021 CT-guided lung biopsy w/ adenocarcinoma.  # 2022 Staging brain MRI and PET scan with bilateral lung involvement (CRISTO primary) and med/hilar adenopathy.  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  # 2022 Osimertinib - ongoing; [place] (CR minus a stable thoracic node).    Clinically, Wolf remains stable from a lung cancer standpoint.  Radiographically, his disease appears to be stable as well minus some waxing and waning thoracic adenopathy.  Wolf is tolerating treatment well and without major toxicity.    His anemia is improving w/ iron supplementation; he may have a degree of mild thalassemia.  His waxing and waning transaminitis has resolved; this could be treatment effect or med related.  His kidney function remains stable overall; some waxing and waning GFR; renally dose chemo meds; hydration encouraged.    RTC 4 months w/ labs (CBC, CMP) and PET scan.    Miles Estrada MD    --------------------    Interval History:  Wolf returns for follow-up of lung cancer accompanied by his son.  Esteban  used throughout our visit.  All in all, he remains quite well.  No new health symptoms to report.  No side effects from his treatments.  No rash, nausea, vomiting, diarrhea, achy joints or mouth sores.  No financial toxicity.  --------------------    Physical Exam:  VS: BP (!) 166/83 (BP Location: Left arm, Patient Position: Chair, Cuff Size: Adult Regular)   Pulse 80   Ht 1.543 m (5' 0.75\")   Wt 64 kg (141 lb)   SpO2 99%   BMI 26.86 kg/m    GEN: Well appearing.    Labs / Imaging / Path:  We reviewed 6 month trend in CBC, CMP and personally reviewed / compared serial " PET scans.

## 2023-05-09 NOTE — LETTER
2023         RE: Wolf Sen  [address]  [place] [facility] [address]        Dear Colleague,    Thank you for referring your patient, Wolf Sen, to the [facility] [place] [place]. Please see a copy of my visit note below.    [facility] Hematology / Oncology  Progress Note  Name: Wolf Sen  :  1950    MRN:  [id number]    --------------------    Assessment / Plan:  Locally advanced, unresectable, left-sided lung adenocarcinoma:  # 2021 Presented w/ dyspnea, cough and multiple failed rounds of antibiotics for presumed pneumonia.  # Dec 2021 CT-guided lung biopsy w/ adenocarcinoma.  # 2022 Staging brain MRI and PET scan with bilateral lung involvement (CRISTO primary) and med/hilar adenopathy.  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  # 2022 Osimertinib - ongoing; [place] (CR minus a stable thoracic node).    Clinically, Wolf remains stable from a lung cancer standpoint.  Radiographically, his disease appears to be stable as well minus some waxing and waning thoracic adenopathy.  Wolf is tolerating treatment well and without major toxicity.    His anemia is improving w/ iron supplementation; he may have a degree of mild thalassemia.  His waxing and waning transaminitis has resolved; this could be treatment effect or med related.  His kidney function remains stable overall; some waxing and waning GFR; renally dose chemo meds; hydration encouraged.    RTC 4 months w/ labs (CBC, CMP) and PET scan.    Miles Estrada MD    --------------------    Interval History:  Wolf returns for follow-up of lung cancer accompanied by his son.  Esteban  used throughout our visit.  All in all, he remains quite well.  No new health symptoms to report.  No side effects from his treatments.  No rash, nausea, vomiting, diarrhea, achy joints or mouth sores.  No financial toxicity.  --------------------    Physical Exam:  VS: BP (!) 166/83 (BP Location: Left arm, Patient  "Position: Chair, Cuff Size: Adult Regular)   Pulse 80   Ht 1.543 m (5' 0.75\")   Wt 64 kg (141 lb)   SpO2 99%   BMI 26.86 kg/m    GEN: Well appearing.    Labs / Imaging / Path:  We reviewed 6 month trend in CBC, CMP and personally reviewed / compared serial PET scans.      Again, thank you for allowing me to participate in the care of your patient.        Sincerely,        Miles Estrada MD    "

## 2023-05-16 DIAGNOSIS — C34.92 ADENOCARCINOMA, LUNG, LEFT (H): Primary | ICD-10-CM

## 2023-05-30 ENCOUNTER — PATIENT OUTREACH (OUTPATIENT)
Dept: GERIATRIC MEDICINE | Facility: CLINIC | Age: 73
End: 2023-05-30
Payer: COMMERCIAL

## 2023-05-30 NOTE — PROGRESS NOTES
[facility] Care Coordination Contact      [facility] Six-Month Telephone Assessment    6 month telephone assessment completed on 5/30/23 with Stan  via language line.    ER visits: No  Hospitalizations: No  TCU stays: No  Significant health status changes: none  Falls/Injuries: No  ADL/IADL changes: No  Changes in services: No    Caregiver Assessment follow up:  N/A    Goals: See POC in chart for goal progress documentation.      Will see member in 6 months for an annual health risk assessment.   Encouraged member to call CC with any questions or concerns in the meantime.       Viki Avalos RN, PHN  [facility]

## 2023-06-15 DIAGNOSIS — C34.92 ADENOCARCINOMA, LUNG, LEFT (H): Primary | ICD-10-CM

## 2023-07-14 DIAGNOSIS — C34.92 ADENOCARCINOMA, LUNG, LEFT (H): Primary | ICD-10-CM

## 2023-07-18 ENCOUNTER — TELEPHONE (OUTPATIENT)
Dept: FAMILY MEDICINE | Facility: CLINIC | Age: 73
End: 2023-07-18
Payer: COMMERCIAL

## 2023-07-18 NOTE — TELEPHONE ENCOUNTER
Patient Quality Outreach    Patient is due for the following:   Diabetes -  Foot Exam      Topic Date Due     Zoster (Shingles) Vaccine (2 of 3) 04/27/2016     COVID-19 Vaccine (5 - Pfizer series) 01/30/2023       Next Steps:   Schedule a office visit for foot exam    Type of outreach:    Sent Intern message.    Next Steps:  Reach out within 90 days via Intern.    Max number of attempts reached: No. Will try again in 90 days if patient still on fail list.    Questions for provider review:    None           Deborah Dasilva MA

## 2023-08-01 ENCOUNTER — TRANSFERRED RECORDS (OUTPATIENT)
Dept: HEALTH INFORMATION MANAGEMENT | Facility: CLINIC | Age: 73
End: 2023-08-01

## 2023-08-14 DIAGNOSIS — C34.92 ADENOCARCINOMA, LUNG, LEFT (H): Primary | ICD-10-CM

## 2023-09-08 ENCOUNTER — LAB (OUTPATIENT)
Dept: LAB | Facility: CLINIC | Age: 73
End: 2023-09-08
Payer: COMMERCIAL

## 2023-09-08 ENCOUNTER — ANCILLARY PROCEDURE (OUTPATIENT)
Dept: PET IMAGING | Facility: CLINIC | Age: 73
End: 2023-09-08
Attending: INTERNAL MEDICINE
Payer: COMMERCIAL

## 2023-09-08 DIAGNOSIS — C34.92 STAGE IV ADENOCARCINOMA OF LUNG, LEFT (H): ICD-10-CM

## 2023-09-08 DIAGNOSIS — C34.32 PRIMARY CANCER OF LEFT LOWER LOBE OF LUNG (H): ICD-10-CM

## 2023-09-08 DIAGNOSIS — E06.9 THYROIDITIS: ICD-10-CM

## 2023-09-08 LAB
ALBUMIN SERPL BCG-MCNC: 4 G/DL (ref 3.5–5.2)
ALP SERPL-CCNC: 66 U/L (ref 40–129)
ALT SERPL W P-5'-P-CCNC: 16 U/L (ref 0–70)
ANION GAP SERPL CALCULATED.3IONS-SCNC: 13 MMOL/L (ref 7–15)
AST SERPL W P-5'-P-CCNC: 32 U/L (ref 0–45)
BASOPHILS # BLD AUTO: 0 10E3/UL (ref 0–0.2)
BASOPHILS NFR BLD AUTO: 0 %
BILIRUB SERPL-MCNC: 0.4 MG/DL
BUN SERPL-MCNC: 24.4 MG/DL (ref 8–23)
CALCIUM SERPL-MCNC: 9.2 MG/DL (ref 8.8–10.2)
CHLORIDE SERPL-SCNC: 103 MMOL/L (ref 98–107)
CREAT SERPL-MCNC: 1.6 MG/DL (ref 0.67–1.17)
DEPRECATED HCO3 PLAS-SCNC: 24 MMOL/L (ref 22–29)
EGFRCR SERPLBLD CKD-EPI 2021: 45 ML/MIN/1.73M2
EOSINOPHIL # BLD AUTO: 0.3 10E3/UL (ref 0–0.7)
EOSINOPHIL NFR BLD AUTO: 4 %
ERYTHROCYTE [DISTWIDTH] IN BLOOD BY AUTOMATED COUNT: 14.3 % (ref 10–15)
GLUCOSE SERPL-MCNC: 120 MG/DL (ref 70–99)
HCT VFR BLD AUTO: 35.8 % (ref 40–53)
HGB BLD-MCNC: 11.9 G/DL (ref 13.3–17.7)
IMM GRANULOCYTES # BLD: 0 10E3/UL
IMM GRANULOCYTES NFR BLD: 0 %
LYMPHOCYTES # BLD AUTO: 1.9 10E3/UL (ref 0.8–5.3)
LYMPHOCYTES NFR BLD AUTO: 28 %
MCH RBC QN AUTO: 25.3 PG (ref 26.5–33)
MCHC RBC AUTO-ENTMCNC: 33.2 G/DL (ref 31.5–36.5)
MCV RBC AUTO: 76 FL (ref 78–100)
MONOCYTES # BLD AUTO: 0.9 10E3/UL (ref 0–1.3)
MONOCYTES NFR BLD AUTO: 13 %
NEUTROPHILS # BLD AUTO: 3.8 10E3/UL (ref 1.6–8.3)
NEUTROPHILS NFR BLD AUTO: 55 %
NRBC # BLD AUTO: 0 10E3/UL
NRBC BLD AUTO-RTO: 0 /100
PLATELET # BLD AUTO: 172 10E3/UL (ref 150–450)
POTASSIUM SERPL-SCNC: 4.1 MMOL/L (ref 3.4–5.3)
PROT SERPL-MCNC: 7.8 G/DL (ref 6.4–8.3)
RBC # BLD AUTO: 4.7 10E6/UL (ref 4.4–5.9)
SODIUM SERPL-SCNC: 140 MMOL/L (ref 136–145)
TSH SERPL DL<=0.005 MIU/L-ACNC: 2.26 UIU/ML (ref 0.3–4.2)
WBC # BLD AUTO: 7 10E3/UL (ref 4–11)

## 2023-09-08 PROCEDURE — 80053 COMPREHEN METABOLIC PANEL: CPT

## 2023-09-08 PROCEDURE — 36415 COLL VENOUS BLD VENIPUNCTURE: CPT

## 2023-09-08 PROCEDURE — 84443 ASSAY THYROID STIM HORMONE: CPT

## 2023-09-08 PROCEDURE — 78816 PET IMAGE W/CT FULL BODY: CPT | Mod: GC | Performed by: RADIOLOGY

## 2023-09-08 PROCEDURE — A9552 F18 FDG: HCPCS | Performed by: RADIOLOGY

## 2023-09-08 PROCEDURE — 85025 COMPLETE CBC W/AUTO DIFF WBC: CPT

## 2023-09-13 ENCOUNTER — ONCOLOGY VISIT (OUTPATIENT)
Dept: ONCOLOGY | Facility: CLINIC | Age: 73
End: 2023-09-13
Attending: INTERNAL MEDICINE
Payer: COMMERCIAL

## 2023-09-13 VITALS
BODY MASS INDEX: 27.19 KG/M2 | SYSTOLIC BLOOD PRESSURE: 224 MMHG | WEIGHT: 144 LBS | DIASTOLIC BLOOD PRESSURE: 94 MMHG | HEIGHT: 61 IN | OXYGEN SATURATION: 98 % | HEART RATE: 77 BPM

## 2023-09-13 DIAGNOSIS — C34.92 ADENOCARCINOMA, LUNG, LEFT (H): Primary | ICD-10-CM

## 2023-09-13 DIAGNOSIS — D50.0 IRON DEFICIENCY ANEMIA DUE TO CHRONIC BLOOD LOSS: ICD-10-CM

## 2023-09-13 DIAGNOSIS — C34.32 PRIMARY CANCER OF LEFT LOWER LOBE OF LUNG (H): Primary | ICD-10-CM

## 2023-09-13 DIAGNOSIS — C34.92 STAGE IV ADENOCARCINOMA OF LUNG, LEFT (H): ICD-10-CM

## 2023-09-13 DIAGNOSIS — I16.1 HYPERTENSIVE EMERGENCY: ICD-10-CM

## 2023-09-13 DIAGNOSIS — N18.32 STAGE 3B CHRONIC KIDNEY DISEASE (H): ICD-10-CM

## 2023-09-13 PROCEDURE — 99215 OFFICE O/P EST HI 40 MIN: CPT | Performed by: INTERNAL MEDICINE

## 2023-09-13 ASSESSMENT — PAIN SCALES - GENERAL: PAINLEVEL: NO PAIN (0)

## 2023-09-13 NOTE — NURSING NOTE
"Oncology Rooming Note    September 13, 2023 8:06 AM   Wolf Sen is a 73 year old male who presents for:    Chief Complaint   Patient presents with    Oncology Clinic Visit     Follow up     Initial Vitals: BP (!) 211/90 (BP Location: Right arm, Patient Position: Chair, Cuff Size: Adult Regular)   Pulse 77   Ht 1.543 m (5' 0.75\")   Wt 65.3 kg (144 lb)   SpO2 98%   BMI 27.43 kg/m   Estimated body mass index is 27.43 kg/m  as calculated from the following:    Height as of this encounter: 1.543 m (5' 0.75\").    Weight as of this encounter: 65.3 kg (144 lb). Body surface area is 1.67 meters squared.  No Pain (0) Comment: Data Unavailable   No LMP for male patient.  Allergies reviewed: Yes  Medications reviewed: Yes    Medications: Medication refills not needed today.  Pharmacy name entered into EPIC:    WRITTEN PRESCRIPTION REQUESTED  [id number] - [place], [place] - [address] [id number] - [place] - [address] [facility] & [address] [place] [address] [facility] [place], [place] - [address] [place] [address] SHRUTHI MADRID    Clinical concerns: PET resutls  Dr. Estrada was notified.      Korina Sherman CMA                  "

## 2023-09-13 NOTE — PROGRESS NOTES
[facility] Hematology / Oncology  Progress Note 2023  Name: Wolf Sen  :  1950    MRN:  [id number]    --------------------    Assessment / Plan:  Locally advanced, unresectable, left-sided lung adenocarcinoma:  # 2021 Presented w/ dyspnea, cough and multiple failed rounds of antibiotics for presumed pneumonia.  # Dec 2021 CT-guided lung biopsy w/ adenocarcinoma.  # 2022 Staging brain MRI and PET scan with bilateral lung involvement (CRISTO primary) and med/hilar adenopathy.  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  # 2022 Osimertinib - ongoing; [place] (CR minus a stable thoracic node).    Continue osimertinib; planning indefinitely until disease progression or toxicity.  Radiographically, his disease appears overall stable minus some waxing and waning adenopathy and nodules.  Continue iron supplementation for mild microcytic anemia; likely some component of mild thalassemia as well.  Otherwise counts, chemistries and thyroid function stable; baseline chronic kidney disease.  Reinforced need to go to emergency room for hypertensive emergency given severe hypertension; osimertinib not associated with high blood pressure.  Reinforced need for good blood pressure control to prevent cardiovascular complications and prevent worsening of chronic kidney disease.  Assuming all is well, return to clinic 4 months with labs and PET scan; planning brain MRI prn symptoms.    Miles Estrada MD    --------------------    Interval History:  Wolf returns for follow up of lung cancer.  All in all, he is doing quite well.  Has some aches and pains from arthritis.  No neurologic symptoms.  No strokelike symptoms.  No chest pain or pressure.  Little bit of itching is tolerable from osimertinib.  No nausea or vomiting or diarrhea.    Social History:  Social History     Tobacco Use    Smoking status: Never    Smokeless tobacco: Never   Substance Use Topics    Alcohol use: No    Drug use: No  "      --------------------    Physical Exam:  VS: BP (!) 211/90 (BP Location: Right arm, Patient Position: Chair, Cuff Size: Adult Regular)   Pulse 77   Ht 1.543 m (5' 0.75\")   Wt 65.3 kg (144 lb)   SpO2 98%   BMI 27.43 kg/m  .  GEN: Well appearing.    Labs / Imaging:  Reviewed CBC, CMP, TSH.  Reviewed PET/CT w/ independent review.  "

## 2023-09-13 NOTE — LETTER
2023         RE: Wolf Sen  [address]  [place] [address]        Dear Colleague,    Thank you for referring your patient, Wolf Sen, to the [facility] [place]. Please see a copy of my visit note below.    [facility] Hematology / Oncology  Progress Note 2023  Name: Wolf Sen  :  1950    MRN:  [id number]    --------------------    Assessment / Plan:  Locally advanced, unresectable, left-sided lung adenocarcinoma:  # 2021 Presented w/ dyspnea, cough and multiple failed rounds of antibiotics for presumed pneumonia.  # Dec 2021 CT-guided lung biopsy w/ adenocarcinoma.  # 2022 Staging brain MRI and PET scan with bilateral lung involvement (CRISTO primary) and med/hilar adenopathy.  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  # 2022 Osimertinib - ongoing; [place] (CR minus a stable thoracic node).    Continue osimertinib; planning indefinitely until disease progression or toxicity.  Radiographically, his disease appears overall stable minus some waxing and waning adenopathy and nodules.  Continue iron supplementation for mild microcytic anemia; likely some component of mild thalassemia as well.  Otherwise counts, chemistries and thyroid function stable; baseline chronic kidney disease.  Reinforced need to go to emergency room for hypertensive emergency given severe hypertension; osimertinib not associated with high blood pressure.  Reinforced need for good blood pressure control to prevent cardiovascular complications and prevent worsening of chronic kidney disease.  Assuming all is well, return to clinic 4 months with labs and PET scan; planning brain MRI prn symptoms.    Miles Estrada MD    --------------------    Interval History:  Wolf returns for follow up of lung cancer.  All in all, he is doing quite well.  Has some aches and pains from arthritis.  No neurologic symptoms.  No strokelike symptoms.  No chest pain or pressure.   "Little bit of itching is tolerable from osimertinib.  No nausea or vomiting or diarrhea.    Social History:  Social History     Tobacco Use     Smoking status: Never     Smokeless tobacco: Never   Substance Use Topics     Alcohol use: No     Drug use: No       --------------------    Physical Exam:  VS: BP (!) 211/90 (BP Location: Right arm, Patient Position: Chair, Cuff Size: Adult Regular)   Pulse 77   Ht 1.543 m (5' 0.75\")   Wt 65.3 kg (144 lb)   SpO2 98%   BMI 27.43 kg/m  .  GEN: Well appearing.    Labs / Imaging:  Reviewed CBC, CMP, TSH.  Reviewed PET/CT w/ independent review.      Again, thank you for allowing me to participate in the care of your patient.        Sincerely,        Miles Estrada MD  "

## 2023-09-14 ENCOUNTER — TELEPHONE (OUTPATIENT)
Dept: FAMILY MEDICINE | Facility: CLINIC | Age: 73
End: 2023-09-14
Payer: COMMERCIAL

## 2023-09-14 NOTE — TELEPHONE ENCOUNTER
Reason for Call:  Appointment Request    Patient requesting this type of appt:  Hospital/ED Follow-Up     Requested provider: Kimberly Bell    Reason patient unable to be scheduled: Not within requested timeframe    When does patient want to be seen/preferred time: 3-7 days    Comments: Daughter calling and we can leave a detailed message regarding the appointment    Could we send this information to you in [facility] or would you prefer to receive a phone call?:   Patient would prefer a phone call   Okay to leave a detailed message?: Yes at Other phone number:  Daughter [phone]    Call taken on 9/14/2023 at 12:43 PM by Caterina Leary MA

## 2023-09-19 ENCOUNTER — OFFICE VISIT (OUTPATIENT)
Dept: FAMILY MEDICINE | Facility: CLINIC | Age: 73
End: 2023-09-19
Payer: COMMERCIAL

## 2023-09-19 VITALS
SYSTOLIC BLOOD PRESSURE: 155 MMHG | HEART RATE: 67 BPM | DIASTOLIC BLOOD PRESSURE: 80 MMHG | TEMPERATURE: 97.4 F | HEIGHT: 64 IN | BODY MASS INDEX: 24.04 KG/M2 | OXYGEN SATURATION: 98 % | RESPIRATION RATE: 16 BRPM | WEIGHT: 140.8 LBS

## 2023-09-19 DIAGNOSIS — N18.30 STAGE 3 CHRONIC KIDNEY DISEASE, UNSPECIFIED WHETHER STAGE 3A OR 3B CKD (H): ICD-10-CM

## 2023-09-19 DIAGNOSIS — Z23 ENCOUNTER FOR IMMUNIZATION: ICD-10-CM

## 2023-09-19 DIAGNOSIS — E11.49 TYPE 2 DIABETES MELLITUS WITH NEUROLOGICAL MANIFESTATIONS, CONTROLLED (H): Primary | ICD-10-CM

## 2023-09-19 DIAGNOSIS — C34.92 ADENOCARCINOMA, LUNG, LEFT (H): ICD-10-CM

## 2023-09-19 DIAGNOSIS — I10 BENIGN ESSENTIAL HTN: ICD-10-CM

## 2023-09-19 PROCEDURE — G0008 ADMIN INFLUENZA VIRUS VAC: HCPCS | Performed by: PREVENTIVE MEDICINE

## 2023-09-19 PROCEDURE — 99215 OFFICE O/P EST HI 40 MIN: CPT | Mod: 25 | Performed by: PREVENTIVE MEDICINE

## 2023-09-19 PROCEDURE — 90662 IIV NO PRSV INCREASED AG IM: CPT | Performed by: PREVENTIVE MEDICINE

## 2023-09-19 RX ORDER — HYDRALAZINE HYDROCHLORIDE 25 MG/1
25 TABLET, FILM COATED ORAL 3 TIMES DAILY
Qty: 270 TABLET | Refills: 1 | Status: SHIPPED | OUTPATIENT
Start: 2023-09-19 | End: 2024-04-23

## 2023-09-19 NOTE — PROGRESS NOTES
Assessment & Plan     Type 2 diabetes mellitus with neurological manifestations, controlled (H)  -will check labs with next set of lab draw done for Oncology  - HEMOGLOBIN A1C  - Lipid panel reflex to direct LDL Non-fasting  - REVIEW OF HEALTH MAINTENANCE PROTOCOL ORDERS    Lab Results   Component Value Date    A1C 6.1 03/15/2023    A1C 6.1 09/30/2022    A1C 7.0 11/24/2021    A1C 6.7 11/27/2020    A1C 8.3 12/18/2019    A1C 11.5 09/20/2019    A1C 10.6 05/09/2019    A1C 8.0 01/11/2019         Stage 3 chronic kidney disease, unspecified whether stage 3a or 3b CKD (H)  -no use of NSAIDs  - Basic metabolic panel  (Ca, Cl, CO2, Creat, Gluc, K, Na, BUN)  -last GFR in the ED was 37, hence will reduce dose of Metformin from 1000 mg twice a day to 500 twice a day.     Adenocarcinoma, lung, left (H)  -per Oncology  -PET scan done recently     Benign essential HTN  -Has had elevated readings during oncology visits  -Emergency room notes reviewed  -Continue losartan 100 mg daily  -Continue Toprol XL 25 mg daily  -Increased hydralazine from 25 mg twice a day to 3 times a day  -Patient will update blood pressure readings via MyChart  - hydrALAZINE (APRESOLINE) 25 MG tablet  Dispense: 270 tablet; Refill: 1    Encounter for immunization  -Flu vaccination done today  - INFLUENZA VACCINE 65+ (FLUZONE HD)      Review of external notes as documented elsewhere in note  Ordering of each unique test  Prescription drug management  42 minutes spent by me on the date of the encounter doing chart review, history and exam, documentation and further activities per the note     MED REC REQUIRED  Post Medication Reconciliation Status: patient was not discharged from an inpatient facility or TCU    Kimberly Bell MD MPH    [facility]    Klaus Bloom is a 73 year old, presenting for the following health issues:  Hospital F/U        9/19/2023     3:42 PM   Additional Questions   Roomed by jong   Accompanied by  "daughter       HPI     Had questions about if he can have coffee    Has international trip for a month coming up in 1/24, we reviewed a few things for travel safety   Will be traveling to [place], [place], [place] and [place], [facility]     Visit done with Esteban interpretor via phone  Home check was 135/77  No chest pain  No headaches  No dizziness  No pedal edema    Chronic kidney disease:  No use of NSAIDS      Does not check glucose at home    Hypertension Follow-up    Do you check your blood pressure regularly outside of the clinic? No   Are you following a low salt diet? No  Are your blood pressures ever more than 140 on the top number (systolic) OR more   than 90 on the bottom number (diastolic), for example 140/90? Does not check bp outside of clinic  How many servings of fruits and vegetables do you eat daily?  0-1  On average, how many sweetened beverages do you drink each day (Examples: soda, juice, sweet tea, etc.  Do NOT count diet or artificially sweetened beverages)?   0  How many days per week do you exercise enough to make your heart beat faster? 3 or less  How many minutes a day do you exercise enough to make your heart beat faster? 30 - 60  How many days per week do you miss taking your medication? 0    ED/UC Followup:    Facility:  [facility]   Date of visit: 09/13/2023  Reason for visit: Elevated Blood Pressure   Current Status: feeling tired      The following is a summary from the ED visit:      \"MDM:   Wolf Sen is a 73 y.o. male who presents today with asymptomatic hypertension noted at outpatient oncology visit. This occurred when he was due to receive the results of his most recent PET scan about his cancer progress. No chest pain, shortness of breath, fever, chills, cough, weakness, lightheadedness, palpitations. No concerns about any physical symptoms ongoing at all. Is compliant with all medications. EKG obtained, shows no regional ischemic " "findings. Did consider endorgan damage from hypertension, but I do feel that this represents asymptomatic hypertension and does not warrant any emergent work-up; I discussed this with patient and family, and recommended checking blood pressure at home is the best means to screen for uncontrolled hypertension. They feel that he warrants work-up to exclude kidney damage or heart damage by their . This does not seem entirely unreasonable, though instructed them that the most important thing would be checking blood pressure at home to screen for developing uncontrolled hypertension. Laboratory work-up obtained as above, this is unremarkable. Does show chronic kidney disease, with creatinine at patient's baseline over the past 6 months. Mild hypokalemia, I think this can be just simply with dietary changes and this was provided. Patient does have chronic anemia, to be followed up with oncology and primary. No indication for hospitalization or any additional emergent work-up. Patient comfortable the outpatient plan of care. Return precautions discussed, questions solicited and answered.    DIAGNOSIS:  ICD-10-CM   1. Elevated blood pressure reading R03.0     2. Microcytic anemia D50.9     3. Chronic kidney disease, unspecified CKD stage N18.9     4. Hypokalemia E87.6 \"      Review of Systems   Constitutional, HEENT, cardiovascular, pulmonary, gi and gu systems are negative, except as otherwise noted.      Objective    BP (!) 155/80 (BP Location: Right arm, Patient Position: Sitting, Cuff Size: Adult Regular)   Pulse 67   Temp 97.4  F (36.3  C) (Oral)   Resp 16   Ht 1.626 m (5' 4\")   Wt 63.9 kg (140 lb 12.8 oz)   SpO2 98%   BMI 24.17 kg/m    Body mass index is 24.17 kg/m .  Physical Exam   GENERAL APPEARANCE: healthy, alert and no distress  EYES: Eyes grossly normal to inspection and conjunctivae and sclerae normal  RESP: No rhonchi, crackles+ at bases   CV: regular rates and rhythm, normal S1 S2, systolic " murmur+   ABDOMEN: soft, non-tender and no rebound or guarding   MS: extremities normal- no gross deformities noted   SKIN: no suspicious lesions or rashes  NEURO: Normal strength and tone, mentation intact and speech normal  PSYCH: mentation appears normal      No results found for this or any previous visit (from the past 24 hour(s)).

## 2023-09-30 ENCOUNTER — HEALTH MAINTENANCE LETTER (OUTPATIENT)
Age: 73
End: 2023-09-30

## 2023-10-02 ENCOUNTER — PATIENT OUTREACH (OUTPATIENT)
Dept: GERIATRIC MEDICINE | Facility: CLINIC | Age: 73
End: 2023-10-02
Payer: COMMERCIAL

## 2023-10-09 ENCOUNTER — PATIENT OUTREACH (OUTPATIENT)
Dept: GERIATRIC MEDICINE | Facility: CLINIC | Age: 73
End: 2023-10-09
Payer: COMMERCIAL

## 2023-10-09 NOTE — PROGRESS NOTES
Care Coordinator spoke with member with Stan Thornton via [employer] language line.  Scheduled for 10/17 at 10 a.m.  Care Coordinator call KTTS to schedule .     Viki Avalos RN, N  [facility]

## 2023-10-11 NOTE — PROGRESS NOTES
[facility] Care Coordination Contact  CC received notification of Emergency Room visit.  ER visit occurred on 9/13/23 at [facility]  with Dx of anxiety and HTN.    CC contacted member and reviewed discharge summary.  Member has a follow-up appointment with PCP: Yes: scheduled on completed on 9/19/23 with PCP.   Member has had a change in condition: No  New referrals placed: No  Home Visit Needed: No  Care plan reviewed and updated.  PCP notified of ED visit via EMR.    Viki Avalos RN, PHN  [facility]

## 2023-10-13 DIAGNOSIS — C34.92 ADENOCARCINOMA, LUNG, LEFT (H): Primary | ICD-10-CM

## 2023-10-17 ENCOUNTER — PATIENT OUTREACH (OUTPATIENT)
Dept: GERIATRIC MEDICINE | Facility: CLINIC | Age: 73
End: 2023-10-17
Payer: COMMERCIAL

## 2023-10-17 DIAGNOSIS — E78.5 HYPERLIPIDEMIA LDL GOAL <70: ICD-10-CM

## 2023-10-17 RX ORDER — ATORVASTATIN CALCIUM 80 MG/1
40 TABLET, FILM COATED ORAL DAILY
Qty: 90 TABLET | Refills: 0 | Status: SHIPPED | OUTPATIENT
Start: 2023-10-17 | End: 2024-04-23

## 2023-10-17 ASSESSMENT — LIFESTYLE VARIABLES
AUDIT-C TOTAL SCORE: 0
SKIP TO QUESTIONS 9-10: 1

## 2023-10-17 NOTE — PROGRESS NOTES
[facility] Care Coordination Contact    Received after visit chart from care coordinator.  Completed following tasks: Mailed copy of care plan to client, Mailed Safe Medication Disposal , Mailed [employer] Leave Behind Letter, Submitted referrals/auths for hmkg & PERS, and Updated services in Database  , Provider Signature - No POC Shared:  Member indicates that they do not want their POC shared with any EW providers.    [employer]:  Emailed completed PCA assessment to [employer]. Mailed copy to member.  Faxed MD Communication to PCP. Member no longer qualifies for PCA.    Nataly Pyle  Case Management Specialist   [facility]  [phone]

## 2023-10-17 NOTE — LETTER
October 17, 2023      WOLF MEKAMERRY  [address]  [place] [address]      Dear Wolf:    At [facility], we re dedicated to improving your health and wellness. Enclosed is the Care Plan developed with you on 10/17/2023. Please review the Care Plan carefully.    As a reminder, during your visit we talked about:  Ways to manage your physical and mental health  Using health care to maintain and improve your health   Your preventive care needs     Remember to contact your care coordinator if you:  Are hospitalized, or plan to be hospitalized   Have a fall    Have a change in your physical or mental health  Need help finding support or services    If you have questions, or don t agree with your Care Plan, call me at [phone]. You can also call me if your needs change. TTY users, call the [place] [employer] at (285) or 1-648.475.8567 (yujmaw-ri-rdykkm relay service).    Sincerely,    Viki Avalos RN, PHN    E-mail: [email or web address]  Phone: [phone]      [facility] ([facility] of hospitals in the [place]) is a health plan that contracts with both Medicare and the [place] Medical Assistance (Medicaid) program to provide benefits of both programs to enrollees. Enrollment in [facility] depends on contract renewal.    [id number] accepted    [id number] (07/2022)

## 2023-10-17 NOTE — Clinical Note
Buffy is FYI - member had his home annual visit.   PCA will be terminated as he states he no longer needs assistance with ADL needs - only IADLs needs.   I did confirm with him and left voice message for his daugther,Eloise, about the Metformin decrease from 9/19/23.  It was unclear if he was taking the 1000 mg BID or the 500 mg BID.   Thank you,  Viki Avalos RN, N [facility]

## 2023-10-17 NOTE — PROGRESS NOTES
[facility] Care Coordination Contact    [facility] Home Visit Assessment     Home visit for Health Risk Assessment with Wolf Sen completed on October 17, 2023    Type of residence:: Town home  Current living arrangement:: I live in a private home with family     Assessment completed with:: Patient, Kosovan  - Annie - through [employer] Language Line    Current Care Plan  Member currently receiving the following home care services:     Member currently receiving the following community resources: PCA, Housekeeping/Chore Agency, [employer]      Medication Review  Medication reconciliation completed in Epic: Yes  Medication set-up & administration: Family/informal caregiver sets up weekly.  Self-administers medications.  Medication Risk Assessment Medication (1 or more, place referral to MTM): N/A: No risk factors identified  MTM Referral Placed: No: No risk factors idenified    Mental/Behavioral Health   Depression Screening:   PHQ-2 Total Score (Adult) - Positive if 3 or more points; Administer PHQ-9 if positive: 0       Mental health DX:: No        Falls Assessment:   Fallen 2 or more times in the past year?: No   Any fall with injury in the past year?: No    ADL/IADL Dependencies:   Dependent ADLs:: Independent  Dependent IADLs:: Cleaning, Cooking, Laundry, Shopping, Meal Preparation, Medication Management, Money Management, Transportation    [facility] sponsored benefits: Shared information re: Silver Sneakers/gym memberships, ASA, Calcium +D.    PCA Assessment completed at visit: Yes Annual PCA assessment indicated 0 hours per day of PCA. This is a decrease from the  previous assessment. and Not Applicable   Care Coordinator notified member, daughter, and PCA agency.     Elderly Waiver Eligibility: Yes-will continue on EW    Care Plan & Recommendations: Member no longer qualifies for PCA hours - he states that he has gotten stronger after he was sick from his lung cancer and can complete  ADL cares on his own.  Member is still in need of IADL care.   Will continue with 1. Homemaking, 2. PERS, 3. Monthly supply.     Member inquired about ADULT DAY CARE as he used to go prior to cancer dx - he states that he may want to attend again - he is going to tour a few places and then let Care Coordinator know.     See [facility] for detailed assessment information.    Follow-Up Plan: Member informed of future contact, plan to f/u with member with a 6 month telephone assessment.  Contact information shared with member and family, encouraged member to call with any questions or concerns at any time.    [facility] care continuum providers: Please see Snapshot and Care Management Flowsheets for Specific details of care plan.    This CC note routed to PCP, Kimberly Bell.    Viki Avalos RN, PHN  [facility]

## 2023-11-03 DIAGNOSIS — I10 BENIGN ESSENTIAL HTN: ICD-10-CM

## 2023-11-03 RX ORDER — METOPROLOL SUCCINATE 25 MG/1
TABLET, EXTENDED RELEASE ORAL
Qty: 90 TABLET | Refills: 1 | Status: SHIPPED | OUTPATIENT
Start: 2023-11-03 | End: 2024-04-23

## 2023-11-03 RX ORDER — LOSARTAN POTASSIUM 100 MG/1
100 TABLET ORAL DAILY
Qty: 90 TABLET | Refills: 1 | Status: SHIPPED | OUTPATIENT
Start: 2023-11-03 | End: 2024-04-23

## 2023-11-08 ENCOUNTER — MEDICAL CORRESPONDENCE (OUTPATIENT)
Dept: HEALTH INFORMATION MANAGEMENT | Facility: CLINIC | Age: 73
End: 2023-11-08
Payer: COMMERCIAL

## 2023-11-13 DIAGNOSIS — C34.92 ADENOCARCINOMA, LUNG, LEFT (H): Primary | ICD-10-CM

## 2023-11-18 NOTE — PROGRESS NOTES
11-17-23      Patient calls requesting refill bd pen needles for her insulin -mtm will re-order for her today .    Joan Lopez Rph.  Medication Therapy Management Provider  [phone]     [facility] Initial Evaluation  Subjective:  The history is provided by the patient. A  was used.   Wolf Sen is a 69 year old male with a lumbar condition.  Condition occurred with:  Insidious onset.  Condition occurred: for unknown reasons.  This is a new condition  MD: 4/10/2019    2 MONTH DURATION. .    Patient reports pain:  Lumbar spine right.  Radiates to:  Gluteals right, thigh right, lower leg right and foot right.  Pain is described as cramping and is intermittent and reported as 9/10.  Associated symptoms:  Loss of motion/stiffness and loss of strength. Pain is worse during the night.  Symptoms are exacerbated by bending, sitting, walking and lifting Relieved by: TYLENOL.  Since onset symptoms are unchanged.  Special tests:  X-ray.  Previous treatment: NONE.    General health as reported by patient is fair.  Pertinent medical history includes:  High blood pressure, stroke and diabetes.  Medical allergies: no.  Other surgeries include:  Other.  Current medications:  High blood pressure medication and pain medication.  Current occupation is NOT WORKING. .        Barriers include:  None as reported by the patient.    Red flags:  Chest pain (SUGGESTED HE MAKE HIS MD AWARE. ).                        Objective:  System         Lumbar/SI Evaluation    Lumbar Myotomes:    T12-L3 (Hip Flex):  Left: 5    Right: 5  L2-4 (Quads):  Left:  5    Right:  5  L4 (Ankle DF):  Left:  5    Right:  5  L5 (Great Toe Ext): Left: 5    Right: 5   S1 (Toe Raise):  Left: 5    Right: 5                                                                 Terry Lumbar Evaluation    Posture:  Sitting: fair        Correction of Posture: better    Movement Loss:  Flexion (Flex): min    Side Glide R (SG R): pain and nil  Side Glide L (SG L): pain and nil  Test Movements:  FIS: During: peripheralizing and increases      EIS: During: centralizing    Repeat EIS: During: centralizing          SGIS L:  During: no effect      Conclusion: derangement.    Rx: neutral spine, sitting with towel roll, Therapeutic Ex, Therapeutic activity, NMR,  Manual therapy.                                            ROS    Assessment/Plan:    Patient is a 69 year old male with lumbar complaints.    Patient has the following significant findings with corresponding treatment plan.                Diagnosis 1:  ACUTE MIDLINE LOW BACK PAIN WITH R SIDED SCIATICA.  Pain -  hot/cold therapy, US, electric stimulation, manual therapy, splint/taping/bracing/orthotics, self management, education, directional preference exercise and home program  Decreased ROM/flexibility - manual therapy, therapeutic exercise, therapeutic activity and home program  Decreased function - therapeutic activities and home program  Impaired posture - neuro re-education, therapeutic activities and home program    Therapy Evaluation Codes:   1) History comprised of:   Personal factors that impact the plan of care:      Past/current experiences.    Comorbidity factors that impact the plan of care are:      Diabetes and Stroke.     Medications impacting care: None.  2) Examination of Body Systems comprised of:   Body structures and functions that impact the plan of care:      Lumbar spine.   Activity limitations that impact the plan of care are:      Bathing, Bending, Dressing, Lifting, Working, Sleeping and Transfers. .  3) Clinical presentation characteristics are:   Stable/Uncomplicated.  4) Decision-Making    Low complexity using standardized patient assessment instrument and/or measureable assessment of functional outcome.  Cumulative Therapy Evaluation is: Low complexity.    Previous and current functional limitations:  (See Goal Flow Sheet for this information)    Short term and Long term goals: (See Goal Flow Sheet for this information)     Communication ability:  Patient appears to be able to clearly communicate and understand verbal and written communication and  follow directions correctly.  Treatment Explanation - The following has been discussed with the patient:   RX ordered/plan of care  Anticipated outcomes  Possible risks and side effects  This patient would benefit from PT intervention to resume normal activities.   Rehab potential is good.    Frequency:  1 X week, once daily  Duration:  for 6 weeks  Discharge Plan:  Achieve all LTG.  Independent in home treatment program.  Reach maximal therapeutic benefit.    Please refer to the daily flowsheet for treatment today, total treatment time and time spent performing 1:1 timed codes.

## 2023-12-13 DIAGNOSIS — C34.92 ADENOCARCINOMA, LUNG, LEFT (H): Primary | ICD-10-CM

## 2023-12-15 ENCOUNTER — LAB (OUTPATIENT)
Dept: LAB | Facility: CLINIC | Age: 73
End: 2023-12-15
Payer: COMMERCIAL

## 2023-12-15 ENCOUNTER — ANCILLARY PROCEDURE (OUTPATIENT)
Dept: PET IMAGING | Facility: CLINIC | Age: 73
End: 2023-12-15
Attending: INTERNAL MEDICINE
Payer: COMMERCIAL

## 2023-12-15 ENCOUNTER — DOCUMENTATION ONLY (OUTPATIENT)
Dept: ONCOLOGY | Facility: CLINIC | Age: 73
End: 2023-12-15

## 2023-12-15 DIAGNOSIS — C34.92 STAGE IV ADENOCARCINOMA OF LUNG, LEFT (H): ICD-10-CM

## 2023-12-15 DIAGNOSIS — C34.32 PRIMARY CANCER OF LEFT LOWER LOBE OF LUNG (H): ICD-10-CM

## 2023-12-15 DIAGNOSIS — C34.90 MALIGNANT NEOPLASM OF UNSPECIFIED PART OF UNSPECIFIED BRONCHUS OR LUNG (H): Primary | ICD-10-CM

## 2023-12-15 LAB
ALBUMIN SERPL BCG-MCNC: 4 G/DL (ref 3.5–5.2)
ALP SERPL-CCNC: 70 U/L (ref 40–150)
ALT SERPL W P-5'-P-CCNC: 28 U/L (ref 0–70)
ANION GAP SERPL CALCULATED.3IONS-SCNC: 9 MMOL/L (ref 7–15)
AST SERPL W P-5'-P-CCNC: 29 U/L (ref 0–45)
BASOPHILS # BLD AUTO: 0 10E3/UL (ref 0–0.2)
BASOPHILS NFR BLD AUTO: 1 %
BILIRUB SERPL-MCNC: 0.5 MG/DL
BUN SERPL-MCNC: 21.4 MG/DL (ref 8–23)
CALCIUM SERPL-MCNC: 8.8 MG/DL (ref 8.8–10.2)
CHLORIDE SERPL-SCNC: 105 MMOL/L (ref 98–107)
CREAT SERPL-MCNC: 1.43 MG/DL (ref 0.67–1.17)
DEPRECATED HCO3 PLAS-SCNC: 25 MMOL/L (ref 22–29)
EGFRCR SERPLBLD CKD-EPI 2021: 51 ML/MIN/1.73M2
EOSINOPHIL # BLD AUTO: 0.3 10E3/UL (ref 0–0.7)
EOSINOPHIL NFR BLD AUTO: 4 %
ERYTHROCYTE [DISTWIDTH] IN BLOOD BY AUTOMATED COUNT: 14.8 % (ref 10–15)
GLUCOSE SERPL-MCNC: 109 MG/DL (ref 70–99)
HCT VFR BLD AUTO: 36.8 % (ref 40–53)
HGB BLD-MCNC: 11.9 G/DL (ref 13.3–17.7)
IMM GRANULOCYTES # BLD: 0 10E3/UL
IMM GRANULOCYTES NFR BLD: 0 %
LYMPHOCYTES # BLD AUTO: 2 10E3/UL (ref 0.8–5.3)
LYMPHOCYTES NFR BLD AUTO: 31 %
MCH RBC QN AUTO: 25.4 PG (ref 26.5–33)
MCHC RBC AUTO-ENTMCNC: 32.3 G/DL (ref 31.5–36.5)
MCV RBC AUTO: 79 FL (ref 78–100)
MONOCYTES # BLD AUTO: 0.8 10E3/UL (ref 0–1.3)
MONOCYTES NFR BLD AUTO: 12 %
NEUTROPHILS # BLD AUTO: 3.4 10E3/UL (ref 1.6–8.3)
NEUTROPHILS NFR BLD AUTO: 52 %
NRBC # BLD AUTO: 0 10E3/UL
NRBC BLD AUTO-RTO: 0 /100
PLATELET # BLD AUTO: 156 10E3/UL (ref 150–450)
POTASSIUM SERPL-SCNC: 3.8 MMOL/L (ref 3.4–5.3)
PROT SERPL-MCNC: 7.7 G/DL (ref 6.4–8.3)
RBC # BLD AUTO: 4.69 10E6/UL (ref 4.4–5.9)
SODIUM SERPL-SCNC: 139 MMOL/L (ref 135–145)
TSH SERPL DL<=0.005 MIU/L-ACNC: 1.59 UIU/ML (ref 0.3–4.2)
WBC # BLD AUTO: 6.5 10E3/UL (ref 4–11)

## 2023-12-15 PROCEDURE — 85025 COMPLETE CBC W/AUTO DIFF WBC: CPT

## 2023-12-15 PROCEDURE — 36415 COLL VENOUS BLD VENIPUNCTURE: CPT

## 2023-12-15 PROCEDURE — 78816 PET IMAGE W/CT FULL BODY: CPT | Mod: GC | Performed by: RADIOLOGY

## 2023-12-15 PROCEDURE — 84443 ASSAY THYROID STIM HORMONE: CPT

## 2023-12-15 PROCEDURE — A9552 F18 FDG: HCPCS | Performed by: RADIOLOGY

## 2023-12-15 PROCEDURE — 80053 COMPREHEN METABOLIC PANEL: CPT

## 2023-12-18 ENCOUNTER — ONCOLOGY VISIT (OUTPATIENT)
Dept: ONCOLOGY | Facility: CLINIC | Age: 73
End: 2023-12-18
Attending: INTERNAL MEDICINE
Payer: COMMERCIAL

## 2023-12-18 VITALS
TEMPERATURE: 97.6 F | DIASTOLIC BLOOD PRESSURE: 89 MMHG | SYSTOLIC BLOOD PRESSURE: 217 MMHG | RESPIRATION RATE: 16 BRPM | HEIGHT: 60 IN | WEIGHT: 142.6 LBS | BODY MASS INDEX: 28 KG/M2 | HEART RATE: 59 BPM | OXYGEN SATURATION: 97 %

## 2023-12-18 DIAGNOSIS — C34.92 ADENOCARCINOMA, LUNG, LEFT (H): Primary | ICD-10-CM

## 2023-12-18 DIAGNOSIS — C34.90 MALIGNANT NEOPLASM OF UNSPECIFIED PART OF UNSPECIFIED BRONCHUS OR LUNG (H): ICD-10-CM

## 2023-12-18 DIAGNOSIS — R53.0 NEOPLASTIC (MALIGNANT) RELATED FATIGUE: ICD-10-CM

## 2023-12-18 DIAGNOSIS — C34.92 STAGE IV ADENOCARCINOMA OF LUNG, LEFT (H): ICD-10-CM

## 2023-12-18 DIAGNOSIS — C34.32 ADENOCARCINOMA OF LOWER LOBE OF LEFT LUNG (H): ICD-10-CM

## 2023-12-18 DIAGNOSIS — I16.1 HYPERTENSIVE EMERGENCY: ICD-10-CM

## 2023-12-18 PROCEDURE — 91320 SARSCV2 VAC 30MCG TRS-SUC IM: CPT | Performed by: INTERNAL MEDICINE

## 2023-12-18 PROCEDURE — 99213 OFFICE O/P EST LOW 20 MIN: CPT | Mod: 25 | Performed by: INTERNAL MEDICINE

## 2023-12-18 PROCEDURE — 90480 ADMN SARSCOV2 VAC 1/ONLY CMP: CPT | Performed by: INTERNAL MEDICINE

## 2023-12-18 ASSESSMENT — PAIN SCALES - GENERAL: PAINLEVEL: NO PAIN (0)

## 2023-12-18 NOTE — NURSING NOTE
Oncology Rooming Note    December 18, 2023 2:56 PM   Wolf Sen is a 73 year old male who presents for:    Chief Complaint   Patient presents with    Oncology Clinic Visit     Follow up     Initial Vitals: BP (!) 214/90   Pulse 63   Temp 97.6  F (36.4  C)   Resp 16   Ht 1.524 m (5')   Wt 64.7 kg (142 lb 9.6 oz)   SpO2 97%   BMI 27.85 kg/m   Estimated body mass index is 27.85 kg/m  as calculated from the following:    Height as of this encounter: 1.524 m (5').    Weight as of this encounter: 64.7 kg (142 lb 9.6 oz). Body surface area is 1.65 meters squared.  No Pain (0) Comment: Data Unavailable   No LMP for male patient.  Allergies reviewed: Yes  Medications reviewed: Yes    Medications: Medication refills not needed today.  Pharmacy name entered into EPIC:    WRITTEN PRESCRIPTION REQUESTED  [id number] - [place], [place] - [address] [id number] - [place], [place] - [address] AT [facility] & [address] [place] [address] [facility] - [place], [place] - [address]    Clinical concerns: No Concerns      Mary Grace Trevizo MA

## 2023-12-18 NOTE — LETTER
2023         RE: Wolf Sen  [address]  [place] [address]        Dear Colleague,    Thank you for referring your patient, Wolf Sen, to the [facility]. Please see a copy of my visit note below.    [facility] Hematology / Oncology  Progress Note Dec 18, 2023  Name: Wolf Sen  :  1950    MRN:  [id number]    --------------------    Assessment / Plan:  Locally advanced, unresectable, left-sided lung adenocarcinoma:  # 2021 Presented w/ dyspnea, cough and multiple failed rounds of antibiotics for presumed pneumonia.  # Dec 2021 CT-guided lung biopsy w/ adenocarcinoma.  # 2022 Staging brain MRI and PET scan with bilateral lung involvement (CRISTO primary) and med/hilar adenopathy.  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    Clinically and radiographically, Wolf continues to be in a near complete remission on osimertinib.  We have plans to continue osimertinib indefinitely until toxicity or disease progression.  Given plans for travel outside the country, we will look to give him a 2-month supply with his next refill.  We agreed to follow-up in 4 months time with the labs and PET CT scan.  Of note his blood pressure was markedly elevated today; at home his blood pressure runs 160/80; I asked him to reach out to his primary care provider.  Planning brain MRI PRN symptoms given long-term stability.  Age-appropriate vaccinations reviewed today.    Miles Estrada MD    --------------------    Interval History:  Wolf returns for follow-up of lung cancer accompanied by his family member.  All in all, he continues to enjoy good health.  No nausea or vomiting.  Some dry itchy skin with the winter weather and cold.  No diarrhea.  Stable appetite, energy and weight.  No shortness of breath or cough.    Social History:  Social History     Tobacco Use     Smoking status: Never      Smokeless tobacco: Never   Substance Use Topics     Alcohol use: No     Family History:  Family History   Problem Relation Age of Onset     Hypertension Father      Cancer No family hx of      Diabetes No family hx of      Cerebrovascular Disease No family hx of      Thyroid Disease No family hx of      Glaucoma No family hx of      Macular Degeneration No family hx of      Immunizations:  Immunization History   Administered Date(s) Administered     COVID-19 12+ (2023-24) (Pfizer) 12/18/2023     COVID-19 Bivalent 12+ (Pfizer) 09/30/2022     COVID-19 MONOVALENT 12+ (Pfizer) 03/04/2021, 03/25/2021     COVID-19 Monovalent 12+ (Pfizer 2022) 03/02/2022     Influenza (High Dose) 3 valent vaccine 10/08/2014, 10/15/2015, 11/16/2016, 01/17/2018, 11/01/2018, 09/20/2019     Influenza (IIV3) PF 10/12/2012, 11/15/2013, 09/01/2014     Influenza Vaccine 65+ (Fluzone HD) 01/19/2021, 09/15/2021, 09/30/2022, 09/19/2023     Influenza Vaccine >6 months,quad, PF 12/22/2005, 11/15/2013     Pneumo Conj 13-V (2010&after) 11/01/2018     Pneumococcal 23 valent 11/15/2013, 05/05/2014     TD,PF 7+ (Tenivac) 03/15/2023     TDAP (Adacel,Boostrix) 10/12/2012     Zoster vaccine, live 03/02/2016     Health Maintenance Due   Topic Date Due     ZOSTER IMMUNIZATION (1 of 2) 04/27/2016     --------------------    Physical Exam:  VS: BP (!) 217/89   Pulse 59   Temp 97.6  F (36.4  C)   Resp 16   Ht 1.524 m (5')   Wt 64.7 kg (142 lb 9.6 oz)   SpO2 97%   BMI 27.85 kg/m  .  GEN: Well appearing.    Labs / Imaging:  Reviewed CBC, CMP, TSH.  Reviewed PET w/ independent review.      Again, thank you for allowing me to participate in the care of your patient.        Sincerely,        Miles Estrada MD

## 2023-12-19 DIAGNOSIS — C34.92 ADENOCARCINOMA, LUNG, LEFT (H): Primary | ICD-10-CM

## 2023-12-19 NOTE — PROGRESS NOTES
[facility] Hematology / Oncology  Progress Note Dec 18, 2023  Name: Wolf Sen  :  1950    MRN:  [id number]    --------------------    Assessment / Plan:  Locally advanced, unresectable, left-sided lung adenocarcinoma:  # 2021 Presented w/ dyspnea, cough and multiple failed rounds of antibiotics for presumed pneumonia.  # Dec 2021 CT-guided lung biopsy w/ adenocarcinoma.  # 2022 Staging brain MRI and PET scan with bilateral lung involvement (CRISTO primary) and med/hilar adenopathy.  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  # 2022 Osimertinib - ongoing; NM (CR minus a stable thoracic node).    Clinically and radiographically, Wolf continues to be in a near complete remission on osimertinib.  We have plans to continue osimertinib indefinitely until toxicity or disease progression.  Given plans for travel outside the country, we will look to give him a 2-month supply with his next refill.  We agreed to follow-up in 4 months time with the labs and PET CT scan.  Of note his blood pressure was markedly elevated today; at home his blood pressure runs 160/80; I asked him to reach out to his primary care provider.  Planning brain MRI PRN symptoms given long-term stability.  Age-appropriate vaccinations reviewed today.    Miles Estrada MD    --------------------    Interval History:  Wolf returns for follow-up of lung cancer accompanied by his family member.  All in all, he continues to enjoy good health.  No nausea or vomiting.  Some dry itchy skin with the winter weather and cold.  No diarrhea.  Stable appetite, energy and weight.  No shortness of breath or cough.    Social History:  Social History     Tobacco Use    Smoking status: Never    Smokeless tobacco: Never   Substance Use Topics    Alcohol use: No     Family History:  Family History   Problem Relation Age of Onset    Hypertension Father     Cancer No family hx of     Diabetes No family hx of     Cerebrovascular Disease No family  hx of     Thyroid Disease No family hx of     Glaucoma No family hx of     Macular Degeneration No family hx of      Immunizations:  Immunization History   Administered Date(s) Administered    COVID-19 12+ (2023-24) (Pfizer) 12/18/2023    COVID-19 Bivalent 12+ (Pfizer) 09/30/2022    COVID-19 MONOVALENT 12+ (Pfizer) 03/04/2021, 03/25/2021    COVID-19 Monovalent 12+ (Pfizer 2022) 03/02/2022    Influenza (High Dose) 3 valent vaccine 10/08/2014, 10/15/2015, 11/16/2016, 01/17/2018, 11/01/2018, 09/20/2019    Influenza (IIV3) PF 10/12/2012, 11/15/2013, 09/01/2014    Influenza Vaccine 65+ (Fluzone HD) 01/19/2021, 09/15/2021, 09/30/2022, 09/19/2023    Influenza Vaccine >6 months,quad, PF 12/22/2005, 11/15/2013    Pneumo Conj 13-V (2010&after) 11/01/2018    Pneumococcal 23 valent 11/15/2013, 05/05/2014    TD,PF 7+ (Tenivac) 03/15/2023    TDAP (Adacel,Boostrix) 10/12/2012    Zoster vaccine, live 03/02/2016     Health Maintenance Due   Topic Date Due    ZOSTER IMMUNIZATION (1 of 2) 04/27/2016     --------------------    Physical Exam:  VS: BP (!) 217/89   Pulse 59   Temp 97.6  F (36.4  C)   Resp 16   Ht 1.524 m (5')   Wt 64.7 kg (142 lb 9.6 oz)   SpO2 97%   BMI 27.85 kg/m  .  GEN: Well appearing.    Labs / Imaging:  Reviewed CBC, CMP, TSH.  Reviewed PET w/ independent review.

## 2023-12-28 DIAGNOSIS — E11.49 TYPE 2 DIABETES MELLITUS WITH NEUROLOGICAL MANIFESTATIONS, CONTROLLED (H): ICD-10-CM

## 2024-03-12 DIAGNOSIS — C34.92 ADENOCARCINOMA, LUNG, LEFT (H): Primary | ICD-10-CM

## 2024-03-28 ENCOUNTER — TELEPHONE (OUTPATIENT)
Dept: FAMILY MEDICINE | Facility: CLINIC | Age: 74
End: 2024-03-28
Payer: COMMERCIAL

## 2024-03-28 NOTE — TELEPHONE ENCOUNTER
Patient Quality Outreach    Patient is due for the following:   Diabetes -  Eye Exam and Foot Exam  Hypertension -  Hypertension follow-up visit  Physical Annual Wellness Visit      Topic Date Due    Zoster (Shingles) Vaccine (1 of 2) 04/27/2016       Next Steps:   Schedule a Annual Wellness Visit    Type of outreach:    Sent [employer] message.      Questions for provider review:    None           Gaby Romeo MA

## 2024-04-02 DIAGNOSIS — E11.49 TYPE 2 DIABETES MELLITUS WITH NEUROLOGICAL MANIFESTATIONS, CONTROLLED (H): ICD-10-CM

## 2024-04-02 NOTE — TELEPHONE ENCOUNTER
Called and left a voicemail message regarding a jonna refill sent to the patient's pharmacy and to return our call to schedule a visit for further refills.  Caterina Leary [facility]  2nd Floor  Primary Care

## 2024-04-04 ENCOUNTER — PATIENT OUTREACH (OUTPATIENT)
Dept: GERIATRIC MEDICINE | Facility: CLINIC | Age: 74
End: 2024-04-04
Payer: COMMERCIAL

## 2024-04-04 NOTE — PROGRESS NOTES
Per Maddi Ensure is tentatively going out for delivery on 4/4/24. CC notified.    Nataly Pyle  Case Management Specialist   [facility]  [phone]

## 2024-04-04 NOTE — PROGRESS NOTES
[facility] Care Coordination Contact      [facility] Six-Month Telephone Assessment    6 month telephone assessment completed on 4/4/24 with member.    ER visits: No  Hospitalizations: No  TCU stays: No  Significant health status changes: none  Falls/Injuries: No  ADL/IADL changes: No  Changes in services: No    Caregiver Assessment follow up:  N/A    Goals: See POC in chart for goal progress documentation.  Member states he is doing very well - no questions or needs.      Received from [employer]: Wolf Sen-we have limited nutritonal supplements, if instock client's order will ship 4/4/24 for Ensure. - relayed to member - he understands and is ok if he gets or not.     Will see member in 6 months for an annual health risk assessment.   Encouraged member to call CC with any questions or concerns in the meantime.     Viki Avalos RN, PHN  [facility]

## 2024-04-10 DIAGNOSIS — C34.92 ADENOCARCINOMA, LUNG, LEFT (H): Primary | ICD-10-CM

## 2024-04-23 ENCOUNTER — OFFICE VISIT (OUTPATIENT)
Dept: FAMILY MEDICINE | Facility: CLINIC | Age: 74
End: 2024-04-23
Payer: COMMERCIAL

## 2024-04-23 VITALS
SYSTOLIC BLOOD PRESSURE: 139 MMHG | WEIGHT: 135.6 LBS | DIASTOLIC BLOOD PRESSURE: 70 MMHG | RESPIRATION RATE: 18 BRPM | HEART RATE: 89 BPM | BODY MASS INDEX: 22.59 KG/M2 | OXYGEN SATURATION: 99 % | HEIGHT: 65 IN

## 2024-04-23 DIAGNOSIS — C34.92 ADENOCARCINOMA, LUNG, LEFT (H): ICD-10-CM

## 2024-04-23 DIAGNOSIS — C34.90 MALIGNANT NEOPLASM OF UNSPECIFIED PART OF UNSPECIFIED BRONCHUS OR LUNG (H): ICD-10-CM

## 2024-04-23 DIAGNOSIS — E78.5 HYPERLIPIDEMIA LDL GOAL <70: ICD-10-CM

## 2024-04-23 DIAGNOSIS — R53.0 NEOPLASTIC (MALIGNANT) RELATED FATIGUE: ICD-10-CM

## 2024-04-23 DIAGNOSIS — N18.30 STAGE 3 CHRONIC KIDNEY DISEASE, UNSPECIFIED WHETHER STAGE 3A OR 3B CKD (H): ICD-10-CM

## 2024-04-23 DIAGNOSIS — E11.49 TYPE 2 DIABETES MELLITUS WITH NEUROLOGICAL MANIFESTATIONS, CONTROLLED (H): ICD-10-CM

## 2024-04-23 DIAGNOSIS — I10 BENIGN ESSENTIAL HTN: ICD-10-CM

## 2024-04-23 DIAGNOSIS — I42.7 CARDIOMYOPATHY SECONDARY TO DRUG (H): ICD-10-CM

## 2024-04-23 DIAGNOSIS — Z00.00 ENCOUNTER FOR MEDICARE ANNUAL WELLNESS EXAM: Primary | ICD-10-CM

## 2024-04-23 DIAGNOSIS — N18.31 STAGE 3A CHRONIC KIDNEY DISEASE (H): ICD-10-CM

## 2024-04-23 LAB
BASOPHILS # BLD AUTO: 0 10E3/UL (ref 0–0.2)
BASOPHILS NFR BLD AUTO: 0 %
EOSINOPHIL # BLD AUTO: 0.2 10E3/UL (ref 0–0.7)
EOSINOPHIL NFR BLD AUTO: 4 %
ERYTHROCYTE [DISTWIDTH] IN BLOOD BY AUTOMATED COUNT: 14.6 % (ref 10–15)
HBA1C MFR BLD: 6.1 % (ref 0–5.6)
HCT VFR BLD AUTO: 36.1 % (ref 40–53)
HGB BLD-MCNC: 11.7 G/DL (ref 13.3–17.7)
IMM GRANULOCYTES # BLD: 0 10E3/UL
IMM GRANULOCYTES NFR BLD: 0 %
LYMPHOCYTES # BLD AUTO: 1.8 10E3/UL (ref 0.8–5.3)
LYMPHOCYTES NFR BLD AUTO: 30 %
MCH RBC QN AUTO: 25.7 PG (ref 26.5–33)
MCHC RBC AUTO-ENTMCNC: 32.4 G/DL (ref 31.5–36.5)
MCV RBC AUTO: 79 FL (ref 78–100)
MONOCYTES # BLD AUTO: 0.6 10E3/UL (ref 0–1.3)
MONOCYTES NFR BLD AUTO: 10 %
NEUTROPHILS # BLD AUTO: 3.4 10E3/UL (ref 1.6–8.3)
NEUTROPHILS NFR BLD AUTO: 57 %
PLATELET # BLD AUTO: 149 10E3/UL (ref 150–450)
RBC # BLD AUTO: 4.56 10E6/UL (ref 4.4–5.9)
WBC # BLD AUTO: 6 10E3/UL (ref 4–11)

## 2024-04-23 PROCEDURE — 85025 COMPLETE CBC W/AUTO DIFF WBC: CPT | Performed by: PREVENTIVE MEDICINE

## 2024-04-23 PROCEDURE — G0439 PPPS, SUBSEQ VISIT: HCPCS | Performed by: PREVENTIVE MEDICINE

## 2024-04-23 PROCEDURE — 36415 COLL VENOUS BLD VENIPUNCTURE: CPT | Performed by: PREVENTIVE MEDICINE

## 2024-04-23 PROCEDURE — 84443 ASSAY THYROID STIM HORMONE: CPT | Performed by: PREVENTIVE MEDICINE

## 2024-04-23 PROCEDURE — 99214 OFFICE O/P EST MOD 30 MIN: CPT | Mod: 25 | Performed by: PREVENTIVE MEDICINE

## 2024-04-23 PROCEDURE — 80053 COMPREHEN METABOLIC PANEL: CPT | Performed by: PREVENTIVE MEDICINE

## 2024-04-23 PROCEDURE — 90480 ADMN SARSCOV2 VAC 1/ONLY CMP: CPT | Performed by: PREVENTIVE MEDICINE

## 2024-04-23 PROCEDURE — 83036 HEMOGLOBIN GLYCOSYLATED A1C: CPT | Performed by: PREVENTIVE MEDICINE

## 2024-04-23 PROCEDURE — 91320 SARSCV2 VAC 30MCG TRS-SUC IM: CPT | Performed by: PREVENTIVE MEDICINE

## 2024-04-23 PROCEDURE — 82570 ASSAY OF URINE CREATININE: CPT | Performed by: PREVENTIVE MEDICINE

## 2024-04-23 PROCEDURE — 80061 LIPID PANEL: CPT | Performed by: PREVENTIVE MEDICINE

## 2024-04-23 PROCEDURE — 82043 UR ALBUMIN QUANTITATIVE: CPT | Performed by: PREVENTIVE MEDICINE

## 2024-04-23 RX ORDER — RESPIRATORY SYNCYTIAL VIRUS VACCINE 120MCG/0.5
0.5 KIT INTRAMUSCULAR ONCE
Qty: 1 EACH | Refills: 0 | Status: CANCELLED | OUTPATIENT
Start: 2024-04-23 | End: 2024-04-23

## 2024-04-23 RX ORDER — INSULIN GLARGINE 100 [IU]/ML
INJECTION, SOLUTION SUBCUTANEOUS
Qty: 30 ML | Refills: 0 | Status: SHIPPED | OUTPATIENT
Start: 2024-04-23 | End: 2024-07-30

## 2024-04-23 RX ORDER — METOPROLOL SUCCINATE 25 MG/1
TABLET, EXTENDED RELEASE ORAL
Qty: 90 TABLET | Refills: 1 | Status: SHIPPED | OUTPATIENT
Start: 2024-04-23

## 2024-04-23 RX ORDER — INSULIN ASPART 100 [IU]/ML
INJECTION, SOLUTION INTRAVENOUS; SUBCUTANEOUS
Qty: 30 ML | Refills: 1 | Status: SHIPPED | OUTPATIENT
Start: 2024-04-23

## 2024-04-23 RX ORDER — HYDRALAZINE HYDROCHLORIDE 25 MG/1
25 TABLET, FILM COATED ORAL 3 TIMES DAILY
Qty: 270 TABLET | Refills: 1 | Status: SHIPPED | OUTPATIENT
Start: 2024-04-23

## 2024-04-23 RX ORDER — ATORVASTATIN CALCIUM 80 MG/1
40 TABLET, FILM COATED ORAL DAILY
Qty: 90 TABLET | Refills: 0 | Status: SHIPPED | OUTPATIENT
Start: 2024-04-23

## 2024-04-23 RX ORDER — MULTIPLE VITAMINS W/ MINERALS TAB 9MG-400MCG
1 TAB ORAL DAILY
Qty: 90 TABLET | Refills: 3 | Status: SHIPPED | OUTPATIENT
Start: 2024-04-23

## 2024-04-23 RX ORDER — LOSARTAN POTASSIUM 100 MG/1
100 TABLET ORAL DAILY
Qty: 90 TABLET | Refills: 1 | Status: SHIPPED | OUTPATIENT
Start: 2024-04-23

## 2024-04-23 SDOH — HEALTH STABILITY: PHYSICAL HEALTH: ON AVERAGE, HOW MANY DAYS PER WEEK DO YOU ENGAGE IN MODERATE TO STRENUOUS EXERCISE (LIKE A BRISK WALK)?: 3 DAYS

## 2024-04-23 SDOH — HEALTH STABILITY: PHYSICAL HEALTH: ON AVERAGE, HOW MANY MINUTES DO YOU ENGAGE IN EXERCISE AT THIS LEVEL?: 10 MIN

## 2024-04-23 ASSESSMENT — SOCIAL DETERMINANTS OF HEALTH (SDOH)
HOW OFTEN DO YOU GET TOGETHER WITH FRIENDS OR RELATIVES?: ONCE A WEEK
HOW OFTEN DO YOU GET TOGETHER WITH FRIENDS OR RELATIVES?: ONCE A WEEK

## 2024-04-23 ASSESSMENT — PAIN SCALES - GENERAL: PAINLEVEL: NO PAIN (0)

## 2024-04-23 NOTE — PATIENT INSTRUCTIONS
Preventive Care Advice   This is general advice given by our system to help you stay healthy. However, your care team may have specific advice just for you. Please talk to your care team about your preventive care needs.  Nutrition  Eat 5 or more servings of fruits and vegetables each day.  Try wheat bread, brown rice and whole grain pasta (instead of white bread, rice, and pasta).  Get enough calcium and vitamin D. Check the label on foods and aim for 100% of the RDA (recommended daily allowance).  Lifestyle  Exercise at least 150 minutes each week   (30 minutes a day, 5 days a week).  Do muscle strengthening activities 2 days a week. These help control your weight and prevent disease.  No smoking.  Wear sunscreen to prevent skin cancer.  Have a dental exam and cleaning every 6 months.  Yearly exams  See your health care team every year to talk about:  Any changes in your health.  Any medicines your care team has prescribed.  Preventive care, family planning, and ways to prevent chronic diseases.  Shots (vaccines)   HPV shots (up to age 26), if you've never had them before.  Hepatitis B shots (up to age 59), if you've never had them before.  COVID-19 shot: Get this shot when it's due.  Flu shot: Get a flu shot every year.  Tetanus shot: Get a tetanus shot every 10 years.  Pneumococcal, hepatitis A, and RSV shots: Ask your care team if you need these based on your risk.  Shingles shot (for age 50 and up).  General health tests  Diabetes screening:  Starting at age 35, Get screened for diabetes at least every 3 years.  If you are younger than age 35, ask your care team if you should be screened for diabetes.  Cholesterol test: At age 39, start having a cholesterol test every 5 years, or more often if advised.  Bone density scan (DEXA): At age 50, ask your care team if you should have this scan for osteoporosis (brittle bones).  Hepatitis C: Get tested at least once in your life.  STIs (sexually transmitted  infections)  Before age 24: Ask your care team if you should be screened for STIs.  After age 24: Get screened for STIs if you're at risk. You are at risk for STIs (including HIV) if:  You are sexually active with more than one person.  You don't use condoms every time.  You or a partner was diagnosed with a sexually transmitted infection.  If you are at risk for HIV, ask about PrEP medicine to prevent HIV.  Get tested for HIV at least once in your life, whether you are at risk for HIV or not.  Cancer screening tests  Cervical cancer screening: If you have a cervix, begin getting regular cervical cancer screening tests at age 21. Most people who have regular screenings with normal results can stop after age 65. Talk about this with your provider.  Breast cancer scan (mammogram): If you've ever had breasts, begin having regular mammograms starting at age 40. This is a scan to check for breast cancer.  Colon cancer screening: It is important to start screening for colon cancer at age 45.  Have a colonoscopy test every 10 years (or more often if you're at risk) Or, ask your provider about stool tests like a FIT test every year or Cologuard test every 3 years.  To learn more about your testing options, visit: https://www.[employer]/532171.pdf.  For help making a decision, visit: https://bit.ly/at85441.  Prostate cancer screening test: If you have a prostate and are age 55 to 69, ask your provider if you would benefit from a yearly prostate cancer screening test.  Lung cancer screening: If you are a current or former smoker age 50 to 80, ask your care team if ongoing lung cancer screenings are right for you.  For informational purposes only. Not to replace the advice of your health care provider. Copyright   2023 [employer]. All rights reserved. Clinically reviewed by the [facility] Transitions Program. [employer] [id number] - REV 01/24.

## 2024-04-23 NOTE — PROGRESS NOTES
Preventive Care Visit  [facility] ATAIRVIN Bell MD, Family Medicine  Apr 23, 2024      Assessment & Plan     Encounter for Medicare annual wellness exam  -preventive guidelines reviewed     Stage 3a chronic kidney disease (H)  -no use of NSAIDs  - Albumin Random Urine Quantitative with Creat Ratio    Type 2 diabetes mellitus with neurological manifestations, controlled (H)  - Hemoglobin A1c  - insulin glargine (LANTUS SOLOSTAR) 100 UNIT/ML pen  Dispense: 30 mL; Refill: 0  - insulin aspart (NOVOLOG FLEXPEN) 100 UNIT/ML pen  Dispense: 30 mL; Refill: 1  - metFORMIN (GLUCOPHAGE) 1000 MG tablet  Dispense: 180 tablet; Refill: 1  - multivitamin w/minerals (THERA-VIT-M) tablet  Dispense: 90 tablet; Refill: 3  - Lipid panel reflex to direct LDL Non-fasting  - Hemoglobin A1c    Lab Results   Component Value Date    A1C 6.1 04/23/2024    A1C 6.1 03/15/2023    A1C 6.1 09/30/2022    A1C 7.0 11/24/2021    A1C 6.7 11/27/2020    A1C 8.3 12/18/2019    A1C 11.5 09/20/2019    A1C 10.6 05/09/2019    A1C 8.0 01/11/2019     -no episodes of hypoglycemia.  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  Global peak LV longitudinal strain is averaged at -19.6%. This is within  reported normal limits (normal <-18%).  Global right ventricular function is normal.  Mild aortic insufficiency is present.     This study was compared with the study from 4/2021 .  No significant changes compared to baseline images from stress echo.      Benign essential HTN  -continue current medication   - losartan (COZAAR) 100 MG tablet  Dispense: 90 tablet; Refill: 1  - metoprolol succinate ER (TOPROL XL) 25 MG 24 hr tablet  Dispense: 90 tablet; Refill: 1  - hydrALAZINE (APRESOLINE) 25 MG tablet  Dispense: 270  tablet; Refill: 1    Hyperlipidemia LDL goal <70  - atorvastatin (LIPITOR) 80 MG tablet  Dispense: 90 tablet; Refill: 0    Stage 3 chronic kidney disease, unspecified whether stage 3a or 3b CKD (H)  -no use of NSAIDs    Malignant neoplasm of unspecified part of unspecified bronchus or lung (H)  - Comprehensive metabolic panel  - CBC with Platelets & Differential  - TSH with free T4 reflex    Neoplastic (malignant) related fatigue  - TSH with free T4 reflex      Ordering of each unique test  Prescription drug management  30 minutes spent by me on the date of the encounter doing chart review, history and exam, documentation and further activities per the note      Counseling  Appropriate preventive services were discussed with this patient, including applicable screening as appropriate for fall prevention, nutrition, physical activity, Tobacco-use cessation, weight loss and cognition.  Checklist reviewing preventive services available has been given to the patient.  Reviewed patient's diet, addressing concerns and/or questions.   He is at risk for lack of exercise and has been provided with information to increase physical activity for the benefit of his well-being.       Klaus Bloom is a 74 year old, presenting for the following:  Physical        4/23/2024    10:53 AM   Additional Questions   Roomed by DM         Via the Health Maintenance questionnaire, the patient has reported the following services have been completed -Eye Exam, this information has been sent to the abstraction team.  Health Care Directive  Patient has a Health Care Directive on file  Advance care planning document is on file and is current.    HPI      Visit done with the help of a [place] telephone interpretor.      Wt Readings from Last 2 Encounters:   04/23/24 61.5 kg (135 lb 9.6 oz)   12/18/23 64.7 kg (142 lb 9.6 oz)      Travel in January 24.    Blood pressure:  -not checked at home  -Reading at home at last check 148/86    Diabetes:  -not  checked     Lipids:  -taking statin    Lung cancer:  -has follow up with Oncology     Not hungry, only taking one can of Ensure.  No rectal bleeding  No melena  No hematuria       Wt Readings from Last 2 Encounters:   04/23/24 61.5 kg (135 lb 9.6 oz)   12/18/23 64.7 kg (142 lb 9.6 oz)          4/23/2024   General Health   How would you rate your overall physical health? Good   Feel stress (tense, anxious, or unable to sleep) Not at all         4/23/2024   Nutrition   Diet: Regular (no restrictions)         4/23/2024   Exercise   Days per week of moderate/strenous exercise 3 days   Average minutes spent exercising at this level 10 min         4/23/2024   Social Factors   Frequency of gathering with friends or relatives Once a week   Worry food won't last until get money to buy more No   Food not last or not have enough money for food? No   Do you have housing?  Patient declined   Are you worried about losing your housing? Patient declined   Lack of transportation? No   Unable to get utilities (heat,electricity)? No         4/23/2024   Fall Risk   Fallen 2 or more times in the past year? No    No   Trouble with walking or balance? No    No          4/23/2024   Activities of Daily Living- Home Safety   Needs help with the following daily activites None of the above   Safety concerns in the home None of the above         4/23/2024   Dental   Dentist two times every year? Yes         4/23/2024   Hearing Screening   Hearing concerns? None of the above         4/23/2024   Driving Risk Screening   Patient/family members have concerns about driving (!) DECLINE         4/23/2024   General Alertness/Fatigue Screening   Have you been more tired than usual lately? No         4/23/2024   Urinary Incontinence Screening   Bothered by leaking urine in past 6 months No            Today's PHQ-2 Score:       4/23/2024    11:00 AM   PHQ-2 ( 1999 Pfizer)   Q1: Little interest or pleasure in doing things 0   Q2: Feeling down, depressed or  hopeless 0   PHQ-2 Score 0   Q1: Little interest or pleasure in doing things Not at all   Q2: Feeling down, depressed or hopeless Not at all   PHQ-2 Score 0           4/23/2024   Substance Use   Alcohol more than 3/day or more than 7/wk No   Do you have a current opioid prescription? No   How severe/bad is pain from 1 to 10? 0/10 (No Pain)   Do you use any other substances recreationally? No     Social History     Tobacco Use    Smoking status: Never    Smokeless tobacco: Never   Substance Use Topics    Alcohol use: No    Drug use: No       ASCVD Risk   The ASCVD Risk score (Aruna LAWSON, et al., 2019) failed to calculate for the following reasons:    The patient has a prior MI or stroke diagnosis            Reviewed and updated as needed this visit by Provider   Tobacco  Allergies  Meds  Problems  Med Hx  Surg Hx  Fam Hx            Past Medical History:   Diagnosis Date    Cataracts, both eyes 5/20/2014    Cerebral artery occlusion with cerebral infarction (H)     DM type 2, goal A1c below 7 5/5/2014    Hypertension      Past Surgical History:   Procedure Laterality Date    CATARACT IOL, RT/LT      COLONOSCOPY N/A 12/7/2022    Procedure: COLONOSCOPY, WITH POLYPECTOMY AND BIOPSY;  Surgeon: Rolf Escalante MD;  Location: [facility]    COLONOSCOPY N/A 12/7/2022    Procedure: COLONOSCOPY, FLEXIBLE, WITH LESION REMOVAL USING SNARE;  Surgeon: Rolf Escalante MD;  Location: [facility]    COLONOSCOPY WITH CO2 INSUFFLATION N/A 12/7/2022    Procedure: COLONOSCOPY, WITH CO2 INSUFFLATION;  Surgeon: Rolf Escalante MD;  Location: [facility]    COMBINED REPAIR PTOSIS WITH BLEPHAROPLASTY Bilateral 10/9/2017    Procedure: COMBINED REPAIR PTOSIS WITH BLEPHAROPLASTY;  Bilateral upper eyelid blepharoplasty and ptosis repair;  Surgeon: Bibiana Melara MD;  Location: [facility]    PHACOEMULSIFICATION WITH STANDARD INTRAOCULAR LENS IMPLANT Right 2/25/2016    Procedure: PHACOEMULSIFICATION WITH STANDARD  INTRAOCULAR LENS IMPLANT;  Surgeon: Carlton Don MD;  Location: [facility]    PHACOEMULSIFICATION WITH STANDARD INTRAOCULAR LENS IMPLANT Left 2/11/2016    Procedure: PHACOEMULSIFICATION WITH STANDARD INTRAOCULAR LENS IMPLANT;  Surgeon: Carlton Don MD;  Location: [facility]    REPAIR PTOSIS Bilateral     10/17     Lab work is in process  Labs reviewed in EPIC  BP Readings from Last 3 Encounters:   04/23/24 139/70   12/18/23 (!) 217/89   09/19/23 (!) 155/80    Wt Readings from Last 3 Encounters:   04/23/24 61.5 kg (135 lb 9.6 oz)   12/18/23 64.7 kg (142 lb 9.6 oz)   09/19/23 63.9 kg (140 lb 12.8 oz)                  Patient Active Problem List   Diagnosis    DM type 2, goal A1C below 8.0    HTN, goal below 150/90    Hyperlipidemia LDL goal <70    Overweight (BMI 25.0-29.9)    Cataracts, both eyes    Advance care planning    Type 2 diabetes mellitus with neurological manifestations, controlled (H)    Pseudophakia of left eye    History of carotid endarterectomy    Cerebral infarction (H)    Aortic valve insufficiency    Vitamin D deficiency    CKD (chronic kidney disease) stage 3, GFR 30-59 ml/min (H)    Adenocarcinoma, lung, left (H)    Mouth sore    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)     Past Surgical History:   Procedure Laterality Date    CATARACT IOL, RT/LT      COLONOSCOPY N/A 12/7/2022    Procedure: COLONOSCOPY, WITH POLYPECTOMY AND BIOPSY;  Surgeon: Rolf Escalante MD;  [facility]    COLONOSCOPY N/A 12/7/2022    Procedure: COLONOSCOPY, FLEXIBLE, WITH LESION REMOVAL USING SNARE;  Surgeon: Rolf Escalante MD;  [facility]    COLONOSCOPY WITH CO2 INSUFFLATION N/A 12/7/2022    Procedure: COLONOSCOPY, WITH CO2 INSUFFLATION;  Surgeon: Rolf Escalante MD;  [facility]    COMBINED REPAIR PTOSIS WITH BLEPHAROPLASTY Bilateral 10/9/2017    Procedure: COMBINED REPAIR PTOSIS WITH BLEPHAROPLASTY;  Bilateral upper eyelid blepharoplasty and ptosis repair;   Surgeon: Bibiana Melara MD;  [facility]    PHACOEMULSIFICATION WITH STANDARD INTRAOCULAR LENS IMPLANT Right 2/25/2016    Procedure: PHACOEMULSIFICATION WITH STANDARD INTRAOCULAR LENS IMPLANT;  Surgeon: Carlton Don MD;  [facility]    PHACOEMULSIFICATION WITH STANDARD INTRAOCULAR LENS IMPLANT Left 2/11/2016    Procedure: PHACOEMULSIFICATION WITH STANDARD INTRAOCULAR LENS IMPLANT;  Surgeon: Carlton Don MD;  [facility]    REPAIR PTOSIS Bilateral     10/17       Social History     Tobacco Use    Smoking status: Never    Smokeless tobacco: Never   Substance Use Topics    Alcohol use: No     Family History   Problem Relation Age of Onset    Hypertension Father     Cancer No family hx of     Diabetes No family hx of     Cerebrovascular Disease No family hx of     Thyroid Disease No family hx of     Glaucoma No family hx of     Macular Degeneration No family hx of          Current Outpatient Medications   Medication Sig Dispense Refill    acetaminophen (MAPAP) 500 MG tablet TAKE 2 TABLETS(1000 MG) BY MOUTH THREE TIMES DAILY AS NEEDED FOR PAIN 100 tablet 0    ASPIRIN PO Take 81 mg by mouth      atorvastatin (LIPITOR) 80 MG tablet Take 0.5 tablets (40 mg) by mouth daily 90 tablet 0    ferrous sulfate (FEROSUL) 325 (65 Fe) MG tablet Take 1 tablet (325 mg) by mouth daily (with breakfast) 90 tablet 3    hydrALAZINE (APRESOLINE) 25 MG tablet Take 1 tablet (25 mg) by mouth 3 times daily TAKE 1 TABLET(25 MG) BY MOUTH TWICE DAILY 270 tablet 1    hydrocortisone, Perianal, (HYDROCORTISONE) 2.5 % cream Place rectally 2 times daily as needed for hemorrhoids 30 g 0    insulin aspart (NOVOLOG FLEXPEN) 100 UNIT/ML pen INJECT 15 UNITS UNDER THE SKIN THREE TIMES DAILY WITH MEALS 30 mL 1    insulin glargine (LANTUS SOLOSTAR) 100 UNIT/ML pen ADMINISTER 28 UNITS UNDER THE SKIN AT BEDTIME 30 mL 0    insulin pen needle (32G X 4 MM) 32G X 4 MM miscellaneous Use 4 pen needles daily or as directed. 200  each 11    ketoconazole (NIZORAL) 2 % external cream Apply topically daily 60 g 1    lidocaine, viscous, (XYLOCAINE) 2 % solution Apply 5 mLs topically every 3 hours as needed for other (oral sore) Apply to oral sore with sponge on a stick 100 mL 0    losartan (COZAAR) 100 MG tablet Take 1 tablet (100 mg) by mouth daily 90 tablet 1    metFORMIN (GLUCOPHAGE) 1000 MG tablet Take 1 tablet (1,000 mg) by mouth 2 times daily (with meals) 180 tablet 1    metoprolol succinate ER (TOPROL XL) 25 MG 24 hr tablet TAKE 1 TABLET(25 MG) BY MOUTH EVERY EVENING FOR BLOOD PRESSURE 90 tablet 1    multivitamin w/minerals (THERA-VIT-M) tablet Take 1 tablet by mouth daily 90 tablet 3    osimertinib (TAGRISSO) 80 MG tablet Take 1 tablet (80 mg) by mouth daily 30 tablet 0    SYSTANE ULTRA 0.4-0.3 % SOLN ophthalmic solution INSTILL 1 DROP INTO BOTH EYES QID 10 mL 3    thin (NO BRAND SPECIFIED) lancets Use with lanceting device. To accompany: Blood Glucose Monitor Brands: per insurance. 300 each 11    Alcohol Swabs (B-D SINGLE USE SWABS REGULAR) PADS USE TO SWAB AREA OF INJECTION/RONEY AS DIRECTED 100 each 4    blood glucose (ONETOUCH VERIO IQ) test strip USE TO TEST BLOOD SUGAR 1 TIMES DAILY OR AS DIRECTED 300 strip 11     No Known Allergies  Recent Labs   Lab Test 04/23/24  1214 12/15/23  0820 09/08/23  0728 05/09/23  0732 03/15/23  1637 01/13/23  1211 09/30/22  1521 12/10/21  0826 11/24/21  1007 09/15/21  1226 03/17/21  1432 11/27/20  1104   A1C 6.1*  --   --   --  6.1*  --  6.1*  --  7.0*   < >  --  6.7*   LDL  --   --   --   --   --   --  40  --  61  --   --  93   HDL  --   --   --   --   --   --  51  --  53  --   --  45   TRIG  --   --   --   --   --   --  117  --  148  --   --  178*   ALT  --  28 16 35 42   < >  --    < > 26   < > 52  --    CR  --  1.43* 1.60* 1.48* 1.53*   < >  --    < > 1.17   < > 1.44* 1.37*   GFRESTIMATED  --  51* 45* 49* 47*   < >  --    < > 61   < > 48* 51*   GFRESTBLACK  --   --   --   --   --   --   --   --    "--   --  55* 59*   POTASSIUM  --  3.8 4.1 3.9 4.2   < >  --    < > 3.5   < > 3.7 4.7   TSH  --  1.59 2.26  --  1.32  --   --   --   --    < > 0.64  --     < > = values in this interval not displayed.      Current providers sharing in care for this patient include:  Patient Care Team:  Kimberly Bell MD as PCP - General (Family Practice)  Viki Avalos RN as Lead Care Coordinator  Kimberly Bell MD as Assigned PCP  Evelia Marquez, RN as Specialty Care Coordinator (Hematology & Oncology)  Lexii Hong OD (Optometry)  Lexii Hong OD as Assigned Surgical Provider  Miles Estrada MD as Assigned Pediatric Specialist Provider    The following health maintenance items are reviewed in Epic and correct as of today:  Health Maintenance   Topic Date Due    RSV VACCINE (Pregnancy & 60+) (1 - 1-dose 60+ series) Never done    ZOSTER IMMUNIZATION (1 of 2) 04/27/2016    DIABETIC FOOT EXAM  09/15/2022    LIPID  09/30/2023    MICROALBUMIN  03/15/2024    EYE EXAM  03/22/2024    ANNUAL REVIEW OF HM ORDERS  09/19/2024    A1C  10/23/2024    BMP  12/15/2024    MEDICARE ANNUAL WELLNESS VISIT  04/23/2025    FALL RISK ASSESSMENT  04/23/2025    HEMOGLOBIN  04/23/2025    ADVANCE CARE PLANNING  09/30/2027    DTAP/TDAP/TD IMMUNIZATION (3 - Td or Tdap) 03/15/2033    HEPATITIS C SCREENING  Completed    PHQ-2 (once per calendar year)  Completed    INFLUENZA VACCINE  Completed    Pneumococcal Vaccine: 65+ Years  Completed    URINALYSIS  Completed    COVID-19 Vaccine  Completed    IPV IMMUNIZATION  Aged Out    HPV IMMUNIZATION  Aged Out    MENINGITIS IMMUNIZATION  Aged Out    RSV MONOCLONAL ANTIBODY  Aged Out    COLORECTAL CANCER SCREENING  Discontinued         Review of Systems  Constitutional, HEENT, cardiovascular, pulmonary, gi and gu systems are negative, except as otherwise noted.     Objective    Exam  /70   Pulse 89   Resp 18   Ht 1.645 m (5' 4.75\")   Wt 61.5 kg (135 lb 9.6 oz)   SpO2 99%   BMI 22.74 kg/m  " "   Estimated body mass index is 22.74 kg/m  as calculated from the following:    Height as of this encounter: 1.645 m (5' 4.75\").    Weight as of this encounter: 61.5 kg (135 lb 9.6 oz).    Physical Exam  GENERAL APPEARANCE: healthy, alert and no distress  EYES: Eyes grossly normal to inspection and conjunctivae and sclerae normal  RESP: lungs clear to auscultation - no rales, rhonchi or wheezes  CV: regular rates and rhythm, normal S1 S2  ABDOMEN: soft, non-tender   MS: extremities normal- no gross deformities noted and peripheral pulses normal  SKIN: no suspicious lesions or rashes  NEURO: Normal strength and tone, mentation intact and speech normal  PSYCH: mentation appears normal  Diabetic foot exam: normal DP and PT pulses, no trophic changes or ulcerative lesions, and normal sensory exam            4/23/2024   Mini Cog   Clock Draw Score 2 Normal   3 Item Recall 2 objects recalled   Mini Cog Total Score 4              Signed Electronically by: Kimberly Bell MD    "

## 2024-04-23 NOTE — RESULT ENCOUNTER NOTE
Wolf,     Three month glucose number Hemoglobin A1C is at goal at 6.1.    Please do not hesitate to call us at [phone] if you have any questions or concerns.    Thank you,    Kimberly Bell MD MPH

## 2024-04-24 LAB
ALBUMIN SERPL BCG-MCNC: 4.3 G/DL (ref 3.5–5.2)
ALP SERPL-CCNC: 78 U/L (ref 40–150)
ALT SERPL W P-5'-P-CCNC: 60 U/L (ref 0–70)
ANION GAP SERPL CALCULATED.3IONS-SCNC: 10 MMOL/L (ref 7–15)
AST SERPL W P-5'-P-CCNC: 47 U/L (ref 0–45)
BILIRUB SERPL-MCNC: 0.4 MG/DL
BUN SERPL-MCNC: 24.1 MG/DL (ref 8–23)
CALCIUM SERPL-MCNC: 9.8 MG/DL (ref 8.8–10.2)
CHLORIDE SERPL-SCNC: 104 MMOL/L (ref 98–107)
CHOLEST SERPL-MCNC: 128 MG/DL
CREAT SERPL-MCNC: 1.6 MG/DL (ref 0.67–1.17)
CREAT UR-MCNC: 482 MG/DL
DEPRECATED HCO3 PLAS-SCNC: 26 MMOL/L (ref 22–29)
EGFRCR SERPLBLD CKD-EPI 2021: 44 ML/MIN/1.73M2
FASTING STATUS PATIENT QL REPORTED: NO
GLUCOSE SERPL-MCNC: 129 MG/DL (ref 70–99)
HDLC SERPL-MCNC: 62 MG/DL
LDLC SERPL CALC-MCNC: 43 MG/DL
MICROALBUMIN UR-MCNC: 266 MG/L
MICROALBUMIN/CREAT UR: 55.19 MG/G CR (ref 0–17)
NONHDLC SERPL-MCNC: 66 MG/DL
POTASSIUM SERPL-SCNC: 4.7 MMOL/L (ref 3.4–5.3)
PROT SERPL-MCNC: 7.6 G/DL (ref 6.4–8.3)
SODIUM SERPL-SCNC: 140 MMOL/L (ref 135–145)
TRIGL SERPL-MCNC: 114 MG/DL
TSH SERPL DL<=0.005 MIU/L-ACNC: 1.41 UIU/ML (ref 0.3–4.2)

## 2024-04-25 NOTE — RESULT ENCOUNTER NOTE
Wolf,     Cholesterol is at goal for you.  Urine sample is showing some abnormal protein. We will continue to monitor this.  Electrolytes and non fasting glucose are normal.  Kidney function is low but stable for you. Avoid over the counter Ibuprofen, Advil, Aleve.  AST liver test is slightly elevated. Will recheck at next set of labs.       Please do not hesitate to call us at [phone] if you have any questions or concerns.    Thank you,    Kimberly Bell MD MPH

## 2024-04-26 ENCOUNTER — ANCILLARY PROCEDURE (OUTPATIENT)
Dept: PET IMAGING | Facility: CLINIC | Age: 74
End: 2024-04-26
Attending: INTERNAL MEDICINE
Payer: COMMERCIAL

## 2024-04-26 DIAGNOSIS — C34.32 ADENOCARCINOMA OF LOWER LOBE OF LEFT LUNG (H): ICD-10-CM

## 2024-04-26 DIAGNOSIS — C34.90 MALIGNANT NEOPLASM OF UNSPECIFIED PART OF UNSPECIFIED BRONCHUS OR LUNG (H): ICD-10-CM

## 2024-04-26 PROCEDURE — 78816 PET IMAGE W/CT FULL BODY: CPT | Mod: GC | Performed by: RADIOLOGY

## 2024-04-26 PROCEDURE — A9552 F18 FDG: HCPCS | Performed by: RADIOLOGY

## 2024-04-26 RX ORDER — FLUDEOXYGLUCOSE F 18 200 MCI/ML
10-18 INJECTION, SOLUTION INTRAVENOUS ONCE
Status: COMPLETED | OUTPATIENT
Start: 2024-04-26 | End: 2024-04-26

## 2024-04-26 RX ADMIN — FLUDEOXYGLUCOSE F 18 12 MILLICURIE: 200 INJECTION, SOLUTION INTRAVENOUS at 08:17

## 2024-04-29 ENCOUNTER — ONCOLOGY VISIT (OUTPATIENT)
Dept: ONCOLOGY | Facility: CLINIC | Age: 74
End: 2024-04-29
Attending: INTERNAL MEDICINE
Payer: COMMERCIAL

## 2024-04-29 VITALS
DIASTOLIC BLOOD PRESSURE: 78 MMHG | HEIGHT: 65 IN | BODY MASS INDEX: 22.56 KG/M2 | SYSTOLIC BLOOD PRESSURE: 156 MMHG | WEIGHT: 135.4 LBS | OXYGEN SATURATION: 97 % | RESPIRATION RATE: 16 BRPM | HEART RATE: 75 BPM

## 2024-04-29 DIAGNOSIS — C34.82: ICD-10-CM

## 2024-04-29 DIAGNOSIS — R53.0 NEOPLASTIC (MALIGNANT) RELATED FATIGUE: ICD-10-CM

## 2024-04-29 DIAGNOSIS — A09 INFECTIOUS DIARRHEA: ICD-10-CM

## 2024-04-29 DIAGNOSIS — C34.92 STAGE IV ADENOCARCINOMA OF LUNG, LEFT (H): Primary | ICD-10-CM

## 2024-04-29 DIAGNOSIS — N18.32 STAGE 3B CHRONIC KIDNEY DISEASE (H): ICD-10-CM

## 2024-04-29 DIAGNOSIS — D61.810 ANTINEOPLASTIC CHEMOTHERAPY INDUCED PANCYTOPENIA (CODE) (H): ICD-10-CM

## 2024-04-29 PROCEDURE — 99215 OFFICE O/P EST HI 40 MIN: CPT | Performed by: INTERNAL MEDICINE

## 2024-04-29 PROCEDURE — G0463 HOSPITAL OUTPT CLINIC VISIT: HCPCS | Performed by: INTERNAL MEDICINE

## 2024-04-29 RX ORDER — LOPERAMIDE HYDROCHLORIDE 2 MG/1
2 TABLET ORAL 4 TIMES DAILY PRN
Qty: 30 TABLET | Refills: 0 | Status: SHIPPED | OUTPATIENT
Start: 2024-04-29

## 2024-04-29 ASSESSMENT — PAIN SCALES - GENERAL: PAINLEVEL: MILD PAIN (2)

## 2024-04-29 NOTE — NURSING NOTE
"Oncology Rooming Note    April 29, 2024 8:54 AM   Wolf Sen is a 74 year old male who presents for:    Chief Complaint   Patient presents with    Oncology Clinic Visit     4 month follow up     Initial Vitals: BP (!) 156/78 (BP Location: Left arm)   Pulse 75   Resp 16   Ht 1.645 m (5' 4.76\")   Wt 61.4 kg (135 lb 6.4 oz)   SpO2 97%   BMI 22.70 kg/m   Estimated body mass index is 22.7 kg/m  as calculated from the following:    Height as of this encounter: 1.645 m (5' 4.76\").    Weight as of this encounter: 61.4 kg (135 lb 6.4 oz). Body surface area is 1.68 meters squared.  Mild Pain (2) Comment: Data Unavailable   No LMP for male patient.  Allergies reviewed: Yes  Medications reviewed: Yes    Medications: Medication refills not needed today.  Pharmacy name entered into EPIC:    WRITTEN PRESCRIPTION REQUESTED  [id number] [place] - [address] [id number] - [facility] [place] - [address] AT [facility] & [address] [place] [address] [facility] - [place], [place] - [address]    Frailty Screening:   Is the patient here for a new oncology consult visit in cancer care? 2. No      Clinical concerns: No new concerns         Gisselle Pardo LPN              "

## 2024-04-29 NOTE — PROGRESS NOTES
[facility] Hematology / Oncology  Progress Note  Name: Wolf Sen  :  1950    MRN:  [id number]    --------------------    Assessment / Plan:  Locally advanced, unresectable, left-sided lung adenocarcinoma:  # 2021 Presented w/ dyspnea, cough and multiple failed rounds of antibiotics for presumed pneumonia.  # Dec 2021 CT-guided lung biopsy w/ adenocarcinoma.  # 2022 Staging brain MRI and PET scan with bilateral lung involvement (CRISTO primary) and med/hilar adenopathy.  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  # 2022 Osimertinib - ongoing; LA (CR minus a stable thoracic node).    Clinically and radiographically, Wolf continues to be in a near complete remission on osimertinib; we are monitoring some slight SUV activity changes and size changes.  We have plans to continue osimertinib indefinitely until toxicity or disease progression.  We agreed to follow-up in 4 months time with labs and PET CT scan.  Chemistries show stable chronic kidney disease, transaminitis.  Blood counts show stable, mild anemia and thrombocytopenia.  Stool studies for C. difficile and enteric pathogens sent regarding diarrhea; reviewed Pedialyte; reviewed brat diet; reviewed supportive care with Imodium.  Planning brain MRI PRN symptoms given long-term stability.    Miles Estrada MD    --------------------    Interval History:  Wolf returns for follow-up of lung cancer accompanied by his daughter.   used throughout the visit.  All in all, Wolf continues to remain well from a lung cancer standpoint.  No new discernible side effects from treatment.  He is unfortunately had some diarrhea for the last 2 weeks.  He did travel internationally to [place] in January.  No recent antibiotics.  No known sick contacts.  No one around him else with this diarrhea.  He has had loose stools now for about 2 weeks with no form.  He does get some gurgling and abdominal bloating.    Social History:  Social History  "    Tobacco Use    Smoking status: Never    Smokeless tobacco: Never   Substance Use Topics    Alcohol use: No     Family History:  Family History   Problem Relation Age of Onset    Hypertension Father     Cancer No family hx of     Diabetes No family hx of     Cerebrovascular Disease No family hx of     Thyroid Disease No family hx of     Glaucoma No family hx of     Macular Degeneration No family hx of      Immunizations:  Immunization History   Administered Date(s) Administered    COVID-19 12+ (2023-24) (Pfizer) 12/18/2023, 04/23/2024    COVID-19 Bivalent 12+ (Pfizer) 09/30/2022    COVID-19 MONOVALENT 12+ (Pfizer) 03/04/2021, 03/25/2021    COVID-19 Monovalent 12+ (Pfizer 2022) 03/02/2022    Influenza (High Dose) 3 valent vaccine 10/08/2014, 10/15/2015, 11/16/2016, 01/17/2018, 11/01/2018, 09/20/2019    Influenza (IIV3) PF 10/12/2012, 11/15/2013, 09/01/2014    Influenza Vaccine 65+ (Fluzone HD) 01/19/2021, 09/15/2021, 09/30/2022, 09/19/2023    Influenza Vaccine >6 months,quad, PF 12/22/2005, 11/15/2013    Pneumo Conj 13-V (2010&after) 11/01/2018    Pneumococcal 23 valent 11/15/2013, 05/05/2014    TD,PF 7+ (Tenivac) 03/15/2023    TDAP (Adacel,Boostrix) 10/12/2012    Zoster vaccine, live 03/02/2016     Health Maintenance Due   Topic Date Due    ZOSTER IMMUNIZATION (1 of 2) 04/27/2016     --------------------    Physical Exam:  VS: BP (!) 156/78 (BP Location: Left arm)   Pulse 75   Resp 16   Ht 1.645 m (5' 4.76\")   Wt 61.4 kg (135 lb 6.4 oz)   SpO2 97%   BMI 22.70 kg/m  .  GEN: Well appearing.    Labs / Imaging:  Reviewed CBC, CMP, TSH.  Reviewed PET w/ independent review.  "

## 2024-04-29 NOTE — LETTER
2024         RE: Wolf Sen  [address]  [facility] [address]        Dear Colleague,    Thank you for referring your patient, Wolf Sen, to the [facility]. Please see a copy of my visit note below.    [facility] Hematology / Oncology  Progress Note  Name: Wolf Sen  :  1950    MRN:  [id number]    --------------------    Assessment / Plan:  Locally advanced, unresectable, left-sided lung adenocarcinoma:  # 2021 Presented w/ dyspnea, cough and multiple failed rounds of antibiotics for presumed pneumonia.  # Dec 2021 CT-guided lung biopsy w/ adenocarcinoma.  # 2022 Staging brain MRI and PET scan with bilateral lung involvement (CRISTO primary) and med/hilar adenopathy.  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  # 2022 Osimertinib - ongoing; CO (CR minus a stable thoracic node).    Clinically and radiographically, Wolf continues to be in a near complete remission on osimertinib; we are monitoring some slight SUV activity changes and size changes.  We have plans to continue osimertinib indefinitely until toxicity or disease progression.  We agreed to follow-up in 4 months time with labs and PET CT scan.  Chemistries show stable chronic kidney disease, transaminitis.  Blood counts show stable, mild anemia and thrombocytopenia.  Stool studies for C. difficile and enteric pathogens sent regarding diarrhea; reviewed Pedialyte; reviewed brat diet; reviewed supportive care with Imodium.  Planning brain MRI PRN symptoms given long-term stability.    Miles Estrada MD    --------------------    Interval History:  Wolf returns for follow-up of lung cancer accompanied by his daughter.   used throughout the visit.  All in all, Wolf continues to remain well from a lung cancer standpoint.  No new discernible side effects from treatment.  He is unfortunately had some diarrhea for the last 2 weeks.  He did travel internationally to [place] in  "January.  No recent antibiotics.  No known sick contacts.  No one around him else with this diarrhea.  He has had loose stools now for about 2 weeks with no form.  He does get some gurgling and abdominal bloating.    Social History:  Social History     Tobacco Use     Smoking status: Never     Smokeless tobacco: Never   Substance Use Topics     Alcohol use: No     Family History:  Family History   Problem Relation Age of Onset     Hypertension Father      Cancer No family hx of      Diabetes No family hx of      Cerebrovascular Disease No family hx of      Thyroid Disease No family hx of      Glaucoma No family hx of      Macular Degeneration No family hx of      Immunizations:  Immunization History   Administered Date(s) Administered     COVID-19 12+ (2023-24) (Pfizer) 12/18/2023, 04/23/2024     COVID-19 Bivalent 12+ (Pfizer) 09/30/2022     COVID-19 MONOVALENT 12+ (Pfizer) 03/04/2021, 03/25/2021     COVID-19 Monovalent 12+ (Pfizer 2022) 03/02/2022     Influenza (High Dose) 3 valent vaccine 10/08/2014, 10/15/2015, 11/16/2016, 01/17/2018, 11/01/2018, 09/20/2019     Influenza (IIV3) PF 10/12/2012, 11/15/2013, 09/01/2014     Influenza Vaccine 65+ (Fluzone HD) 01/19/2021, 09/15/2021, 09/30/2022, 09/19/2023     Influenza Vaccine >6 months,quad, PF 12/22/2005, 11/15/2013     Pneumo Conj 13-V (2010&after) 11/01/2018     Pneumococcal 23 valent 11/15/2013, 05/05/2014     TD,PF 7+ (Tenivac) 03/15/2023     TDAP (Adacel,Boostrix) 10/12/2012     Zoster vaccine, live 03/02/2016     Health Maintenance Due   Topic Date Due     ZOSTER IMMUNIZATION (1 of 2) 04/27/2016     --------------------    Physical Exam:  VS: BP (!) 156/78 (BP Location: Left arm)   Pulse 75   Resp 16   Ht 1.645 m (5' 4.76\")   Wt 61.4 kg (135 lb 6.4 oz)   SpO2 97%   BMI 22.70 kg/m  .  GEN: Well appearing.    Labs / Imaging:  Reviewed CBC, CMP, TSH.  Reviewed PET w/ independent review.      Again, thank you for allowing me to participate in the care of your " patient.        Sincerely,        Miles Estrada MD

## 2024-05-01 ENCOUNTER — MYC MEDICAL ADVICE (OUTPATIENT)
Dept: ONCOLOGY | Facility: CLINIC | Age: 74
End: 2024-05-01
Payer: COMMERCIAL

## 2024-05-01 NOTE — TELEPHONE ENCOUNTER
1401 VM received from daughter, states patient collected a sample and attempted to drop it at the Oncology Lab but was told he could not drop it there.  Patient was unsure what to do with it so he discarded the sample.  Daughter reports that the patient reported to her that he is no longer having loose stool, though she is unsure if that is the case.  Requests return call.      Attempted to reach daughter, message left requesting return call.       Evelia Marquez RN

## 2024-05-01 NOTE — TELEPHONE ENCOUNTER
1517 VM from daughter requesting call, states if unable to answer requests detailed voice mail.    Attempted to reach Eloise, detailed message left stating if no longer having loose stool ok to forego stool testing.  If taking Imodium, would recommend to stop and collect sample if loose stools return.  Advised that proper collection containers are necessary and should have patient name, date of birth, date and time of collection on container label and stored properly until drop off. Requested return call if further questions.    Daughter returned call, discussed above.  She will discuss with patient when she talks to him next.  Daughter has contact number if return call is needed.      Evelia Marquez RN

## 2024-05-09 DIAGNOSIS — C34.92 ADENOCARCINOMA, LUNG, LEFT (H): Primary | ICD-10-CM

## 2024-06-07 DIAGNOSIS — C34.92 ADENOCARCINOMA, LUNG, LEFT (H): Primary | ICD-10-CM

## 2024-07-09 DIAGNOSIS — C34.92 ADENOCARCINOMA, LUNG, LEFT (H): Primary | ICD-10-CM

## 2024-07-30 DIAGNOSIS — E11.49 TYPE 2 DIABETES MELLITUS WITH NEUROLOGICAL MANIFESTATIONS, CONTROLLED (H): ICD-10-CM

## 2024-07-30 RX ORDER — INSULIN GLARGINE 100 [IU]/ML
INJECTION, SOLUTION SUBCUTANEOUS
Qty: 30 ML | Refills: 0 | Status: SHIPPED | OUTPATIENT
Start: 2024-07-30

## 2024-08-09 DIAGNOSIS — C34.92 ADENOCARCINOMA, LUNG, LEFT (H): Primary | ICD-10-CM

## 2024-08-30 ENCOUNTER — ANCILLARY PROCEDURE (OUTPATIENT)
Dept: PET IMAGING | Facility: CLINIC | Age: 74
End: 2024-08-30
Attending: INTERNAL MEDICINE
Payer: COMMERCIAL

## 2024-08-30 DIAGNOSIS — C34.82: ICD-10-CM

## 2024-08-30 DIAGNOSIS — C34.92 STAGE IV ADENOCARCINOMA OF LUNG, LEFT (H): ICD-10-CM

## 2024-08-30 PROCEDURE — 78816 PET IMAGE W/CT FULL BODY: CPT | Mod: GC | Performed by: RADIOLOGY

## 2024-08-30 PROCEDURE — A9552 F18 FDG: HCPCS | Performed by: RADIOLOGY

## 2024-08-30 RX ORDER — FLUDEOXYGLUCOSE F 18 200 MCI/ML
10-18 INJECTION, SOLUTION INTRAVENOUS ONCE
Status: COMPLETED | OUTPATIENT
Start: 2024-08-30 | End: 2024-08-30

## 2024-08-30 RX ADMIN — FLUDEOXYGLUCOSE F 18 13.4 MILLICURIE: 200 INJECTION, SOLUTION INTRAVENOUS at 12:24

## 2024-09-03 ENCOUNTER — LAB (OUTPATIENT)
Dept: INFUSION THERAPY | Facility: CLINIC | Age: 74
End: 2024-09-03
Attending: INTERNAL MEDICINE
Payer: COMMERCIAL

## 2024-09-03 ENCOUNTER — ONCOLOGY VISIT (OUTPATIENT)
Dept: ONCOLOGY | Facility: CLINIC | Age: 74
End: 2024-09-03
Attending: INTERNAL MEDICINE
Payer: COMMERCIAL

## 2024-09-03 ENCOUNTER — PATIENT OUTREACH (OUTPATIENT)
Dept: GERIATRIC MEDICINE | Facility: CLINIC | Age: 74
End: 2024-09-03

## 2024-09-03 VITALS
BODY MASS INDEX: 21.99 KG/M2 | HEIGHT: 65 IN | OXYGEN SATURATION: 99 % | HEART RATE: 73 BPM | TEMPERATURE: 97.9 F | SYSTOLIC BLOOD PRESSURE: 156 MMHG | DIASTOLIC BLOOD PRESSURE: 81 MMHG | WEIGHT: 132 LBS

## 2024-09-03 DIAGNOSIS — R53.0 NEOPLASTIC (MALIGNANT) RELATED FATIGUE: ICD-10-CM

## 2024-09-03 DIAGNOSIS — C34.92 STAGE IV ADENOCARCINOMA OF LUNG, LEFT (H): Primary | ICD-10-CM

## 2024-09-03 DIAGNOSIS — D50.0 IRON DEFICIENCY ANEMIA DUE TO CHRONIC BLOOD LOSS: ICD-10-CM

## 2024-09-03 DIAGNOSIS — N18.32 STAGE 3B CHRONIC KIDNEY DISEASE (H): ICD-10-CM

## 2024-09-03 DIAGNOSIS — C34.92 STAGE IV ADENOCARCINOMA OF LUNG, LEFT (H): ICD-10-CM

## 2024-09-03 LAB
ALBUMIN SERPL BCG-MCNC: 4.2 G/DL (ref 3.5–5.2)
ALP SERPL-CCNC: 71 U/L (ref 40–150)
ALT SERPL W P-5'-P-CCNC: 31 U/L (ref 0–70)
ANION GAP SERPL CALCULATED.3IONS-SCNC: 10 MMOL/L (ref 7–15)
AST SERPL W P-5'-P-CCNC: 32 U/L (ref 0–45)
BASOPHILS # BLD AUTO: 0 10E3/UL (ref 0–0.2)
BASOPHILS NFR BLD AUTO: 0 %
BILIRUB SERPL-MCNC: 0.5 MG/DL
BUN SERPL-MCNC: 20.9 MG/DL (ref 8–23)
CALCIUM SERPL-MCNC: 9.8 MG/DL (ref 8.8–10.4)
CHLORIDE SERPL-SCNC: 100 MMOL/L (ref 98–107)
CREAT SERPL-MCNC: 1.72 MG/DL (ref 0.67–1.17)
EGFRCR SERPLBLD CKD-EPI 2021: 41 ML/MIN/1.73M2
EOSINOPHIL # BLD AUTO: 0.3 10E3/UL (ref 0–0.7)
EOSINOPHIL NFR BLD AUTO: 4 %
ERYTHROCYTE [DISTWIDTH] IN BLOOD BY AUTOMATED COUNT: 14.9 % (ref 10–15)
GLUCOSE SERPL-MCNC: 165 MG/DL (ref 70–99)
HCO3 SERPL-SCNC: 27 MMOL/L (ref 22–29)
HCT VFR BLD AUTO: 33.9 % (ref 40–53)
HGB BLD-MCNC: 10.9 G/DL (ref 13.3–17.7)
HOLD SPECIMEN: NORMAL
IMM GRANULOCYTES # BLD: 0 10E3/UL
IMM GRANULOCYTES NFR BLD: 0 %
LYMPHOCYTES # BLD AUTO: 1.7 10E3/UL (ref 0.8–5.3)
LYMPHOCYTES NFR BLD AUTO: 24 %
MCH RBC QN AUTO: 25.1 PG (ref 26.5–33)
MCHC RBC AUTO-ENTMCNC: 32.2 G/DL (ref 31.5–36.5)
MCV RBC AUTO: 78 FL (ref 78–100)
MONOCYTES # BLD AUTO: 0.6 10E3/UL (ref 0–1.3)
MONOCYTES NFR BLD AUTO: 9 %
NEUTROPHILS # BLD AUTO: 4.4 10E3/UL (ref 1.6–8.3)
NEUTROPHILS NFR BLD AUTO: 62 %
NRBC # BLD AUTO: 0 10E3/UL
NRBC BLD AUTO-RTO: 0 /100
PLATELET # BLD AUTO: 168 10E3/UL (ref 150–450)
POTASSIUM SERPL-SCNC: 4.1 MMOL/L (ref 3.4–5.3)
PROT SERPL-MCNC: 7.9 G/DL (ref 6.4–8.3)
RBC # BLD AUTO: 4.35 10E6/UL (ref 4.4–5.9)
SODIUM SERPL-SCNC: 137 MMOL/L (ref 135–145)
TSH SERPL DL<=0.005 MIU/L-ACNC: 2.41 UIU/ML (ref 0.3–4.2)
WBC # BLD AUTO: 7.1 10E3/UL (ref 4–11)

## 2024-09-03 PROCEDURE — G2211 COMPLEX E/M VISIT ADD ON: HCPCS | Performed by: INTERNAL MEDICINE

## 2024-09-03 PROCEDURE — 84443 ASSAY THYROID STIM HORMONE: CPT

## 2024-09-03 PROCEDURE — 99214 OFFICE O/P EST MOD 30 MIN: CPT | Performed by: INTERNAL MEDICINE

## 2024-09-03 PROCEDURE — 36415 COLL VENOUS BLD VENIPUNCTURE: CPT

## 2024-09-03 PROCEDURE — G0463 HOSPITAL OUTPT CLINIC VISIT: HCPCS | Performed by: INTERNAL MEDICINE

## 2024-09-03 PROCEDURE — 82040 ASSAY OF SERUM ALBUMIN: CPT

## 2024-09-03 PROCEDURE — 85025 COMPLETE CBC W/AUTO DIFF WBC: CPT

## 2024-09-03 ASSESSMENT — PAIN SCALES - GENERAL: PAINLEVEL: NO PAIN (0)

## 2024-09-03 NOTE — LETTER
9/3/2024      Wolf Sen  [address]  [facility] [address]      Dear Colleague,    Thank you for referring your patient, Wolf Sen, to the [facility]. Please see a copy of my visit note below.    [facility] Hematology / Oncology  Progress Note  Name: Wolf Sen  :  1950    MRN:  [id number]    --------------------    Assessment / Plan:  Locally advanced, unresectable, left-sided lung adenocarcinoma:  # 2021 Presented w/ dyspnea, cough and multiple failed rounds of antibiotics for presumed pneumonia.  # Dec 2021 CT-guided lung biopsy w/ adenocarcinoma.  # 2022 Staging brain MRI and PET scan with bilateral lung involvement (CRISTO primary) and med/hilar adenopathy.  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  # 2022 Osimertinib - ongoing; ND (CR minus a stable thoracic node).    Clinically and radiographically, Wolf has stable disease osimertinib; we are monitoring some slight SUV activity changes and size changes.  We have plans to continue osimertinib indefinitely until toxicity or clear disease progression.  We agreed to follow-up in 6 months time with labs and PET CT scan.  Chemistries show stable chronic kidney disease, transaminitis; reinforced need for hydration.  Blood counts show stable, mild anemia and thrombocytopenia.  Planning brain MRI PRN symptoms given long-term stability.    Miles Estrada MD    --------------------    Interval History:  oWlf returns for follow-up of lung cancer accompanied by his daughter.   used throughout the visit.  All in all, Wolf continues to remain well from a lung cancer standpoint.  No new discernible side effects from treatment.  No financial toxicity.    Social History:  Social History     Tobacco Use     Smoking status: Never     Smokeless tobacco: Never   Substance Use Topics     Alcohol use: No     Family History:  Family History   Problem Relation Age of Onset     Hypertension Father   "    Cancer No family hx of      Diabetes No family hx of      Cerebrovascular Disease No family hx of      Thyroid Disease No family hx of      Glaucoma No family hx of      Macular Degeneration No family hx of      Immunizations:  Immunization History   Administered Date(s) Administered     COVID-19 12+ (2023-24) (Pfizer) 12/18/2023, 04/23/2024     COVID-19 Bivalent 12+ (Pfizer) 09/30/2022     COVID-19 MONOVALENT 12+ (Pfizer) 03/04/2021, 03/25/2021     COVID-19 Monovalent 12+ (Pfizer 2022) 03/02/2022     Influenza (High Dose) 3 valent vaccine 10/08/2014, 10/15/2015, 11/16/2016, 01/17/2018, 11/01/2018, 09/20/2019     Influenza (IIV3) PF 10/12/2012, 11/15/2013, 09/01/2014     Influenza Vaccine 65+ (Fluzone HD) 01/19/2021, 09/15/2021, 09/30/2022, 09/19/2023     Influenza Vaccine >6 months,quad, PF 12/22/2005, 11/15/2013     Pneumo Conj 13-V (2010&after) 11/01/2018     Pneumococcal 23 valent 11/15/2013, 05/05/2014     TD,PF 7+ (Tenivac) 03/15/2023     TDAP (Adacel,Boostrix) 10/12/2012     Td (Adult), Adsorbed 12/22/2005     Zoster vaccine, live 03/02/2016     Health Maintenance Due   Topic Date Due     ZOSTER IMMUNIZATION (1 of 2) 04/27/2016     --------------------    Physical Exam:  VS: BP (!) 156/81 (BP Location: Left arm)   Pulse 73   Temp 97.9  F (36.6  C) (Oral)   Ht 1.645 m (5' 4.76\")   Wt 59.9 kg (132 lb)   SpO2 99%   BMI 22.13 kg/m  .  GEN: Well appearing.    Labs / Imaging:  Reviewed CBC, CMP, TSH.  Reviewed PET w/ independent review.    Again, thank you for allowing me to participate in the care of your patient.        Sincerely,        Miles Estrada MD  "

## 2024-09-03 NOTE — PATIENT INSTRUCTIONS
1) Labs in 3 months (CBC, CMP, TSH).  2) BJT MG 6 months w/ labs (CBC, CMP, TSH) and PET scan.    Miles Estrada MD.

## 2024-09-03 NOTE — NURSING NOTE
"Oncology Rooming Note    September 3, 2024 9:28 AM   Wolf Sen is a 74 year old male who presents for:    Chief Complaint   Patient presents with    Oncology Clinic Visit     Initial Vitals: BP (!) 156/81 (BP Location: Left arm)   Pulse 73   Ht 1.645 m (5' 4.76\")   Wt 59.9 kg (132 lb)   SpO2 99%   BMI 22.13 kg/m   Estimated body mass index is 22.13 kg/m  as calculated from the following:    Height as of this encounter: 1.645 m (5' 4.76\").    Weight as of this encounter: 59.9 kg (132 lb). Body surface area is 1.65 meters squared.  No Pain (0) Comment: Data Unavailable   No LMP for male patient.  Allergies reviewed: Yes  Medications reviewed: Yes    Medications: Medication refills not needed today.  Pharmacy name entered into EPIC:    WRITTEN PRESCRIPTION REQUESTED  [id number] [place] - [address] [id number] - [place], [place] - [address] AT [facility] & [address] [place] [address] [facility] - [place], [place] - [address]    Frailty Screening:   Is the patient here for a new oncology consult visit in cancer care? 2. No      Clinical concerns: No concerns        Mary Grace Trevizo MA            "

## 2024-09-03 NOTE — PROGRESS NOTES
Care Coordinator spoke with member to schedule visit 9/11 @ 10:30 a.m.   Called [facility] to schedule .     Viki Avalos RN, N  [facility]

## 2024-09-03 NOTE — PROGRESS NOTES
[facility] Hematology / Oncology  Progress Note  Name: Wolf Sen  :  1950    MRN:  [id number]    --------------------    Assessment / Plan:  Locally advanced, unresectable, left-sided lung adenocarcinoma:  # 2021 Presented w/ dyspnea, cough and multiple failed rounds of antibiotics for presumed pneumonia.  # Dec 2021 CT-guided lung biopsy w/ adenocarcinoma.  # 2022 Staging brain MRI and PET scan with bilateral lung involvement (CRISTO primary) and med/hilar adenopathy.  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  # 2022 Osimertinib - ongoing; HI (CR minus a stable thoracic node).    Clinically and radiographically, Wolf has stable disease osimertinib; we are monitoring some slight SUV activity changes and size changes.  We have plans to continue osimertinib indefinitely until toxicity or clear disease progression.  We agreed to follow-up in 6 months time with labs and PET CT scan.  Chemistries show stable chronic kidney disease, transaminitis; reinforced need for hydration.  Blood counts show stable, mild anemia and thrombocytopenia.  Planning brain MRI PRN symptoms given long-term stability.    Miles Estrada MD    --------------------    Interval History:  Wolf returns for follow-up of lung cancer accompanied by his daughter.   used throughout the visit.  All in all, Wolf continues to remain well from a lung cancer standpoint.  No new discernible side effects from treatment.  No financial toxicity.    Social History:  Social History     Tobacco Use    Smoking status: Never    Smokeless tobacco: Never   Substance Use Topics    Alcohol use: No     Family History:  Family History   Problem Relation Age of Onset    Hypertension Father     Cancer No family hx of     Diabetes No family hx of     Cerebrovascular Disease No family hx of     Thyroid Disease No family hx of     Glaucoma No family hx of     Macular Degeneration No family hx of      Immunizations:  Immunization History  "  Administered Date(s) Administered    COVID-19 12+ (2023-24) (Pfizer) 12/18/2023, 04/23/2024    COVID-19 Bivalent 12+ (Pfizer) 09/30/2022    COVID-19 MONOVALENT 12+ (Pfizer) 03/04/2021, 03/25/2021    COVID-19 Monovalent 12+ (Pfizer 2022) 03/02/2022    Influenza (High Dose) 3 valent vaccine 10/08/2014, 10/15/2015, 11/16/2016, 01/17/2018, 11/01/2018, 09/20/2019    Influenza (IIV3) PF 10/12/2012, 11/15/2013, 09/01/2014    Influenza Vaccine 65+ (Fluzone HD) 01/19/2021, 09/15/2021, 09/30/2022, 09/19/2023    Influenza Vaccine >6 months,quad, PF 12/22/2005, 11/15/2013    Pneumo Conj 13-V (2010&after) 11/01/2018    Pneumococcal 23 valent 11/15/2013, 05/05/2014    TD,PF 7+ (Tenivac) 03/15/2023    TDAP (Adacel,Boostrix) 10/12/2012    Td (Adult), Adsorbed 12/22/2005    Zoster vaccine, live 03/02/2016     Health Maintenance Due   Topic Date Due    ZOSTER IMMUNIZATION (1 of 2) 04/27/2016     --------------------    Physical Exam:  VS: BP (!) 156/81 (BP Location: Left arm)   Pulse 73   Temp 97.9  F (36.6  C) (Oral)   Ht 1.645 m (5' 4.76\")   Wt 59.9 kg (132 lb)   SpO2 99%   BMI 22.13 kg/m  .  GEN: Well appearing.    Labs / Imaging:  Reviewed CBC, CMP, TSH.  Reviewed PET w/ independent review.  "

## 2024-09-09 DIAGNOSIS — C34.92 ADENOCARCINOMA, LUNG, LEFT (H): Primary | ICD-10-CM

## 2024-09-10 DIAGNOSIS — C34.92 STAGE IV ADENOCARCINOMA OF LUNG, LEFT (H): ICD-10-CM

## 2024-09-10 DIAGNOSIS — R53.0 NEOPLASTIC (MALIGNANT) RELATED FATIGUE: Primary | ICD-10-CM

## 2024-09-10 DIAGNOSIS — I42.7 CARDIOMYOPATHY SECONDARY TO DRUG (H): ICD-10-CM

## 2024-09-10 NOTE — PROGRESS NOTES
Orders entered per provider:     Miles Estrada MD Neubauer, Julie, RN  Cc: Maxx Burk MD  Please contact Wolf (daughter may be best).  PET scan shows some mm-sized changes, but stable and controlled disease.  The reading radiologist makes some comments about the heart.  I am not sure if this is real or not.  Any symptoms?  I these kind of moments, we are now obliged to do a heart test.  If it does catch something, we'll be glad we did the extra testing.  If it is normal, then it was a false alarm.    Verbal order for ECHO and EKG (same visit).    Miles Estrada MD

## 2024-09-11 ENCOUNTER — PATIENT OUTREACH (OUTPATIENT)
Dept: GERIATRIC MEDICINE | Facility: CLINIC | Age: 74
End: 2024-09-11
Payer: COMMERCIAL

## 2024-09-11 NOTE — PROGRESS NOTES
[facility] Care Coordination Contact    [facility] Home Visit Assessment     Home visit for Health Risk Assessment with Wolf Sen completed on September 11, 2024    Type of residence:: Town home  Current living arrangement:: I live in a private home with family     Assessment completed with:: Patient, Esteban  - Daniel from [facility].     Current Care Plan  Member currently receiving the following home care services:     Member currently receiving the following community resources: Housekeeping/Chore Agency, Lifeline, monthly supplies.    Medication Review  Medication reconciliation completed in Epic: Yes  Medication set-up & administration: Independent and sets up on own daily.  Self-administers medications.  Medication Risk Assessment Medication (1 or more, place referral to MTM): N/A: No risk factors identified  MTM Referral Placed: No: member declined    Mental/Behavioral Health   Depression Screening:   PHQ-2 Total Score (Adult) - Positive if 3 or more points; Administer PHQ-9 if positive: 0       Mental health DX:: No        Falls Assessment:   Fallen 2 or more times in the past year?: No   Any fall with injury in the past year?: No    ADL/IADL Dependencies:   Dependent ADLs:: Independent  Dependent IADLs:: Cleaning, Cooking, Laundry, Shopping, Meal Preparation, Medication Management, Money Management, Transportation    [facility]: Shared information regarding [employer] Program. Reviewed preventative health screening and health plan supplemental benefits/incentives. Reviewed medication disposal form.    PCA Assessment completed at visit: [place], and assessment indicates that member does not meet access criteria for CFSS/PCA.     Elderly Waiver Eligibility: Yes-will continue on EW    Care Plan & Recommendations: 1. Homemaking, 2. PERS, 3. Monthly Ensure    See [place] for detailed assessment  information.    Follow-Up Plan: Member informed of future contact, plan to f/u with member with a 6 month telephone assessment.  Contact information shared with member and family, encouraged member to call with any questions or concerns at any time.    [facility] care continuum providers: Please see Snapshot and Care Management Flowsheets for Specific details of care plan.    This CC note routed to PCP, Kimberly Bell.    Viki Avalos RN, PHN  [facility]

## 2024-09-12 ENCOUNTER — PATIENT OUTREACH (OUTPATIENT)
Dept: GERIATRIC MEDICINE | Facility: CLINIC | Age: 74
End: 2024-09-12
Payer: COMMERCIAL

## 2024-09-12 NOTE — PROGRESS NOTES
[facility] Care Coordination Contact    Received after visit chart from care coordinator.  Completed following tasks: Mailed copy of support plan to member, Mailed [place] Cho[place] signature sheet pages 3-4, Mailed Safe Medication Disposal , Submitted referrals/auths for hmkg & PERS, and Updated services in Database   and Provider Signature - No Support Plan Shared:  Member indicates that they do not want their support plan shared with any EW providers.    Nataly Pyle  Case Management Specialist   [facility]  [phone]

## 2024-09-12 NOTE — LETTER
September 12, 2024       Wolf Taylormiguel  [address]  [facility] [address]      Dear Wolf,    At [facility], we re dedicated to improving your health and wellness. Enclosed is the Support Plan developed with you on 9/11/2024. Please review the Support Plan carefully.    As a reminder, during your visit we talked about:   Ways to manage your physical and mental health   Using health care to maintain and improve your health    Your preventive care needs      Remember to contact your care coordinator if you:   Are hospitalized or plan to be hospitalized    Have a fall     Have a change in your physical or mental health   Need help finding support or services    If you have questions or don t agree with your Support Plan, call me at [phone]. You can also call me if your needs change. TTY users call the [place] Relay at 648 or 1-769.510.2069 (punpsd-aa-yonhyw relay service).    Sincerely,       Viki Avalos RN, PHN    E-mail: [email or web address]  Phone: [phone]      [facility]                [id number] accepted     (06/2024)                [address] [place], [place] [address] [phone]  fax [phone]  [facility]

## 2024-09-18 ENCOUNTER — ANCILLARY PROCEDURE (OUTPATIENT)
Dept: CARDIOLOGY | Facility: CLINIC | Age: 74
End: 2024-09-18
Attending: INTERNAL MEDICINE
Payer: COMMERCIAL

## 2024-09-18 ENCOUNTER — ALLIED HEALTH/NURSE VISIT (OUTPATIENT)
Dept: INFUSION THERAPY | Facility: CLINIC | Age: 74
End: 2024-09-18
Attending: INTERNAL MEDICINE
Payer: COMMERCIAL

## 2024-09-18 DIAGNOSIS — I42.7 CARDIOMYOPATHY SECONDARY TO DRUG (H): ICD-10-CM

## 2024-09-18 LAB — LVEF ECHO: NORMAL

## 2024-09-18 PROCEDURE — 93005 ELECTROCARDIOGRAM TRACING: CPT

## 2024-09-18 PROCEDURE — 99207 PR NO CHARGE LOS: CPT

## 2024-09-18 PROCEDURE — 93306 TTE W/DOPPLER COMPLETE: CPT | Performed by: INTERNAL MEDICINE

## 2024-09-18 PROCEDURE — 93000 ELECTROCARDIOGRAM COMPLETE: CPT

## 2024-09-19 ENCOUNTER — MEDICAL CORRESPONDENCE (OUTPATIENT)
Dept: HEALTH INFORMATION MANAGEMENT | Facility: CLINIC | Age: 74
End: 2024-09-19
Payer: COMMERCIAL

## 2024-09-27 ENCOUNTER — TELEPHONE (OUTPATIENT)
Dept: FAMILY MEDICINE | Facility: CLINIC | Age: 74
End: 2024-09-27
Payer: COMMERCIAL

## 2024-09-27 NOTE — TELEPHONE ENCOUNTER
Forms/Letter Request    Type of form/letter: DMV/Handicap Parking      Is Release of Information needed?: Yes  Was an JESSICA obtained?  Yes    Do we have the form/letter: Yes: Placed in MD's box    Who is the form from? Patient    Where did/will the form come from? form was sent via [email or web address]    When is form/letter needed by: 3-5 days    How would you like the form/letter returned: Mail  Is this the correct address?: Yes  [address] [address] [place], [place] [address]  [facility] [address]    And     Mail to [facility] address on forms.    Patient Notified form requests are processed in 5-7 business days:Yes    Could we send this information to you in [email or web address] or would you prefer to receive a phone call?:   Patient would like to be contacted via [email or web address]

## 2024-09-30 NOTE — TELEPHONE ENCOUNTER
Copies placed in abstract and tc bin. Original mailed to patient's home address and copy mailed to DMV.

## 2024-10-08 DIAGNOSIS — C34.92 ADENOCARCINOMA, LUNG, LEFT (H): Primary | ICD-10-CM

## 2024-10-18 DIAGNOSIS — E11.49 TYPE 2 DIABETES MELLITUS WITH NEUROLOGICAL MANIFESTATIONS, CONTROLLED (H): ICD-10-CM

## 2024-10-18 DIAGNOSIS — I10 BENIGN ESSENTIAL HTN: ICD-10-CM

## 2024-10-18 RX ORDER — HYDRALAZINE HYDROCHLORIDE 25 MG/1
25 TABLET, FILM COATED ORAL 3 TIMES DAILY
Qty: 270 TABLET | Refills: 1 | Status: SHIPPED | OUTPATIENT
Start: 2024-10-18

## 2024-10-18 RX ORDER — METOPROLOL SUCCINATE 25 MG/1
TABLET, EXTENDED RELEASE ORAL
Qty: 90 TABLET | Refills: 1 | Status: SHIPPED | OUTPATIENT
Start: 2024-10-18

## 2024-10-22 DIAGNOSIS — I10 BENIGN ESSENTIAL HTN: ICD-10-CM

## 2024-10-22 RX ORDER — METOPROLOL SUCCINATE 25 MG/1
TABLET, EXTENDED RELEASE ORAL
Qty: 90 TABLET | Refills: 1 | OUTPATIENT
Start: 2024-10-22

## 2024-10-27 DIAGNOSIS — E78.5 HYPERLIPIDEMIA LDL GOAL <70: ICD-10-CM

## 2024-10-28 RX ORDER — ATORVASTATIN CALCIUM 80 MG/1
40 TABLET, FILM COATED ORAL DAILY
Qty: 90 TABLET | Refills: 0 | Status: SHIPPED | OUTPATIENT
Start: 2024-10-28

## 2024-11-03 DIAGNOSIS — E11.49 TYPE 2 DIABETES MELLITUS WITH NEUROLOGICAL MANIFESTATIONS, CONTROLLED (H): ICD-10-CM

## 2024-11-05 RX ORDER — INSULIN GLARGINE 100 [IU]/ML
INJECTION, SOLUTION SUBCUTANEOUS
Qty: 30 ML | Refills: 0 | Status: SHIPPED | OUTPATIENT
Start: 2024-11-05

## 2024-11-07 DIAGNOSIS — C34.92 ADENOCARCINOMA, LUNG, LEFT (H): Primary | ICD-10-CM

## 2024-11-25 ENCOUNTER — DOCUMENTATION ONLY (OUTPATIENT)
Dept: ONCOLOGY | Facility: CLINIC | Age: 74
End: 2024-11-25
Payer: COMMERCIAL

## 2024-11-25 DIAGNOSIS — C34.90 MALIGNANT NEOPLASM OF UNSPECIFIED PART OF UNSPECIFIED BRONCHUS OR LUNG (H): Primary | ICD-10-CM

## 2024-12-03 ENCOUNTER — LAB (OUTPATIENT)
Dept: INFUSION THERAPY | Facility: CLINIC | Age: 74
End: 2024-12-03
Attending: INTERNAL MEDICINE
Payer: COMMERCIAL

## 2024-12-03 DIAGNOSIS — R53.0 NEOPLASTIC (MALIGNANT) RELATED FATIGUE: ICD-10-CM

## 2024-12-03 DIAGNOSIS — C34.92 STAGE IV ADENOCARCINOMA OF LUNG, LEFT (H): ICD-10-CM

## 2024-12-03 LAB
ALBUMIN SERPL BCG-MCNC: 4.2 G/DL (ref 3.5–5.2)
ALP SERPL-CCNC: 82 U/L (ref 40–150)
ALT SERPL W P-5'-P-CCNC: 125 U/L (ref 0–70)
ANION GAP SERPL CALCULATED.3IONS-SCNC: 9 MMOL/L (ref 7–15)
AST SERPL W P-5'-P-CCNC: 71 U/L (ref 0–45)
BASOPHILS # BLD AUTO: 0 10E3/UL (ref 0–0.2)
BASOPHILS NFR BLD AUTO: 1 %
BILIRUB SERPL-MCNC: 0.6 MG/DL
BUN SERPL-MCNC: 18.6 MG/DL (ref 8–23)
CALCIUM SERPL-MCNC: 9.5 MG/DL (ref 8.8–10.4)
CHLORIDE SERPL-SCNC: 100 MMOL/L (ref 98–107)
CREAT SERPL-MCNC: 1.55 MG/DL (ref 0.67–1.17)
EGFRCR SERPLBLD CKD-EPI 2021: 46 ML/MIN/1.73M2
EOSINOPHIL # BLD AUTO: 0.4 10E3/UL (ref 0–0.7)
EOSINOPHIL NFR BLD AUTO: 6 %
ERYTHROCYTE [DISTWIDTH] IN BLOOD BY AUTOMATED COUNT: 14.2 % (ref 10–15)
GLUCOSE SERPL-MCNC: 128 MG/DL (ref 70–99)
HCO3 SERPL-SCNC: 29 MMOL/L (ref 22–29)
HCT VFR BLD AUTO: 37.9 % (ref 40–53)
HGB BLD-MCNC: 12.4 G/DL (ref 13.3–17.7)
IMM GRANULOCYTES # BLD: 0 10E3/UL
IMM GRANULOCYTES NFR BLD: 0 %
LYMPHOCYTES # BLD AUTO: 2.1 10E3/UL (ref 0.8–5.3)
LYMPHOCYTES NFR BLD AUTO: 33 %
MCH RBC QN AUTO: 26 PG (ref 26.5–33)
MCHC RBC AUTO-ENTMCNC: 32.7 G/DL (ref 31.5–36.5)
MCV RBC AUTO: 80 FL (ref 78–100)
MONOCYTES # BLD AUTO: 0.7 10E3/UL (ref 0–1.3)
MONOCYTES NFR BLD AUTO: 11 %
NEUTROPHILS # BLD AUTO: 3.1 10E3/UL (ref 1.6–8.3)
NEUTROPHILS NFR BLD AUTO: 49 %
NRBC # BLD AUTO: 0 10E3/UL
NRBC BLD AUTO-RTO: 0 /100
PLATELET # BLD AUTO: 171 10E3/UL (ref 150–450)
POTASSIUM SERPL-SCNC: 4.5 MMOL/L (ref 3.4–5.3)
PROT SERPL-MCNC: 7.7 G/DL (ref 6.4–8.3)
RBC # BLD AUTO: 4.77 10E6/UL (ref 4.4–5.9)
SODIUM SERPL-SCNC: 138 MMOL/L (ref 135–145)
TSH SERPL DL<=0.005 MIU/L-ACNC: 2.73 UIU/ML (ref 0.3–4.2)
WBC # BLD AUTO: 6.4 10E3/UL (ref 4–11)

## 2024-12-03 PROCEDURE — 84443 ASSAY THYROID STIM HORMONE: CPT

## 2024-12-03 PROCEDURE — 84295 ASSAY OF SERUM SODIUM: CPT

## 2024-12-03 PROCEDURE — 84155 ASSAY OF PROTEIN SERUM: CPT

## 2024-12-03 PROCEDURE — 85048 AUTOMATED LEUKOCYTE COUNT: CPT

## 2024-12-03 PROCEDURE — 36415 COLL VENOUS BLD VENIPUNCTURE: CPT

## 2024-12-03 PROCEDURE — 85004 AUTOMATED DIFF WBC COUNT: CPT

## 2024-12-12 ENCOUNTER — TELEPHONE (OUTPATIENT)
Dept: FAMILY MEDICINE | Facility: CLINIC | Age: 74
End: 2024-12-12
Payer: COMMERCIAL

## 2024-12-12 NOTE — TELEPHONE ENCOUNTER
Patient's daughter Gwen is calling stating that patient is not taking his insulin because he is feeling dizzy.    Insulin should not cause dizziness, rather is from uncontrolled BP, too low or too high.    Patient to call back to the clinic to be triaged.  Gwen will call back with pt on the line.    Krystle Jackson RN, BSN  [facility]

## 2024-12-16 ENCOUNTER — TELEPHONE (OUTPATIENT)
Dept: FAMILY MEDICINE | Facility: CLINIC | Age: 74
End: 2024-12-16
Payer: COMMERCIAL

## 2024-12-16 NOTE — TELEPHONE ENCOUNTER
Patient's daughter Gwen is calling to verify if indeed patient needs to be on 4 BP meds.  Patient has not started hydrochlorothiazide medication yet.    Per chart review, per last OV from 11/22/2024    Benign essential HTN  -Uncontrolled  -no symptoms at this time  -Continue Losartan 100 mg, added hydrochlorothiazide 25 mg daily  -ancillary blood pressure check in 2 weeks  -continue Toprol XL 25 mg daily, defer increasing for now due to low heart rate   -continue Hydralazine 25 mg three times a day  -if blood pressure is not at goal on Ancillary then can consider adding Calcium channel blocker since ECHO does not show congestive heart failure.   - losartan (COZAAR) 100 MG tablet  Dispense: 90 tablet; Refill: 1  - hydroCHLOROthiazide (HYDRODIURIL) 25 MG tablet  Dispense: 90 tablet; Refill: 1    Patient to start hydrochlorothiazide and to make a nurse visit to recheck BP.  Writer assisted in scheduling.    Krystle Jackson, RN, BSN  [facility]

## 2024-12-30 NOTE — PROGRESS NOTES
Oncology Follow Up Visit: December 31, 2024    Oncologist: Dr Miles Estrada  PCP: Kimberly Bell    Diagnosis:   Wolf Sen is a 75 yo male with Locally advanced, unresectable, left-sided lung adenocarcinoma:  # Nov 2021 Presented w/ dyspnea, cough and multiple failed rounds of antibiotics for presumed pneumonia.  # Dec 2021 CT-guided lung biopsy w/ adenocarcinoma.  # Jan 2022 Staging brain MRI and PET scan with bilateral lung involvement (CRISTO primary) and med/hilar adenopathy.  # Jan 2022 [facility] w/ EGFR mutation.  # Feb 2022 Osimertinib - ongoing; [place] (CR minus a stable thoracic node)    Interval History: Mr Sen comes to clinic with daughter for review prior to continuation of Oimertinib for his lung cancer.  on IPad.  PT reports he is taking medication as ordered and is seeing no new side effects.   Admits he does not have the appetite as previous but weight is stable. Energy is good and continues to stay active.   Pruritic Rash to bilateral anterior feet and ankles noted- also right anterior shoulder x 1 month- no new med, food or other exposures at that time- treating with zyrtec for itching and some hydrocortisone.   BPs- elevated and did see PCP - started hydrochlorothiazide 2 weeks ago but BPs still very elevated- denies headaches, vision changes, chest pain or pressure. Will see for recheck this Friday. Does not have cuff at home for home monitoring.   Denies cough, SOB, or new symptoms today.    Rest of comprehensive and complete ROS is reviewed and is negative.   Past Medical History:   Diagnosis Date    Cataracts, both eyes 5/20/2014    Cerebral artery occlusion with cerebral infarction (H)     DM type 2, goal A1c below 7 5/5/2014    Hypertension      Current Outpatient Medications   Medication Sig Dispense Refill    acetaminophen (MAPAP) 500 MG tablet TAKE 2 TABLETS(1000 MG) BY MOUTH THREE TIMES DAILY AS NEEDED FOR PAIN 100 tablet 1    Alcohol Swabs (B-D SINGLE USE SWABS  REGULAR) PADS USE TO SWAB AREA OF INJECTION/RONEY AS DIRECTED 100 each 4    ASPIRIN PO Take 81 mg by mouth      atorvastatin (LIPITOR) 80 MG tablet Take 0.5 tablets (40 mg) by mouth daily. 90 tablet 0    blood glucose (ONETOUCH VERIO IQ) test strip USE TO TEST BLOOD SUGAR 1 TIMES DAILY OR AS DIRECTED 300 strip 11    ferrous sulfate (FEROSUL) 325 (65 Fe) MG tablet Take 1 tablet (325 mg) by mouth daily (with breakfast) 90 tablet 3    hydrALAZINE (APRESOLINE) 25 MG tablet TAKE 1 TABLET BY MOUTH THREE TIMES DAILY 270 tablet 1    hydroCHLOROthiazide (HYDRODIURIL) 25 MG tablet Take 1 tablet (25 mg) by mouth daily. For blood pressure 90 tablet 1    hydrocortisone, Perianal, (HYDROCORTISONE) 2.5 % cream Place rectally 2 times daily as needed for hemorrhoids. 30 g 0    insulin aspart (NOVOLOG FLEXPEN) 100 UNIT/ML pen INJECT 15 UNITS UNDER THE SKIN THREE TIMES DAILY WITH MEALS 30 mL 1    insulin pen needle (32G X 4 MM) 32G X 4 MM miscellaneous Use 4 pen needles daily or as directed. 200 each 11    ketoconazole (NIZORAL) 2 % external cream Apply topically daily 60 g 1    LANTUS SOLOSTAR 100 UNIT/ML soln ADMINISTER 28 UNITS UNDER THE SKIN AT BEDTIME 30 mL 0    loperamide (IMODIUM A-D) 2 MG tablet Take 1 tablet (2 mg) by mouth 4 times daily as needed for diarrhea. 30 tablet 0    losartan (COZAAR) 100 MG tablet Take 1 tablet (100 mg) by mouth daily. 90 tablet 1    metoprolol succinate ER (TOPROL XL) 25 MG 24 hr tablet TAKE 1 TABLET(25 MG) BY MOUTH EVERY EVENING FOR BLOOD PRESSURE 90 tablet 1    multivitamin w/minerals (THERA-VIT-M) tablet Take 1 tablet by mouth daily 90 tablet 3    osimertinib (TAGRISSO) 80 MG tablet Take 1 tablet (80 mg) by mouth daily 30 tablet 0    osimertinib (TAGRISSO) 80 MG tablet Take 1 tablet (80 mg) by mouth daily 30 tablet 0    osimertinib (TAGRISSO) 80 MG tablet Take 1 tablet (80 mg) by mouth daily 30 tablet 0    osimertinib (TAGRISSO) 80 MG tablet Take 1 tablet (80 mg) by mouth daily 30 tablet 0     "osimertinib (TAGRISSO) 80 MG tablet Take 1 tablet (80 mg) by mouth daily 30 tablet 0    osimertinib (TAGRISSO) 80 MG tablet Take 1 tablet (80 mg) by mouth daily 30 tablet 0    osimertinib (TAGRISSO) 80 MG tablet Take 1 tablet (80 mg) by mouth daily 30 tablet 0    osimertinib (TAGRISSO) 80 MG tablet Take 1 tablet (80 mg) by mouth daily 30 tablet 0    SYSTANE ULTRA 0.4-0.3 % SOLN ophthalmic solution INSTILL 1 DROP INTO BOTH EYES QID 10 mL 3    thin (NO BRAND SPECIFIED) lancets Use with lanceting device. To accompany: Blood Glucose Monitor Brands: per insurance. 300 each 11     No current facility-administered medications for this visit.     Facility-Administered Medications Ordered in Other Visits   Medication Dose Route Frequency Provider Last Rate Last Admin    DOBUTamine 500 mg in dextrose 5% 250 mL (adult std conc) premix  2.5-20 mcg/kg/min Intravenous Continuous Thanh Davenport MD   Stopped at 04/19/21 1609    metoprolol (LOPRESSOR) injection 5 mg  5 mg Intravenous Q5 Min PRN Thanh Davenport MD         No Known Allergies    Physical Exam:BP (!) 184/80   Pulse 65   Temp 97.9  F (36.6  C) (Oral)   Resp 16   Ht 1.635 m (5' 4.37\")   Wt 60.7 kg (133 lb 14.4 oz)   SpO2 97%   BMI 22.72 kg/m   -weight is stable.   ECOG PS- 0  Constitutional: Alert, cooperative, and in no distress.   ENT: Eyes bright, No mouth sores  Neck: Supple, No adenopathy.  Cardiac: Heart rate and rhythm is regular and strong without murmur  Respiratory: Breathing easy. Lung sounds clear to auscultation  GI: Abdomen is soft, non-tender, BS normal. No masses or organomegaly  MS: Muscle tone normal, extremities normal with no edema.   Skin: Light red scattered papular rash to anterior feet and ankles and noted to right anterior shoulder as well. Signs of scratching to foot and back of right ankle. No signs of infection- no patchy redness or warmth noted.   Neuro: Sensory grossly WNL, gait normal.   Lymph: Normal ant/post " cervical, axillary, supraclavicular nodes  Psych: Mentation appears normal and affect normal/bright and smiling    Laboratory/Imaging Results:    Latest Reference Range & Units 12/03/24 09:01 12/31/24 12:01 12/31/24 12:28   Sodium 135 - 145 mmol/L 138     Potassium 3.4 - 5.3 mmol/L 4.5     Chloride 98 - 107 mmol/L 100     Carbon Dioxide (CO2) 22 - 29 mmol/L 29     Urea Nitrogen 8.0 - 23.0 mg/dL 18.6     Creatinine 0.67 - 1.17 mg/dL 1.55 (H)     GFR Estimate >60 mL/min/1.73m2 46 (L)     Calcium 8.8 - 10.4 mg/dL 9.5     Anion Gap 7 - 15 mmol/L 9     Albumin 3.5 - 5.2 g/dL 4.2  4.1   Protein Total 6.4 - 8.3 g/dL 7.7  7.7   Alkaline Phosphatase 40 - 150 U/L 82  80   ALT 0 - 70 U/L 125 (H)  44   AST 0 - 45 U/L 71 (H)  40   Bilirubin Direct 0.00 - 0.30 mg/dL   <0.20   Bilirubin Total <=1.2 mg/dL 0.6  0.4   Glucose 70 - 99 mg/dL 128 (H)     TSH 0.30 - 4.20 uIU/mL 2.73     WBC 4.0 - 11.0 10e3/uL 6.4     Hemoglobin 13.3 - 17.7 g/dL 12.4 (L)     Hematocrit 40.0 - 53.0 % 37.9 (L)     Platelet Count 150 - 450 10e3/uL 171     RBC Count 4.40 - 5.90 10e6/uL 4.77     MCV 78 - 100 fL 80     MCH 26.5 - 33.0 pg 26.0 (L)     MCHC 31.5 - 36.5 g/dL 32.7     RDW 10.0 - 15.0 % 14.2     % Neutrophils % 49     % Lymphocytes % 33     % Monocytes % 11     % Eosinophils % 6     % Basophils % 1     Absolute Basophils 0.0 - 0.2 10e3/uL 0.0     Absolute Eosinophils 0.0 - 0.7 10e3/uL 0.4     Absolute Immature Granulocytes <=0.4 10e3/uL 0.0     Absolute Lymphocytes 0.8 - 5.3 10e3/uL 2.1     Absolute Monocytes 0.0 - 1.3 10e3/uL 0.7     % Immature Granulocytes % 0     Absolute Neutrophils 1.6 - 8.3 10e3/uL 3.1     Absolute NRBCs 10e3/uL 0.0     NRBCs per 100 WBC <1 /100 0     KOH PREP   Rpt    (H): Data is abnormally high  (L): Data is abnormally low  Rpt: View report in Results Review for more information  Results for orders placed or performed in visit on 12/31/24   Hepatic panel     Status: Normal   Result Value Ref Range    Protein Total 7.7 6.4 -  8.3 g/dL    Albumin 4.1 3.5 - 5.2 g/dL    Bilirubin Total 0.4 <=1.2 mg/dL    Alkaline Phosphatase 80 40 - 150 U/L    AST 40 0 - 45 U/L    ALT 44 0 - 70 U/L    Bilirubin Direct <0.20 0.00 - 0.30 mg/dL   KOH skin hair or nails     Status: None    Specimen: Foot, Right; Skin   Result Value Ref Range    KOH Preparation No fungal elements seen     KOH Preparation Reference Range: No fungal elements seen.      Assessment and Plan:  Locally advanced, unresectable, left-sided lung adenocarcinoma-Pt continues on Osimertinib - now near 2 years of use with good results on last imaging 8/30/2024.   - had elevated liver enzymes with last visit but returned to normal with today's testing.   Tolerating well -no change in plan   Return to clinic in 3 months with labs and imaging which are already set.     Hypertension- uncontrolled. - Recheck at end of visit saw no improvement in reading. He is using hydrochlorothiazide x 2 weeks with recheck by PCP at end of this week. Given order for home BP kit and asked to check 1-2 x daily at home. Discussed risks of elevated blood pressures and risk of stroke.     Rash- Noted x 1  month to anterior feet and ankles and right shoulder. Treating with zyrtec for itch and hydrocortisone without resolution. Get KOH today(negative). May be drug rash- though had prior to hydrochlorothiazide. Osimertinib causes rashes often( up to 58% of time) suggest hydrocortisone bid. And can move to mid potency triamcinolone if needed.     The total time of this encounter amounted to  45 minutes. This time included time spent with the patient and , prep work, ordering tests, and performing post visit documentation.  Jesusita Goodrich,Cnp

## 2024-12-31 ENCOUNTER — ONCOLOGY VISIT (OUTPATIENT)
Dept: ONCOLOGY | Facility: CLINIC | Age: 74
End: 2024-12-31
Attending: NURSE PRACTITIONER
Payer: COMMERCIAL

## 2024-12-31 VITALS
TEMPERATURE: 97.9 F | HEART RATE: 65 BPM | OXYGEN SATURATION: 97 % | RESPIRATION RATE: 16 BRPM | DIASTOLIC BLOOD PRESSURE: 80 MMHG | SYSTOLIC BLOOD PRESSURE: 184 MMHG | WEIGHT: 133.9 LBS | BODY MASS INDEX: 22.86 KG/M2 | HEIGHT: 64 IN

## 2024-12-31 DIAGNOSIS — I10 HYPERTENSION, UNSPECIFIED TYPE: ICD-10-CM

## 2024-12-31 DIAGNOSIS — R21 RASH: ICD-10-CM

## 2024-12-31 DIAGNOSIS — C34.92 ADENOCARCINOMA, LUNG, LEFT (H): ICD-10-CM

## 2024-12-31 DIAGNOSIS — R74.8 ELEVATED LIVER ENZYMES: Primary | ICD-10-CM

## 2024-12-31 LAB
ALBUMIN SERPL BCG-MCNC: 4.1 G/DL (ref 3.5–5.2)
ALP SERPL-CCNC: 80 U/L (ref 40–150)
ALT SERPL W P-5'-P-CCNC: 44 U/L (ref 0–70)
AST SERPL W P-5'-P-CCNC: 40 U/L (ref 0–45)
BILIRUB DIRECT SERPL-MCNC: <0.2 MG/DL (ref 0–0.3)
BILIRUB SERPL-MCNC: 0.4 MG/DL
KOH PREPARATION: NORMAL
KOH PREPARATION: NORMAL
PROT SERPL-MCNC: 7.7 G/DL (ref 6.4–8.3)

## 2024-12-31 PROCEDURE — 87220 TISSUE EXAM FOR FUNGI: CPT | Performed by: NURSE PRACTITIONER

## 2024-12-31 PROCEDURE — 99215 OFFICE O/P EST HI 40 MIN: CPT | Performed by: NURSE PRACTITIONER

## 2024-12-31 PROCEDURE — G0463 HOSPITAL OUTPT CLINIC VISIT: HCPCS | Performed by: NURSE PRACTITIONER

## 2024-12-31 PROCEDURE — 36415 COLL VENOUS BLD VENIPUNCTURE: CPT | Performed by: NURSE PRACTITIONER

## 2024-12-31 PROCEDURE — 80076 HEPATIC FUNCTION PANEL: CPT | Performed by: NURSE PRACTITIONER

## 2024-12-31 ASSESSMENT — PAIN SCALES - GENERAL: PAINLEVEL_OUTOF10: NO PAIN (0)

## 2024-12-31 NOTE — NURSING NOTE
"Oncology Rooming Note    December 31, 2024 11:13 AM   Wolf Sen is a 74 year old male who presents for:    Chief Complaint   Patient presents with    Oncology Clinic Visit     Follow up     Initial Vitals: BP (!) 184/80   Pulse 65   Temp 97.9  F (36.6  C) (Oral)   Resp 16   Ht 1.635 m (5' 4.37\")   Wt 60.7 kg (133 lb 14.4 oz)   SpO2 97%   BMI 22.72 kg/m   Estimated body mass index is 22.72 kg/m  as calculated from the following:    Height as of this encounter: 1.635 m (5' 4.37\").    Weight as of this encounter: 60.7 kg (133 lb 14.4 oz). Body surface area is 1.66 meters squared.  No Pain (0) Comment: Data Unavailable   No LMP for male patient.  Allergies reviewed: Yes  Medications reviewed: Yes    Medications: Medication refills not needed today.  Pharmacy name entered into EPIC:    WRITTEN PRESCRIPTION REQUESTED  [id number] - [place], [place] - [address] [id number] - [place], [place] - [address] AT [facility] & [address] [place] [address] [facility] - [place], [place] - [address]    Frailty Screening:   Is the patient here for a new oncology consult visit in cancer care? 2. No      Clinical concerns: results       Gisselle Pardo LPN              "

## 2024-12-31 NOTE — LETTER
12/31/2024      Wolf Sen  [address]  [place] [address]      Dear Colleague,    Thank you for referring your patient, Wolf Sen, to the [facility]. Please see a copy of my visit note below.    Oncology Follow Up Visit: December 31, 2024    Oncologist: Dr Miles Estrada  PCP: Kimberly Bell    Diagnosis:   Wolf Sen is a 75 yo male with Locally advanced, unresectable, left-sided lung adenocarcinoma:  # Nov 2021 Presented w/ dyspnea, cough and multiple failed rounds of antibiotics for presumed pneumonia.  # Dec 2021 CT-guided lung biopsy w/ adenocarcinoma.  # Jan 2022 Staging brain MRI and PET scan with bilateral lung involvement (CRISTO primary) and med/hilar adenopathy.  # Jan 2022 [facility] w/ EGFR mutation.  # Feb 2022 Osimertinib - ongoing; [place] (CR minus a stable thoracic node)    Interval History: Mr Sen comes to clinic with daughter for review prior to continuation of Oimertinib for his lung cancer.  on IPad.  PT reports he is taking medication as ordered and is seeing no new side effects.   Admits he does not have the appetite as previous but weight is stable. Energy is good and continues to stay active.   Pruritic Rash to bilateral anterior feet and ankles noted- also right anterior shoulder x 1 month- no new med, food or other exposures at that time- treating with zyrtec for itching and some hydrocortisone.   BPs- elevated and did see PCP - started hydrochlorothiazide 2 weeks ago but BPs still very elevated- denies headaches, vision changes, chest pain or pressure. Will see for recheck this Friday. Does not have cuff at home for home monitoring.   Denies cough, SOB, or new symptoms today.    Rest of comprehensive and complete ROS is reviewed and is negative.   Past Medical History:   Diagnosis Date     Cataracts, both eyes 5/20/2014     Cerebral artery occlusion with cerebral infarction (H)      DM type 2, goal A1c below 7 5/5/2014      Hypertension      Current Outpatient Medications   Medication Sig Dispense Refill     acetaminophen (MAPAP) 500 MG tablet TAKE 2 TABLETS(1000 MG) BY MOUTH THREE TIMES DAILY AS NEEDED FOR PAIN 100 tablet 1     Alcohol Swabs (B-D SINGLE USE SWABS REGULAR) PADS USE TO SWAB AREA OF INJECTION/RONEY AS DIRECTED 100 each 4     ASPIRIN PO Take 81 mg by mouth       atorvastatin (LIPITOR) 80 MG tablet Take 0.5 tablets (40 mg) by mouth daily. 90 tablet 0     blood glucose (ONETOUCH VERIO IQ) test strip USE TO TEST BLOOD SUGAR 1 TIMES DAILY OR AS DIRECTED 300 strip 11     ferrous sulfate (FEROSUL) 325 (65 Fe) MG tablet Take 1 tablet (325 mg) by mouth daily (with breakfast) 90 tablet 3     hydrALAZINE (APRESOLINE) 25 MG tablet TAKE 1 TABLET BY MOUTH THREE TIMES DAILY 270 tablet 1     hydroCHLOROthiazide (HYDRODIURIL) 25 MG tablet Take 1 tablet (25 mg) by mouth daily. For blood pressure 90 tablet 1     hydrocortisone, Perianal, (HYDROCORTISONE) 2.5 % cream Place rectally 2 times daily as needed for hemorrhoids. 30 g 0     insulin aspart (NOVOLOG FLEXPEN) 100 UNIT/ML pen INJECT 15 UNITS UNDER THE SKIN THREE TIMES DAILY WITH MEALS 30 mL 1     insulin pen needle (32G X 4 MM) 32G X 4 MM miscellaneous Use 4 pen needles daily or as directed. 200 each 11     ketoconazole (NIZORAL) 2 % external cream Apply topically daily 60 g 1     LANTUS SOLOSTAR 100 UNIT/ML soln ADMINISTER 28 UNITS UNDER THE SKIN AT BEDTIME 30 mL 0     loperamide (IMODIUM A-D) 2 MG tablet Take 1 tablet (2 mg) by mouth 4 times daily as needed for diarrhea. 30 tablet 0     losartan (COZAAR) 100 MG tablet Take 1 tablet (100 mg) by mouth daily. 90 tablet 1     metoprolol succinate ER (TOPROL XL) 25 MG 24 hr tablet TAKE 1 TABLET(25 MG) BY MOUTH EVERY EVENING FOR BLOOD PRESSURE 90 tablet 1     multivitamin w/minerals (THERA-VIT-M) tablet Take 1 tablet by mouth daily 90 tablet 3     osimertinib (TAGRISSO) 80 MG tablet Take 1 tablet (80 mg) by mouth daily 30 tablet 0      "osimertinib (TAGRISSO) 80 MG tablet Take 1 tablet (80 mg) by mouth daily 30 tablet 0     osimertinib (TAGRISSO) 80 MG tablet Take 1 tablet (80 mg) by mouth daily 30 tablet 0     osimertinib (TAGRISSO) 80 MG tablet Take 1 tablet (80 mg) by mouth daily 30 tablet 0     osimertinib (TAGRISSO) 80 MG tablet Take 1 tablet (80 mg) by mouth daily 30 tablet 0     osimertinib (TAGRISSO) 80 MG tablet Take 1 tablet (80 mg) by mouth daily 30 tablet 0     osimertinib (TAGRISSO) 80 MG tablet Take 1 tablet (80 mg) by mouth daily 30 tablet 0     osimertinib (TAGRISSO) 80 MG tablet Take 1 tablet (80 mg) by mouth daily 30 tablet 0     SYSTANE ULTRA 0.4-0.3 % SOLN ophthalmic solution INSTILL 1 DROP INTO BOTH EYES QID 10 mL 3     thin (NO BRAND SPECIFIED) lancets Use with lanceting device. To accompany: Blood Glucose Monitor Brands: per insurance. 300 each 11     No current facility-administered medications for this visit.     Facility-Administered Medications Ordered in Other Visits   Medication Dose Route Frequency Provider Last Rate Last Admin     DOBUTamine 500 mg in dextrose 5% 250 mL (adult std conc) premix  2.5-20 mcg/kg/min Intravenous Continuous Thanh Davenport MD   Stopped at 04/19/21 1609     metoprolol (LOPRESSOR) injection 5 mg  5 mg Intravenous Q5 Min PRN Thanh Davenport MD         No Known Allergies    Physical Exam:BP (!) 184/80   Pulse 65   Temp 97.9  F (36.6  C) (Oral)   Resp 16   Ht 1.635 m (5' 4.37\")   Wt 60.7 kg (133 lb 14.4 oz)   SpO2 97%   BMI 22.72 kg/m   -weight is stable.   ECOG PS- 0  Constitutional: Alert, cooperative, and in no distress.   ENT: Eyes bright, No mouth sores  Neck: Supple, No adenopathy.  Cardiac: Heart rate and rhythm is regular and strong without murmur  Respiratory: Breathing easy. Lung sounds clear to auscultation  GI: Abdomen is soft, non-tender, BS normal. No masses or organomegaly  MS: Muscle tone normal, extremities normal with no edema.   Skin: Light red " scattered papular rash to anterior feet and ankles and noted to right anterior shoulder as well. Signs of scratching to foot and back of right ankle. No signs of infection- no patchy redness or warmth noted.   Neuro: Sensory grossly WNL, gait normal.   Lymph: Normal ant/post cervical, axillary, supraclavicular nodes  Psych: Mentation appears normal and affect normal/bright and smiling    Laboratory/Imaging Results:    Latest Reference Range & Units 12/03/24 09:01 12/31/24 12:01 12/31/24 12:28   Sodium 135 - 145 mmol/L 138     Potassium 3.4 - 5.3 mmol/L 4.5     Chloride 98 - 107 mmol/L 100     Carbon Dioxide (CO2) 22 - 29 mmol/L 29     Urea Nitrogen 8.0 - 23.0 mg/dL 18.6     Creatinine 0.67 - 1.17 mg/dL 1.55 (H)     GFR Estimate >60 mL/min/1.73m2 46 (L)     Calcium 8.8 - 10.4 mg/dL 9.5     Anion Gap 7 - 15 mmol/L 9     Albumin 3.5 - 5.2 g/dL 4.2  4.1   Protein Total 6.4 - 8.3 g/dL 7.7  7.7   Alkaline Phosphatase 40 - 150 U/L 82  80   ALT 0 - 70 U/L 125 (H)  44   AST 0 - 45 U/L 71 (H)  40   Bilirubin Direct 0.00 - 0.30 mg/dL   <0.20   Bilirubin Total <=1.2 mg/dL 0.6  0.4   Glucose 70 - 99 mg/dL 128 (H)     TSH 0.30 - 4.20 uIU/mL 2.73     WBC 4.0 - 11.0 10e3/uL 6.4     Hemoglobin 13.3 - 17.7 g/dL 12.4 (L)     Hematocrit 40.0 - 53.0 % 37.9 (L)     Platelet Count 150 - 450 10e3/uL 171     RBC Count 4.40 - 5.90 10e6/uL 4.77     MCV 78 - 100 fL 80     MCH 26.5 - 33.0 pg 26.0 (L)     MCHC 31.5 - 36.5 g/dL 32.7     RDW 10.0 - 15.0 % 14.2     % Neutrophils % 49     % Lymphocytes % 33     % Monocytes % 11     % Eosinophils % 6     % Basophils % 1     Absolute Basophils 0.0 - 0.2 10e3/uL 0.0     Absolute Eosinophils 0.0 - 0.7 10e3/uL 0.4     Absolute Immature Granulocytes <=0.4 10e3/uL 0.0     Absolute Lymphocytes 0.8 - 5.3 10e3/uL 2.1     Absolute Monocytes 0.0 - 1.3 10e3/uL 0.7     % Immature Granulocytes % 0     Absolute Neutrophils 1.6 - 8.3 10e3/uL 3.1     Absolute NRBCs 10e3/uL 0.0     NRBCs per 100 WBC <1 /100 0     KOH  PREP   Rpt    (H): Data is abnormally high  (L): Data is abnormally low  Rpt: View report in Results Review for more information  Results for orders placed or performed in visit on 12/31/24   Hepatic panel     Status: Normal   Result Value Ref Range    Protein Total 7.7 6.4 - 8.3 g/dL    Albumin 4.1 3.5 - 5.2 g/dL    Bilirubin Total 0.4 <=1.2 mg/dL    Alkaline Phosphatase 80 40 - 150 U/L    AST 40 0 - 45 U/L    ALT 44 0 - 70 U/L    Bilirubin Direct <0.20 0.00 - 0.30 mg/dL   KOH skin hair or nails     Status: None    Specimen: Foot, Right; Skin   Result Value Ref Range    KOH Preparation No fungal elements seen     KOH Preparation Reference Range: No fungal elements seen.      Assessment and Plan:  Locally advanced, unresectable, left-sided lung adenocarcinoma-Pt continues on Osimertinib - now near 2 years of use with good results on last imaging 8/30/2024.   - had elevated liver enzymes with last visit but returned to normal with today's testing.   Tolerating well -no change in plan   Return to clinic in 3 months with labs and imaging which are already set.     Hypertension- uncontrolled. - Recheck at end of visit saw no improvement in reading. He is using hydrochlorothiazide x 2 weeks with recheck by PCP at end of this week. Given order for home BP kit and asked to check 1-2 x daily at home. Discussed risks of elevated blood pressures and risk of stroke.     Rash- Noted x 1  month to anterior feet and ankles and right shoulder. Treating with zyrtec for itch and hydrocortisone without resolution. Get KOH today(negative). May be drug rash- though had prior to hydrochlorothiazide. Osimertinib causes rashes often( up to 58% of time) suggest hydrocortisone bid. And can move to mid potency triamcinolone if needed.     The total time of this encounter amounted to  45 minutes. This time included time spent with the patient and , prep work, ordering tests, and performing post visit documentation.  Jesusita  Kary Goodrich      Again, thank you for allowing me to participate in the care of your patient.        Sincerely,        Jesusita Goodrich NP, APRN CNP    Electronically signed

## 2025-01-07 DIAGNOSIS — C34.92 ADENOCARCINOMA, LUNG, LEFT (H): Primary | ICD-10-CM

## 2025-01-11 ENCOUNTER — HEALTH MAINTENANCE LETTER (OUTPATIENT)
Age: 75
End: 2025-01-11

## 2025-01-22 DIAGNOSIS — E11.49 TYPE 2 DIABETES MELLITUS WITH NEUROLOGICAL MANIFESTATIONS, CONTROLLED (H): ICD-10-CM

## 2025-01-22 RX ORDER — PEN NEEDLE, DIABETIC 32GX 5/32"
NEEDLE, DISPOSABLE MISCELLANEOUS
Qty: 400 EACH | Refills: 1 | Status: SHIPPED | OUTPATIENT
Start: 2025-01-22

## 2025-02-04 ENCOUNTER — MYC REFILL (OUTPATIENT)
Dept: FAMILY MEDICINE | Facility: CLINIC | Age: 75
End: 2025-02-04
Payer: COMMERCIAL

## 2025-02-04 DIAGNOSIS — E78.5 HYPERLIPIDEMIA LDL GOAL <70: ICD-10-CM

## 2025-02-05 RX ORDER — ATORVASTATIN CALCIUM 80 MG/1
40 TABLET, FILM COATED ORAL DAILY
Qty: 90 TABLET | Refills: 2 | Status: SHIPPED | OUTPATIENT
Start: 2025-02-05

## 2025-02-06 DIAGNOSIS — C34.92 ADENOCARCINOMA, LUNG, LEFT (H): Primary | ICD-10-CM

## 2025-02-28 ENCOUNTER — PATIENT OUTREACH (OUTPATIENT)
Dept: ONCOLOGY | Facility: CLINIC | Age: 75
End: 2025-02-28

## 2025-02-28 NOTE — PROGRESS NOTES
Call received from patient's daughter, reports that patient presented for her PET scan today and was informed that he would be required to pay $3000 for the scan.  Per review of chart, PET scan is authorized.  Connected with staff member that documented authorization on the referral and was informed that this should not be the case.      Messaged  and awaiting reply to see if there was a reason that the patient was advised that a large payment was required.  Awaiting reply to see if ok to reschedule or if there is indeed a large copayment that the patient should be made aware of.          Evelia Marquez RN

## 2025-03-05 NOTE — PROGRESS NOTES
Message left for patient's daughter informing that have not yet been informed of the reason that patient was told there was a large copay for PET exam.   stated it was not their team member that informed of high copay, that it would be the person on the PET truck.  Manager for this team is out of office, messaged the person covering and was told that another manager is responsible.  Messaged the [facility]/PET manager and awaiting reply.     Evelia Marquez RN

## 2025-03-10 DIAGNOSIS — C34.92 ADENOCARCINOMA, LUNG, LEFT (H): Primary | ICD-10-CM

## 2025-03-12 ENCOUNTER — TRANSFERRED RECORDS (OUTPATIENT)
Dept: HEALTH INFORMATION MANAGEMENT | Facility: CLINIC | Age: 75
End: 2025-03-12

## 2025-03-18 DIAGNOSIS — E11.49 TYPE 2 DIABETES MELLITUS WITH NEUROLOGICAL MANIFESTATIONS, CONTROLLED (H): ICD-10-CM

## 2025-03-18 RX ORDER — INSULIN ASPART 100 [IU]/ML
INJECTION, SOLUTION INTRAVENOUS; SUBCUTANEOUS
Qty: 30 ML | Refills: 0 | Status: SHIPPED | OUTPATIENT
Start: 2025-03-18

## 2025-03-25 ENCOUNTER — TELEPHONE (OUTPATIENT)
Dept: PHARMACY | Facility: OTHER | Age: 75
End: 2025-03-25
Payer: COMMERCIAL

## 2025-03-25 DIAGNOSIS — I10 HTN, GOAL BELOW 150/90: ICD-10-CM

## 2025-03-25 NOTE — TELEPHONE ENCOUNTER
MT Recruitment: [facility] insurance     Referral outreach attempt #2 on March 25, 2025      Outcome: left voicemail- Call back number [phone].    Rocio Aguilar CPhT  [facility]

## 2025-03-26 RX ORDER — AMLODIPINE BESYLATE 5 MG/1
TABLET ORAL
Qty: 90 TABLET | Refills: 0 | Status: SHIPPED | OUTPATIENT
Start: 2025-03-26

## 2025-03-27 ENCOUNTER — PATIENT OUTREACH (OUTPATIENT)
Dept: GERIATRIC MEDICINE | Facility: CLINIC | Age: 75
End: 2025-03-27
Payer: COMMERCIAL

## 2025-03-27 NOTE — PROGRESS NOTES
[facility] Care Coordination Contact      [facility] Six-Month Telephone Assessment    6 month telephone assessment completed on 3/27/25 with member and Alysiaan , Annie, with [facility] language line.    ER visits: No  Hospitalizations: No  TCU stays: No  Significant health status changes: none  Falls/Injuries: No  ADL/IADL changes: No  Changes in services: No    Caregiver Assessment follow up:  N/A    Goals: See Support Plan for goal progress documentation.  Member states that he is doing well - no changes.     Will see member in 6 months for an annual health risk assessment.   Encouraged member to call CC with any questions or concerns in the meantime.     Viki Avalos RN, PHN  [facility]

## 2025-03-28 ENCOUNTER — ANCILLARY PROCEDURE (OUTPATIENT)
Dept: PET IMAGING | Facility: CLINIC | Age: 75
End: 2025-03-28
Attending: INTERNAL MEDICINE
Payer: COMMERCIAL

## 2025-03-28 ENCOUNTER — ANCILLARY ORDERS (OUTPATIENT)
Dept: ONCOLOGY | Facility: CLINIC | Age: 75
End: 2025-03-28

## 2025-03-28 DIAGNOSIS — N18.32 STAGE 3B CHRONIC KIDNEY DISEASE (H): ICD-10-CM

## 2025-03-28 DIAGNOSIS — C34.92 STAGE IV ADENOCARCINOMA OF LUNG, LEFT (H): ICD-10-CM

## 2025-03-28 DIAGNOSIS — R53.0 NEOPLASTIC (MALIGNANT) RELATED FATIGUE: ICD-10-CM

## 2025-03-28 DIAGNOSIS — D50.0 IRON DEFICIENCY ANEMIA DUE TO CHRONIC BLOOD LOSS: ICD-10-CM

## 2025-03-28 DIAGNOSIS — C34.92 STAGE IV ADENOCARCINOMA OF LUNG, LEFT (H): Primary | ICD-10-CM

## 2025-03-28 DIAGNOSIS — C34.32 PRIMARY MALIGNANT NEOPLASM OF BRONCHUS OF LEFT LOWER LOBE (H): ICD-10-CM

## 2025-03-28 PROCEDURE — A9552 F18 FDG: HCPCS | Performed by: RADIOLOGY

## 2025-03-28 PROCEDURE — 78816 PET IMAGE W/CT FULL BODY: CPT | Mod: PS | Performed by: RADIOLOGY

## 2025-03-28 RX ORDER — FLUDEOXYGLUCOSE F 18 200 MCI/ML
10-18 INJECTION, SOLUTION INTRAVENOUS ONCE
Status: COMPLETED | OUTPATIENT
Start: 2025-03-28 | End: 2025-03-28

## 2025-03-28 RX ADMIN — FLUDEOXYGLUCOSE F 18 12.93 MILLICURIE: 200 INJECTION, SOLUTION INTRAVENOUS at 11:00

## 2025-03-31 ENCOUNTER — PATIENT OUTREACH (OUTPATIENT)
Dept: ONCOLOGY | Facility: CLINIC | Age: 75
End: 2025-03-31
Payer: COMMERCIAL

## 2025-03-31 LAB — RADIOLOGIST FLAGS: ABNORMAL

## 2025-03-31 NOTE — PROGRESS NOTES
"Call received from  Imaging to report urgent finding noted on PET scan dated 3/28/2025:    \"Continued interval progression of groundglass and consolidative  opacities throughout the left lung, now significant, worse within the  left upper lobe, with new areas of nodular consolidation and  groundglass opacity. This is nonspecific, and could reflect a drug  reaction, including interstitial lung disease related to current  therapy, a progressive infectious process, possibly atypical  pneumonia, less likely radiation pneumonitis given remote positioning  of multiple opacities. Disease progression is also considered.  Recommend correlation with patient's symptoms.\"    Forwarding to BERE Montalvo, CNP in Dr. Estrada's absence.  Patient is scheduled for follow up with Dr. Estrada on 47/2025 to review findings.        Evelia Marquez RN       "

## 2025-03-31 NOTE — PROGRESS NOTES
Leora Pride, APRN CNP  You1 hour ago (3:18 PM)     RJ  How is the patient feeling? Is he in respiratory distress? If asymptomatic, okay to wait until visit with Dr. Estrada next week.     Attempted to reach patient's daughter, message left requesting return call.  [employer] message also sent.        Evelia Marquez RN

## 2025-04-01 NOTE — PROGRESS NOTES
Gwen returned call, states she viewed [employer] message and her father did report that he has been experiencing a cough for the past week.  Daughter contacted patient for 3 way call and she translated as needed.  Patient denies fever or shortness of breath, but does report having some sputum that was bloody, though denies at this time.  Daughter states that patient has not mentioned this to her previously.      Advised patient/daughter to follow up as scheduled and to continue to monitor.  If experiencing worsening symptoms or respiratory distress to be evaluated in ER.  Will forward to covering provider and if other recommendations will return call.  Patient/daughter verbalize understanding and agree.      Evelia Marquez RN

## 2025-04-07 ENCOUNTER — ONCOLOGY VISIT (OUTPATIENT)
Dept: ONCOLOGY | Facility: CLINIC | Age: 75
End: 2025-04-07
Attending: INTERNAL MEDICINE
Payer: COMMERCIAL

## 2025-04-07 VITALS
WEIGHT: 138.2 LBS | SYSTOLIC BLOOD PRESSURE: 177 MMHG | BODY MASS INDEX: 23.45 KG/M2 | HEART RATE: 57 BPM | DIASTOLIC BLOOD PRESSURE: 80 MMHG | OXYGEN SATURATION: 98 %

## 2025-04-07 DIAGNOSIS — R53.0 NEOPLASTIC (MALIGNANT) RELATED FATIGUE: ICD-10-CM

## 2025-04-07 DIAGNOSIS — C34.82: ICD-10-CM

## 2025-04-07 DIAGNOSIS — R74.8 ELEVATED LIVER ENZYMES: ICD-10-CM

## 2025-04-07 DIAGNOSIS — N18.32 STAGE 3B CHRONIC KIDNEY DISEASE (H): Primary | ICD-10-CM

## 2025-04-07 PROCEDURE — G2211 COMPLEX E/M VISIT ADD ON: HCPCS | Performed by: INTERNAL MEDICINE

## 2025-04-07 PROCEDURE — 99214 OFFICE O/P EST MOD 30 MIN: CPT | Performed by: INTERNAL MEDICINE

## 2025-04-07 PROCEDURE — G0463 HOSPITAL OUTPT CLINIC VISIT: HCPCS | Performed by: INTERNAL MEDICINE

## 2025-04-07 ASSESSMENT — PAIN SCALES - GENERAL: PAINLEVEL_OUTOF10: SEVERE PAIN (10)

## 2025-04-07 NOTE — PATIENT INSTRUCTIONS
1) MAULIK 3 months w/ labs (CBC, CMP, TSH).  2) BJT 6 monthts w/ labs (CBC, CMP, TSH, ferritin) and PET.    Miles Estrada MD.

## 2025-04-07 NOTE — PROGRESS NOTES
[facility] Hematology / Oncology  Progress Note  Name: Wolf Sen  :  1950  MRN:  [id number]    --------------------    Subjective:  Wolf returns for follow-up of lung cancer accompanied by his daughter.  All in all, he remains quite well.  Continues to tolerate treatment without major side effect; does note some nailbed changes chronically since starting treatment.  No major changes in shortness of breath or cough; had a brief episode of self resolved hemoptysis.  No major issues w/ rash.    --------------------    Objective:  VS: BP (!) 177/80   Pulse 57   Wt 62.7 kg (138 lb 3.2 oz)   SpO2 98%   BMI 23.45 kg/m    Gen: Well-appearing.  Chest: CTAB; no crackles.    We reviewed CBC, CMP, TSH.  We reviewed PET w/ independent review.    --------------------    Assessment / Plan:  Locally advanced, unresectable, left-sided lung adenocarcinoma:  # 2021 Presented w/ dyspnea, cough and multiple failed rounds of antibiotics for presumed pneumonia.  # Dec 2021 CT-guided lung biopsy w/ adenocarcinoma.  # 2022 Staging brain MRI and PET scan with bilateral lung involvement (CRISTO primary) and med/hilar adenopathy.  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  # 2022 Osimertinib - ongoing; IL (CR minus a stable thoracic node).    Continue osimertinib.  Radiographically, we reviewed overall stable disease as well as incidental findings; disagree with radiologist's interpretation (reviewing serial scans over 3 years).  Radiographically, reviewed incidental findings as well; reviewed plans to check in with primary care provider regarding BP as well as dentist regarding dental changes.  Chemistries show stable chronic kidney disease stage III AA and mild hyperglycemia; no signs of recurrent transaminitis; blood counts with stable mild anemia; TSH okay.  Checking ferritin with future microcytic anemia; could be hemoglobinopathy, could be mild iron deficiency anemia.  Reviewed the for recurrent coughing  episodes or hemoptysis, further investigation may be warranted; consider trial of prednisone 40 mg x 5 days.  Planning brain MRI PRN symptoms given long-term stability.    Patient Instructions   1) MAULIK 3 months w/ labs (CBC, CMP, TSH).  2) BJT 6 monthts w/ labs (CBC, CMP, TSH, ferritin) and PET.    Miles Estrada MD.

## 2025-04-07 NOTE — Clinical Note
2025      Wlof Sen  [address]  [facility] [address]      Dear Colleague,    Thank you for referring your patient, Wolf Sen, to the [facility]. Please see a copy of my visit note below.    [facility] Hematology / Oncology  Progress Note  Name: Wolf Sen  :  1950  MRN:  [id number]    --------------------    Subjective:  ***.    --------------------    Objective:  VS: BP (!) 177/80   Pulse 57   Wt 62.7 kg (138 lb 3.2 oz)   SpO2 98%   BMI 23.45 kg/m    Gen: Well-appearing.    We reviewed CBC, CMP, TSH.  We reviewed PET w/ independent review.    --------------------    Assessment / Plan:  ***    There are no Patient Instructions on file for this visit.      Again, thank you for allowing me to participate in the care of your patient.        Sincerely,        Miles Estrada MD    Electronically signed

## 2025-04-07 NOTE — NURSING NOTE
"Oncology Rooming Note    April 7, 2025 7:31 AM   Wolf Sen is a 75 year old male who presents for:    Chief Complaint   Patient presents with    Oncology Clinic Visit     Initial Vitals: Wt 62.7 kg (138 lb 3.2 oz)   BMI 23.45 kg/m   Estimated body mass index is 23.45 kg/m  as calculated from the following:    Height as of 12/31/24: 1.635 m (5' 4.37\").    Weight as of this encounter: 62.7 kg (138 lb 3.2 oz). Body surface area is 1.69 meters squared.  Data Unavailable Comment: Data Unavailable   No LMP for male patient.  Allergies reviewed: Yes  Medications reviewed: Yes    Medications: Medication refills not needed today.  Pharmacy name entered into EPIC:    WRITTEN PRESCRIPTION REQUESTED  [id number] [place] - [address] [id number] - [place], [place] - [address] AT [facility] & [address] [place] [address] [facility] - [place], [place] - [address]    Frailty Screening:   Is the patient here for a new oncology consult visit in cancer care? 2. No    PHQ9:  Did this patient require a PHQ9?: No      Clinical concerns: Patient has concerns of right flank pain ,patient will discuss concerns with provider.       Mary Grace Trevizo MA            "

## 2025-04-09 DIAGNOSIS — C34.92 ADENOCARCINOMA, LUNG, LEFT (H): Primary | ICD-10-CM

## 2025-04-10 ENCOUNTER — OFFICE VISIT (OUTPATIENT)
Dept: FAMILY MEDICINE | Facility: CLINIC | Age: 75
End: 2025-04-10
Payer: COMMERCIAL

## 2025-04-10 VITALS
OXYGEN SATURATION: 97 % | WEIGHT: 139 LBS | TEMPERATURE: 97.2 F | DIASTOLIC BLOOD PRESSURE: 88 MMHG | BODY MASS INDEX: 23.73 KG/M2 | HEIGHT: 64 IN | SYSTOLIC BLOOD PRESSURE: 162 MMHG | RESPIRATION RATE: 16 BRPM | HEART RATE: 93 BPM

## 2025-04-10 DIAGNOSIS — E11.49 TYPE 2 DIABETES MELLITUS WITH NEUROLOGICAL MANIFESTATIONS, CONTROLLED (H): Primary | ICD-10-CM

## 2025-04-10 DIAGNOSIS — N18.31 STAGE 3A CHRONIC KIDNEY DISEASE (H): ICD-10-CM

## 2025-04-10 DIAGNOSIS — Z23 ENCOUNTER FOR IMMUNIZATION: ICD-10-CM

## 2025-04-10 DIAGNOSIS — Z79.899 MEDICATION MANAGEMENT: ICD-10-CM

## 2025-04-10 DIAGNOSIS — K59.00 CONSTIPATION, UNSPECIFIED CONSTIPATION TYPE: ICD-10-CM

## 2025-04-10 DIAGNOSIS — C34.92 ADENOCARCINOMA, LUNG, LEFT (H): ICD-10-CM

## 2025-04-10 DIAGNOSIS — I10 HTN, GOAL BELOW 150/90: ICD-10-CM

## 2025-04-10 DIAGNOSIS — E78.5 HYPERLIPIDEMIA LDL GOAL <70: ICD-10-CM

## 2025-04-10 DIAGNOSIS — I10 BENIGN ESSENTIAL HTN: ICD-10-CM

## 2025-04-10 LAB
ALBUMIN SERPL BCG-MCNC: 4 G/DL (ref 3.5–5.2)
ALP SERPL-CCNC: 99 U/L (ref 40–150)
ALT SERPL W P-5'-P-CCNC: 79 U/L (ref 0–70)
ANION GAP SERPL CALCULATED.3IONS-SCNC: 14 MMOL/L (ref 7–15)
AST SERPL W P-5'-P-CCNC: 87 U/L (ref 0–45)
BILIRUB SERPL-MCNC: 0.4 MG/DL
BUN SERPL-MCNC: 25.9 MG/DL (ref 8–23)
CALCIUM SERPL-MCNC: 9.3 MG/DL (ref 8.8–10.4)
CHLORIDE SERPL-SCNC: 100 MMOL/L (ref 98–107)
CHOLEST SERPL-MCNC: 161 MG/DL
CREAT SERPL-MCNC: 1.51 MG/DL (ref 0.67–1.17)
CREAT UR-MCNC: 123 MG/DL
EGFRCR SERPLBLD CKD-EPI 2021: 47 ML/MIN/1.73M2
EST. AVERAGE GLUCOSE BLD GHB EST-MCNC: 194 MG/DL
FASTING STATUS PATIENT QL REPORTED: ABNORMAL
FASTING STATUS PATIENT QL REPORTED: ABNORMAL
GLUCOSE SERPL-MCNC: 368 MG/DL (ref 70–99)
HBA1C MFR BLD: 8.4 % (ref 0–5.6)
HCO3 SERPL-SCNC: 25 MMOL/L (ref 22–29)
HDLC SERPL-MCNC: 69 MG/DL
LDLC SERPL CALC-MCNC: 56 MG/DL
MICROALBUMIN UR-MCNC: 124 MG/L
MICROALBUMIN/CREAT UR: 100.81 MG/G CR (ref 0–17)
NONHDLC SERPL-MCNC: 92 MG/DL
POTASSIUM SERPL-SCNC: 3.6 MMOL/L (ref 3.4–5.3)
PROT SERPL-MCNC: 7.5 G/DL (ref 6.4–8.3)
SODIUM SERPL-SCNC: 139 MMOL/L (ref 135–145)
TRIGL SERPL-MCNC: 182 MG/DL

## 2025-04-10 RX ORDER — METOPROLOL SUCCINATE 25 MG/1
TABLET, EXTENDED RELEASE ORAL
Qty: 90 TABLET | Refills: 1 | Status: SHIPPED | OUTPATIENT
Start: 2025-04-10

## 2025-04-10 RX ORDER — INSULIN GLARGINE 100 [IU]/ML
INJECTION, SOLUTION SUBCUTANEOUS
Qty: 15 ML | Refills: 0 | Status: SHIPPED | OUTPATIENT
Start: 2025-04-10

## 2025-04-10 RX ORDER — POLYETHYLENE GLYCOL 3350 17 G/17G
1 POWDER, FOR SOLUTION ORAL DAILY
Qty: 116 G | Refills: 1 | Status: SHIPPED | OUTPATIENT
Start: 2025-04-10

## 2025-04-10 RX ORDER — HYDROCHLOROTHIAZIDE 25 MG/1
25 TABLET ORAL DAILY
Qty: 90 TABLET | Refills: 1 | Status: SHIPPED | OUTPATIENT
Start: 2025-04-10

## 2025-04-10 RX ORDER — HYDRALAZINE HYDROCHLORIDE 25 MG/1
25 TABLET, FILM COATED ORAL 3 TIMES DAILY
Qty: 270 TABLET | Refills: 1 | Status: SHIPPED | OUTPATIENT
Start: 2025-04-10

## 2025-04-10 RX ORDER — LOSARTAN POTASSIUM 100 MG/1
100 TABLET ORAL DAILY
Qty: 90 TABLET | Refills: 1 | Status: SHIPPED | OUTPATIENT
Start: 2025-04-10

## 2025-04-10 RX ORDER — AMLODIPINE BESYLATE 5 MG/1
5 TABLET ORAL DAILY
Qty: 90 TABLET | Refills: 1 | Status: SHIPPED | OUTPATIENT
Start: 2025-04-10

## 2025-04-10 NOTE — RESULT ENCOUNTER NOTE
Wolf,       3-month glucose number hemoglobin A1c has increased from 6.3 to 8.4.     I would like you to restart Lantus at 10 units nightly.    I have also sent in a medication called empagliflozin 10 mg to be taken daily.  This will help protect your kidneys as well as help with diabetes control.    Other labs are pending     Please plan to follow-up with me in 3 months.        Please do not hesitate to call us at [phone] if you have any questions or concerns.    Thank you,    Kimberly Bell MD MPH

## 2025-04-10 NOTE — PROGRESS NOTES
Assessment & Plan     Type 2 diabetes mellitus with neurological manifestations, controlled (H)  -Hba1c went up   - Med Therapy Management Referral  - Albumin Random Urine Quantitative with Creat Ratio  - Comprehensive metabolic panel  - Hemoglobin A1c  - Albumin Random Urine Quantitative with Creat Ratio  - Comprehensive metabolic panel  - Hemoglobin A1c  - empagliflozin (JARDIANCE) 10 MG TABS tablet  Dispense: 90 tablet; Refill: 1  - insulin glargine (LANTUS SOLOSTAR) 100 UNIT/ML pen  Dispense: 15 mL; Refill: 0      Lab Results   Component Value Date    A1C 8.4 04/10/2025    A1C 6.3 11/22/2024    A1C 6.1 04/23/2024    A1C 6.1 03/15/2023    A1C 6.1 09/30/2022    A1C 6.7 11/27/2020    A1C 8.3 12/18/2019    A1C 11.5 09/20/2019    A1C 10.6 05/09/2019    A1C 8.0 01/11/2019     Periodic blood sugar testing.  Regular foot checks  Limit carbohydrates and sweets in diet  Update eye exam annually   Regular exercise, 150 minutes per week  Hypoglycemia precautions     At last visit, I had requested for the patient to stop taking metformin as A1c was excellent at 6.3 and the patient has a low GFR.  Patient did stop metformin, but it seems insulin was also discontinued.  At this time we will continue with Lantus at 10 units daily  Added empagliflozin 10 mg daily for diabetes as well as chronic kidney disease   Follow-up in 3 months at which time labs will be rechecked      Medication management  -medications reviewed     HTN, goal below 150/90  -refills provided  -had been out of Amlodipine for 2 days   - amLODIPine (NORVASC) 5 MG tablet  Dispense: 90 tablet; Refill: 1    Hyperlipidemia LDL goal <70  -continue statin   - Lipid panel reflex to direct LDL Non-fasting      The ASCVD Risk score (Aruna DK, et al., 2019) failed to calculate for the following reasons:    Risk score cannot be calculated because patient has a medical history suggesting prior/existing ASCVD    LDL Cholesterol Calculated   Date Value Ref Range Status    04/23/2024 43 <=100 mg/dL Final   11/27/2020 93 <100 mg/dL Final     Comment:     Desirable:       <100 mg/dl         Stage 3a chronic kidney disease (H)  - Comprehensive metabolic panel  - Comprehensive metabolic panel  - empagliflozin (JARDIANCE) 10 MG TABS tablet  Dispense: 90 tablet; Refill: 1    Adenocarcinoma, lung, left (H)  -Followed by oncology  -Locally advanced, unresectable, left-sided lung adenocarcinoma     Encounter for immunization  Patient will get RSV vaccination at his pharmacy    Constipation, unspecified constipation type  - polyethylene glycol (MIRALAX) 17 GM/Dose powder  Dispense: 116 g; Refill: 1    Benign essential HTN  - hydrALAZINE (APRESOLINE) 25 MG tablet  Dispense: 270 tablet; Refill: 1  - hydrochlorothiazide (HYDRODIURIL) 25 MG tablet  Dispense: 90 tablet; Refill: 1  - metoprolol succinate ER (TOPROL XL) 25 MG 24 hr tablet  Dispense: 90 tablet; Refill: 1  - losartan (COZAAR) 100 MG tablet  Dispense: 90 tablet; Refill: 1    Blood pressure readings have been elevated, however has been out of amlodipine for 2 days.  Patient is requiring multiple antihypertensive medications.    For now, will continue the following:  Losartan 100 mg daily  Metoprolol 25 mg daily  Hydrochlorothiazide 25 mg daily  Hydralazine 25 mg 3 times a day  Amlodipine 5 mg daily.  Echo had not shown congestive heart failure hence okay to continue with calcium channel blocker.  If blood pressure is still elevated then can increase amlodipine to 10 mg daily.  Beyond that if blood pressure is still not controlled then patient will need to be referred to the hypertension clinic        Klaus Bloom is a 75 year old, presenting for the following health issues:  Recheck Medication and Hypertension        4/10/2025     8:54 AM   Additional Questions   Roomed by Leonora CASTILLO      Wt Readings from Last 2 Encounters:   04/10/25 63 kg (139 lb)   04/07/25 62.7 kg (138 lb 3.2 oz)      Visit done with interpretor    Following  "with Oncology,last visit 4/7/25, reviewed notes    No chest pain  No headaches  No visio changes   No leg edema      Ran out of Amlodipine 2 days ago    We had stopped Metformin  No insulin for a few months  Not checking glucose at home   No nocturia     Diabetes:  -not checked      Lipids:  -taking statin    Would like medication for constipation     Hyperlipidemia Follow-Up    Are you regularly taking any medication or supplement to lower your cholesterol?   Yes- Lipitor 80 MG.   Are you having muscle aches or other side effects that you think could be caused by your cholesterol lowering medication?  No    Hypertension Follow-up    Do you check your blood pressure regularly outside of the clinic? No   Are you following a low salt diet? No  Are your blood pressures ever more than 140 on the top number (systolic) OR more   than 90 on the bottom number (diastolic), for example 140/90? Yes    BP Readings from Last 2 Encounters:   04/10/25 (!) 162/88   04/07/25 (!) 177/80     Diabetes Follow-up     How often are you checking your blood sugar? Not at all  What concerns do you have today about your diabetes? Other: Patient would like refills on meds    Do you have any of these symptoms? (Select all that apply)  Numbness in feet, Redness, sores, or blisters on feet, Blurry vision, and Weight loss  Have you had a diabetic eye exam in the last 12 months? No          Objective    BP (!) 162/88   Pulse 93   Temp 97.2  F (36.2  C)   Resp 16   Ht 1.635 m (5' 4.37\")   Wt 63 kg (139 lb)   SpO2 97%   BMI 23.59 kg/m    Body mass index is 23.59 kg/m .  Physical Exam   GENERAL APPEARANCE: healthy, alert and no distress  EYES: Eyes grossly normal to inspection and conjunctivae and sclerae normal  RESP: no wheezing   CV: regular rates and rhythm, normal S1 S2, Grade 3-4 systolic murmur+   ABDOMEN: soft, non-tender and no rebound or guarding   MS: extremities normal- no gross deformities noted  NEURO: Normal strength and tone, " mentation intact and speech normal  PSYCH: mentation appears normal      Results for orders placed or performed in visit on 04/10/25 (from the past 24 hours)   Hemoglobin A1c   Result Value Ref Range    Estimated Average Glucose 194 (H) <117 mg/dL    Hemoglobin A1C 8.4 (H) 0.0 - 5.6 %    Narrative    Result confirmed by repeat test            Signed Electronically by: Kimberly Bell MD

## 2025-05-29 ENCOUNTER — NURSE TRIAGE (OUTPATIENT)
Dept: FAMILY MEDICINE | Facility: CLINIC | Age: 75
End: 2025-05-29
Payer: COMMERCIAL

## 2025-05-29 NOTE — TELEPHONE ENCOUNTER
Reason for Disposition   Cough has been present for > 3 weeks    Additional Information   Negative: Bluish (or gray) lips or face   Negative: SEVERE difficulty breathing (e.g., struggling for each breath, speaks in single words)   Negative: Rapid onset of cough and has hives   Negative: Coughing started suddenly after medicine, an allergic food or bee sting   Negative: Difficulty breathing after exposure to flames, smoke, or fumes   Negative: Sounds like a life-threatening emergency to the triager   Negative: Previous asthma attacks and this feels like asthma attack   Negative: Dry cough (non-productive; no sputum or minimal clear sputum) and within 14 days of COVID-19 Exposure   Negative: MODERATE difficulty breathing (e.g., speaks in phrases, SOB even at rest, pulse 100-120) and still present when not coughing   Negative: Chest pain present when not coughing   Negative: Passed out (e.g., fainted, lost consciousness, blacked out and was not responding)   Negative: Patient sounds very sick or weak to the triager   Negative: MILD difficulty breathing (e.g., minimal/no SOB at rest, SOB with walking, pulse <100) and still present when not coughing   Negative: Coughed up > 1 tablespoon (15 ml) blood (Exception: Blood-tinged sputum.)   Negative: Fever > 103 F (39.4 C)   Negative: Fever > 101 F (38.3 C) and over 60 years of age   Negative: Fever > 100 F (37.8 C) and has diabetes mellitus or a weak immune system (e.g., HIV positive, cancer chemotherapy, organ transplant, splenectomy, chronic steroids)   Negative: Fever > 100 F (37.8 C) and bedridden (e.g., CVA, chronic illness, recovering from surgery)   Negative: Increasing ankle swelling   Negative: Wheezing is present   Negative: SEVERE coughing spells (e.g., whooping sound after coughing, vomiting after coughing)   Negative: Coughing up linda-colored (reddish-brown) or blood-tinged sputum   Negative: Fever present > 3 days (72 hours)   Negative: Fever returns after  "gone for over 24 hours and symptoms worse or not improved   Negative: Using nasal washes and pain medicine > 24 hours and sinus pain persists   Negative: Known COPD or other severe lung disease (i.e., bronchiectasis, cystic fibrosis, lung surgery) and symptoms getting worse (i.e., increased sputum purulence or amount, increased breathing difficulty)   Negative: Continuous (nonstop) coughing interferes with work or school and no improvement using cough treatment per Care Advice   Negative: Patient wants to be seen    Answer Assessment - Initial Assessment Questions  1. ONSET: \"When did the cough begin?\"       A few months ago  2. SEVERITY: \"How bad is the cough today?\"       Mild, dry  3. SPUTUM: \"Describe the color of your sputum\" (e.g., none, dry cough; clear, white, yellow, green)      no  4. HEMOPTYSIS: \"Are you coughing up any blood?\" If Yes, ask: \"How much?\" (e.g., flecks, streaks, tablespoons, etc.)      no  5. DIFFICULTY BREATHING: \"Are you having difficulty breathing?\" If Yes, ask: \"How bad is it?\" (e.g., mild, moderate, severe)       no  6. FEVER: \"Do you have a fever?\" If Yes, ask: \"What is your temperature, how was it measured, and when did it start?\"      no  7. CARDIAC HISTORY: \"Do you have any history of heart disease?\" (e.g., heart attack, congestive heart failure)       yes  8. LUNG HISTORY: \"Do you have any history of lung disease?\"  (e.g., pulmonary embolus, asthma, emphysema)      yes  9. PE RISK FACTORS: \"Do you have a history of blood clots?\" (or: recent major surgery, recent prolonged travel, bedridden)      no  10. OTHER SYMPTOMS: \"Do you have any other symptoms?\" (e.g., runny nose, wheezing, chest pain)        none  11. PREGNANCY: \"Is there any chance you are pregnant?\" \"When was your last menstrual period?\"        N/a  12. TRAVEL: \"Have you traveled out of the country in the last month?\" (e.g., travel history, exposures)        no    Protocols used: Cough-A-OH    "

## 2025-05-30 ENCOUNTER — ANCILLARY PROCEDURE (OUTPATIENT)
Dept: GENERAL RADIOLOGY | Facility: CLINIC | Age: 75
End: 2025-05-30
Attending: PREVENTIVE MEDICINE
Payer: COMMERCIAL

## 2025-05-30 DIAGNOSIS — C34.92 ADENOCARCINOMA, LUNG, LEFT (H): ICD-10-CM

## 2025-05-30 DIAGNOSIS — R05.1 ACUTE COUGH: ICD-10-CM

## 2025-05-30 PROCEDURE — 71046 X-RAY EXAM CHEST 2 VIEWS: CPT | Mod: TC | Performed by: RADIOLOGY

## 2025-06-03 ENCOUNTER — RESULTS FOLLOW-UP (OUTPATIENT)
Dept: FAMILY MEDICINE | Facility: CLINIC | Age: 75
End: 2025-06-03

## 2025-06-05 ENCOUNTER — PATIENT OUTREACH (OUTPATIENT)
Dept: ONCOLOGY | Facility: CLINIC | Age: 75
End: 2025-06-05
Payer: COMMERCIAL

## 2025-06-05 NOTE — PROGRESS NOTES
Received return call from daughter, Gwen.  She states that she has spoken with her father and he reported that he still has a cough but it has improved since starting prednisone and doxycycline.  Also states that the patient is able to lie flat now to sleep, previously was needing to prop himself up to sleep.  She states he has one more day of medication and will return call on Monday if he continues to experience the cough for a follow up visit.     Evelia Marquez RN

## 2025-06-05 NOTE — PROGRESS NOTES
Patient had seen PCP for persistent cough x 3 weeks, provider contacted Dr. Estrada about patient's symptoms.  Received the following message from Dr. Estrada:    Evelia, please reach out to Wolf or his family members and inquire if he would like to be evaluated by MAULIK.     I suspect some chest imaging will be necessary; perhaps MAULIK can order prior to their visit.     Miles Estrada MD.    Attempted to reach patient's daughter, Gwen.  Message left requesting return call.           Evelia Marquez RN

## 2025-06-09 ENCOUNTER — NURSE TRIAGE (OUTPATIENT)
Dept: FAMILY MEDICINE | Facility: CLINIC | Age: 75
End: 2025-06-09

## 2025-06-10 ENCOUNTER — PATIENT OUTREACH (OUTPATIENT)
Dept: CARE COORDINATION | Facility: CLINIC | Age: 75
End: 2025-06-10

## 2025-06-10 ENCOUNTER — OFFICE VISIT (OUTPATIENT)
Dept: FAMILY MEDICINE | Facility: CLINIC | Age: 75
End: 2025-06-10
Payer: COMMERCIAL

## 2025-06-10 VITALS
WEIGHT: 130.8 LBS | DIASTOLIC BLOOD PRESSURE: 73 MMHG | TEMPERATURE: 97.4 F | BODY MASS INDEX: 22.33 KG/M2 | HEIGHT: 64 IN | RESPIRATION RATE: 18 BRPM | OXYGEN SATURATION: 96 % | SYSTOLIC BLOOD PRESSURE: 124 MMHG | HEART RATE: 77 BPM

## 2025-06-10 DIAGNOSIS — M62.81 GENERALIZED MUSCLE WEAKNESS: ICD-10-CM

## 2025-06-10 DIAGNOSIS — R20.2 PARESTHESIAS: Primary | ICD-10-CM

## 2025-06-10 DIAGNOSIS — Z79.899 MEDICATION MANAGEMENT: ICD-10-CM

## 2025-06-10 DIAGNOSIS — Z28.01 VACCINATION NOT CARRIED OUT BECAUSE OF ACUTE ILLNESS: ICD-10-CM

## 2025-06-10 LAB
BASOPHILS # BLD AUTO: 0 10E3/UL (ref 0–0.2)
BASOPHILS NFR BLD AUTO: 0 %
EOSINOPHIL # BLD AUTO: 0.3 10E3/UL (ref 0–0.7)
EOSINOPHIL NFR BLD AUTO: 4 %
ERYTHROCYTE [DISTWIDTH] IN BLOOD BY AUTOMATED COUNT: 13.7 % (ref 10–15)
HCT VFR BLD AUTO: 38.4 % (ref 40–53)
HGB BLD-MCNC: 12.9 G/DL (ref 13.3–17.7)
IMM GRANULOCYTES # BLD: 0 10E3/UL
IMM GRANULOCYTES NFR BLD: 0 %
LYMPHOCYTES # BLD AUTO: 1.7 10E3/UL (ref 0.8–5.3)
LYMPHOCYTES NFR BLD AUTO: 21 %
MCH RBC QN AUTO: 25.3 PG (ref 26.5–33)
MCHC RBC AUTO-ENTMCNC: 33.6 G/DL (ref 31.5–36.5)
MCV RBC AUTO: 75 FL (ref 78–100)
MONOCYTES # BLD AUTO: 1 10E3/UL (ref 0–1.3)
MONOCYTES NFR BLD AUTO: 12 %
NEUTROPHILS # BLD AUTO: 5.2 10E3/UL (ref 1.6–8.3)
NEUTROPHILS NFR BLD AUTO: 64 %
PLATELET # BLD AUTO: 187 10E3/UL (ref 150–450)
RBC # BLD AUTO: 5.09 10E6/UL (ref 4.4–5.9)
WBC # BLD AUTO: 8.3 10E3/UL (ref 4–11)

## 2025-06-10 PROCEDURE — 82607 VITAMIN B-12: CPT | Performed by: FAMILY MEDICINE

## 2025-06-10 PROCEDURE — 3078F DIAST BP <80 MM HG: CPT | Performed by: FAMILY MEDICINE

## 2025-06-10 PROCEDURE — 3074F SYST BP LT 130 MM HG: CPT | Performed by: FAMILY MEDICINE

## 2025-06-10 PROCEDURE — 99214 OFFICE O/P EST MOD 30 MIN: CPT | Performed by: FAMILY MEDICINE

## 2025-06-10 PROCEDURE — 80053 COMPREHEN METABOLIC PANEL: CPT | Performed by: FAMILY MEDICINE

## 2025-06-10 PROCEDURE — 85025 COMPLETE CBC W/AUTO DIFF WBC: CPT | Performed by: FAMILY MEDICINE

## 2025-06-10 PROCEDURE — 36415 COLL VENOUS BLD VENIPUNCTURE: CPT | Performed by: FAMILY MEDICINE

## 2025-06-10 ASSESSMENT — ENCOUNTER SYMPTOMS: COUGH: 1

## 2025-06-10 NOTE — TELEPHONE ENCOUNTER
Daughter (CTC on file, speaks English) calling in to follow up from appt 5/30/25 and re: MyC.     States she isn't sure exactly how patient is doing today, but does feel like he needs to be seen again. States since finishing the prednisone and abx, he has been having increased lethargy and weakness, some numbness (but unsure where, possibly just feet). States patient didn't move much from the bed yesterday as he was worried about falling.     Daughter spoke with patient this AM and patient mentioned feeling a bit less tired but still having numbness. Daughter unsure if this numbness is new since he was last seen on 5/30.    Attempted to reach patient to further triage, but patient not available. Daughter thinks he may be showering. Daughter would like patient seen again today d/t concerns, appt made with Dr. Philippe. Aware of appt time.      Did request that daughter call our office back w/ patient on the phone (we can conference in an , daughter would prefer this for medical questions), just to be sure primary care is appropriate for him. Daughter verbalized understanding, agrees with plan.      When daughter calls back in with patient on the phone, please triage symptoms.      Ladan Delgado, RN, BSN  [facility]  [place], [place] and [place]

## 2025-06-10 NOTE — Clinical Note
KATEY I did mention to the patient that cancer and cancer treatment can be the cause of peripheral neuropathy symptoms, although I also said that I am not specifically aware of any issues with Tagris[place]. The may ask you about this.   Cedric Philippe

## 2025-06-10 NOTE — PROGRESS NOTES
{PROVIDER CHARTING PREFERENCE:578421}    Klaus Bloom is a 75 year old, presenting for the following health issues:  Cough      6/10/2025     3:15 PM   Additional Questions   Roomed by maegan Ely    History of Present Illness       Reason for visit:  Cough    He eats 0-1 servings of fruits and vegetables daily.He consumes 0 sweetened beverage(s) daily.He exercises with enough effort to increase his heart rate 10 to 19 minutes per day.  He exercises with enough effort to increase his heart rate 5 days per week.   He is taking medications regularly.        {MA/LPN/RN Pre-Provider Visit Orders- hCG/UA/Strep (Optional):637922}  Acute Illness  Acute illness concerns: ***  Onset/Duration: ***  Symptoms:  Fever: {.:169061}  Chills/Sweats: {.:576999}  Headache (location?): {.:149740}  Sinus Pressure: {.:539090}  Conjunctivitis:  {.:273328}  Ear Pain: {.:710072}  Rhinorrhea: {.:688569}  Congestion: {.:616701}  Sore Throat: {.:930094}  Cough: {.:070425}  Wheeze: {.:658521}  Decreased Appetite: {.:411498}  Nausea: {.:867220}  Vomiting: {.:086611}  Diarrhea: {.:713289}  Dysuria/Freq.: {.:662966}  Dysuria or Hematuria: {.:560054}  Fatigue/Achiness: {.:142059}  Sick/Strep Exposure: {.:246342}  Therapies tried and outcome: {:058184}  {additonal problems for provider to add (Optional):528973}    {ROS Picklists (Optional):261492}      Objective    There were no vitals taken for this visit.  There is no height or weight on file to calculate BMI.  Physical Exam   {Exam List (Optional):527072}    {Diagnostic Test Results (Optional):335078}        Signed Electronically by: Cedric Philippe MD  {Email feedback regarding this note to [email or web address]   :742173}

## 2025-06-10 NOTE — PATIENT INSTRUCTIONS
At [facility], we strive to deliver an exceptional experience to you, every time we see you. If you receive a survey, please let us know what we are doing well and/or what we could improve upon, as we do value your feedback.  If you have MyChart, you can expect to receive results automatically within 24 hours of their completion.  Your provider will send a note interpreting your results as well.   If you do not have MyChart, you should receive your results in about a week by mail.    Your care team:                            Family Medicine Internal Medicine   MD Isidro Ventura, MD Belia Bowles, MD Madhu Mendieta, MD Mary Mcintyre, PA-C BERE Howard, KEILA Tomlinson, MD Pediatrics   MD Vicki Jack, MD Dulce Maria Cedeno, PARUBEN Nguyen APRN CNP   MD Apoorva Montes, MD Bobbi Belcher, CNP      Same-Day Provider (No follow-up visits)    CHRISTINE Larios PA-C     Clinic hours: Monday - Thursday 7 am-6 pm; Fridays 7 am-5 pm.   Urgent care: Monday - Friday 10 am- 8 pm; Saturday and Sunday 9 am-5 pm.    Clinic: [phone]       [facility]: Monday - Thursday 8 am - 7 pm; Friday 8 am - 6 pm  [facility]: [phone]     [facility] Scheduling: Monday - Friday 7 am - 7 pm; Saturday 7 am - 3:30 pm  ([phone]) [place] : [phone]

## 2025-06-10 NOTE — PROGRESS NOTES
Assessment & Plan     (R20.2) Paresthesias  (primary encounter diagnosis)  (M62.81) Generalized muscle weakness  Comment: He seems to be experiencing paresthesias from a peripheral neuropathy. The cause of that is unknown, but it could be from his uncontrolled diabetes, or related to a recent illness, and I believe the patient has noted that the symptoms are improving. I will check some labs to screen for other metabolic causes.  Plan: CBC with platelets and differential, Vitamin         B12, Folate, Comprehensive metabolic panel        Follow-up as needed based on any abnormal test results.     (Z79.749) Medication management  Comment:   Plan: Med Therapy Management Referral            (Z28.01) Vaccination not carried out because of acute illness  Comment: Shingrix, COVID-19, and RSV.   Plan:     Klaus Bloom is a 75 year old, presenting for the following health issues:  Fatigue        6/10/2025     3:40 PM   Additional Questions   Accompanied by daughter     JONATHAN    Concern - tired   Onset: 3-4 days   Description:  When he started his new medications (prescribed on 530/25) to treat his cough his feet and hands starting to be numb and he has been feeling tired and weak. His symptoms started the first day he started the new medicines. He started Jardiance after his symptoms developed.   Intensity: severe  Progression of Symptoms:  worsening  Accompanying Signs & Symptoms: when pt is eating he has no taste   Previous history of similar problem: NA   Precipitating factors:        Worsened by: medication prescribed   Alleviating factors:        Improved by: resting   Therapies tried and outcome: resting help but when pt gets up he feels dizzy     Review of Systems  Patient does not feel sick in any other way. Denies chest pain, SOB, flu symptoms, tachycardia.      Objective    /73 (BP Location: Left arm, Patient Position: Sitting, Cuff Size: Adult Regular)   Pulse 77   Temp 97.4  F (36.3  C) (Temporal)    "Resp 18   Ht 1.635 m (5' 4.37\")   Wt 59.3 kg (130 lb 12.8 oz)   SpO2 96%   BMI 22.19 kg/m    Body mass index is 22.19 kg/m .  Physical Exam   GENERAL: healthy, alert and no distress  EYES: Eyes grossly normal to inspection, PERRL, EOMI, sclerae white and conjunctivae normal  RESP: some fine end-inspiratory crackles in the lower 2/3s of the left lung posteriorly. As I continue to examine him, the crackles fade further. Otherwise, lungs clear to auscultation - no wheezes, no areas of dullness, no tachypnea  CV: Heart regular rate and rhythm without murmur, click or rub. No peripheral edema and peripheral pulses strong  MS: no gross musculoskeletal defects noted, no edema  SKIN: no suspicious lesions or rashes to visible skin  NEURO: Normal strength and tone, sensory exam grossly normal, mentation intact, oriented times 3 and cranial nerves 2-12 intact  PSYCH: mentation appears normal, affect normal/bright      Latest Reference Range & Units 04/10/25 09:29   Sodium 135 - 145 mmol/L 139   Potassium 3.4 - 5.3 mmol/L 3.6   Chloride 98 - 107 mmol/L 100   Carbon Dioxide (CO2) 22 - 29 mmol/L 25   Urea Nitrogen 8.0 - 23.0 mg/dL 25.9 (H)   Creatinine 0.67 - 1.17 mg/dL 1.51 (H)   GFR Estimate >60 mL/min/1.73m2 47 (L)   Calcium 8.8 - 10.4 mg/dL 9.3   Anion Gap 7 - 15 mmol/L 14   Albumin 3.5 - 5.2 g/dL 4.0   Protein Total 6.4 - 8.3 g/dL 7.5   Alkaline Phosphatase 40 - 150 U/L 99   ALT 0 - 70 U/L 79 (H)   AST 0 - 45 U/L 87 (H)   Albumin Urine mg/g Cr 0.00 - 17.00 mg/g Cr 100.81 (H)   Albumin Urine mg/L mg/L 124.0   Bilirubin Total <=1.2 mg/dL 0.4   Cholesterol <200 mg/dL 161   Creatinine Urine mg/dL 123.0   Patient Fasting?  Unknown  Unknown   Glucose 70 - 99 mg/dL 368 (H)   HDL Cholesterol >=40 mg/dL 69   Estimated Average Glucose <117 mg/dL 194 (H)   Hemoglobin A1C 0.0 - 5.6 % 8.4 (H)   LDL Cholesterol Calculated <100 mg/dL 56   Non HDL Cholesterol <130 mg/dL 92   Triglycerides <150 mg/dL 182 (H)   (H): Data is abnormally " high  (L): Data is abnormally low      This document serves as a record of the services and decisions personally performed and made by Dr. Philippe. It was created on his behalf by Xin Marte, a trained medical scribe. The creation of this document is based the provider's statements to the medical scribe.  Xin Marte, 4:01 PM         Signed Electronically by: Cedric Philippe MD

## 2025-06-10 NOTE — TELEPHONE ENCOUNTER
"Patient's daughter called back with patient present. She requested . RN connected call to  to further discuss concerns and symptoms.     Patient states he feels dizzy and lightheaded when stands up. This started 3 days ago.     When walk feels like would lose balance. He says he can walk but feels like cannot walk \"far distances\". He says he has to stand up slowly.     He is feeling weak. This also started 3 days ago.   Whole body \"shaking\".   Tips of fingers are numb. This is new. Never had before. It is unclear when this started.     Numbness and bitterness on tongue - started on first day of medications - prednisone, doxycycline, amlodipine increased dose.     Loss of appetite -  started before 5/30/25. Got worse after taking prednisone, doxycycline, and increased dose of amlodipine.    He is drinking water.     Denies difficulty breathing, chest pain, severe weakness, fainting.     Writer advised to keep visit today at 3:10 pm at this time.    Reviewed when to go to the ER or call 911. Patient and daughter, Gwen verbalized understanding and agreed with plan. They denied further questions or concerns.     Eloise Atwood, RN, BSN, PHN  [facility]    Reason for Disposition    MODERATE weakness (e.g., interferes with work, school, normal activities) and cause unknown (Exceptions: Weakness from acute minor illness or from poor fluid intake; weakness is chronic and not worse.)    Additional Information    Negative: SEVERE difficulty breathing (e.g., struggling for each breath, speaks in single words)    Negative: Shock suspected (e.g., cold/pale/clammy skin, too weak to stand, low BP, rapid pulse)    Negative: Difficult to awaken or acting confused (e.g., disoriented, slurred speech)    Negative: Fainted > 15 minutes ago and still feels too weak or dizzy to stand    Negative: SEVERE weakness (e.g., unable to walk or barely able to walk, requires support) and new-onset " or getting worse    Negative: Sounds like a life-threatening emergency to the triager    Negative: Weakness of the face, arm or leg on one side of the body    Negative: Has diabetes mellitus and weakness from low blood sugar (i.e., < 60 mg/dL or 3.5 mmol/L)    Negative: Recent heat exposure, suspected cause of weakness    Negative: Vomiting is main symptom    Negative: Diarrhea is main symptom    Negative: Difficulty breathing    Negative: Heart beating < 50 beats per minute OR > 140 beats per minute    Negative: Extra heartbeats, irregular heart beating, or heart is beating very fast (i.e., 'palpitations')    Negative: Follows large amount of bleeding (e.g., from vomiting, rectum, vagina) (Exception: Small transient weakness from sight of a small amount blood.)    Negative: Black or tarry bowel movements    Negative: MODERATE weakness or fatigue from poor fluid intake with no improvement after 2 hours of rest and fluids    Negative: Drinking very little and dehydration suspected (e.g., no urine > 12 hours, very dry mouth, very lightheaded)    Negative: Patient sounds very sick or weak to the triager    Protocols used: Weakness (Generalized) and Fatigue-A-OH

## 2025-06-11 ENCOUNTER — LAB (OUTPATIENT)
Dept: LAB | Facility: CLINIC | Age: 75
End: 2025-06-11
Payer: COMMERCIAL

## 2025-06-11 DIAGNOSIS — M62.81 MUSCLE WEAKNESS (GENERALIZED): ICD-10-CM

## 2025-06-11 DIAGNOSIS — R20.2 PARESTHESIA: ICD-10-CM

## 2025-06-11 LAB
ALBUMIN SERPL BCG-MCNC: 3.8 G/DL (ref 3.5–5.2)
ALP SERPL-CCNC: 95 U/L (ref 40–150)
ALT SERPL W P-5'-P-CCNC: 37 U/L (ref 0–70)
ANION GAP SERPL CALCULATED.3IONS-SCNC: 11 MMOL/L (ref 7–15)
AST SERPL W P-5'-P-CCNC: 30 U/L (ref 0–45)
BILIRUB SERPL-MCNC: 0.4 MG/DL
BUN SERPL-MCNC: 40 MG/DL (ref 8–23)
CALCIUM SERPL-MCNC: 9 MG/DL (ref 8.8–10.4)
CHLORIDE SERPL-SCNC: 92 MMOL/L (ref 98–107)
CREAT SERPL-MCNC: 1.94 MG/DL (ref 0.67–1.17)
EGFRCR SERPLBLD CKD-EPI 2021: 35 ML/MIN/1.73M2
GLUCOSE SERPL-MCNC: 307 MG/DL (ref 70–99)
HCO3 SERPL-SCNC: 26 MMOL/L (ref 22–29)
POTASSIUM SERPL-SCNC: 4.2 MMOL/L (ref 3.4–5.3)
PROT SERPL-MCNC: 7.2 G/DL (ref 6.4–8.3)
SODIUM SERPL-SCNC: 129 MMOL/L (ref 135–145)
VIT B12 SERPL-MCNC: >4000 PG/ML (ref 232–1245)

## 2025-06-11 PROCEDURE — 82746 ASSAY OF FOLIC ACID SERUM: CPT

## 2025-06-11 PROCEDURE — 36415 COLL VENOUS BLD VENIPUNCTURE: CPT

## 2025-06-12 ENCOUNTER — TELEPHONE (OUTPATIENT)
Dept: FAMILY MEDICINE | Facility: CLINIC | Age: 75
End: 2025-06-12

## 2025-06-12 LAB — FOLATE SERPL-MCNC: 29.1 NG/ML (ref 4.6–34.8)

## 2025-06-12 NOTE — TELEPHONE ENCOUNTER
MTM referral from: [facility] visit (referral by provider)    MTM referral outreach attempt #2 on June 12, 2025 at 9:51 AM      Outcome: No Answer- LM    Use [facility] MAP (!) for the carrier/Plan on the flowsheet      [employer] Message Sent    BRENNAN Thomas

## 2025-06-17 ENCOUNTER — OFFICE VISIT (OUTPATIENT)
Dept: FAMILY MEDICINE | Facility: CLINIC | Age: 75
End: 2025-06-17
Payer: COMMERCIAL

## 2025-06-17 VITALS
HEIGHT: 64 IN | DIASTOLIC BLOOD PRESSURE: 63 MMHG | TEMPERATURE: 97.1 F | SYSTOLIC BLOOD PRESSURE: 115 MMHG | WEIGHT: 131.8 LBS | HEART RATE: 67 BPM | RESPIRATION RATE: 16 BRPM | OXYGEN SATURATION: 97 % | BODY MASS INDEX: 22.5 KG/M2

## 2025-06-17 DIAGNOSIS — N18.30 STAGE 3 CHRONIC KIDNEY DISEASE, UNSPECIFIED WHETHER STAGE 3A OR 3B CKD (H): ICD-10-CM

## 2025-06-17 DIAGNOSIS — I42.7 CARDIOMYOPATHY SECONDARY TO DRUG: ICD-10-CM

## 2025-06-17 DIAGNOSIS — Z00.00 ENCOUNTER FOR MEDICARE ANNUAL WELLNESS EXAM: Primary | ICD-10-CM

## 2025-06-17 DIAGNOSIS — E11.49 TYPE 2 DIABETES MELLITUS WITH NEUROLOGICAL MANIFESTATIONS, CONTROLLED (H): ICD-10-CM

## 2025-06-17 DIAGNOSIS — I10 HTN, GOAL BELOW 150/90: ICD-10-CM

## 2025-06-17 DIAGNOSIS — Z23 ENCOUNTER FOR IMMUNIZATION: ICD-10-CM

## 2025-06-17 DIAGNOSIS — C34.92 ADENOCARCINOMA, LUNG, LEFT (H): ICD-10-CM

## 2025-06-17 PROCEDURE — G0439 PPPS, SUBSEQ VISIT: HCPCS | Performed by: PREVENTIVE MEDICINE

## 2025-06-17 PROCEDURE — 80048 BASIC METABOLIC PNL TOTAL CA: CPT | Performed by: PREVENTIVE MEDICINE

## 2025-06-17 PROCEDURE — 3078F DIAST BP <80 MM HG: CPT | Performed by: PREVENTIVE MEDICINE

## 2025-06-17 PROCEDURE — 90677 PCV20 VACCINE IM: CPT | Performed by: PREVENTIVE MEDICINE

## 2025-06-17 PROCEDURE — 99214 OFFICE O/P EST MOD 30 MIN: CPT | Mod: 25 | Performed by: PREVENTIVE MEDICINE

## 2025-06-17 PROCEDURE — G0009 ADMIN PNEUMOCOCCAL VACCINE: HCPCS | Performed by: PREVENTIVE MEDICINE

## 2025-06-17 PROCEDURE — 3074F SYST BP LT 130 MM HG: CPT | Performed by: PREVENTIVE MEDICINE

## 2025-06-17 PROCEDURE — 36415 COLL VENOUS BLD VENIPUNCTURE: CPT | Performed by: PREVENTIVE MEDICINE

## 2025-06-17 RX ORDER — PEN NEEDLE, DIABETIC 32GX 5/32"
NEEDLE, DISPOSABLE MISCELLANEOUS
Qty: 400 EACH | Refills: 1 | Status: SHIPPED | OUTPATIENT
Start: 2025-06-17

## 2025-06-17 RX ORDER — INSULIN GLARGINE 100 [IU]/ML
INJECTION, SOLUTION SUBCUTANEOUS
Qty: 15 ML | Refills: 0 | Status: SHIPPED | OUTPATIENT
Start: 2025-06-17

## 2025-06-17 SDOH — HEALTH STABILITY: PHYSICAL HEALTH: ON AVERAGE, HOW MANY DAYS PER WEEK DO YOU ENGAGE IN MODERATE TO STRENUOUS EXERCISE (LIKE A BRISK WALK)?: 2 DAYS

## 2025-06-17 ASSESSMENT — SOCIAL DETERMINANTS OF HEALTH (SDOH): HOW OFTEN DO YOU GET TOGETHER WITH FRIENDS OR RELATIVES?: THREE TIMES A WEEK

## 2025-06-17 NOTE — PROGRESS NOTES
Preventive Care Visit  [facility] ATA MORENO Bell MD, Family Medicine  Jun 17, 2025      Assessment & Plan     Encounter for Medicare annual wellness exam  -preventive guidelines reviewed     Cardiomyopathy secondary to drug  ECHO done 9/18/24:        Interpretation Summary     Left ventricular function is normal.The ejection fraction is 60-65%.  Global right ventricular function is normal.  Mild aortic insufficiency is present.  The inferior vena cava was normal in size with preserved respiratory  variability.  No pericardial effusion is present.     This study was compared with the study from 3/25/2022, there are no  significant changes.       Adenocarcinoma, lung, left (H)  -has been on Ensure, switch to Glucerna due to diabetes   - Nutritional Supplements (GLUCERNA SHAKE) LIQD  Dispense: 4000 mL; Refill: 1  -Followed by oncology  -Locally advanced, unresectable, left-sided lung adenocarcinoma     HTN, goal below 150/90  -low sodium at last labs 7 days ago, recheck today   - Basic metabolic panel  (Ca, Cl, CO2, Creat, Gluc, K, Na, BUN)    Stage 3 chronic kidney disease, unspecified whether stage 3a or 3b CKD (H)  -On losartan, Jardiance     Type 2 diabetes mellitus with neurological manifestations, controlled (H)  -uncontrolled, appointment with MTM scheduled   - insulin glargine (LANTUS SOLOSTAR) 100 UNIT/ML pen  Dispense: 15 mL; Refill: 0  - insulin pen needle (BD PEN NEEDLE JESSICA 2ND GEN) 32G X 4 MM miscellaneous  Dispense: 400 each; Refill: 1  -started Jardiance 10 mg  -has not restarted Lantus as yet, resent medication   -check glucose at home  -Metformin was stopped due to low GFR   Few months ago I had requested for the patient to stop taking metformin as A1c was excellent at 6.3 and the patient had a low GFR.  Patient did stop metformin, but it seems insulin was also discontinued.  At this time we will continue with Lantus at 10 units daily  Added empagliflozin 10 mg daily for  diabetes as well as chronic kidney disease   Defer GLP 1 as has already lost weight and does not want to lose more weight     Lab Results   Component Value Date    A1C 8.4 04/10/2025    A1C 6.3 11/22/2024    A1C 6.1 04/23/2024    A1C 6.1 03/15/2023    A1C 6.1 09/30/2022    A1C 6.7 11/27/2020    A1C 8.3 12/18/2019    A1C 11.5 09/20/2019    A1C 10.6 05/09/2019    A1C 8.0 01/11/2019         Encounter for immunization  - PNEUMOCOCCAL 20 VALENT CONJUGATE (PREVNAR 20)        Counseling  Appropriate preventive services were addressed with this patient via screening, questionnaire, or discussion as appropriate for fall prevention, nutrition, physical activity, Tobacco-use cessation, social engagement, weight loss and cognition.  Checklist reviewing preventive services available has been given to the patient.  Reviewed patient's diet, addressing concerns and/or questions.   He is at risk for lack of exercise and has been provided with information to increase physical activity for the benefit of his well-being.   The patient was instructed to see the dentist every 6 months.   Discussed possible causes of fatigue. Addressed any concerns about safety while driving.      Follow-up    Follow-up Visit   Expected date:  Jun 24, 2026 (Approximate)      Follow Up Appointment Details:     Follow-up with whom?: PCP    Follow-Up for what?: Medicare Wellness    Welcome or Annual?: Annual Wellness    How?: In Person                 Subjective   Wolf is a 75 year old, presenting for the following:  Medicare Visit        6/17/2025     3:05 PM   Additional Questions   Roomed by Leonora   Accompanied by daughter          Visit done with Interpretor    Breathing is better  Has been more tired  Tired when walking  May feel drowsy  No falls  No melena  No rectal bleeding   Done with antibiotics Doxycycline     Has appointment with Oncology 10/25.    [facility] visit tracy [place] on July 2nd.    Does not feel hungry.  Has lost about 10 pounds in the  last month  No snacks.    Has been on one Ensure.           Wt Readings from Last 2 Encounters:   06/17/25 59.8 kg (131 lb 12.8 oz)   06/10/25 59.3 kg (130 lb 12.8 oz)      We had stopped Metformin due to low GFR     Not checking glucose at home   No nocturia      Diabetes:  -not checked     Has not started Lantus, does not like using the injections  Taking Jardiance 10 mg daily        Lipids:  -taking statin        Hyperlipidemia Follow-Up     Are you regularly taking any medication or supplement to lower your cholesterol?   Yes- Lipitor 80 MG.   Are you having muscle aches or other side effects that you think could be caused by your cholesterol lowering medication?  No     Hypertension Follow-up     Do you check your blood pressure regularly outside of the clinic? No   Are you following a low salt diet? No  Are your blood pressures ever more than 140 on the top number (systolic) OR more  HPI    Advance Care Planning    Discussed advance care planning with patient; informed AVS has link to [employer].        6/17/2025   General Health   How would you rate your overall physical health? Good         6/17/2025   Nutrition   Diet: Low salt    Low fat/cholesterol       Multiple values from one day are sorted in reverse-chronological order         6/17/2025   Exercise   Days per week of moderate/strenous exercise 2 days   (!) EXERCISE CONCERN      6/17/2025   Social Factors   Frequency of gathering with friends or relatives Three times a week   Worry food won't last until get money to buy more No   Food not last or not have enough money for food? No   Do you have housing? (Housing is defined as stable permanent housing and does not include staying outside in a car, in a tent, in an abandoned building, in an overnight shelter, or couch-surfing.) Patient declined   Are you worried about losing your housing? Patient declined   Lack of transportation? No   Unable to get utilities (heat,electricity)? Patient declined          6/17/2025   Fall Risk   Fallen 2 or more times in the past year? No   Trouble with walking or balance? Yes   Gait Speed Test (Document in seconds) 5   Gait Speed Test Interpretation Less than or equal to 5.00 seconds - PASS          6/17/2025   Activities of Daily Living- Home Safety   Needs help with the following daily activites None of the above   Safety concerns in the home None of the above         6/17/2025   Dental   Dentist two times every year? (!) NO         6/17/2025   Hearing Screening   Hearing concerns? None of the above         6/17/2025   Driving Risk Screening   Patient/family members have concerns about driving (!) YES          6/17/2025   General Alertness/Fatigue Screening   Have you been more tired than usual lately? (!) YES         6/17/2025   Urinary Incontinence Screening   Bothered by leaking urine in past 6 months No         Today's PHQ-2 Score:       6/17/2025     3:01 PM   PHQ-2 ( 1999 Pfizer)   PHQ-2 Score Incomplete           6/17/2025   Substance Use   Alcohol more than 3/day or more than 7/wk No   Do you have a current opioid prescription? No   How severe/bad is pain from 1 to 10? 0/10 (No Pain)   Do you use any other substances recreationally? No     Social History     Tobacco Use    Smoking status: Never     Passive exposure: Never    Smokeless tobacco: Never   Vaping Use    Vaping status: Never Used   Substance Use Topics    Alcohol use: No    Drug use: No       ASCVD Risk   The ASCVD Risk score (Aruna LAWSON, et al., 2019) failed to calculate for the following reasons:    Risk score cannot be calculated because patient has a medical history suggesting prior/existing ASCVD          Reviewed and updated as needed this visit by Provider   Tobacco  Allergies  Meds  Problems  Med Hx  Surg Hx  Fam Hx            Past Medical History:   Diagnosis Date    Cataracts, both eyes 5/20/2014    Cerebral artery occlusion with cerebral infarction (H)     DM type 2, goal A1c  below 7 5/5/2014    Hypertension      Past Surgical History:   Procedure Laterality Date    CATARACT IOL, RT/LT      COLONOSCOPY N/A 12/7/2022    Procedure: COLONOSCOPY, WITH POLYPECTOMY AND BIOPSY;  Surgeon: Rolf Escalante MD;  Location: [facility]    COLONOSCOPY N/A 12/7/2022    Procedure: COLONOSCOPY, FLEXIBLE, WITH LESION REMOVAL USING SNARE;  Surgeon: Rolf Escalante MD;  Location: [facility]    COLONOSCOPY WITH CO2 INSUFFLATION N/A 12/7/2022    Procedure: COLONOSCOPY, WITH CO2 INSUFFLATION;  Surgeon: Rolf Escalante MD;  Location: [facility]    COMBINED REPAIR PTOSIS WITH BLEPHAROPLASTY Bilateral 10/9/2017    Procedure: COMBINED REPAIR PTOSIS WITH BLEPHAROPLASTY;  Bilateral upper eyelid blepharoplasty and ptosis repair;  Surgeon: Bibiana Melara MD;  Location: [facility]    PHACOEMULSIFICATION WITH STANDARD INTRAOCULAR LENS IMPLANT Right 2/25/2016    Procedure: PHACOEMULSIFICATION WITH STANDARD INTRAOCULAR LENS IMPLANT;  Surgeon: Carlton Don MD;  Location: [facility]    PHACOEMULSIFICATION WITH STANDARD INTRAOCULAR LENS IMPLANT Left 2/11/2016    Procedure: PHACOEMULSIFICATION WITH STANDARD INTRAOCULAR LENS IMPLANT;  Surgeon: Carlton Don MD;  Location: [facility]    REPAIR PTOSIS Bilateral     10/17     Lab work is in process  Labs reviewed in EPIC  BP Readings from Last 3 Encounters:   06/17/25 115/63   06/10/25 124/73   05/30/25 (!) 168/74    Wt Readings from Last 3 Encounters:   06/17/25 59.8 kg (131 lb 12.8 oz)   06/10/25 59.3 kg (130 lb 12.8 oz)   05/30/25 63.4 kg (139 lb 12.8 oz)                  Patient Active Problem List   Diagnosis    DM type 2, goal A1C below 8.0    HTN, goal below 150/90    Hyperlipidemia LDL goal <70    Overweight (BMI 25.0-29.9)    Cataracts, both eyes    Advance care planning    Type 2 diabetes mellitus with neurological manifestations, controlled (H)    Pseudophakia of left eye    History of carotid endarterectomy    Cerebral infarction (H)     Aortic valve insufficiency    Vitamin D deficiency    CKD (chronic kidney disease) stage 3, GFR 30-59 ml/min (H)    Adenocarcinoma, lung, left (H)    Mouth sore    Cardiomyopathy secondary to drug     Past Surgical History:   Procedure Laterality Date    CATARACT IOL, RT/LT      COLONOSCOPY N/A 12/7/2022    Procedure: COLONOSCOPY, WITH POLYPECTOMY AND BIOPSY;  Surgeon: Rolf Escalante MD;  [facility]    COLONOSCOPY N/A 12/7/2022    Procedure: COLONOSCOPY, FLEXIBLE, WITH LESION REMOVAL USING SNARE;  Surgeon: Rolf Escalante MD;  [facility]    COLONOSCOPY WITH CO2 INSUFFLATION N/A 12/7/2022    Procedure: COLONOSCOPY, WITH CO2 INSUFFLATION;  Surgeon: Rolf Escalante MD;  [facility]    COMBINED REPAIR PTOSIS WITH BLEPHAROPLASTY Bilateral 10/9/2017    Procedure: COMBINED REPAIR PTOSIS WITH BLEPHAROPLASTY;  Bilateral upper eyelid blepharoplasty and ptosis repair;  Surgeon: Bibiana Melara MD;  [facility]    PHACOEMULSIFICATION WITH STANDARD INTRAOCULAR LENS IMPLANT Right 2/25/2016    Procedure: PHACOEMULSIFICATION WITH STANDARD INTRAOCULAR LENS IMPLANT;  Surgeon: Carlton Don MD;  [facility]    PHACOEMULSIFICATION WITH STANDARD INTRAOCULAR LENS IMPLANT Left 2/11/2016    Procedure: PHACOEMULSIFICATION WITH STANDARD INTRAOCULAR LENS IMPLANT;  Surgeon: Carlton Don MD;  Location: [facility]    REPAIR PTOSIS Bilateral     10/17       Social History     Tobacco Use    Smoking status: Never     Passive exposure: Never    Smokeless tobacco: Never   Substance Use Topics    Alcohol use: No     Family History   Problem Relation Age of Onset    Hypertension Father     Cancer No family hx of     Diabetes No family hx of     Cerebrovascular Disease No family hx of     Thyroid Disease No family hx of     Glaucoma No family hx of     Macular Degeneration No family hx of          Current Outpatient Medications   Medication Sig Dispense Refill     acetaminophen (MAPAP) 500 MG tablet TAKE 2 TABLETS(1000 MG) BY MOUTH THREE TIMES DAILY AS NEEDED FOR PAIN 100 tablet 1    amLODIPine (NORVASC) 2.5 MG tablet Take 1 tablet (2.5 mg) by mouth daily. Take with 5 mg dose for a total of 7.5 mg daily 90 tablet 0    amLODIPine (NORVASC) 5 MG tablet Take 1 tablet (5 mg) by mouth daily. 90 tablet 1    ASPIRIN PO Take 81 mg by mouth      atorvastatin (LIPITOR) 80 MG tablet Take 0.5 tablets (40 mg) by mouth daily. 90 tablet 2    blood glucose (ONETOUCH VERIO IQ) test strip USE TO TEST BLOOD SUGAR 1 TIMES DAILY OR AS DIRECTED 300 strip 11    empagliflozin (JARDIANCE) 10 MG TABS tablet Take 1 tablet (10 mg) by mouth daily. 90 tablet 1    ferrous sulfate (FEROSUL) 325 (65 Fe) MG tablet Take 1 tablet (325 mg) by mouth daily (with breakfast) 90 tablet 3    hydrALAZINE (APRESOLINE) 25 MG tablet Take 1 tablet (25 mg) by mouth 3 times daily. 270 tablet 1    hydrochlorothiazide (HYDRODIURIL) 25 MG tablet Take 1 tablet (25 mg) by mouth daily. For blood pressure 90 tablet 1    Insulin Aspart FlexPen 100 UNIT/ML SOPN INJECT 15 UNITS UNDER THE SKIN THREE TIMES DAILY WITH MEALS 30 mL 0    insulin glargine (LANTUS SOLOSTAR) 100 UNIT/ML pen ADMINISTER 10 UNITS UNDER THE SKIN AT BEDTIME 15 mL 0    insulin pen needle (BD PEN NEEDLE JESSICA 2ND GEN) 32G X 4 MM miscellaneous Use 1 pen needles daily or as directed. 400 each 1    losartan (COZAAR) 100 MG tablet Take 1 tablet (100 mg) by mouth daily. 90 tablet 1    metoprolol succinate ER (TOPROL XL) 25 MG 24 hr tablet TAKE 1 TABLET(25 MG) BY MOUTH EVERY EVENING FOR BLOOD PRESSURE 90 tablet 1    multivitamin w/minerals (THERA-VIT-M) tablet Take 1 tablet by mouth daily 90 tablet 3    Nutritional Supplements (GLUCERNA SHAKE) LIQD Take 1 Bottle by mouth daily. 4000 mL 1    omeprazole (PRILOSEC) 20 MG DR capsule Take 1 capsule (20 mg) by mouth daily. 30 capsule 0    osimertinib (TAGRISSO) 80 MG tablet Take 1 tablet (80 mg) by mouth daily 30 tablet 2     polyethylene glycol (MIRALAX) 17 GM/Dose powder Take 17 g (1 Capful) by mouth daily. 116 g 1    thin (NO BRAND SPECIFIED) lancets Use with lanceting device. To accompany: Blood Glucose Monitor Brands: per insurance. 300 each 11    triamcinolone (KENALOG) 0.1 % external ointment Apply topically 2 times daily. 45 g 0     No Known Allergies  Recent Labs   Lab Test 06/10/25  1619 04/10/25  0929 02/28/25  0930 12/31/24  1228 12/03/24  0901 11/22/24  1015 09/03/24  0844 04/23/24  1214 01/13/23  1211 09/30/22  1521 09/15/21  1226 03/17/21  1432 11/27/20  1104   A1C  --  8.4*  --   --   --  6.3*  --  6.1*   < > 6.1*   < >  --  6.7*   LDL  --  56  --   --   --   --   --  43  --  40   < >  --  93   HDL  --  69  --   --   --   --   --  62  --  51   < >  --  45   TRIG  --  182*  --   --   --   --   --  114  --  117   < >  --  178*   ALT 37 79* 39   < > 125*  --    < > 60   < >  --    < > 52  --    CR 1.94* 1.51* 1.39*  --  1.55*  --    < > 1.60*   < >  --    < > 1.44* 1.37*   GFRESTIMATED 35* 47* 53*  --  46*  --    < > 44*   < >  --    < > 48* 51*   GFRESTBLACK  --   --   --   --   --   --   --   --   --   --   --  55* 59*   POTASSIUM 4.2 3.6 4.2  --  4.5  --    < > 4.7   < >  --    < > 3.7 4.7   TSH  --   --  2.17  --  2.73  --    < > 1.41   < >  --    < > 0.64  --     < > = values in this interval not displayed.      Current providers sharing in care for this patient include:  Patient Care Team:  Kimberly Bell MD as PCP - General (Family Practice)  Viki Avalos, RN as Lead Care Coordinator  Kimberly Blel MD as Assigned PCP  Evelia Marquez, RN as Specialty Care Coordinator (Hematology & Oncology)  Lexii Hong OD (Optometry)  Miles Estrada MD as Assigned Cancer Care Provider    The following health maintenance items are reviewed in Epic and correct as of today:  Health Maintenance   Topic Date Due    ZOSTER VACCINE (1 of 2) 04/27/2016    RSV VACCINE (1 - 1-dose 75+ series) Never done    EYE EXAM   "03/22/2024    Medicare Annual MTM Pharmacist Visit (once per calendar year)  Never done    DIABETIC FOOT EXAM  04/23/2025    COVID-19 VACCINE (8 - Pfizer risk 2024-25 season) 05/22/2025    A1C  10/10/2025    LIPID  04/10/2026    MICROALBUMIN  04/10/2026    BMP  06/10/2026    ANNUAL REVIEW OF HM ORDERS  06/10/2026    HEMOGLOBIN  06/10/2026    MEDICARE ANNUAL WELLNESS VISIT  06/17/2026    FALL RISK ASSESSMENT  06/17/2026    ADVANCE CARE PLANNING  04/23/2029    DTAP/TDAP/TD VACCINE (3 - Td or Tdap) 03/15/2033    HEPATITIS C SCREENING  Completed    PHQ-2 (once per calendar year)  Completed    INFLUENZA VACCINE  Completed    PNEUMOCOCCAL VACCINE 50+ YEARS  Completed    URINALYSIS  Completed    HPV VACCINE  Aged Out    MENINGITIS VACCINE  Aged Out    COLORECTAL CANCER SCREENING  Discontinued         Review of Systems  Constitutional, HEENT, cardiovascular, pulmonary, gi and gu systems are negative, except as otherwise noted.     Objective    Exam  /63   Pulse 67   Temp 97.1  F (36.2  C)   Resp 16   Ht 1.635 m (5' 4.37\")   Wt 59.8 kg (131 lb 12.8 oz)   SpO2 97%   BMI 22.36 kg/m     Estimated body mass index is 22.36 kg/m  as calculated from the following:    Height as of this encounter: 1.635 m (5' 4.37\").    Weight as of this encounter: 59.8 kg (131 lb 12.8 oz).    Physical Exam  GENERAL APPEARANCE: No acute distress+   EYES: Eyes grossly normal to inspection and conjunctivae and sclerae normal  RESP: crackles bilaterally+, more on the left side   CV: regular rates and rhythm, normal S1 S2  ABDOMEN: soft, non-tender and no rebound or guarding   MS: extremities normal- no gross deformities noted   NEURO: Normal strength and tone, mentation intact and speech normal  PSYCH: mentation appears normal    Gait and balance assessed per Gait Speed Test.  Result as above.        6/17/2025   Mini Cog   Mini-Cog Not Completed (choose reason) Language barrier and no  present             11/22/2024   Vision " Screen   Patient wears corrective lenses (select all that apply) Worn during vision screen   Vision Screen Results REFER   No Corrective Lenses, PLUS LENS REQUIRED REFER       Signed Electronically by: Kimberly Bell MD

## 2025-06-17 NOTE — PATIENT INSTRUCTIONS
Patient Education   Preventive Care Advice   This is general advice given by our system to help you stay healthy. However, your care team may have specific advice just for you. Please talk to your care team about your preventive care needs.  Nutrition  Eat 5 or more servings of fruits and vegetables each day.  Try wheat bread, brown rice and whole grain pasta (instead of white bread, rice, and pasta).  Get enough calcium and vitamin D. Check the label on foods and aim for 100% of the RDA (recommended daily allowance).  Lifestyle  Exercise at least 150 minutes each week  (30 minutes a day, 5 days a week).  Do muscle strengthening activities 2 days a week. These help control your weight and prevent disease.  No smoking.  Wear sunscreen to prevent skin cancer.  Have a dental exam and cleaning every 6 months.  Yearly exams  See your health care team every year to talk about:  Any changes in your health.  Any medicines your care team has prescribed.  Preventive care, family planning, and ways to prevent chronic diseases.  Shots (vaccines)   HPV shots (up to age 26), if you've never had them before.  Hepatitis B shots (up to age 59), if you've never had them before.  COVID-19 shot: Get this shot when it's due.  Flu shot: Get a flu shot every year.  Tetanus shot: Get a tetanus shot every 10 years.  Pneumococcal, hepatitis A, and RSV shots: Ask your care team if you need these based on your risk.  Shingles shot (for age 50 and up)  General health tests  Diabetes screening:  Starting at age 35, Get screened for diabetes at least every 3 years.  If you are younger than age 35, ask your care team if you should be screened for diabetes.  Cholesterol test: At age 39, start having a cholesterol test every 5 years, or more often if advised.  Bone density scan (DEXA): At age 50, ask your care team if you should have this scan for osteoporosis (brittle bones).  Hepatitis C: Get tested at least once in your life.  STIs (sexually  transmitted infections)  Before age 24: Ask your care team if you should be screened for STIs.  After age 24: Get screened for STIs if you're at risk. You are at risk for STIs (including HIV) if:  You are sexually active with more than one person.  You don't use condoms every time.  You or a partner was diagnosed with a sexually transmitted infection.  If you are at risk for HIV, ask about PrEP medicine to prevent HIV.  Get tested for HIV at least once in your life, whether you are at risk for HIV or not.  Cancer screening tests  Cervical cancer screening: If you have a cervix, begin getting regular cervical cancer screening tests starting at age 21.  Breast cancer scan (mammogram): If you've ever had breasts, begin having regular mammograms starting at age 40. This is a scan to check for breast cancer.  Colon cancer screening: It is important to start screening for colon cancer at age 45.  Have a colonoscopy test every 10 years (or more often if you're at risk) Or, ask your provider about stool tests like a FIT test every year or Cologuard test every 3 years.  To learn more about your testing options, visit:   .  For help making a decision, visit:   https://bit.ly/xv96947.  Prostate cancer screening test: If you have a prostate, ask your care team if a prostate cancer screening test (PSA) at age 55 is right for you.  Lung cancer screening: If you are a current or former smoker ages 50 to 80, ask your care team if ongoing lung cancer screenings are right for you.  For informational purposes only. Not to replace the advice of your health care provider. Copyright   2023 [facility]. All rights reserved. Clinically reviewed by the [facility]. [employer] 015213 - REV 01/24.  Preventing Falls: Care Instructions  Injuries and health problems such as trouble walking or poor eyesight can increase your risk of falling. So can some medicines. But there are things you can do to help  "prevent falls. You can exercise to get stronger. You can also arrange your home to make it safer.    Talk to your doctor about the medicines you take. Ask if any of them increase the risk of falls and whether they can be changed or stopped.   Try to exercise regularly. It can help improve your strength and balance. This can help lower your risk of falling.         Practice fall safety and prevention.   Wear low-heeled shoes that fit well and give your feet good support. Talk to your doctor if you have foot problems that make this hard.  Carry a cellphone or wear a medical alert device that you can use to call for help.  Use stepladders instead of chairs to reach high objects. Don't climb if you're at risk for falls. Ask for help, if needed.  Wear the correct eyeglasses, if you need them.        Make your home safer.   Remove rugs, cords, clutter, and furniture from walkways.  Keep your house well lit. Use night-lights in hallways and bathrooms.  Install and use sturdy handrails on stairways.  Wear nonskid footwear, even inside. Don't walk barefoot or in socks without shoes.        Be safe outside.   Use handrails, curb cuts, and ramps whenever possible.  Keep your hands free by using a shoulder bag or backpack.  Try to walk in well-lit areas. Watch out for uneven ground, changes in pavement, and debris.  Be careful in the winter. Walk on the grass or gravel when sidewalks are slippery. Use de-icer on steps and walkways. Add non-slip devices to shoes.    Put grab bars and nonskid mats in your shower or tub and near the toilet. Try to use a shower chair or bath bench when bathing.   Get into a tub or shower by putting in your weaker leg first. Get out with your strong side first. Have a phone or medical alert device in the bathroom with you.   Where can you learn more?  Go to https://www.[employer].net/patiented  Enter G117 in the search box to learn more about \"Preventing Falls: Care Instructions.\"  Current as of: " July 31, 2024  Content Version: 14.5    [id number] [employer].   Care instructions adapted under license by your healthcare professional. If you have questions about a medical condition or this instruction, always ask your healthcare professional. [employer] disclaims any warranty or liability for your use of this information.    Learning About Sleeping Well  What does sleeping well mean?     Sleeping well means getting enough sleep to feel good and stay healthy. How much sleep is enough varies among people.  The number of hours you sleep and how you feel when you wake up are both important. If you do not feel refreshed, you probably need more sleep. Another sign of not getting enough sleep is feeling tired during the day.  Experts recommend that adults get at least 7 or more hours of sleep per day. Children and older adults need more sleep.  Why is getting enough sleep important?  Getting enough quality sleep is a basic part of good health. When your sleep suffers, your physical health, mood, and your thoughts can suffer too. You may find yourself feeling more grumpy or stressed. Not getting enough sleep also can lead to serious problems, including injury, accidents, anxiety, and depression.  What might cause poor sleeping?  Many things can cause sleep problems, including:  Changes to your sleep schedule.  Stress. Stress can be caused by fear about a single event, such as giving a speech. Or you may have ongoing stress, such as worry about work or school.  Depression, anxiety, and other mental or emotional conditions.  Changes in your sleep habits or surroundings. This includes changes that happen where you sleep, such as noise, light, or sleeping in a different bed. It also includes changes in your sleep pattern, such as having jet lag or working a late shift.  Health problems, such as pain, breathing problems, and restless legs syndrome.  Lack of regular exercise.  Using alcohol, nicotine,  "or caffeine before bed.  How can you help yourself?  Here are some tips that may help you sleep more soundly and wake up feeling more refreshed.  Your sleeping area   Use your bedroom only for sleeping and sex. A bit of light reading may help you fall asleep. But if it doesn't, do your reading elsewhere in the house. Try not to use your TV, computer, smartphone, or tablet while you are in bed.  Be sure your bed is big enough to stretch out comfortably, especially if you have a sleep partner.  Keep your bedroom quiet, dark, and cool. Use curtains, blinds, or a sleep mask to block out light. To block out noise, use earplugs, soothing music, or a \"white noise\" machine.  Your evening and bedtime routine   Create a relaxing bedtime routine. You might want to take a warm shower or bath, or listen to soothing music.  Go to bed at the same time every night. And get up at the same time every morning, even if you feel tired.  What to avoid   Limit caffeine (coffee, tea, caffeinated sodas) during the day, and don't have any for at least 6 hours before bedtime.  Avoid drinking alcohol before bedtime. Alcohol can cause you to wake up more often during the night.  Try not to smoke or use tobacco, especially in the evening. Nicotine can keep you awake.  Limit naps during the day, especially close to bedtime.  Avoid lying in bed awake for too long. If you can't fall asleep or if you wake up in the middle of the night and can't get back to sleep within about 20 minutes, get out of bed and go to another room until you feel sleepy.  Avoid taking medicine right before bed that may keep you awake or make you feel hyper or energized. Your doctor can tell you if your medicine may do this and if you can take it earlier in the day.  If you can't sleep   Imagine yourself in a peaceful, pleasant scene. Focus on the details and feelings of being in a place that is relaxing.  Get up and do a quiet or boring activity until you feel " "sleepy.  Avoid drinking any liquids before going to bed to help prevent waking up often to use the bathroom.  Where can you learn more?  Go to https://www.[employer].net/patiented  Enter J942 in the search box to learn more about \"Learning About Sleeping Well.\"  Current as of: July 31, 2024  Content Version: 14.5 2024-2025 Related Content Database (RCDb).   Care instructions adapted under license by your healthcare professional. If you have questions about a medical condition or this instruction, always ask your healthcare professional. Related Content Database (RCDb) disclaims any warranty or liability for your use of this information.       "

## 2025-06-18 ENCOUNTER — RESULTS FOLLOW-UP (OUTPATIENT)
Dept: FAMILY MEDICINE | Facility: CLINIC | Age: 75
End: 2025-06-18

## 2025-06-18 LAB
ANION GAP SERPL CALCULATED.3IONS-SCNC: 9 MMOL/L (ref 7–15)
BUN SERPL-MCNC: 31.2 MG/DL (ref 8–23)
CALCIUM SERPL-MCNC: 8.9 MG/DL (ref 8.8–10.4)
CHLORIDE SERPL-SCNC: 97 MMOL/L (ref 98–107)
CREAT SERPL-MCNC: 1.98 MG/DL (ref 0.67–1.17)
EGFRCR SERPLBLD CKD-EPI 2021: 34 ML/MIN/1.73M2
GLUCOSE SERPL-MCNC: 410 MG/DL (ref 70–99)
HCO3 SERPL-SCNC: 27 MMOL/L (ref 22–29)
POTASSIUM SERPL-SCNC: 4.8 MMOL/L (ref 3.4–5.3)
SODIUM SERPL-SCNC: 133 MMOL/L (ref 135–145)

## 2025-07-01 ENCOUNTER — PATIENT OUTREACH (OUTPATIENT)
Dept: ONCOLOGY | Facility: CLINIC | Age: 75
End: 2025-07-01
Payer: COMMERCIAL

## 2025-07-01 ENCOUNTER — PATIENT OUTREACH (OUTPATIENT)
Dept: GERIATRIC MEDICINE | Facility: CLINIC | Age: 75
End: 2025-07-01
Payer: COMMERCIAL

## 2025-07-01 NOTE — PROGRESS NOTES
TRANSITIONS OF CARE (PHILLY) LOG    PHILLY tasks should be completed by the CC within one (1) business day of notification of each transition. Follow up contact with member is required after return to their usual care setting.  Note:  If CC finds out about the transitions fifteen (15) days or more after the member has returned to their usual care setting, no PHILLY log is needed. However, the CC should check in with the member to discuss the transition process, any changes needed to the care plan and document it in a case note.     [place]:  [facility] Member ID[facility] [id number]   Product: [facility] Care Coordinator Contact:  Viki Avalos RN, N Agency/County/Care System: [facility]   Transition Communication Actions from Care Management Contact   Transition #1   Notification Date: 7/1/25 Transition Date:   6/30/25 Transition From: Home     Is this the member s usual care setting?               yes Transition To: Hospital, [facility]    Transition Type:  Unplanned    Documentation from conversation with the member/responsible party, provider, discharging and receiving facility:   Date: 7/1/25: Received notification of admission to hospital with dx of pneumonia  CC contacted Hospital /discharge planner, [facility] at [phone](Name)  (  for Name and Phone Number) and left a message with this CC contact information, reviewed community support plan as well requested to be notified of concerns, care conferences and discharge planning.  CC reached out to member and adult daughter Gwen regarding transition and left a message requesting a return call.  Reviewed and update support plan as needed.  Notified community service providers and placed services [facility] [employer] on hold as needed.  Transition log initiated.   PCP, Kimberly Bell, notified of hospitalization via EMR.     Transition #2   Notification Date: 7/3/25 Transition Date:   7/2/25  Transition From: Hospital, [facility]      Is this the member s usual care setting?               no Transition To: Home   Transition Type:  Unplanned    Documentation from conversation with the member/responsible party, provider, discharging and receiving facility:   Date: 7/3/25: Received notification of transition to home.  CC contacted member and reviewed discharge summary. - used [employer]  via [employer] line.  Wolf states he was started on new medications that he will  today.  He states that his daughter, Gwen, will help him.     Member has a follow-up appointment with PCP in 7 days: Yes: scheduled on 7/8 however daughter is trying to get PCP and specialist appt on same day for schedule and purposes.    Member has had a change in condition: No  Home visit needed: No  Support Plan reviewed and updated.  The following home based services [facility] ([facility] [phone] spoke with [place]) were resumed.  New referrals placed: No  Transition log completed.   PCP, Kimberly Bell, notified of transition back to home via EMR.       *RETURN TO USUAL CARE SETTING: *Complete tasks below when the member is discharging TO their usual care setting within one (1) business day of notification..      For situations where the Care Coordinator is notified of the discharge prior to the date of discharge, the Care Coordinator must follow up with the member or designated representative to confirm that discharge actually occurred and discuss required PHILLY tasks as outlined in the PHILLY Instructions.  (This includes situations where it may be a  new  usual care setting for the member. (i.e., a community member who decides upon permanent nursing home placement following hospitalization and rehab).    Discuss with Member/Responsible Party:    Check  Yes  - if the member, family member and/or SNF/facility staff manages the following:    If  No  provide explanation in the comments section.           Date completed: 7/3/25 Communicated with member or their designated representative about the following:  care transition process; about changes to the member s health status; plan of care updates; education about transitions and how to prevent unplanned transitions/readmissions    Four Pillars for Optimal Transition:    Check  Yes  - if the member, family member and/or SNF/facility staff manages the following:    If  No  provide explanation in the comments section.          [x]  Yes     []  No Does the member have a follow-up appointment scheduled with primary care or specialist? (Mental health hospitalizations--the appt. should be w/in 7 days)              For mental health hospitalizations:  []  Yes     []  No     Does the member have a follow-up appointment scheduled with a mental health practitioner within 7 days of discharge?  [x]  Yes     []  No     Has a medication review been completed with member? If no, refer to PCP, home care nurse, MTM, pharmacist  [x]  Yes     []  No     Can the member manage their medications or is there a system in place to manage medications (e.g. home care set-up)?         [x]  Yes     []  No     Can the member verbalize warning signs and symptoms to watch for and how to respond?  [x]  Yes     []  No     Does the member have a copy of and understand their discharge instructions?  If no, assist to obtain copy of discharge instructions, review discharge instructions, and assist to contact PCP to discuss questions about their recent hospitalization.  [x]  Yes     []  No     Does the member have adequate food, housing and transportation?  If no, add goal and discuss additional supports available to the member                                                                                                                                                                                 [x]  Yes     []  No     Is the member safe in their home?  If no, document needs and support  provided                                                                                                                                                                          []  Yes     [x]  No     Are there any concerns of vulnerability, abuse, or neglect?  If yes, document concerns and actions taken by Care Coordinator as a mandated                                                                                                                                                                              [x]  Yes     []  No     Does the member use a Personal Health Care Record?  Check  Yes  if visit summary, discharge summary, and/or healthcare summary are being used as a PHR.                                                                                                                                                                                  [x]  Yes     []  No     Have you reviewed the discharge summary with the member? If  No  provide explanation in comments.  [x]  Yes     []  No     Have you updated the member s care plan/support plan? Add new diagnosis, medications, treatments, goals & interventions, as applicable. If No, provide explanation in comments.    Comments:           Notes from conversation with the member/responsible party, provider, discharging and receiving facility (as applicable):

## 2025-07-01 NOTE — TELEPHONE ENCOUNTER
[facility]: Cancer Care                                                                                          Daughter left voice message that patient is inpatient at [facility]. Return call made to Eloise. Call went directly to voice mail. Message and contact number left to return call.    Signature:  Ladan Anderson RN

## 2025-07-01 NOTE — PROGRESS NOTES
[facility]: Cancer Care                                                                                          Daughter returned call. [facility] is on hold.    CXR: Bilateral opacities, which may represent bilateral infiltrates.    CT CHEST PULMONARY ANGIO: Opacities could be due to multifocal pneumonia, asymmetric edema, or drug reaction. Progression of known malignancy also possible.     Patient is on IV antibiotics. Dr. Estrada updated.      Signature:  Ladan Anderson RN

## 2025-07-02 ENCOUNTER — PATIENT OUTREACH (OUTPATIENT)
Dept: ONCOLOGY | Facility: CLINIC | Age: 75
End: 2025-07-02
Payer: COMMERCIAL

## 2025-07-02 ENCOUNTER — HOSPITAL ENCOUNTER (OUTPATIENT)
Dept: GENERAL RADIOLOGY | Facility: CLINIC | Age: 75
Discharge: HOME | End: 2025-07-02
Attending: INTERNAL MEDICINE
Payer: COMMERCIAL

## 2025-07-02 ENCOUNTER — TELEPHONE (OUTPATIENT)
Dept: ONCOLOGY | Facility: CLINIC | Age: 75
End: 2025-07-02
Payer: COMMERCIAL

## 2025-07-02 ENCOUNTER — TELEPHONE (OUTPATIENT)
Dept: FAMILY MEDICINE | Facility: CLINIC | Age: 75
End: 2025-07-02
Payer: COMMERCIAL

## 2025-07-02 DIAGNOSIS — C34.92 ADENOCARCINOMA, LUNG, LEFT (H): Primary | ICD-10-CM

## 2025-07-02 DIAGNOSIS — C34.92 STAGE IV ADENOCARCINOMA OF LUNG, LEFT (H): ICD-10-CM

## 2025-07-02 PROCEDURE — 999N000122 CT OUTSIDE READ

## 2025-07-02 NOTE — PROGRESS NOTES
[facility]: Cancer Care Note                                                          Order for CT Outside Read was entered this afternoon per Dr. Estrada's request.  Confirmed patient's CT and x-ray imaging from 6/30/25 ([facility]) has been resolved to our PACS system.    Patient is scheduled to follow up with Dr. Estrada on 7/10/25 to review the results.    Khris Bush, RN, BSN, OCN  RN Care Coordinator - Oncology  [facility]

## 2025-07-02 NOTE — TELEPHONE ENCOUNTER
Reason for Call:  Appointment Request    Patient requesting this type of appt:  Hospital/ED Follow-Up     Requested provider: Kimberly Bell    Reason patient unable to be scheduled: Needs to be scheduled by clinic    When does patient want to be seen/preferred time: July 10, if possible.    Comments: Daughter is the  for appts for patient, so she would like to only have one day away from work. Is there a way to schedule with PCP on the same day he is scheduled with his specialist? Please reach out at your earliest convenience to let Gwen know if that would work at all.    Could we send this information to you in [employer] or would you prefer to receive a phone call?:   Patient would prefer a phone call   Okay to leave a detailed message?: Yes at Other phone number:  Gwen @ [phone]    Call taken on 7/2/2025 at 6:23 PM by Rema Leon

## 2025-07-02 NOTE — TELEPHONE ENCOUNTER
Pt's daughter, Gwen, is calling.  States that pt is currently admitted at [facility] in [place], but the plan is for discharge today.  States that they have been advised to have pt stop his oral chemo, and pt needs follow up appt with Dr Estrada in the next few weeks to discuss next steps.     Will route to RNCC/scheduling team for assistance.     Lynda Lloyd RN on 7/2/2025 at 1:27 PM

## 2025-07-03 DIAGNOSIS — R05.1 ACUTE COUGH: ICD-10-CM

## 2025-07-03 RX ORDER — OMEPRAZOLE 20 MG/1
20 CAPSULE, DELAYED RELEASE ORAL DAILY
Qty: 90 CAPSULE | Refills: 2 | Status: SHIPPED | OUTPATIENT
Start: 2025-07-03

## 2025-07-03 NOTE — TELEPHONE ENCOUNTER
Starting next week my schedule will be Virtual only on Thursdays.  We can do a Virtual visit if they are comfortable with that.  Thank you,  Kimberly Bell MD MPH

## 2025-07-07 ENCOUNTER — LAB (OUTPATIENT)
Dept: INFUSION THERAPY | Facility: CLINIC | Age: 75
End: 2025-07-07
Attending: INTERNAL MEDICINE
Payer: COMMERCIAL

## 2025-07-07 DIAGNOSIS — R53.0 NEOPLASTIC (MALIGNANT) RELATED FATIGUE: ICD-10-CM

## 2025-07-07 LAB
ALBUMIN SERPL BCG-MCNC: 3.9 G/DL (ref 3.5–5.2)
ALP SERPL-CCNC: 75 U/L (ref 40–150)
ALT SERPL W P-5'-P-CCNC: 32 U/L (ref 0–70)
ANION GAP SERPL CALCULATED.3IONS-SCNC: 12 MMOL/L (ref 7–15)
AST SERPL W P-5'-P-CCNC: 37 U/L (ref 0–45)
BASOPHILS # BLD AUTO: 0.1 10E3/UL (ref 0–0.2)
BASOPHILS NFR BLD AUTO: 1 %
BILIRUB SERPL-MCNC: 0.4 MG/DL
BUN SERPL-MCNC: 15.1 MG/DL (ref 8–23)
CALCIUM SERPL-MCNC: 9.4 MG/DL (ref 8.8–10.4)
CHLORIDE SERPL-SCNC: 99 MMOL/L (ref 98–107)
CREAT SERPL-MCNC: 1.27 MG/DL (ref 0.67–1.17)
EGFRCR SERPLBLD CKD-EPI 2021: 58 ML/MIN/1.73M2
EOSINOPHIL # BLD AUTO: 0.4 10E3/UL (ref 0–0.7)
EOSINOPHIL NFR BLD AUTO: 6 %
ERYTHROCYTE [DISTWIDTH] IN BLOOD BY AUTOMATED COUNT: 14.7 % (ref 10–15)
GLUCOSE SERPL-MCNC: 272 MG/DL (ref 70–99)
HCO3 SERPL-SCNC: 23 MMOL/L (ref 22–29)
HCT VFR BLD AUTO: 34.6 % (ref 40–53)
HGB BLD-MCNC: 11.3 G/DL (ref 13.3–17.7)
HOLD SPECIMEN: NORMAL
IMM GRANULOCYTES # BLD: 0 10E3/UL
IMM GRANULOCYTES NFR BLD: 0 %
LYMPHOCYTES # BLD AUTO: 2.3 10E3/UL (ref 0.8–5.3)
LYMPHOCYTES NFR BLD AUTO: 32 %
MCH RBC QN AUTO: 25.2 PG (ref 26.5–33)
MCHC RBC AUTO-ENTMCNC: 32.7 G/DL (ref 31.5–36.5)
MCV RBC AUTO: 77 FL (ref 78–100)
MONOCYTES # BLD AUTO: 0.7 10E3/UL (ref 0–1.3)
MONOCYTES NFR BLD AUTO: 10 %
NEUTROPHILS # BLD AUTO: 3.8 10E3/UL (ref 1.6–8.3)
NEUTROPHILS NFR BLD AUTO: 52 %
NRBC # BLD AUTO: 0 10E3/UL
NRBC BLD AUTO-RTO: 0 /100
PLATELET # BLD AUTO: 357 10E3/UL (ref 150–450)
POTASSIUM SERPL-SCNC: 4.8 MMOL/L (ref 3.4–5.3)
PROT SERPL-MCNC: 8 G/DL (ref 6.4–8.3)
RBC # BLD AUTO: 4.49 10E6/UL (ref 4.4–5.9)
SODIUM SERPL-SCNC: 134 MMOL/L (ref 135–145)
TSH SERPL DL<=0.005 MIU/L-ACNC: 1.48 UIU/ML (ref 0.3–4.2)
WBC # BLD AUTO: 7.4 10E3/UL (ref 4–11)

## 2025-07-07 PROCEDURE — 36415 COLL VENOUS BLD VENIPUNCTURE: CPT

## 2025-07-07 PROCEDURE — 84443 ASSAY THYROID STIM HORMONE: CPT

## 2025-07-07 PROCEDURE — 85041 AUTOMATED RBC COUNT: CPT

## 2025-07-07 PROCEDURE — 80053 COMPREHEN METABOLIC PANEL: CPT

## 2025-07-10 ENCOUNTER — TELEPHONE (OUTPATIENT)
Dept: FAMILY MEDICINE | Facility: CLINIC | Age: 75
End: 2025-07-10

## 2025-07-10 ENCOUNTER — VIRTUAL VISIT (OUTPATIENT)
Dept: FAMILY MEDICINE | Facility: CLINIC | Age: 75
End: 2025-07-10
Payer: COMMERCIAL

## 2025-07-10 ENCOUNTER — VIRTUAL VISIT (OUTPATIENT)
Dept: ONCOLOGY | Facility: CLINIC | Age: 75
End: 2025-07-10
Payer: COMMERCIAL

## 2025-07-10 DIAGNOSIS — N18.30 STAGE 3 CHRONIC KIDNEY DISEASE, UNSPECIFIED WHETHER STAGE 3A OR 3B CKD (H): ICD-10-CM

## 2025-07-10 DIAGNOSIS — R53.0 NEOPLASTIC (MALIGNANT) RELATED FATIGUE: ICD-10-CM

## 2025-07-10 DIAGNOSIS — E11.49 TYPE 2 DIABETES MELLITUS WITH NEUROLOGICAL MANIFESTATIONS, CONTROLLED (H): Primary | ICD-10-CM

## 2025-07-10 DIAGNOSIS — C34.82: Primary | ICD-10-CM

## 2025-07-10 DIAGNOSIS — C34.92 ADENOCARCINOMA, LUNG, LEFT (H): ICD-10-CM

## 2025-07-10 DIAGNOSIS — I42.7 CARDIOMYOPATHY SECONDARY TO DRUG: ICD-10-CM

## 2025-07-10 NOTE — PROGRESS NOTES
"  If patient has telephone visit, have they been educated on video visit as preferred visit method and offered to change to video visit? NOT APPLICABLE        Instructions Relayed to Patient by Virtual Roomer:     Patient is active on [employer]:   Relayed following to patient: \"It looks like you are active on [employer], are you able to join the visit this way? If not, do you need us to send you a link now or would you like your provider to send a link via text or email when they are ready to initiate the visit?\"      Patient Confirmed they will join visit via: [employer]  Reminded patient to ensure they were logged on to virtual visit by arrival time listed.   Asked if patient has flexibility to initiate visit sooner than arrival time: patient is unable to initiate visit earlier than arrival time     If pediatric virtual visit, ensured pediatric patient along with parent/guardian will be present for video visit.     Patient offered the website [email or web address] and/or phone number to [employer] Help line: [phone]     Wolf is a 75 year old who is being evaluated via a billable video visit.            Assessment & Plan     Type 2 diabetes mellitus with neurological manifestations, controlled (H)  -low readings in the hospital hence Lantus was stopped  -increased Jardiance from 10 mg to 25 mg   - empagliflozin (JARDIANCE) 25 MG TABS tablet  Dispense: 90 tablet; Refill: 1  -Has appointment with MTM scheduled       Lab Results   Component Value Date    A1C 8.4 04/10/2025    A1C 6.3 11/22/2024    A1C 6.1 04/23/2024    A1C 6.1 03/15/2023    A1C 6.1 09/30/2022    A1C 6.7 11/27/2020    A1C 8.3 12/18/2019    A1C 11.5 09/20/2019    A1C 10.6 05/09/2019    A1C 8.0 01/11/2019         Stage 3 chronic kidney disease, unspecified whether stage 3a or 3b CKD (H)  - empagliflozin (JARDIANCE) 25 MG TABS tablet  Dispense: 90 tablet; Refill: 1  -GFR was 58 on 7/7/25    Cardiomyopathy secondary to drug  ECHO done 7/1/25 in the " hospital:    Interpretation Summary     * The left ventricular systolic function is normal, estimated LVEF 60-65%.     * Left ventricular wall motion is grossly normal however, the mid/distal   septal appears hypokinetic.     * The right ventricle is normal size with normal right ventricular systolic   function.    * There is moderate aortic regurgitation.     * There is mild mitral regurgitation.     * The aortic root is mildly dilated measuring 3.9 cm.     * No pulmonary hypertension, estimated right ventricular systolic pressure   is 27 mmHg.     * Compared to prior study dated 9/18/2024 ([facility]), the   aortic regurgitation is now moderate. The aortic root previously measured 3.7   cm.       Adenocarcinoma, lung, left (H)  -Has appointment with Oncology today  -Patient symptoms and chronicity appears more consistent with gradual progression of his lung adenocarcinoma        MED REC REQUIRED  Post Medication Reconciliation Status: discharge medications reconciled and changed, per note/orders  Follow-up       Subjective   Wolf is a 75 year old, presenting for the following health issues:  Hospital F/U (Pt in [facility], 06/30/25 to 07/02/25)      7/10/2025    11:25 AM   Additional Questions   Roomed by Mariluz   Accompanied by Gwen- daughter         7/10/2025    11:25 AM   Patient Reported Additional Medications   Patient reports taking the following new medications yes- antibiotics     Video Start Time: 11:37 AM    HPI      Visit done with Stan interpretor.  Doing OK  No fever  No diarrhea  Breathing is stable  Scheduled to see Oncology today.  Has completed the course of antibiotics prescribed in the hospital  Cough is a little better  Leg edema is normal.  Not on Lasix    Lantus was stopped in the hospital  Glucose at home+   Has appointment with [facility] pharmacist scheduled    116, 97, 141, 113, 151, 149, 180, 181, 195 and 131, 172 mg/dl.  These are fasting glucose  "readings.    Hospital Follow-up Visit:    Hospital/Nursing Home/IP Rehab Facility: [facility]  Date of Admission: 06/30/25  Date of Discharge: 07/03/25  Reason(s) for Admission: on going cough  Do you have any other stressors you would like to discuss with your provider? No    Problems taking medications regularly:  None  Medication changes since discharge: yes   Problems adhering to non-medication therapy:  None    Summary of hospitalization:  CareEverywhere information obtained and reviewed  Diagnostic Tests/Treatments reviewed.  Follow up needed: none  Other Healthcare Providers Involved in Patient s Care:         Specialist appointment - today   Update since discharge: improved.         Plan of care communicated with patient and family             The following is a summary from the hospital:  \"Admission Date: 6/30/2025  Discharge Date: 7/2/2025   Disposition: Home    DISCHARGE DIAGNOSES & HOSPITAL COURSE     Wolf Sen is a 75 y.o. male with history of Locally advanced, unresectable, lung adenocarcinoma diagnosed in December 2021 remains on oral therapy with Osimertinib (Tagrisso), diabetes, hypertension, hyperlipidemia, CKD 3, who presents with shortness of breath, cough, fever, leg swelling.     Shortness of breath, cough  Suspected community-acquired pneumonia  Likely progression of known left-sided lung adenocarcinoma  Possible drug pneumonitis  Possible acute HFpEF  Presentation  Cough x 3 months, worsening the past 2 weeks  Subjective fevers, leg swelling x 2 to 3 days  Imaging  6/30/2025: CT chest pulmonary angiogram negative for PE, widespread airspace opacification bilaterally  3/28/2025: Whole-body PET showing interval enlargement of left pulmonary lesions concerning for disease progression  9/18/2024: Echocardiogram LVEF 60 to 65%, grade 1 diastolic dysfunction, mild AI  WBC normal, afebrile  Procalcitonin elevated to 1.08, suggestive of bacterial infection  Initial O2 sat 89% on " room air, briefly was on 2 L O2, now back on RA  BNP mildly elevated to 187  Received a single dose of Lasix 40 mg IV on admission, did not need further diuretics  Urine strep and Legionella antigens neg  Echocardiogram showed intact EF of 60 to 65%, does show interval progression of aortic regurgitation which is now moderate compared to September 2021  Discussed case with pulmonary service along with patient's primary oncologist at [facility]. Patient symptoms and chronicity appears more consistent with gradual progression of his lung adenocarcinoma, consistent with prior PET scans in our CT scan. Patient is at risk for some drug pneumonitis from his oral chemo, but has limited other treatment options. Overall concern for severe pulmonary infection remains unlikely, but completing course of oral antibiotics is reasonable. At this point pursuing bronchoscopy appears low yield.  PLAN:  Empiric antibiotics of ceftriaxone and azithromycin given in the hospital. Will prescribe cefdinir and oral azithromycin for discharge.  Pulmonary consult appreciated  Hold Tagrisso, does have risk for drug pneumonitis and immunosuppression, discussed with patient's oncologist Dr. Estrada who recommends holding off on restarting at discharge until seen in oncology clinic  Prescribing Lasix 20mg PO daily PRN for leg swelling. Recommend ongoing outpatient evaluation if recurrent leg swelling which was quite modest on arrival. For now do not recommend daily scheduled diuretics. If using Lasix on a more regular basis, patient will need to have a basic metabolic panel checked in clinic to ensure no hypokalemia.    Lung adenocarcinoma  Follows through [facility] oncology  In December 2021  Left upper lobe primary, bilateral spread  Ongoing disease progression seen on latest whole-body PET in March  Has been maintained on oral chemotherapy with Tagrisso (Osimertinib) since February 2022  Hold Tagrisso as above    Additional  diagnoses:  Hypertension  Hyperlipidemia  Type 2 diabetes  Hypoglycemic overnight during hospitalization  Patient was recently restarted on insulin apparently due to hyperglycemia in context of prior prednisone use. Now becoming more hypoglycemic while no longer on steroids.  CKD 3  Creatinine 1.46 on admission  Baseline creatinine 1.5  PLAN:   Continue PTA medications  Jardiance  Antihypertensives  Hydralazine  Losartan  Toprol XL  Norvasc  Stop Lantus, recommend follow-up in clinic    Esteban Yan  used. Daughter present along with another daughter on the phone.      DISCHARGE MEDICATIONS       Medication List       PAUSE taking these medications     Tagrisso 80 mg tablet  Wait to take this until your doctor or other care provider tells you to start again.  Generic drug: osimertinib    START taking these medications     azithromycin 250 mg tablet  Commonly known as: ZITHROMAX  Take 1 tablet (250 mg) by mouth once daily for 4 days.    cefdinir 300 mg capsule  Commonly known as: OMNICEF  Take 1 capsule (300 mg) by mouth twice a day for 5 days.    CHANGE how you take these medications     Aspirin 81 mg Tab  1 Tab daily.    Objective           Vitals:  No vitals were obtained today due to virtual visit.    Physical Exam   GENERAL: alert and no distress  EYES: Eyes grossly normal to inspection.  No discharge or erythema, or obvious scleral/conjunctival abnormalities.  RESP: No audible wheeze, cough, or visible cyanosis.    SKIN: Visible skin clear. No significant rash, abnormal pigmentation or lesions.  NEURO: Cranial nerves grossly intact.  Mentation and speech appropriate for age.  PSYCH: Appropriate affect, tone, and pace of words      Video-Visit Details    Type of service:  Video Visit   Video End Time:12:01 PM  Originating Location (pt. Location): Other Parked car    Distant Location (provider location):  Off-site  Platform used for Video Visit: Danielle  Signed Electronically by: Kimberly Bell MD

## 2025-07-10 NOTE — TELEPHONE ENCOUNTER
New Medication Request    Contacts       Contact Date/Time Type Contact Phone/Fax    07/10/2025 03:40 PM CDT Phone (Incoming) Gwen Sen (Emergency Contact) [phone] (M)            What medication are you requesting?: Alcohol prep pads    Reason for medication request: Blood sugar testing     Have you taken this medication before?: Yes:    Controlled Substance Agreement on file:   CSA -- Patient Level:    CSA: None found at the patient level.         Patient offered an appointment? No    Preferred Pharmacy:    [id number] - [place], [place] - [address] [facility] [address]  [facility] [address]  Phone: [phone] Fax: [phone]      Could we send this information to you in [facility] or would you prefer to receive a phone call?:   Patient would prefer a phone call   Okay to leave a detailed message?: Yes at Cell number on file:    Telephone Information:   Mobile [phone]   Mobile [phone]

## 2025-07-10 NOTE — LETTER
7/10/2025      Wolf Sen  [address]  [place] [facility] [address]      Dear Colleague,    Thank you for referring your patient, Wolf Sen, to the [facility] [place]. Please see a copy of my visit note below.    [facility] Hematology / Oncology  Progress Note  Name: Wolf Sen  :  1950  MRN:  [id number]    --------------------    Subjective:  Wolf returns for follow-up of lung cancer accompanied by his daughter.  Since our last visit, Maxx was hospitalized for worsening cough and shortness of breath.  Fortunately, he seems to have improved with antibiotics during his hospitalization and  symptoms are getting better.    --------------------    Objective:  Video visit.    We reviewed CBC, CMP, TSH.  We reviewed serial CT including outside CT w/ independent review.    --------------------    Assessment / Plan:  Locally advanced, unresectable, left-sided lung adenocarcinoma:  # 2021 Presented w/ dyspnea, cough and multiple failed rounds of antibiotics for presumed pneumonia.  # Dec 2021 CT-guided lung biopsy w/ adenocarcinoma.  # 2022 Staging brain MRI and PET scan with bilateral lung involvement (CRISTO primary) and med/hilar adenopathy.  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  # 2022 Osimertinib - ongoing; [place] (CR minus a stable thoracic node).    Wolf, his daughter and I reviewed the 3 potential processes could be going on with recent symptoms and chest imaging.  Pneumonia is always possible and fortunately he has improved clinically with a course of antibiotics.  Drug-induced pneumonitis is possible; he has previously required steroids and we have a low threshold to restart.  Cancer progression is also a possibility as well.  To this end, given his age and performance status, we are trying to get as much mileage out of osimertinib as possible.  I do not see him is a great candidate for systemic platinum based therapy or amivantamab/lazertinib right now.  For  all of these reasons, we agreed to resume osimertinib and see him back in September as planned.  Planning brain MRI PRN symptoms given long-term stability.   used throughout our visit.    Patient Instructions   BJT 2 months w/ labs (CBC, CMP, TSH) and PET (already scheduled).    Miles Estrada MD.    Video Visit:  Wolf is a 75 year old male who is being evaluated via a billable video visit.  }    Video start time: 3:13 PM  Video end time: 3:30 PM  Provider location: [facility]  Patient location: Home  Mode of transmission:  Portal / [employer].      Again, thank you for allowing me to participate in the care of your patient.        Sincerely,        Miles Estrada MD    Electronically signed

## 2025-07-10 NOTE — PATIENT INSTRUCTIONS
At [facility], we strive to deliver an exceptional experience to you, every time we see you. If you receive a survey, please let us know what we are doing well and/or what we could improve upon, as we do value your feedback.  If you have MyChart, you can expect to receive results automatically within 24 hours of their completion.  Your provider will send a note interpreting your results as well.   If you do not have MyChart, you should receive your results in about a week by mail.    Your care team:                            Family Medicine Internal Medicine   MD Isidro Ventura, MD Belia Bowles, MD Madhu Mendieta, MD Mary Mcintyre, PALindsayC    Carlos Tomlinson, MD Pediatrics   Kimberly Bell, MD Vicki Wallace, MD Dominique Wallace, APRN CNP Jennifer Nguyen APRN CNP   MD Apoorva Montes, MD Bobbi Belcher, CNP     Tor Brown, CNP Same-Day Provider (No follow-up visits)   BERE Howard, DNP Jael Herzog, BERE Mejia, FNP, BC ALEJANDRA LeahyC     Clinic hours: Monday - Thursday 7 am-6 pm; Fridays 7 am-5 pm.   Urgent care: Monday - Friday 10 am- 8 pm; Saturday and Sunday 9 am-5 pm.    Clinic: [phone]       [facility]: Monday - Thursday 8 am - 7 pm; Friday 8 am - 6 pm  [facility]: [phone]

## 2025-07-10 NOTE — LETTER
7/10/2025      Wolf Sen  [address]  [place] [address]      Dear Colleague,    Thank you for referring your patient, Wolf Sen, to the [facility] [place]. Please see a copy of my visit note below.    [facility] Hematology / Oncology  Progress Note  Name: Wolf Sen  :  1950  MRN:  [id number]    --------------------    Subjective:  Wolf returns for follow-up of lung cancer accompanied by his daughter.  Since our last visit, Maxx was hospitalized for worsening cough and shortness of breath.  Fortunately, he seems to have improved with antibiotics during his hospitalization and  symptoms are getting better.    --------------------    Objective:  Video visit.    We reviewed CBC, CMP, TSH.  We reviewed serial CT including outside CT w/ independent review.    --------------------    Assessment / Plan:  Locally advanced, unresectable, left-sided lung adenocarcinoma:  # 2021 Presented w/ dyspnea, cough and multiple failed rounds of antibiotics for presumed pneumonia.  # Dec 2021 CT-guided lung biopsy w/ adenocarcinoma.  # 2022 Staging brain MRI and PET scan with bilateral lung involvement (CRISTO primary) and med/hilar adenopathy.  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  # 2022 Osimertinib - ongoing; [place] (CR minus a stable thoracic node).    Wolf, his daughter and I reviewed the 3 potential processes could be going on with recent symptoms and chest imaging.  Pneumonia is always possible and fortunately he has improved clinically with a course of antibiotics.  Drug-induced pneumonitis is possible; he has previously required steroids and we have a low threshold to restart.  Cancer progression is also a possibility as well.  To this end, given his age and performance status, we are trying to get as much mileage out of osimertinib as possible.  I do not see him is a great candidate for systemic platinum based therapy or amivantamab/lazertinib right now.  For  all of these reasons, we agreed to resume osimertinib and see him back in September as planned.  Planning brain MRI PRN symptoms given long-term stability.   used throughout our visit.    Patient Instructions   BJT 2 months w/ labs (CBC, CMP, TSH) and PET (already scheduled).    Miles Estrada MD.    Video Visit:  Wolf is a 75 year old male who is being evaluated via a billable video visit.  }    Video start time: 3:13 PM  Video end time: 3:30 PM  Provider location: [facility]  Patient location: Home  Mode of transmission:  Portal / [employer].      Again, thank you for allowing me to participate in the care of your patient.        Sincerely,        Miles Estrada MD    Electronically signed

## 2025-07-11 NOTE — PROGRESS NOTES
[facility] Hematology / Oncology  Progress Note  Name: Wolf Sen  :  1950  MRN:  [id number]    --------------------    Subjective:  Wolf returns for follow-up of lung cancer accompanied by his daughter.  Since our last visit, Maxx was hospitalized for worsening cough and shortness of breath.  Fortunately, he seems to have improved with antibiotics during his hospitalization and  symptoms are getting better.    --------------------    Objective:  Video visit.    We reviewed CBC, CMP, TSH.  We reviewed serial CT including outside CT w/ independent review.    --------------------    Assessment / Plan:  Locally advanced, unresectable, left-sided lung adenocarcinoma:  # 2021 Presented w/ dyspnea, cough and multiple failed rounds of antibiotics for presumed pneumonia.  # Dec 2021 CT-guided lung biopsy w/ adenocarcinoma.  # 2022 Staging brain MRI and PET scan with bilateral lung involvement (CRISTO primary) and med/hilar adenopathy.  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  # 2022 Osimertinib - ongoing; [place] (CR minus a stable thoracic node).    Wolf, his daughter and I reviewed the 3 potential processes could be going on with recent symptoms and chest imaging.  Pneumonia is always possible and fortunately he has improved clinically with a course of antibiotics.  Drug-induced pneumonitis is possible; he has previously required steroids and we have a low threshold to restart.  Cancer progression is also a possibility as well.  To this end, given his age and performance status, we are trying to get as much mileage out of osimertinib as possible.  I do not see him is a great candidate for systemic platinum based therapy or amivantamab/lazertinib right now.  For all of these reasons, we agreed to resume osimertinib and see him back in September as planned.  Planning brain MRI PRN symptoms given long-term stability.   used throughout our visit.    Patient Instructions   BJT 2 months w/  labs (CBC, CMP, TSH) and PET (already scheduled).    Miles Estrada MD.    Video Visit:  Wolf is a 75 year old male who is being evaluated via a billable video visit.  }    Video start time: 3:13 PM  Video end time: 3:30 PM  Provider location: [facility]  Patient location: Home  Mode of transmission:  Portal / [employer].

## 2025-07-15 DIAGNOSIS — C34.92 ADENOCARCINOMA, LUNG, LEFT (H): Primary | ICD-10-CM

## 2025-07-15 RX ORDER — PEN NEEDLE, DIABETIC 31 GX5/16"
1 NEEDLE, DISPOSABLE MISCELLANEOUS 4 TIMES DAILY PRN
Qty: 100 EACH | Refills: 1 | Status: SHIPPED | OUTPATIENT
Start: 2025-07-15

## 2025-07-15 NOTE — TELEPHONE ENCOUNTER
Lled and left a voicemail message that the alcohol pads were sent to the pharmacy.  Caterina Leary MA/  [facility]   Primary Care

## 2025-07-24 NOTE — PROGRESS NOTES
Medication Therapy Management (MTM) Encounter    ASSESSMENT:                            Medication Adherence/Access: See below for considerations.    1. Prior CVA  Controlled on aspirin and high intensity statin as indicated    2. Iron deficiency anemia  Patient is not taking iron due to misunderstanding. Recommend starting.    3. Adenocarcinoma  Stable. Plan in place with oncology    4. Type 2 diabetes  Uncontrolled. A1C is not at goal of <8% and fasting blood sugars are not at goal of <150 mg/dL. He is supposed to be taking Jardiance 25 mg daily but has not increased the dose yet. Recommend taking two 10 mg tablets to use up current supply, then switch to 25 mg tablets thereafter.     5. Hypertension   Uncontrolled. Patient is only taking hydralazine twice daily. Recommend increasing to three times daily as prescribed    6. GERD  Patient has chronic cough and providers are unsure of the etiology. Encouraged him to try omeprazole for 4 weeks as recommended to rule out GERD as the cause    PLAN:                            1. Increase hydralazine to three times daily  2. Start taking ferrous sulfate 325 mg once daily for your low iron  3. Increase Jardiance to two tablets daily until you switch to the new bottle of 25 mg tablets  4. Check your blood sugar 2 hours after meals so I can see how high they are going after you eat  5. Take omeprazole every day for 4 weeks to see if your cough improves. Take omeprazole 30 min before meals or 4 hours after. If cough does not improve, talk with Dr. Bell     Follow-up: Return in 5 weeks (on 8/29/2025) for Follow up, with me, in person.    SUBJECTIVE/OBJECTIVE:                          Wolf Sen is a 75 year old male seen for an initial visit. He was referred to me from Cedric Philippe MD.  (ID# [id number]) was used during today's visit. Pt was accompanied by his daughter Gwen.     Reason for visit: Medicare Annual MTM    Allergies/ADRs: Reviewed in chart  Past  Medical History: Reviewed in chart  Tobacco: He reports that he has never smoked. He has never been exposed to tobacco smoke. He has never used smokeless tobacco.  Alcohol: none    Medication Adherence/Access: Patient uses pill box(es).  Patient takes medications 2 time(s) per day.   Per patient, misses medication 0 time(s) per week.     Prior CVA  Aspirin 81 mg daily  Atorvastatin 40 mg daily    Denies abnormal bleeding/bruising or myalgias  No concerns today    Iron deficiency anemia   Ferrous sulfate 325 mg daily - not taking    Patient was not aware he should be taking iron  Never had it filled by his pharmacy   He will start taking it now    Adenocarcinoma  Tagrisso 80 mg daily     Follows with oncology  Has been on this regimen for several years   Gets this through [facility]  No concerns with cost  Denies concerns at this time  Diabetes   Type 2 Diabetes  Jardiance 10 mg daily    Med history:  Metformin - stopped in hospital  Lantus - stopped in hospital    Not taking insulin or metformin anymore  His blood sugars had been quite low while on them  Now just on Jardiance and blood sugars increasing again  Recently had Jardiance increased to 25 mg tab but he hasn't started that yet  Wanted to use up old bottle of 10 mg tabs first    Blood sugar monitoring: FB, 151, 219, 188, 142, 145, 171, 164, 171, 157, 188, 153, 154    Eye exam in the last 12 months? No  Foot exam: due  Hypertension   Drug induced cardiomyopathy  Amlodipine 7.5 mg daily  Losartan 100 mg daily  Metoprolol ER 25 mg daily  Hydralazine 25 mg three times daily - only taking twice daily     Med history:   Hydrochlorothiazide - stopped in February    Patient following closely with cardiology  He does not self-monitor blood pressure  Only taking hydralazine twice daily  He wasn't aware it is supposed to be three times daily   Blood pressure elevated today in clinic  He would like to try increasing hydralazine to three times daily as  prescribed   GERD    Omeprazole 20 mg daily     Pt has been coughing every night  Unclear cause  Was ordered omeprazole but never actually took it  He didn't understand what it was for  Still has cough  Would like to try taking it now     Today's Vitals:   BP (!) 156/66 (BP Location: Right arm, Patient Position: Sitting, Cuff Size: Adult Regular)   Pulse 63   SpO2 97%   ----------------  Post Discharge Medication Reconciliation Status: discharge medications reconciled and changed, per note/orders.    I spent 50 minutes with this patient today. All changes were made via collaborative practice agreement with Kimberly Bell MD. A copy of the visit note was provided to the patient's provider(s).    A summary of these recommendations was given to the patient.    Christoph Vera, PharmD, [facility]  Medication Therapy Management Provider  [phone]     Medication Therapy Recommendations  HTN, goal below 150/90   1 Current Medication: hydrALAZINE (APRESOLINE) 25 MG tablet   Current Medication Sig: Take 1 tablet (25 mg) by mouth 3 times daily.   Rationale: Does not understand instructions - Adherence - Adherence   Recommendation: Provide Education - hydrALAZINE 25 MG tablet - ferrous sulfate, omeprazole, and jardaince as well   Status: Patient Agreed - Adherence/Education   Identified Date: 7/25/2025 Completed Date: 7/29/2025

## 2025-07-25 ENCOUNTER — OFFICE VISIT (OUTPATIENT)
Dept: PHARMACY | Facility: CLINIC | Age: 75
End: 2025-07-25
Attending: FAMILY MEDICINE
Payer: COMMERCIAL

## 2025-07-25 VITALS — DIASTOLIC BLOOD PRESSURE: 66 MMHG | HEART RATE: 63 BPM | OXYGEN SATURATION: 97 % | SYSTOLIC BLOOD PRESSURE: 156 MMHG

## 2025-07-25 DIAGNOSIS — D50.0 IRON DEFICIENCY ANEMIA DUE TO CHRONIC BLOOD LOSS: ICD-10-CM

## 2025-07-25 DIAGNOSIS — E11.49 TYPE 2 DIABETES MELLITUS WITH NEUROLOGICAL MANIFESTATIONS, CONTROLLED (H): Primary | ICD-10-CM

## 2025-07-25 DIAGNOSIS — I63.9 CEREBRAL INFARCTION, UNSPECIFIED MECHANISM (H): ICD-10-CM

## 2025-07-25 DIAGNOSIS — R05.3 CHRONIC COUGH: ICD-10-CM

## 2025-07-25 DIAGNOSIS — I10 HTN, GOAL BELOW 150/90: ICD-10-CM

## 2025-07-25 DIAGNOSIS — C34.92 ADENOCARCINOMA, LUNG, LEFT (H): ICD-10-CM

## 2025-07-25 PROCEDURE — 3078F DIAST BP <80 MM HG: CPT | Performed by: PHARMACIST

## 2025-07-25 PROCEDURE — 99605 MTMS BY PHARM NP 15 MIN: CPT | Performed by: PHARMACIST

## 2025-07-25 PROCEDURE — 3077F SYST BP >= 140 MM HG: CPT | Performed by: PHARMACIST

## 2025-07-25 PROCEDURE — 1111F DSCHRG MED/CURRENT MED MERGE: CPT | Performed by: PHARMACIST

## 2025-07-25 PROCEDURE — 99607 MTMS BY PHARM ADDL 15 MIN: CPT | Performed by: PHARMACIST

## 2025-07-25 RX ORDER — FERROUS SULFATE 325(65) MG
325 TABLET ORAL
Qty: 90 TABLET | Refills: 3 | Status: SHIPPED | OUTPATIENT
Start: 2025-07-25

## 2025-07-25 RX ORDER — MULTIPLE VITAMINS W/ MINERALS TAB 9MG-400MCG
1 TAB ORAL DAILY
Qty: 90 TABLET | Refills: 3 | Status: SHIPPED | OUTPATIENT
Start: 2025-07-25

## 2025-07-25 NOTE — Clinical Note
Hello - please see MTM plan for details. Patient had quite a few adherence issues with meds (either taking things wrong or not taking them at all) so we got that straightened out today. Have follow-up next month to check back in

## 2025-07-25 NOTE — LETTER
"Recommended To-Do List      Prepared on: Jul 25, 2025       You can get the best results from your medications by completing the items on this \"To-Do List.\"      Bring your To-Do List when you go to your doctor. And, share it with your family or caregivers.    My To-Do List:  What we talked about: What I should do:   Hydralazine    Take one tablet three times daily for blood pressure           What we talked about: What I should do:   Ferrous sulfate   Take one tablet once every other day to help with low iron levels/anemia          What we talked about: What I should do:   Jardiance   Take two 10 mg tablets daily until your current bottle is gone. Then start new bottle of 25 mg tablets and take one tablet once daily          What we talked about: What I should do:   Omeprazole  Take this medication every day for at least 4 weeks to see if your cough improves. If it doesn't improve after those 4 weeks, follow-up with Dr. Bell          "

## 2025-07-25 NOTE — LETTER
_  Medication List        Prepared on: Jul 25, 2025     Bring your Medication List when you go to the doctor, hospital, or   emergency room. And, share it with your family or caregivers.     Note any changes to how you take your medications.  Cross out medications when you no longer use them.    Medication How I take it Why I use it Prescriber   acetaminophen (MAPAP) 500 MG tablet TAKE 2 TABLETS(1000 MG) BY MOUTH THREE TIMES DAILY AS NEEDED FOR PAIN Generalized Osteoarthritis Kimberly Bell MD   Alcohol Swabs (ALCOHOL PREP) PADS 1 Pad 4 times daily as needed (check glucose). Type 2 diabetes mellitus with neurological manifestations, controlled (H) Kimberly Bell MD   amLODIPine (NORVASC) 2.5 MG tablet Take 1 tablet (2.5 mg) by mouth daily. Take with 5 mg dose for a total of 7.5 mg daily HTN, goal below 150/90 Kimberly Bell MD   amLODIPine (NORVASC) 5 MG tablet Take 1 tablet (5 mg) by mouth daily. HTN, goal below 150/90 Kimberly Bell MD   ASPIRIN PO Take 81 mg by mouth  General health Patient Reported   atorvastatin (LIPITOR) 80 MG tablet Take 0.5 tablets (40 mg) by mouth daily. Prior stroke Kimberly Bell MD   blood glucose (ONETOUCH VERIO IQ) test strip USE TO TEST BLOOD SUGAR 1 TIMES DAILY OR AS DIRECTED Uncontrolled type 2 diabetes mellitus with stage 3 chronic kidney disease, with long-term current use of insulin (H) Kimberly Bell MD   empagliflozin (JARDIANCE) 25 MG TABS tablet Take 1 tablet (25 mg) by mouth daily. Type 2 diabetes mellitus with neurological manifestations, controlled (H); Stage 3 chronic kidney disease, unspecified whether stage 3a or 3b CKD (H) Kimberly Bell MD   ferrous sulfate (FEROSUL) 325 (65 Fe) MG tablet Take 1 tablet (325 mg) by mouth daily (with breakfast). Iron Deficiency Anemia due to Chronic Blood Loss Cedric Philippe MD   hydrALAZINE (APRESOLINE) 25 MG tablet Take 1 tablet (25 mg) by mouth 3 times daily. Benign essential HTN Kimberly Bell MD   losartan (COZAAR) 100 MG tablet  Take 1 tablet (100 mg) by mouth daily. Benign essential HTN Kimberly Bell MD   metoprolol succinate ER (TOPROL XL) 25 MG 24 hr tablet TAKE 1 TABLET(25 MG) BY MOUTH EVERY EVENING FOR BLOOD PRESSURE Benign essential HTN Kimberly Bell MD   multivitamin w/minerals (THERA-VIT-M) tablet Take 1 tablet by mouth daily. Type 2 diabetes mellitus with neurological manifestations, controlled (H) Cedric Philippe MD   Nutritional Supplements (GLUCERNA SHAKE) LIQD Take 1 Bottle by mouth daily. Adenocarcinoma, lung, left (H) Kimberly Bell MD   omeprazole (PRILOSEC) 20 MG DR capsule TAKE 1 CAPSULE(20 MG) BY MOUTH DAILY Acute cough Kimberly Bell MD   osimertinib (TAGRISSO) 80 MG tablet Take 1 tablet (80 mg) by mouth daily Adenocarcinoma, lung, left (H) Miles Estrada MD   thin (NO BRAND SPECIFIED) lancets Use with lanceting device. To accompany: Blood Glucose Monitor Brands: per insurance. Uncontrolled type 2 diabetes mellitus with stage 3 chronic kidney disease, with long-term current use of insulin (H) Kimberly Bell MD   triamcinolone (KENALOG) 0.1 % external ointment Apply topically 2 times daily. Dermatitis Kimberly Bell MD         Add new medications, over-the-counter drugs, herbals, vitamins, or  minerals in the blank rows below.    Medication How I take it Why I use it Prescriber                                      Allergies:      No Known Allergies        Side effects I have had:      No Known Side Effects        Other Information:              My notes and questions:

## 2025-07-25 NOTE — LETTER
July 29, 2025  Wolf Vazquezmiguel  [address]  [place] [facility] [address]    Dear Dudley Garcíajosey, MAYNOR [facility] [place]     Thank you for talking with me on Jul 25, 2025 about your health and medications. As a follow-up to our conversation, I have included two documents:      Your Recommended To-Do List has steps you should take to get the best results from your medications.  Your Medication List will help you keep track of your medications and how to take them.    If you want to talk about these documents, please call Christoph Vera RPH at phone: [phone], Monday-Friday 8-4:30pm.    I look forward to working with you and your doctors to make sure your medications work well for you.    Sincerely,  Christoph Vera RPH  [facility] Pharmacist, [facility]

## 2025-07-25 NOTE — PATIENT INSTRUCTIONS
"Recommendations from today's MTM visit:                                                    MTM (medication therapy management) is a service provided by a clinical pharmacist designed to help you get the most of out of your medicines.   Today we reviewed what your medicines are for, how to know if they are working, that your medicines are safe and how to make your medicine regimen as easy as possible.      1. Increase hydralazine to three times daily  2. Start taking ferrous sulfate 325 mg once daily for your low iron  3. Increase Jardiance to two tablets daily until you switch to the new bottle of 25 mg tablets  4. Check your blood sugar 2 hours after meals so I can see how high they are going after you eat  5. Take omeprazole every day for 4 weeks to see if your cough improves. Take omeprazole 30 min before meals or 4 hours after. If it does not improve, talk with Dr. Bell     Follow-up: 8/29 at 9:30 AM    It was great speaking with you today.  I value your experience and would be very thankful for your time in providing feedback in our clinic survey. In the next few days, you may receive an email or text message from [employer] with a link to a survey related to your  clinical pharmacist.\"     To schedule another MTM appointment, please call the clinic directly or you may call the MTM scheduling line at [phone] or toll-free at [phone].     My Clinical Pharmacist's contact information:                                                      Please feel free to contact me with any questions or concerns you have.      Christoph Vera, [place], [facility]  Phone: [phone]     "

## 2025-08-04 ENCOUNTER — PATIENT OUTREACH (OUTPATIENT)
Dept: GERIATRIC MEDICINE | Facility: CLINIC | Age: 75
End: 2025-08-04
Payer: COMMERCIAL

## 2025-08-12 ENCOUNTER — TELEPHONE (OUTPATIENT)
Dept: FAMILY MEDICINE | Facility: CLINIC | Age: 75
End: 2025-08-12
Payer: COMMERCIAL

## 2025-08-13 ENCOUNTER — MEDICAL CORRESPONDENCE (OUTPATIENT)
Dept: HEALTH INFORMATION MANAGEMENT | Facility: CLINIC | Age: 75
End: 2025-08-13
Payer: COMMERCIAL

## 2025-08-18 ENCOUNTER — PATIENT OUTREACH (OUTPATIENT)
Dept: GERIATRIC MEDICINE | Facility: CLINIC | Age: 75
End: 2025-08-18
Payer: COMMERCIAL

## 2025-08-21 ASSESSMENT — LIFESTYLE VARIABLES
SKIP TO QUESTIONS 9-10: 1
AUDIT-C TOTAL SCORE: 0

## 2025-08-26 DIAGNOSIS — I10 HTN, GOAL BELOW 150/90: ICD-10-CM

## 2025-08-28 RX ORDER — AMLODIPINE BESYLATE 2.5 MG/1
TABLET ORAL
Qty: 90 TABLET | Refills: 0 | Status: SHIPPED | OUTPATIENT
Start: 2025-08-28

## 2025-08-29 ENCOUNTER — OFFICE VISIT (OUTPATIENT)
Dept: PHARMACY | Facility: CLINIC | Age: 75
End: 2025-08-29
Payer: COMMERCIAL

## 2025-08-29 VITALS
HEART RATE: 76 BPM | BODY MASS INDEX: 22.57 KG/M2 | DIASTOLIC BLOOD PRESSURE: 75 MMHG | SYSTOLIC BLOOD PRESSURE: 145 MMHG | WEIGHT: 133 LBS | OXYGEN SATURATION: 95 %

## 2025-08-29 DIAGNOSIS — D50.0 IRON DEFICIENCY ANEMIA DUE TO CHRONIC BLOOD LOSS: ICD-10-CM

## 2025-08-29 DIAGNOSIS — L30.9 DERMATITIS: ICD-10-CM

## 2025-08-29 DIAGNOSIS — E11.49 TYPE 2 DIABETES MELLITUS WITH NEUROLOGICAL MANIFESTATIONS, CONTROLLED (H): Primary | ICD-10-CM

## 2025-08-29 DIAGNOSIS — I10 HTN, GOAL BELOW 150/90: ICD-10-CM

## 2025-08-29 DIAGNOSIS — R05.3 CHRONIC COUGH: ICD-10-CM

## 2025-08-29 PROCEDURE — 3077F SYST BP >= 140 MM HG: CPT | Performed by: PHARMACIST

## 2025-08-29 PROCEDURE — 99606 MTMS BY PHARM EST 15 MIN: CPT | Performed by: PHARMACIST

## 2025-08-29 PROCEDURE — 3078F DIAST BP <80 MM HG: CPT | Performed by: PHARMACIST

## 2025-08-29 PROCEDURE — 99607 MTMS BY PHARM ADDL 15 MIN: CPT | Performed by: PHARMACIST

## 2025-09-02 RX ORDER — TRIAMCINOLONE ACETONIDE 1 MG/G
OINTMENT TOPICAL 2 TIMES DAILY
Qty: 45 G | Refills: 0 | Status: SHIPPED | OUTPATIENT
Start: 2025-09-02

## (undated) DEVICE — 6-0 PLAIN GUT

## (undated) DEVICE — SU SILK 6-0 S-14DA 18" 1780G

## (undated) DEVICE — SOL WATER IRRIG 1000ML BOTTLE 07139-09

## (undated) DEVICE — SU SILK 4-0 P-3 8" 641G

## (undated) DEVICE — GLOVE PROTEXIS W/NEU-THERA 7.5  2D73TE75

## (undated) DEVICE — ESU PENCIL W/HOLSTER

## (undated) DEVICE — ESU ELEC NDL 1" COATED/INSULATED E1465

## (undated) DEVICE — BLADE KNIFE SURG 15 371115

## (undated) DEVICE — SYR 03ML LL W/O NDL

## (undated) DEVICE — PAD CHUX UNDERPAD 23X24" 7136

## (undated) DEVICE — NDL 30GA 0.5" 305106

## (undated) DEVICE — KIT ENDO FIRST STEP DISINFECTANT 200ML W/POUCH EP-4

## (undated) DEVICE — PACK MINOR EYE

## (undated) DEVICE — SU VICRYL 7-0 P-1 18" J488G

## (undated) DEVICE — MARKER SKIN DOUBLE TIP W/FLEXI-RULER W/LABELS

## (undated) DEVICE — ESU EYE HIGH TEMP 65410-183

## (undated) RX ORDER — FENTANYL CITRATE 50 UG/ML
INJECTION, SOLUTION INTRAMUSCULAR; INTRAVENOUS
Status: DISPENSED
Start: 2022-12-06

## (undated) RX ORDER — ACETAMINOPHEN 325 MG/1
TABLET ORAL
Status: DISPENSED
Start: 2017-10-09

## (undated) RX ORDER — FENTANYL CITRATE 50 UG/ML
INJECTION, SOLUTION INTRAMUSCULAR; INTRAVENOUS
Status: DISPENSED
Start: 2022-12-07

## (undated) RX ORDER — FENTANYL CITRATE 50 UG/ML
INJECTION, SOLUTION INTRAMUSCULAR; INTRAVENOUS
Status: DISPENSED
Start: 2017-10-09